# Patient Record
Sex: MALE | Race: WHITE | Employment: OTHER | URBAN - METROPOLITAN AREA
[De-identification: names, ages, dates, MRNs, and addresses within clinical notes are randomized per-mention and may not be internally consistent; named-entity substitution may affect disease eponyms.]

---

## 2017-06-15 PROBLEM — R00.0 TACHYCARDIA: Status: ACTIVE | Noted: 2017-06-15

## 2017-06-15 PROBLEM — K74.60 CIRRHOSIS (HCC): Status: ACTIVE | Noted: 2017-06-15

## 2017-06-15 PROBLEM — I42.9 CARDIOMYOPATHY (HCC): Status: ACTIVE | Noted: 2017-06-15

## 2017-06-15 PROBLEM — E87.1 HYPONATREMIA: Status: ACTIVE | Noted: 2017-06-15

## 2017-06-15 PROBLEM — F10.10 ALCOHOL ABUSE: Status: ACTIVE | Noted: 2017-06-15

## 2017-08-25 PROBLEM — L97.912 NON-PRESSURE CHRONIC ULCER OF RIGHT LOWER LEG WITH FAT LAYER EXPOSED (HCC): Status: ACTIVE | Noted: 2017-08-25

## 2018-02-14 PROBLEM — M17.12 PRIMARY OSTEOARTHRITIS OF LEFT KNEE: Status: ACTIVE | Noted: 2018-02-14

## 2018-04-03 LAB
ALBUMIN SERPL-MCNC: 3.7 G/DL
ALP BLD-CCNC: 57 U/L
ALT SERPL-CCNC: 10 U/L
ANION GAP SERPL CALCULATED.3IONS-SCNC: NORMAL MMOL/L
AST SERPL-CCNC: 14 U/L
AVERAGE GLUCOSE: NORMAL
BASOPHILS ABSOLUTE: 40 /ΜL
BASOPHILS RELATIVE PERCENT: 1 %
BILIRUB SERPL-MCNC: 0.6 MG/DL (ref 0.1–1.4)
BILIRUBIN, URINE: NEGATIVE
BLOOD, URINE: NORMAL
BUN BLDV-MCNC: 13 MG/DL
CALCIUM SERPL-MCNC: 8.9 MG/DL
CHLORIDE BLD-SCNC: 96 MMOL/L
CHOLESTEROL, TOTAL: 197 MG/DL
CHOLESTEROL/HDL RATIO: 5.3
CLARITY: CLEAR
CO2: 32 MMOL/L
COLOR: YELLOW
CREAT SERPL-MCNC: 1.37 MG/DL
EOSINOPHILS ABSOLUTE: 80 /ΜL
EOSINOPHILS RELATIVE PERCENT: 1 %
GFR CALCULATED: NORMAL
GLUCOSE BLD-MCNC: 115 MG/DL
GLUCOSE URINE: NEGATIVE
HBA1C MFR BLD: 5.9 %
HCT VFR BLD CALC: 37.1 % (ref 41–53)
HDLC SERPL-MCNC: 37 MG/DL (ref 35–70)
HEMOGLOBIN: 12.2 G/DL (ref 13.5–17.5)
KETONES, URINE: NEGATIVE
LDL CHOLESTEROL CALCULATED: 134 MG/DL (ref 0–160)
LEUKOCYTE ESTERASE, URINE: NEGATIVE
LYMPHOCYTES ABSOLUTE: 2050 /ΜL
LYMPHOCYTES RELATIVE PERCENT: 27 %
MCH RBC QN AUTO: 31.4 PG
MCHC RBC AUTO-ENTMCNC: 32.8 G/DL
MCV RBC AUTO: 95.8 FL
MONOCYTES ABSOLUTE: 430 /ΜL
MONOCYTES RELATIVE PERCENT: 6 %
NEUTROPHILS ABSOLUTE: 5070 /ΜL
NEUTROPHILS RELATIVE PERCENT: 66 %
NITRITE, URINE: NEGATIVE
PDW BLD-RTO: 14.6 %
PH UA: 7.5 (ref 4.5–8)
PLATELET # BLD: 276 K/ΜL
PMV BLD AUTO: 9.1 FL
POTASSIUM SERPL-SCNC: 4.1 MMOL/L
PROSTATE SPECIFIC ANTIGEN: 3.1 NG/ML
PROTEIN UA: NEGATIVE
RBC # BLD: 3.87 10^6/ΜL
SODIUM BLD-SCNC: 136 MMOL/L
SPECIFIC GRAVITY, URINE: 1.01
TOTAL PROTEIN: 6.2
TRIGL SERPL-MCNC: 134 MG/DL
TSH SERPL DL<=0.05 MIU/L-ACNC: 1.98 UIU/ML
URIC ACID: 8.3
UROBILINOGEN, URINE: NORMAL
VLDLC SERPL CALC-MCNC: NORMAL MG/DL
WBC # BLD: 7.7 10^3/ML

## 2018-04-24 ENCOUNTER — OFFICE VISIT (OUTPATIENT)
Dept: ORTHOPEDIC SURGERY | Age: 78
End: 2018-04-24
Payer: MEDICARE

## 2018-04-24 VITALS
HEART RATE: 64 BPM | BODY MASS INDEX: 34.07 KG/M2 | SYSTOLIC BLOOD PRESSURE: 123 MMHG | WEIGHT: 230 LBS | DIASTOLIC BLOOD PRESSURE: 66 MMHG | TEMPERATURE: 97.3 F | HEIGHT: 69 IN

## 2018-04-24 DIAGNOSIS — M17.12 PRIMARY OSTEOARTHRITIS OF LEFT KNEE: Primary | ICD-10-CM

## 2018-04-24 PROCEDURE — 99213 OFFICE O/P EST LOW 20 MIN: CPT | Performed by: ORTHOPAEDIC SURGERY

## 2018-04-24 RX ORDER — POTASSIUM CHLORIDE 20 MEQ/1
20 TABLET, EXTENDED RELEASE ORAL 2 TIMES DAILY
COMMUNITY
Start: 2018-04-23 | End: 2020-05-12 | Stop reason: SDUPTHER

## 2018-04-27 ENCOUNTER — OFFICE VISIT (OUTPATIENT)
Dept: PRIMARY CARE CLINIC | Age: 78
End: 2018-04-27
Payer: MEDICARE

## 2018-04-27 VITALS
DIASTOLIC BLOOD PRESSURE: 62 MMHG | BODY MASS INDEX: 36.22 KG/M2 | RESPIRATION RATE: 18 BRPM | HEIGHT: 68 IN | SYSTOLIC BLOOD PRESSURE: 118 MMHG | OXYGEN SATURATION: 98 % | HEART RATE: 65 BPM | TEMPERATURE: 98.1 F | WEIGHT: 239 LBS

## 2018-04-27 DIAGNOSIS — I42.0 DILATED CARDIOMYOPATHY (HCC): ICD-10-CM

## 2018-04-27 DIAGNOSIS — I10 ESSENTIAL HYPERTENSION: ICD-10-CM

## 2018-04-27 DIAGNOSIS — R18.8 OTHER ASCITES: ICD-10-CM

## 2018-04-27 DIAGNOSIS — E78.00 PURE HYPERCHOLESTEROLEMIA: ICD-10-CM

## 2018-04-27 DIAGNOSIS — M89.9 DISORDER OF BONE: ICD-10-CM

## 2018-04-27 DIAGNOSIS — K76.9 LIVER DISEASE: Primary | ICD-10-CM

## 2018-04-27 DIAGNOSIS — Z87.898 HISTORY OF ASCITES: ICD-10-CM

## 2018-04-27 DIAGNOSIS — I48.19 PERSISTENT ATRIAL FIBRILLATION (HCC): ICD-10-CM

## 2018-04-27 DIAGNOSIS — R53.83 FATIGUE, UNSPECIFIED TYPE: ICD-10-CM

## 2018-04-27 DIAGNOSIS — R73.09 ELEVATED HEMOGLOBIN A1C: ICD-10-CM

## 2018-04-27 DIAGNOSIS — K70.31 ALCOHOLIC CIRRHOSIS OF LIVER WITH ASCITES (HCC): ICD-10-CM

## 2018-04-27 DIAGNOSIS — M17.12 PRIMARY OSTEOARTHRITIS OF LEFT KNEE: ICD-10-CM

## 2018-04-27 PROCEDURE — 99214 OFFICE O/P EST MOD 30 MIN: CPT | Performed by: INTERNAL MEDICINE

## 2018-04-27 RX ORDER — LEVOTHYROXINE SODIUM 0.2 MG/1
200 TABLET ORAL DAILY
COMMUNITY
End: 2018-08-28 | Stop reason: SDUPTHER

## 2018-04-27 ASSESSMENT — ENCOUNTER SYMPTOMS
STRIDOR: 0
BLOOD IN STOOL: 0
DOUBLE VISION: 0
HEMOPTYSIS: 0
EYE REDNESS: 0
EYE PAIN: 0
BLURRED VISION: 0
DIARRHEA: 0
NAUSEA: 0
SHORTNESS OF BREATH: 0

## 2018-04-27 ASSESSMENT — PATIENT HEALTH QUESTIONNAIRE - PHQ9
SUM OF ALL RESPONSES TO PHQ9 QUESTIONS 1 & 2: 0
2. FEELING DOWN, DEPRESSED OR HOPELESS: 0
SUM OF ALL RESPONSES TO PHQ QUESTIONS 1-9: 0
1. LITTLE INTEREST OR PLEASURE IN DOING THINGS: 0

## 2018-07-22 ENCOUNTER — HOSPITAL ENCOUNTER (EMERGENCY)
Age: 78
Discharge: HOME | End: 2018-07-22
Attending: EMERGENCY MEDICINE
Payer: MEDICARE

## 2018-07-22 ENCOUNTER — APPOINTMENT (OUTPATIENT)
Dept: GENERAL RADIOLOGY | Age: 78
End: 2018-07-22
Payer: MEDICARE

## 2018-07-22 VITALS
OXYGEN SATURATION: 97 % | HEART RATE: 72 BPM | BODY MASS INDEX: 34.07 KG/M2 | HEIGHT: 69 IN | DIASTOLIC BLOOD PRESSURE: 66 MMHG | WEIGHT: 230 LBS | TEMPERATURE: 97.5 F | SYSTOLIC BLOOD PRESSURE: 152 MMHG | RESPIRATION RATE: 16 BRPM

## 2018-07-22 DIAGNOSIS — S63.501A SPRAIN OF RIGHT WRIST, INITIAL ENCOUNTER: Primary | ICD-10-CM

## 2018-07-22 PROCEDURE — 99283 EMERGENCY DEPT VISIT LOW MDM: CPT

## 2018-07-22 PROCEDURE — 73110 X-RAY EXAM OF WRIST: CPT

## 2018-07-22 PROCEDURE — 6360000002 HC RX W HCPCS: Performed by: EMERGENCY MEDICINE

## 2018-07-22 PROCEDURE — 96372 THER/PROPH/DIAG INJ SC/IM: CPT

## 2018-07-22 PROCEDURE — 6370000000 HC RX 637 (ALT 250 FOR IP): Performed by: EMERGENCY MEDICINE

## 2018-07-22 RX ORDER — OXYCODONE HYDROCHLORIDE AND ACETAMINOPHEN 5; 325 MG/1; MG/1
1 TABLET ORAL EVERY 6 HOURS PRN
Qty: 20 TABLET | Refills: 0 | Status: SHIPPED | OUTPATIENT
Start: 2018-07-22 | End: 2018-07-27

## 2018-07-22 RX ORDER — KETOROLAC TROMETHAMINE 30 MG/ML
30 INJECTION, SOLUTION INTRAMUSCULAR; INTRAVENOUS ONCE
Status: COMPLETED | OUTPATIENT
Start: 2018-07-22 | End: 2018-07-22

## 2018-07-22 RX ORDER — ACETAMINOPHEN 500 MG
1000 TABLET ORAL ONCE
Status: COMPLETED | OUTPATIENT
Start: 2018-07-22 | End: 2018-07-22

## 2018-07-22 RX ADMIN — ACETAMINOPHEN 1000 MG: 500 TABLET ORAL at 05:48

## 2018-07-22 RX ADMIN — KETOROLAC TROMETHAMINE 30 MG: 30 INJECTION, SOLUTION INTRAMUSCULAR at 05:49

## 2018-07-22 ASSESSMENT — PAIN DESCRIPTION - PAIN TYPE: TYPE: ACUTE PAIN

## 2018-07-22 ASSESSMENT — PAIN DESCRIPTION - LOCATION: LOCATION: WRIST

## 2018-07-22 ASSESSMENT — PAIN DESCRIPTION - ORIENTATION: ORIENTATION: RIGHT

## 2018-07-22 ASSESSMENT — PAIN SCALES - GENERAL: PAINLEVEL_OUTOF10: 8

## 2018-07-22 NOTE — ED PROVIDER NOTES
reviewed and negative    Physical brief exam: See HPI for other details  Details in electronic record may supersede details below    Vital signs reviewed, please refer to nurses note  Constitutionall: No distress, warm, dry, well nourished, nontoxic  HENT:  NC AT, no deformity, no bleeding of gums  Eyes:  EOMI, nl lids and conjunctiva   Resp:  normal resp rate, no resp distress, no stridor  CVS:  no JVD, no visible heave  GI:  no outward sign peritonitis or splinting, non distended  Musc-Skel:  full range of motion, (+) edema and ttp in right wrist, neurovascularly intact  Skin:  warm and dry, normal turgor, no rashes   Neurologic: awake and alert, cooperative, Cranial Nerves II-XII grossly intact, motor& sensory grossly intact x4   Psychiatric: orientated x3, answers questions appropriately, judgment & affect grossly appropriate  Heme/ Lymph: no visible adenopathy, ecchymosis, pettichiae    Procedures/Radiology:  Wrist xray negative as read by me, pt told that official radiology read is pending and he will be contacted if there is a positive read by radiology    ------------------------- NURSING NOTES AND VITALS REVIEWED ---------------------------  Date / Time Roomed:  7/22/2018  4:58 AM  ED Bed Assignment:  02/02    The nursing notes within the ED encounter and vital signs as below have been reviewed. Patient Vitals for the past 24 hrs:   BP Temp Temp src Pulse Resp SpO2 Height Weight   07/22/18 0512 (!) 152/66 97.5 °F (36.4 °C) Oral 72 16 97 % 5' 9\" (1.753 m) 230 lb (104.3 kg)       Oxygen Saturation Interpretation: Normal      Assessment and Plan: Negative wrist xray, will splint and give pain control, pt to f/u with pcp, given strict return precautions for any new or worsening symptoms      Impression: Right Wrist Sprain      I have reviewed the patient's medications, vital signs, and diagnostic studies available to me in the medical record at the time of the patient encounter.           Danilo Sutherland, MD  07/22/18 5596

## 2018-08-28 RX ORDER — LEVOTHYROXINE SODIUM 0.2 MG/1
200 TABLET ORAL DAILY
Qty: 90 TABLET | Refills: 1 | Status: SHIPPED | OUTPATIENT
Start: 2018-08-28 | End: 2019-02-26 | Stop reason: SDUPTHER

## 2018-09-10 RX ORDER — GABAPENTIN 100 MG/1
100 CAPSULE ORAL 3 TIMES DAILY
Qty: 270 CAPSULE | Refills: 0
Start: 2018-09-10 | End: 2018-09-11 | Stop reason: SDUPTHER

## 2018-09-11 RX ORDER — GABAPENTIN 100 MG/1
CAPSULE ORAL
Qty: 270 CAPSULE | Refills: 1 | Status: SHIPPED | OUTPATIENT
Start: 2018-09-11 | End: 2019-04-01 | Stop reason: SDUPTHER

## 2018-10-04 LAB
ALBUMIN SERPL-MCNC: 3.9 G/DL
ALP BLD-CCNC: 58 U/L
ALT SERPL-CCNC: 11 U/L
ANION GAP SERPL CALCULATED.3IONS-SCNC: 17 MMOL/L
AST SERPL-CCNC: 19 U/L
AVERAGE GLUCOSE: 111
BASOPHILS ABSOLUTE: 0.05 /ΜL
BASOPHILS RELATIVE PERCENT: 0.7 %
BILIRUB SERPL-MCNC: 0.4 MG/DL (ref 0.1–1.4)
BILIRUBIN, URINE: NEGATIVE
BLOOD, URINE: NEGATIVE
BUN BLDV-MCNC: 18 MG/DL
CALCIUM SERPL-MCNC: 9 MG/DL
CHLORIDE BLD-SCNC: 89 MMOL/L
CHOLESTEROL, TOTAL: 181 MG/DL
CHOLESTEROL/HDL RATIO: NORMAL
CLARITY: CLEAR
CO2: 25 MMOL/L
COLOR: YELLOW
CREAT SERPL-MCNC: 1.49 MG/DL
EOSINOPHILS ABSOLUTE: 0.07 /ΜL
EOSINOPHILS RELATIVE PERCENT: 0.9 %
GFR CALCULATED: NORMAL
GLUCOSE BLD-MCNC: 110 MG/DL
GLUCOSE URINE: NEGATIVE
HBA1C MFR BLD: 5.5 %
HCT VFR BLD CALC: 38.2 % (ref 41–53)
HDLC SERPL-MCNC: 37 MG/DL (ref 35–70)
HEMOGLOBIN: 13 G/DL (ref 13.5–17.5)
KETONES, URINE: NEGATIVE
LDL CHOLESTEROL CALCULATED: 112 MG/DL (ref 0–160)
LEUKOCYTE ESTERASE, URINE: NEGATIVE
LYMPHOCYTES ABSOLUTE: 2.84 /ΜL
LYMPHOCYTES RELATIVE PERCENT: 36.9 %
MCH RBC QN AUTO: 31.6 PG
MCHC RBC AUTO-ENTMCNC: 34 G/DL
MCV RBC AUTO: 92.7 FL
MONOCYTES ABSOLUTE: 0.66 /ΜL
MONOCYTES RELATIVE PERCENT: 8.6 %
NEUTROPHILS ABSOLUTE: 4.05 /ΜL
NEUTROPHILS RELATIVE PERCENT: 52.9 %
NITRITE, URINE: NEGATIVE
PDW BLD-RTO: ABNORMAL %
PH UA: 7 (ref 4.5–8)
PLATELET # BLD: 302 K/ΜL
PMV BLD AUTO: 10.6 FL
POTASSIUM SERPL-SCNC: 3.6 MMOL/L
PROTEIN UA: NEGATIVE
RBC # BLD: 4.12 10^6/ΜL
SODIUM BLD-SCNC: 131 MMOL/L
SPECIFIC GRAVITY, URINE: 1.01
TOTAL PROTEIN: 7.4
TRIGL SERPL-MCNC: 158 MG/DL
TSH SERPL DL<=0.05 MIU/L-ACNC: 0.79 UIU/ML
URIC ACID: 13.1
UROBILINOGEN, URINE: NORMAL
VITAMIN D 25-HYDROXY: NORMAL
VITAMIN D2, 25 HYDROXY: NORMAL
VITAMIN D3,25 HYDROXY: NORMAL
VLDLC SERPL CALC-MCNC: 32 MG/DL
WBC # BLD: 7.69 10^3/ML

## 2018-10-11 DIAGNOSIS — R73.09 ELEVATED HEMOGLOBIN A1C: ICD-10-CM

## 2018-10-11 DIAGNOSIS — R53.83 FATIGUE, UNSPECIFIED TYPE: ICD-10-CM

## 2018-10-11 DIAGNOSIS — R18.8 OTHER ASCITES: ICD-10-CM

## 2018-10-11 DIAGNOSIS — M89.9 DISORDER OF BONE: ICD-10-CM

## 2018-10-11 DIAGNOSIS — K76.9 LIVER DISEASE: ICD-10-CM

## 2018-10-11 DIAGNOSIS — I10 ESSENTIAL HYPERTENSION: ICD-10-CM

## 2018-10-11 DIAGNOSIS — I42.0 DILATED CARDIOMYOPATHY (HCC): ICD-10-CM

## 2018-10-11 DIAGNOSIS — E78.00 PURE HYPERCHOLESTEROLEMIA: ICD-10-CM

## 2018-10-25 ENCOUNTER — OFFICE VISIT (OUTPATIENT)
Dept: PRIMARY CARE CLINIC | Age: 78
End: 2018-10-25
Payer: MEDICARE

## 2018-10-25 VITALS
BODY MASS INDEX: 34.96 KG/M2 | DIASTOLIC BLOOD PRESSURE: 62 MMHG | HEIGHT: 69 IN | OXYGEN SATURATION: 98 % | HEART RATE: 64 BPM | TEMPERATURE: 98 F | WEIGHT: 236 LBS | SYSTOLIC BLOOD PRESSURE: 126 MMHG | RESPIRATION RATE: 18 BRPM

## 2018-10-25 DIAGNOSIS — N18.30 CKD (CHRONIC KIDNEY DISEASE) STAGE 3, GFR 30-59 ML/MIN (HCC): ICD-10-CM

## 2018-10-25 DIAGNOSIS — E78.00 PURE HYPERCHOLESTEROLEMIA: ICD-10-CM

## 2018-10-25 DIAGNOSIS — I10 ESSENTIAL HYPERTENSION: ICD-10-CM

## 2018-10-25 DIAGNOSIS — R53.83 FATIGUE, UNSPECIFIED TYPE: ICD-10-CM

## 2018-10-25 DIAGNOSIS — R73.01 ABNORMAL FASTING GLUCOSE: ICD-10-CM

## 2018-10-25 DIAGNOSIS — Z00.00 ROUTINE GENERAL MEDICAL EXAMINATION AT A HEALTH CARE FACILITY: Primary | ICD-10-CM

## 2018-10-25 DIAGNOSIS — R73.09 ELEVATED HEMOGLOBIN A1C: ICD-10-CM

## 2018-10-25 DIAGNOSIS — E55.9 VITAMIN D DEFICIENCY: ICD-10-CM

## 2018-10-25 DIAGNOSIS — Z87.898 HISTORY OF ASCITES: ICD-10-CM

## 2018-10-25 DIAGNOSIS — E79.0 HYPERURICEMIA: ICD-10-CM

## 2018-10-25 DIAGNOSIS — K70.31 ALCOHOLIC CIRRHOSIS OF LIVER WITH ASCITES (HCC): ICD-10-CM

## 2018-10-25 DIAGNOSIS — Z71.85 IMMUNIZATION COUNSELING: ICD-10-CM

## 2018-10-25 DIAGNOSIS — N40.0 PROSTATE ENLARGEMENT: ICD-10-CM

## 2018-10-25 DIAGNOSIS — I42.0 DILATED CARDIOMYOPATHY (HCC): ICD-10-CM

## 2018-10-25 DIAGNOSIS — I89.0 LYMPHEDEMA: ICD-10-CM

## 2018-10-25 DIAGNOSIS — Z12.11 ENCOUNTER FOR SCREENING COLONOSCOPY: ICD-10-CM

## 2018-10-25 DIAGNOSIS — G62.9 NEUROPATHY: ICD-10-CM

## 2018-10-25 DIAGNOSIS — R00.0 TACHYCARDIA: ICD-10-CM

## 2018-10-25 PROCEDURE — G0439 PPPS, SUBSEQ VISIT: HCPCS | Performed by: INTERNAL MEDICINE

## 2018-10-25 RX ORDER — THIAMINE MONONITRATE (VIT B1) 100 MG
100 TABLET ORAL DAILY
COMMUNITY

## 2018-10-25 RX ORDER — BUMETANIDE 2 MG/1
2 TABLET ORAL 3 TIMES DAILY
COMMUNITY
End: 2019-06-06 | Stop reason: ALTCHOICE

## 2018-10-25 ASSESSMENT — LIFESTYLE VARIABLES
HOW OFTEN DURING THE LAST YEAR HAVE YOU NEEDED AN ALCOHOLIC DRINK FIRST THING IN THE MORNING TO GET YOURSELF GOING AFTER A NIGHT OF HEAVY DRINKING: 0
HOW OFTEN DURING THE LAST YEAR HAVE YOU FOUND THAT YOU WERE NOT ABLE TO STOP DRINKING ONCE YOU HAD STARTED: 0
HOW OFTEN DO YOU HAVE A DRINK CONTAINING ALCOHOL: 1
HOW MANY STANDARD DRINKS CONTAINING ALCOHOL DO YOU HAVE ON A TYPICAL DAY: 0
AUDIT TOTAL SCORE: 5
HAVE YOU OR SOMEONE ELSE BEEN INJURED AS A RESULT OF YOUR DRINKING: 0
HOW OFTEN DURING THE LAST YEAR HAVE YOU FAILED TO DO WHAT WAS NORMALLY EXPECTED FROM YOU BECAUSE OF DRINKING: 0
HAS A RELATIVE, FRIEND, DOCTOR, OR ANOTHER HEALTH PROFESSIONAL EXPRESSED CONCERN ABOUT YOUR DRINKING OR SUGGESTED YOU CUT DOWN: 4
HOW OFTEN DURING THE LAST YEAR HAVE YOU HAD A FEELING OF GUILT OR REMORSE AFTER DRINKING: 0
HOW OFTEN DO YOU HAVE SIX OR MORE DRINKS ON ONE OCCASION: 0
AUDIT-C TOTAL SCORE: 1
HOW OFTEN DURING THE LAST YEAR HAVE YOU BEEN UNABLE TO REMEMBER WHAT HAPPENED THE NIGHT BEFORE BECAUSE YOU HAD BEEN DRINKING: 0

## 2018-10-25 NOTE — PROGRESS NOTES
Subsequent annual wellness visit for this 71-year-old gentleman last seen me on 19    We reviewed his past history current medication, and review of systems. Things are improving. He discussed the chronic conditions that he's been treated for which included the followin. 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 2.  Lymphedema, improved secondary to ascites, patient has discontinued drinking and is markedly improved    3. 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 Patient is no longer seeing cardiology    4. As noted above history of hepatic encephalopathy with liver failure and cirrhosis, was followed by Dr. Renaldo Moritz, had paracentesis on occasion. No longer following up with gastroenterology    5. History of chronic kidney disease stage III, continues to see nephrologist Dr. Adeline Ordonez, we'll see him in the coming week. Patient will bring along laboratory work that was performed on 10/4/18. We also reviewed that lab work today    6. Hypercholesterolemia, controlled    7. Essential hypertension, controlled    8. 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 Her graft 9 last colonoscopy on this patient was performed , I advised patient to see Dr. Darien Vargas for a 10 year follow-up, the patient states \"I'll think about it\". ,  Would not allow me to make a referral    9. Discussed immunizations with the patient, he refuses all of them    Plan;  Continue all current medications    Follow-up with nephrology next week Dr. Adeline Ordonez    Consider having colonoscopy, patient will notify us when he is ready to be seen     Dr. Michael Alex, orthopedic surgeon regarding knee pain, patient will make that call himself    Laboratory studies to be performed and see me in 6 months time.   He can combine the laboratory studies

## 2018-10-25 NOTE — PATIENT INSTRUCTIONS
Personalized Preventive Plan for Margaux Thomas - 10/25/2018  Medicare offers a range of preventive health benefits. Some of the tests and screenings are paid in full while other may be subject to a deductible, co-insurance, and/or copay. Some of these benefits include a comprehensive review of your medical history including lifestyle, illnesses that may run in your family, and various assessments and screenings as appropriate. After reviewing your medical record and screening and assessments performed today your provider may have ordered immunizations, labs, imaging, and/or referrals for you. A list of these orders (if applicable) as well as your Preventive Care list are included within your After Visit Summary for your review. Other Preventive Recommendations:    · A preventive eye exam performed by an eye specialist is recommended every 1-2 years to screen for glaucoma; cataracts, macular degeneration, and other eye disorders. · A preventive dental visit is recommended every 6 months. · Try to get at least 150 minutes of exercise per week or 10,000 steps per day on a pedometer . · Order or download the FREE \"Exercise & Physical Activity: Your Everyday Guide\" from [employer]. Call 3-360.602.6610 or search [employer] online. · You need 5000-1687 mg of calcium and 6841-4832 IU of vitamin D per day. It is possible to meet your calcium requirement with diet alone, but a vitamin D supplement is usually necessary to meet this goal.  · When exposed to the sun, use a sunscreen that protects against both UVA and UVB radiation with an SPF of 30 or greater. Reapply every 2 to 3 hours or after sweating, drying off with a towel, or swimming. · Always wear a seat belt when traveling in a car. Always wear a helmet when riding a bicycle or motorcycle.

## 2018-10-30 ENCOUNTER — OFFICE VISIT (OUTPATIENT)
Dept: ORTHOPEDIC SURGERY | Age: 78
End: 2018-10-30
Payer: MEDICARE

## 2018-10-30 VITALS
TEMPERATURE: 98.3 F | SYSTOLIC BLOOD PRESSURE: 122 MMHG | DIASTOLIC BLOOD PRESSURE: 69 MMHG | BODY MASS INDEX: 35.06 KG/M2 | WEIGHT: 234 LBS | HEART RATE: 63 BPM

## 2018-10-30 DIAGNOSIS — M17.12 PRIMARY OSTEOARTHRITIS OF LEFT KNEE: Primary | ICD-10-CM

## 2018-10-30 PROCEDURE — 20610 DRAIN/INJ JOINT/BURSA W/O US: CPT | Performed by: ORTHOPAEDIC SURGERY

## 2018-10-30 PROCEDURE — 99213 OFFICE O/P EST LOW 20 MIN: CPT | Performed by: ORTHOPAEDIC SURGERY

## 2018-10-30 RX ORDER — BUPIVACAINE HYDROCHLORIDE 2.5 MG/ML
3 INJECTION, SOLUTION INFILTRATION; PERINEURAL ONCE
Status: COMPLETED | OUTPATIENT
Start: 2018-10-30 | End: 2018-10-30

## 2018-10-30 RX ORDER — TRIAMCINOLONE ACETONIDE 40 MG/ML
80 INJECTION, SUSPENSION INTRA-ARTICULAR; INTRAMUSCULAR ONCE
Status: COMPLETED | OUTPATIENT
Start: 2018-10-30 | End: 2018-10-30

## 2018-10-30 RX ADMIN — BUPIVACAINE HYDROCHLORIDE 7.5 MG: 2.5 INJECTION, SOLUTION INFILTRATION; PERINEURAL at 14:04

## 2018-10-30 RX ADMIN — TRIAMCINOLONE ACETONIDE 80 MG: 40 INJECTION, SUSPENSION INTRA-ARTICULAR; INTRAMUSCULAR at 14:05

## 2018-10-31 NOTE — PROGRESS NOTES
Chief Complaint:   Chief Complaint   Patient presents with    Knee Pain     Lt knee pain, requesting injection. Last injection given 2/14/2018       Felisha Longoria  presents with recent recurrence of left knee pain, did very well for some time following the injection back in the spring. Denies right knee pain has no other joint complaints. Left knee pain occurs with weightbearing standing transfers and stairs. Stiffness noted but no mechanical locking or giving way. No other joint complaints no fever chills sweats or other constitutional symptoms    Allergies; medications; past medical, surgical, family, and social history; and problem list have been reviewed today and updated as indicated in this encounter seen below. Current Outpatient Prescriptions   Medication Sig Dispense Refill    bumetanide (BUMEX) 2 MG tablet Take 2 mg by mouth 3 times daily      vitamin B-1 (THIAMINE) 100 MG tablet Take 100 mg by mouth daily      gabapentin (NEURONTIN) 100 MG capsule TAKE ONE CAPSULE BY MOUTH THREE TIMES A  capsule 1    levothyroxine (SYNTHROID) 200 MCG tablet Take 1 tablet by mouth Daily 90 tablet 1    potassium chloride (KLOR-CON M) 20 MEQ extended release tablet Take 20 mEq by mouth 2 times daily       allopurinol (ZYLOPRIM) 100 MG tablet TAKE ONE TABLET BY MOUTH TWO TIMES A DAY  1    magnesium oxide (MAG-OX) 400 MG tablet Take 400 mg by mouth daily      metolazone (ZAROXOLYN) 2.5 MG tablet Take 2.5 mg by mouth once a week       HYDROcodone-acetaminophen (NORCO) 5-325 MG per tablet Take 1 tablet by mouth every 6 hours as needed for Pain .  metoprolol tartrate (LOPRESSOR) 50 MG tablet Take 1 tablet by mouth 2 times daily 60 tablet 3    MULTIPLE VITAMIN PO Take  by mouth daily.  Cyanocobalamin (VITAMIN B 12) 100 MCG LOZG Take 200 mcg by mouth. check frequency       No current facility-administered medications for this visit.         Exam: Leg lengths equal hip motion painless, right knee

## 2018-11-24 PROBLEM — Z12.11 ENCOUNTER FOR SCREENING COLONOSCOPY: Status: RESOLVED | Noted: 2018-10-25 | Resolved: 2018-11-24

## 2019-02-26 RX ORDER — LEVOTHYROXINE SODIUM 0.2 MG/1
200 TABLET ORAL DAILY
Qty: 90 TABLET | Refills: 0 | Status: SHIPPED | OUTPATIENT
Start: 2019-02-26 | End: 2019-05-31 | Stop reason: SDUPTHER

## 2019-04-01 RX ORDER — GABAPENTIN 100 MG/1
CAPSULE ORAL
Qty: 270 CAPSULE | Refills: 1 | Status: SHIPPED | OUTPATIENT
Start: 2019-04-01 | End: 2019-11-25 | Stop reason: SDUPTHER

## 2019-05-31 RX ORDER — LEVOTHYROXINE SODIUM 0.2 MG/1
200 TABLET ORAL DAILY
Qty: 90 TABLET | Refills: 1 | Status: SHIPPED | OUTPATIENT
Start: 2019-05-31 | End: 2019-11-25 | Stop reason: SDUPTHER

## 2019-06-06 ENCOUNTER — OFFICE VISIT (OUTPATIENT)
Dept: ORTHOPEDIC SURGERY | Age: 79
End: 2019-06-06
Payer: MEDICARE

## 2019-06-06 VITALS
TEMPERATURE: 97.2 F | BODY MASS INDEX: 35.55 KG/M2 | DIASTOLIC BLOOD PRESSURE: 73 MMHG | WEIGHT: 240 LBS | HEIGHT: 69 IN | HEART RATE: 69 BPM | SYSTOLIC BLOOD PRESSURE: 132 MMHG

## 2019-06-06 DIAGNOSIS — E66.9 OBESITY (BMI 30-39.9): ICD-10-CM

## 2019-06-06 DIAGNOSIS — M17.12 PRIMARY OSTEOARTHRITIS OF LEFT KNEE: Primary | ICD-10-CM

## 2019-06-06 PROCEDURE — 20610 DRAIN/INJ JOINT/BURSA W/O US: CPT | Performed by: ORTHOPAEDIC SURGERY

## 2019-06-06 PROCEDURE — 99213 OFFICE O/P EST LOW 20 MIN: CPT | Performed by: ORTHOPAEDIC SURGERY

## 2019-06-06 RX ORDER — TORSEMIDE 20 MG/1
40 TABLET ORAL 2 TIMES DAILY
COMMUNITY
Start: 2019-06-05

## 2019-06-06 RX ORDER — SPIRONOLACTONE 25 MG/1
25 TABLET ORAL DAILY
Refills: 5 | COMMUNITY
Start: 2019-04-23 | End: 2020-04-20

## 2019-06-06 RX ORDER — LIDOCAINE HYDROCHLORIDE 10 MG/ML
3 INJECTION, SOLUTION INFILTRATION; PERINEURAL ONCE
Status: COMPLETED | OUTPATIENT
Start: 2019-06-06 | End: 2019-06-07

## 2019-06-06 RX ORDER — TRIAMCINOLONE ACETONIDE 40 MG/ML
80 INJECTION, SUSPENSION INTRA-ARTICULAR; INTRAMUSCULAR ONCE
Status: COMPLETED | OUTPATIENT
Start: 2019-06-06 | End: 2019-06-07

## 2019-06-07 RX ADMIN — TRIAMCINOLONE ACETONIDE 80 MG: 40 INJECTION, SUSPENSION INTRA-ARTICULAR; INTRAMUSCULAR at 13:31

## 2019-06-07 RX ADMIN — LIDOCAINE HYDROCHLORIDE 3 ML: 10 INJECTION, SOLUTION INFILTRATION; PERINEURAL at 13:30

## 2019-06-07 NOTE — PROGRESS NOTES
Chief Complaint:   Chief Complaint   Patient presents with    Knee Pain     Lt knee pain. Pain improved for approx 2 months after injection then returned. Pain has since increased. States pain level 7/10       Tate Hubbard  presents with recurrence of anterior to medial left knee pain, temporarily relieved in the past with injections for variable periods of time. Pain is now more constant interfering significantly with transfers sit to stand as well as stairs and ambulation on level surfaces. No injury no history of mechanical problems although he does note stiffness especially after sitting. No fever chills sweats or other systemic symptoms. Patient recognizes that his weight is probably contributing to his discomfort and requests assistance with weight loss prior to considering more aggressive intervention such as surgery in the knee. Allergies; medications; past medical, surgical, family, and social history; and problem list have been reviewed today and updated as indicated in this encounter seen below. Exam: Upper extremities intact leg lengths equal hip motion past. Left knee shows synovitis no active effusion, non-correctable varus deformity with joint line tenderness to palpation, mild crepitus range of motion limited 5-95° of flexion by pain. No mediolateral or AP laxity. Radiographs: Prior x-rays of left knee are reviewed showing advanced degenerative disease of the left knee with varus deformity sclerosis and osteophyte formation. Jona Linn was seen today for knee pain.     Diagnoses and all orders for this visit:    Primary osteoarthritis of left knee  -     MT ARTHROCENTESIS ASPIR&/INJ MAJOR JT/BURSA W/O   -     [facility] - Henry Adame MD, Endocrinology, [place]    Obesity (BMI 30-39.9)  -     Derek Figueroa MD, Endocrinology, [place]    Other orders  -     triamcinolone acetonide (KENALOG-40) injection 80 mg  -     lidocaine 1 % injection 3 mL       Treatment options were reviewed, at this point the patient requests assistance with weight loss so referral was made to Dr. Racquel Marte in this regard, patient may pursue his own over-the-counter remedy such as nutri system in the meantime. At his request today repeated injection of Kenalog and lidocaine in the left knee through an anterior approach no difficulty, case tolerated well. Questions asked and answered follow-up in 4 months or as needed. Return in about 4 months (around 10/6/2019). Current Outpatient Medications   Medication Sig Dispense Refill    spironolactone (ALDACTONE) 25 MG tablet TAKE THREE (3) TABLETS BY MOUTH ONCE DAILY AS DIRECTED  5    torsemide (DEMADEX) 20 MG tablet Take 20 mg by mouth       levothyroxine (SYNTHROID) 200 MCG tablet Take 1 tablet by mouth Daily 90 tablet 1    gabapentin (NEURONTIN) 100 MG capsule TAKE ONE CAPSULE BY MOUTH THREE TIMES A  capsule 1    vitamin B-1 (THIAMINE) 100 MG tablet Take 100 mg by mouth daily      potassium chloride (KLOR-CON M) 20 MEQ extended release tablet Take 20 mEq by mouth 2 times daily       allopurinol (ZYLOPRIM) 100 MG tablet TAKE ONE TABLET BY MOUTH TWO TIMES A DAY  1    magnesium oxide (MAG-OX) 400 MG tablet Take 400 mg by mouth daily      metolazone (ZAROXOLYN) 2.5 MG tablet Take 2.5 mg by mouth once a week       metoprolol tartrate (LOPRESSOR) 50 MG tablet Take 1 tablet by mouth 2 times daily 60 tablet 3    MULTIPLE VITAMIN PO Take  by mouth daily.  HYDROcodone-acetaminophen (NORCO) 5-325 MG per tablet Take 1 tablet by mouth every 6 hours as needed for Pain .        Current Facility-Administered Medications   Medication Dose Route Frequency Provider Last Rate Last Dose    triamcinolone acetonide (KENALOG-40) injection 80 mg  80 mg Intra-articular Once Yanna Hardin MD        lidocaine 1 % injection 3 mL  3 mL Intra-articular Once Yanna Hardin MD           Patient Active Problem List   Diagnosis    Neuropathy ([address]    Ascites  Cirrhosis ([facility]    Tachycardia    Hyponatremia    Cardiomyopathy ([facility]    Alcohol abuse    Delirium due to multiple etiologies    Alcohol use disorder, severe, dependence (HCC)    Persistent atrial fibrillation (HCC)    Non-pressure chronic ulcer of right lower leg with fat layer exposed ([facility]    Primary osteoarthritis of left knee    Osteoarthritis    Lymphedema    Liver disease    Hypertension    Hyperlipidemia    History of ascites    Disorder of bone    Fatigue    Elevated hemoglobin A1c    Immunization counseling    CKD (chronic kidney disease) stage 3, GFR 30-59 ml/min (HCC)    Hyperuricemia    Prostate enlargement    Abnormal fasting glucose    Vitamin D deficiency       Past Medical History:   Diagnosis Date    Ascites     Blood circulation, collateral     CAD (coronary artery disease)     Chronic kidney disease     History of alcohol use     History of ascites     Hyperlipidemia     Hypertension     Hyponatremia     Liver disease     Lymphedema     Neuropathy     both feet, 5/10 in severity    Osteoarthritis     Psychiatric problem     Thyroid disease        Past Surgical History:   Procedure Laterality Date    APPENDECTOMY      COLONOSCOPY      EYE SURGERY      cataracts       Allergies   Allergen Reactions    Sulfa Antibiotics      Hives         Social History     Socioeconomic History    Marital status:      Spouse name: None    Number of children: None    Years of education: None    Highest education level: None   Occupational History    None   Social Needs    Financial resource strain: None    Food insecurity:     Worry: None     Inability: None    Transportation needs:     Medical: None     Non-medical: None   Tobacco Use    Smoking status: Former Smoker     Packs/day: 0.75     Years: 25.00     Pack years: 18.75     Types: Pipe, Cigars     Start date: 10/8/1964     Last attempt to quit: 1986     Years since quittin.2   

## 2019-08-22 ENCOUNTER — TELEPHONE (OUTPATIENT)
Dept: PRIMARY CARE CLINIC | Age: 79
End: 2019-08-22

## 2019-08-22 DIAGNOSIS — E55.9 VITAMIN D DEFICIENCY: ICD-10-CM

## 2019-08-22 DIAGNOSIS — I10 ESSENTIAL HYPERTENSION: ICD-10-CM

## 2019-08-22 DIAGNOSIS — I48.19 PERSISTENT ATRIAL FIBRILLATION (HCC): Primary | ICD-10-CM

## 2019-08-22 DIAGNOSIS — Z12.5 SCREENING FOR PROSTATE CANCER: ICD-10-CM

## 2019-08-22 DIAGNOSIS — E78.00 PURE HYPERCHOLESTEROLEMIA: ICD-10-CM

## 2019-08-22 DIAGNOSIS — R53.83 FATIGUE, UNSPECIFIED TYPE: ICD-10-CM

## 2019-08-22 DIAGNOSIS — N40.0 PROSTATE ENLARGEMENT: ICD-10-CM

## 2019-08-22 DIAGNOSIS — M89.9 DISORDER OF BONE: ICD-10-CM

## 2019-08-22 DIAGNOSIS — R73.09 ELEVATED HEMOGLOBIN A1C: ICD-10-CM

## 2019-09-04 ENCOUNTER — HOSPITAL ENCOUNTER (EMERGENCY)
Age: 79
Discharge: HOME | End: 2019-09-04
Attending: EMERGENCY MEDICINE
Payer: MEDICARE

## 2019-09-04 ENCOUNTER — OFFICE VISIT (OUTPATIENT)
Dept: PRIMARY CARE CLINIC | Age: 79
End: 2019-09-04
Payer: MEDICARE

## 2019-09-04 VITALS
OXYGEN SATURATION: 97 % | RESPIRATION RATE: 18 BRPM | HEIGHT: 68 IN | WEIGHT: 243 LBS | SYSTOLIC BLOOD PRESSURE: 130 MMHG | HEART RATE: 97 BPM | TEMPERATURE: 100.7 F | BODY MASS INDEX: 36.83 KG/M2 | DIASTOLIC BLOOD PRESSURE: 70 MMHG

## 2019-09-04 VITALS
HEART RATE: 97 BPM | WEIGHT: 240 LBS | BODY MASS INDEX: 36.37 KG/M2 | RESPIRATION RATE: 20 BRPM | SYSTOLIC BLOOD PRESSURE: 148 MMHG | HEIGHT: 68 IN | TEMPERATURE: 98.3 F | OXYGEN SATURATION: 97 % | DIASTOLIC BLOOD PRESSURE: 70 MMHG

## 2019-09-04 DIAGNOSIS — J02.9 VIRAL PHARYNGITIS: Primary | ICD-10-CM

## 2019-09-04 DIAGNOSIS — J02.9 PHARYNGITIS, UNSPECIFIED ETIOLOGY: Primary | ICD-10-CM

## 2019-09-04 DIAGNOSIS — R13.14 PHARYNGOESOPHAGEAL DYSPHAGIA: ICD-10-CM

## 2019-09-04 LAB — S PYO AG THROAT QL: NORMAL

## 2019-09-04 PROCEDURE — 96372 THER/PROPH/DIAG INJ SC/IM: CPT

## 2019-09-04 PROCEDURE — 99282 EMERGENCY DEPT VISIT SF MDM: CPT

## 2019-09-04 PROCEDURE — 6360000002 HC RX W HCPCS: Performed by: NURSE PRACTITIONER

## 2019-09-04 PROCEDURE — 6360000002 HC RX W HCPCS: Performed by: STUDENT IN AN ORGANIZED HEALTH CARE EDUCATION/TRAINING PROGRAM

## 2019-09-04 PROCEDURE — 87880 STREP A ASSAY W/OPTIC: CPT | Performed by: FAMILY MEDICINE

## 2019-09-04 PROCEDURE — 99213 OFFICE O/P EST LOW 20 MIN: CPT | Performed by: FAMILY MEDICINE

## 2019-09-04 RX ORDER — KETOROLAC TROMETHAMINE 30 MG/ML
15 INJECTION, SOLUTION INTRAMUSCULAR; INTRAVENOUS ONCE
Status: COMPLETED | OUTPATIENT
Start: 2019-09-04 | End: 2019-09-04

## 2019-09-04 RX ORDER — DEXAMETHASONE SODIUM PHOSPHATE 10 MG/ML
10 INJECTION INTRAMUSCULAR; INTRAVENOUS ONCE
Status: COMPLETED | OUTPATIENT
Start: 2019-09-04 | End: 2019-09-04

## 2019-09-04 RX ORDER — SIMVASTATIN 40 MG
TABLET ORAL
COMMUNITY
End: 2019-09-12 | Stop reason: SDUPTHER

## 2019-09-04 RX ORDER — KETOROLAC TROMETHAMINE 30 MG/ML
60 INJECTION, SOLUTION INTRAMUSCULAR; INTRAVENOUS ONCE
Status: DISCONTINUED | OUTPATIENT
Start: 2019-09-04 | End: 2019-09-04

## 2019-09-04 RX ORDER — DEXAMETHASONE SODIUM PHOSPHATE 10 MG/ML
10 INJECTION INTRAMUSCULAR; INTRAVENOUS ONCE
Status: DISCONTINUED | OUTPATIENT
Start: 2019-09-04 | End: 2019-09-04

## 2019-09-04 RX ADMIN — KETOROLAC TROMETHAMINE 15 MG: 30 INJECTION, SOLUTION INTRAMUSCULAR at 15:09

## 2019-09-04 RX ADMIN — DEXAMETHASONE SODIUM PHOSPHATE 10 MG: 10 INJECTION INTRAMUSCULAR; INTRAVENOUS at 15:05

## 2019-09-04 ASSESSMENT — PAIN DESCRIPTION - DESCRIPTORS: DESCRIPTORS: STABBING;SHARP

## 2019-09-04 ASSESSMENT — ENCOUNTER SYMPTOMS
STRIDOR: 0
CHOKING: 0
NAUSEA: 0
SORE THROAT: 1
BACK PAIN: 0
COUGH: 1
SINUS PRESSURE: 0
VOMITING: 0
DIARRHEA: 0
ABDOMINAL PAIN: 0
EYE PAIN: 0
FACIAL SWELLING: 0
ORTHOPNEA: 0
SHORTNESS OF BREATH: 0
SINUS PAIN: 0
TROUBLE SWALLOWING: 1
WHEEZING: 0
EYE REDNESS: 0
CONSTIPATION: 0
VOICE CHANGE: 0

## 2019-09-04 ASSESSMENT — PATIENT HEALTH QUESTIONNAIRE - PHQ9
1. LITTLE INTEREST OR PLEASURE IN DOING THINGS: 0
SUM OF ALL RESPONSES TO PHQ QUESTIONS 1-9: 0
SUM OF ALL RESPONSES TO PHQ QUESTIONS 1-9: 0
2. FEELING DOWN, DEPRESSED OR HOPELESS: 0
SUM OF ALL RESPONSES TO PHQ9 QUESTIONS 1 & 2: 0

## 2019-09-04 ASSESSMENT — PAIN DESCRIPTION - PAIN TYPE: TYPE: ACUTE PAIN

## 2019-09-04 ASSESSMENT — PAIN SCALES - GENERAL
PAINLEVEL_OUTOF10: 7
PAINLEVEL_OUTOF10: 7

## 2019-09-04 ASSESSMENT — PAIN DESCRIPTION - LOCATION: LOCATION: THROAT

## 2019-09-04 ASSESSMENT — PAIN DESCRIPTION - FREQUENCY: FREQUENCY: CONTINUOUS

## 2019-09-05 ENCOUNTER — HOSPITAL ENCOUNTER (OUTPATIENT)
Age: 79
Discharge: HOME | End: 2019-09-07
Payer: MEDICARE

## 2019-09-05 DIAGNOSIS — I10 ESSENTIAL HYPERTENSION: ICD-10-CM

## 2019-09-05 DIAGNOSIS — I48.19 PERSISTENT ATRIAL FIBRILLATION (HCC): ICD-10-CM

## 2019-09-05 DIAGNOSIS — M89.9 DISORDER OF BONE: ICD-10-CM

## 2019-09-05 DIAGNOSIS — E78.00 PURE HYPERCHOLESTEROLEMIA: ICD-10-CM

## 2019-09-05 DIAGNOSIS — N40.0 PROSTATE ENLARGEMENT: ICD-10-CM

## 2019-09-05 DIAGNOSIS — R53.83 FATIGUE, UNSPECIFIED TYPE: ICD-10-CM

## 2019-09-05 DIAGNOSIS — Z12.5 SCREENING FOR PROSTATE CANCER: ICD-10-CM

## 2019-09-05 DIAGNOSIS — R73.09 ELEVATED HEMOGLOBIN A1C: ICD-10-CM

## 2019-09-05 DIAGNOSIS — E55.9 VITAMIN D DEFICIENCY: ICD-10-CM

## 2019-09-05 LAB
ALBUMIN SERPL-MCNC: 3.7 G/DL (ref 3.5–5.2)
ALP BLD-CCNC: 62 U/L (ref 40–129)
ALT SERPL-CCNC: 15 U/L (ref 0–40)
ANION GAP SERPL CALCULATED.3IONS-SCNC: 18 MMOL/L (ref 7–16)
AST SERPL-CCNC: 18 U/L (ref 0–39)
BASOPHILS ABSOLUTE: 0.01 E9/L (ref 0–0.2)
BASOPHILS RELATIVE PERCENT: 0.1 % (ref 0–2)
BILIRUB SERPL-MCNC: 0.5 MG/DL (ref 0–1.2)
BUN BLDV-MCNC: 27 MG/DL (ref 8–23)
CALCIUM SERPL-MCNC: 8.8 MG/DL (ref 8.6–10.2)
CHLORIDE BLD-SCNC: 91 MMOL/L (ref 98–107)
CHOLESTEROL, TOTAL: 241 MG/DL (ref 0–199)
CO2: 23 MMOL/L (ref 22–29)
CREAT SERPL-MCNC: 1.7 MG/DL (ref 0.7–1.2)
EOSINOPHILS ABSOLUTE: 0 E9/L (ref 0.05–0.5)
EOSINOPHILS RELATIVE PERCENT: 0 % (ref 0–6)
GFR AFRICAN AMERICAN: 48
GFR NON-AFRICAN AMERICAN: 39 ML/MIN/1.73
GLUCOSE BLD-MCNC: 217 MG/DL (ref 74–99)
HBA1C MFR BLD: 5.9 % (ref 4–5.6)
HCT VFR BLD CALC: 40.8 % (ref 37–54)
HDLC SERPL-MCNC: 34 MG/DL
HEMOGLOBIN: 13.6 G/DL (ref 12.5–16.5)
IMMATURE GRANULOCYTES #: 0.1 E9/L
IMMATURE GRANULOCYTES %: 0.8 % (ref 0–5)
LDL CHOLESTEROL CALCULATED: 161 MG/DL (ref 0–99)
LYMPHOCYTES ABSOLUTE: 1.19 E9/L (ref 1.5–4)
LYMPHOCYTES RELATIVE PERCENT: 9.9 % (ref 20–42)
MCH RBC QN AUTO: 32.6 PG (ref 26–35)
MCHC RBC AUTO-ENTMCNC: 33.3 % (ref 32–34.5)
MCV RBC AUTO: 97.8 FL (ref 80–99.9)
MONOCYTES ABSOLUTE: 0.36 E9/L (ref 0.1–0.95)
MONOCYTES RELATIVE PERCENT: 3 % (ref 2–12)
NEUTROPHILS ABSOLUTE: 10.31 E9/L (ref 1.8–7.3)
NEUTROPHILS RELATIVE PERCENT: 86.2 % (ref 43–80)
PDW BLD-RTO: 13.4 FL (ref 11.5–15)
PLATELET # BLD: 313 E9/L (ref 130–450)
PMV BLD AUTO: 10.6 FL (ref 7–12)
POTASSIUM SERPL-SCNC: 3.7 MMOL/L (ref 3.5–5)
PROSTATE SPECIFIC ANTIGEN: 3.09 NG/ML (ref 0–4)
RBC # BLD: 4.17 E12/L (ref 3.8–5.8)
SODIUM BLD-SCNC: 132 MMOL/L (ref 132–146)
TOTAL PROTEIN: 7.7 G/DL (ref 6.4–8.3)
TRIGL SERPL-MCNC: 228 MG/DL (ref 0–149)
TSH SERPL DL<=0.05 MIU/L-ACNC: 1.21 UIU/ML (ref 0.27–4.2)
URIC ACID, SERUM: 13.7 MG/DL (ref 3.4–7)
VITAMIN D 25-HYDROXY: 54 NG/ML (ref 30–100)
VLDLC SERPL CALC-MCNC: 46 MG/DL
WBC # BLD: 12 E9/L (ref 4.5–11.5)

## 2019-09-05 PROCEDURE — 80061 LIPID PANEL: CPT

## 2019-09-05 PROCEDURE — 83036 HEMOGLOBIN GLYCOSYLATED A1C: CPT

## 2019-09-05 PROCEDURE — 85025 COMPLETE CBC W/AUTO DIFF WBC: CPT

## 2019-09-05 PROCEDURE — G0103 PSA SCREENING: HCPCS

## 2019-09-05 PROCEDURE — 82306 VITAMIN D 25 HYDROXY: CPT

## 2019-09-05 PROCEDURE — 84443 ASSAY THYROID STIM HORMONE: CPT

## 2019-09-05 PROCEDURE — 84550 ASSAY OF BLOOD/URIC ACID: CPT

## 2019-09-05 PROCEDURE — 80053 COMPREHEN METABOLIC PANEL: CPT

## 2019-09-07 ENCOUNTER — TELEPHONE (OUTPATIENT)
Dept: FAMILY MEDICINE CLINIC | Age: 79
End: 2019-09-07

## 2019-09-12 ENCOUNTER — OFFICE VISIT (OUTPATIENT)
Dept: PRIMARY CARE CLINIC | Age: 79
End: 2019-09-12
Payer: MEDICARE

## 2019-09-12 VITALS
TEMPERATURE: 98.5 F | HEIGHT: 67 IN | DIASTOLIC BLOOD PRESSURE: 68 MMHG | HEART RATE: 69 BPM | SYSTOLIC BLOOD PRESSURE: 118 MMHG | OXYGEN SATURATION: 98 % | RESPIRATION RATE: 16 BRPM | BODY MASS INDEX: 37.51 KG/M2 | WEIGHT: 239 LBS

## 2019-09-12 DIAGNOSIS — Z87.898 HISTORY OF ASCITES: ICD-10-CM

## 2019-09-12 DIAGNOSIS — E79.0 HYPERURICEMIA: ICD-10-CM

## 2019-09-12 DIAGNOSIS — F10.10 ALCOHOL ABUSE: ICD-10-CM

## 2019-09-12 DIAGNOSIS — J02.9 ACUTE PHARYNGITIS, UNSPECIFIED ETIOLOGY: ICD-10-CM

## 2019-09-12 DIAGNOSIS — K70.31 ALCOHOLIC CIRRHOSIS OF LIVER WITH ASCITES (HCC): Primary | ICD-10-CM

## 2019-09-12 DIAGNOSIS — E87.1 HYPONATREMIA: ICD-10-CM

## 2019-09-12 DIAGNOSIS — N18.30 CKD (CHRONIC KIDNEY DISEASE) STAGE 3, GFR 30-59 ML/MIN (HCC): ICD-10-CM

## 2019-09-12 DIAGNOSIS — G62.9 NEUROPATHY: ICD-10-CM

## 2019-09-12 DIAGNOSIS — R73.01 IMPAIRED FASTING GLUCOSE: ICD-10-CM

## 2019-09-12 DIAGNOSIS — I42.0 DILATED CARDIOMYOPATHY (HCC): ICD-10-CM

## 2019-09-12 DIAGNOSIS — M15.9 PRIMARY OSTEOARTHRITIS INVOLVING MULTIPLE JOINTS: ICD-10-CM

## 2019-09-12 DIAGNOSIS — I10 ESSENTIAL HYPERTENSION: ICD-10-CM

## 2019-09-12 DIAGNOSIS — E78.00 PURE HYPERCHOLESTEROLEMIA: ICD-10-CM

## 2019-09-12 DIAGNOSIS — E03.8 OTHER SPECIFIED HYPOTHYROIDISM: ICD-10-CM

## 2019-09-12 PROBLEM — R73.09 ELEVATED HEMOGLOBIN A1C: Status: RESOLVED | Noted: 2018-04-27 | Resolved: 2019-09-12

## 2019-09-12 PROBLEM — M89.9 DISORDER OF BONE: Status: RESOLVED | Noted: 2018-04-27 | Resolved: 2019-09-12

## 2019-09-12 PROBLEM — L97.912 NON-PRESSURE CHRONIC ULCER OF RIGHT LOWER LEG WITH FAT LAYER EXPOSED (HCC): Status: RESOLVED | Noted: 2017-08-25 | Resolved: 2019-09-12

## 2019-09-12 PROCEDURE — 99214 OFFICE O/P EST MOD 30 MIN: CPT | Performed by: INTERNAL MEDICINE

## 2019-09-12 RX ORDER — ALLOPURINOL 100 MG/1
100 TABLET ORAL DAILY
Qty: 30 TABLET | Refills: 5 | Status: SHIPPED | OUTPATIENT
Start: 2019-09-12 | End: 2019-12-26

## 2019-09-12 RX ORDER — AMOXICILLIN 500 MG/1
500 CAPSULE ORAL 2 TIMES DAILY
Qty: 14 CAPSULE | Refills: 0 | Status: SHIPPED | OUTPATIENT
Start: 2019-09-12 | End: 2019-09-19

## 2019-09-12 RX ORDER — SIMVASTATIN 40 MG
40 TABLET ORAL NIGHTLY
Qty: 30 TABLET | Refills: 5 | Status: SHIPPED
Start: 2019-09-12 | End: 2020-04-06 | Stop reason: SDUPTHER

## 2019-09-12 ASSESSMENT — ENCOUNTER SYMPTOMS
CONSTIPATION: 0
CHEST TIGHTNESS: 0
EYE PAIN: 0
VOMITING: 0
RHINORRHEA: 0
NAUSEA: 0
SHORTNESS OF BREATH: 0
BLOOD IN STOOL: 0
SORE THROAT: 1
COUGH: 0
ABDOMINAL PAIN: 0
DIARRHEA: 0

## 2019-10-01 ENCOUNTER — TELEPHONE (OUTPATIENT)
Dept: PRIMARY CARE CLINIC | Age: 79
End: 2019-10-01

## 2019-11-25 RX ORDER — GABAPENTIN 100 MG/1
CAPSULE ORAL
Qty: 270 CAPSULE | Refills: 0 | Status: SHIPPED
Start: 2019-11-25 | End: 2020-03-02 | Stop reason: SDUPTHER

## 2019-11-25 RX ORDER — LEVOTHYROXINE SODIUM 0.2 MG/1
200 TABLET ORAL DAILY
Qty: 90 TABLET | Refills: 0 | Status: SHIPPED
Start: 2019-11-25 | End: 2020-02-24 | Stop reason: SDUPTHER

## 2019-12-26 ENCOUNTER — OFFICE VISIT (OUTPATIENT)
Dept: PRIMARY CARE CLINIC | Age: 79
End: 2019-12-26
Payer: MEDICARE

## 2019-12-26 VITALS
OXYGEN SATURATION: 97 % | WEIGHT: 240 LBS | DIASTOLIC BLOOD PRESSURE: 58 MMHG | BODY MASS INDEX: 37.67 KG/M2 | HEIGHT: 67 IN | TEMPERATURE: 98 F | SYSTOLIC BLOOD PRESSURE: 98 MMHG | HEART RATE: 81 BPM | RESPIRATION RATE: 18 BRPM

## 2019-12-26 DIAGNOSIS — G62.9 NEUROPATHY: ICD-10-CM

## 2019-12-26 DIAGNOSIS — R73.01 IMPAIRED FASTING GLUCOSE: ICD-10-CM

## 2019-12-26 DIAGNOSIS — N18.30 CKD (CHRONIC KIDNEY DISEASE) STAGE 3, GFR 30-59 ML/MIN (HCC): ICD-10-CM

## 2019-12-26 DIAGNOSIS — E55.9 VITAMIN D DEFICIENCY: Primary | ICD-10-CM

## 2019-12-26 DIAGNOSIS — E78.00 PURE HYPERCHOLESTEROLEMIA: ICD-10-CM

## 2019-12-26 DIAGNOSIS — I89.0 LYMPHEDEMA: ICD-10-CM

## 2019-12-26 DIAGNOSIS — R53.83 FATIGUE, UNSPECIFIED TYPE: ICD-10-CM

## 2019-12-26 DIAGNOSIS — Z87.898 HISTORY OF ASCITES: ICD-10-CM

## 2019-12-26 DIAGNOSIS — K70.31 ALCOHOLIC CIRRHOSIS OF LIVER WITH ASCITES (HCC): ICD-10-CM

## 2019-12-26 DIAGNOSIS — M15.9 PRIMARY OSTEOARTHRITIS INVOLVING MULTIPLE JOINTS: ICD-10-CM

## 2019-12-26 DIAGNOSIS — I10 ESSENTIAL HYPERTENSION: ICD-10-CM

## 2019-12-26 DIAGNOSIS — F10.10 ALCOHOL ABUSE: ICD-10-CM

## 2019-12-26 DIAGNOSIS — E87.1 HYPONATREMIA: ICD-10-CM

## 2019-12-26 DIAGNOSIS — E79.0 HYPERURICEMIA: ICD-10-CM

## 2019-12-26 DIAGNOSIS — I42.0 DILATED CARDIOMYOPATHY (HCC): ICD-10-CM

## 2019-12-26 DIAGNOSIS — E03.8 OTHER SPECIFIED HYPOTHYROIDISM: ICD-10-CM

## 2019-12-26 PROBLEM — J02.9 ACUTE PHARYNGITIS: Status: RESOLVED | Noted: 2019-09-12 | Resolved: 2019-12-26

## 2019-12-26 PROCEDURE — 99214 OFFICE O/P EST MOD 30 MIN: CPT | Performed by: INTERNAL MEDICINE

## 2019-12-26 SDOH — ECONOMIC STABILITY: TRANSPORTATION INSECURITY
IN THE PAST 12 MONTHS, HAS LACK OF TRANSPORTATION KEPT YOU FROM MEETINGS, WORK, OR FROM GETTING THINGS NEEDED FOR DAILY LIVING?: PATIENT DECLINED

## 2019-12-26 SDOH — ECONOMIC STABILITY: TRANSPORTATION INSECURITY
IN THE PAST 12 MONTHS, HAS THE LACK OF TRANSPORTATION KEPT YOU FROM MEDICAL APPOINTMENTS OR FROM GETTING MEDICATIONS?: PATIENT DECLINED

## 2019-12-26 SDOH — ECONOMIC STABILITY: FOOD INSECURITY: WITHIN THE PAST 12 MONTHS, THE FOOD YOU BOUGHT JUST DIDN'T LAST AND YOU DIDN'T HAVE MONEY TO GET MORE.: PATIENT DECLINED

## 2019-12-26 SDOH — ECONOMIC STABILITY: FOOD INSECURITY: WITHIN THE PAST 12 MONTHS, YOU WORRIED THAT YOUR FOOD WOULD RUN OUT BEFORE YOU GOT MONEY TO BUY MORE.: PATIENT DECLINED

## 2019-12-26 SDOH — ECONOMIC STABILITY: INCOME INSECURITY: HOW HARD IS IT FOR YOU TO PAY FOR THE VERY BASICS LIKE FOOD, HOUSING, MEDICAL CARE, AND HEATING?: PATIENT DECLINED

## 2019-12-26 ASSESSMENT — ENCOUNTER SYMPTOMS
DIARRHEA: 0
CONSTIPATION: 0
COUGH: 0
VOMITING: 0
BLOOD IN STOOL: 0
SORE THROAT: 1
RHINORRHEA: 0
ABDOMINAL PAIN: 0
SHORTNESS OF BREATH: 0
CHEST TIGHTNESS: 0
EYE PAIN: 0
NAUSEA: 0

## 2020-01-22 ENCOUNTER — TELEPHONE (OUTPATIENT)
Dept: PRIMARY CARE CLINIC | Age: 80
End: 2020-01-22

## 2020-01-22 NOTE — TELEPHONE ENCOUNTER
Received fax from [facility] requesting medical records. Faxed this information to [facility] records.

## 2020-02-18 ENCOUNTER — OFFICE VISIT (OUTPATIENT)
Dept: ORTHOPEDIC SURGERY | Age: 80
End: 2020-02-18
Payer: MEDICARE

## 2020-02-18 VITALS
WEIGHT: 240 LBS | HEIGHT: 68 IN | DIASTOLIC BLOOD PRESSURE: 62 MMHG | SYSTOLIC BLOOD PRESSURE: 105 MMHG | TEMPERATURE: 97 F | HEART RATE: 77 BPM | BODY MASS INDEX: 36.37 KG/M2

## 2020-02-18 PROCEDURE — 99213 OFFICE O/P EST LOW 20 MIN: CPT | Performed by: ORTHOPAEDIC SURGERY

## 2020-02-18 NOTE — PROGRESS NOTES
weekends, and other underlying medical comorbidities as enumerated. He has not been exercising but does have a membership at the [facility], I encouraged him to do some pre-habilitation there while he considers knee arthroplasty, also discussed this with his primary care physician in terms of his medical risks for this elective procedure. Questions asked and answered follow-up as needed. - Counseling More Than 50% of the Appointment Time: 15 minutes    Return if symptoms worsen or fail to improve. Current Outpatient Medications   Medication Sig Dispense Refill    magnesium oxide (MAG-OX) 400 (241.3 Mg) MG TABS tablet TAKE ONE TABLET BY MOUTH DAILY      levothyroxine (SYNTHROID) 200 MCG tablet Take 1 tablet by mouth Daily 90 tablet 0    gabapentin (NEURONTIN) 100 MG capsule TAKE ONE CAPSULE BY MOUTH THREE TIMES A  capsule 0    simvastatin (ZOCOR) 40 MG tablet Take 1 tablet by mouth nightly 30 tablet 5    spironolactone (ALDACTONE) 25 MG tablet Take 25 mg by mouth daily   5    torsemide (DEMADEX) 20 MG tablet Take 2 tablets TID      vitamin B-1 (THIAMINE) 100 MG tablet Take 100 mg by mouth daily      potassium chloride (KLOR-CON M) 20 MEQ extended release tablet Take 20 mEq by mouth 2 times daily       metolazone (ZAROXOLYN) 2.5 MG tablet Take 2.5 mg by mouth once a week       metoprolol tartrate (LOPRESSOR) 50 MG tablet Take 1 tablet by mouth 2 times daily 60 tablet 3    MULTIPLE VITAMIN PO Take  by mouth daily.  HYDROcodone-acetaminophen (NORCO) 5-325 MG per tablet Take 1 tablet by mouth every 6 hours as needed for Pain . No current facility-administered medications for this visit.         Patient Active Problem List   Diagnosis    [facility]    Cirrhosis ([facility]    Tachycardia    Hyponatremia    Cardiomyopathy ([facility]    Alcohol abuse    Delirium due to multiple etiologies    Primary osteoarthritis of left knee    Osteoarthritis    Lymphedema    Hypertension   

## 2020-02-24 RX ORDER — LEVOTHYROXINE SODIUM 0.2 MG/1
200 TABLET ORAL DAILY
Qty: 90 TABLET | Refills: 0 | Status: SHIPPED
Start: 2020-02-24 | End: 2020-05-26 | Stop reason: SDUPTHER

## 2020-03-02 RX ORDER — GABAPENTIN 100 MG/1
CAPSULE ORAL
Qty: 270 CAPSULE | Refills: 0 | Status: SHIPPED
Start: 2020-03-02 | End: 2020-05-15 | Stop reason: SDUPTHER

## 2020-03-03 ENCOUNTER — TELEPHONE (OUTPATIENT)
Dept: ORTHOPEDIC SURGERY | Age: 80
End: 2020-03-03

## 2020-03-03 NOTE — TELEPHONE ENCOUNTER
Patient called stating he would like to schedule Lt TKA. First available date. Do you want pre-op visit for further discussion?

## 2020-03-04 ENCOUNTER — TELEPHONE (OUTPATIENT)
Dept: ORTHOPEDIC SURGERY | Age: 80
End: 2020-03-04

## 2020-03-04 ENCOUNTER — TELEPHONE (OUTPATIENT)
Dept: PRIMARY CARE CLINIC | Age: 80
End: 2020-03-04

## 2020-03-04 NOTE — TELEPHONE ENCOUNTER
3/04/2020 11:25 am Fax message to Dr. Glass Or, Nephrologist [phone]: [address] notification of plans for surgery.

## 2020-03-04 NOTE — TELEPHONE ENCOUNTER
Patient to have total left knee joint arthroplasty done 3/30/2020. Office is requesting letter of medical clearance. Patient was last seen in December. You do have a 15 minute slot tomorrow if patient needs seen. Please advise so I can contact patient if necessary. I could probably schedule him with one of other doctors if needed.

## 2020-03-05 ENCOUNTER — TELEPHONE (OUTPATIENT)
Dept: ORTHOPEDIC SURGERY | Age: 80
End: 2020-03-05

## 2020-03-05 ENCOUNTER — OFFICE VISIT (OUTPATIENT)
Dept: PRIMARY CARE CLINIC | Age: 80
End: 2020-03-05
Payer: MEDICARE

## 2020-03-05 VITALS
BODY MASS INDEX: 35.46 KG/M2 | RESPIRATION RATE: 18 BRPM | SYSTOLIC BLOOD PRESSURE: 112 MMHG | HEIGHT: 68 IN | OXYGEN SATURATION: 98 % | DIASTOLIC BLOOD PRESSURE: 64 MMHG | HEART RATE: 74 BPM | TEMPERATURE: 98.7 F | WEIGHT: 234 LBS

## 2020-03-05 PROBLEM — Z01.818 PREOP EXAM FOR INTERNAL MEDICINE: Status: ACTIVE | Noted: 2018-10-25

## 2020-03-05 PROBLEM — I48.92 ATRIAL FLUTTER (HCC): Status: ACTIVE | Noted: 2020-03-05

## 2020-03-05 PROCEDURE — 93000 ELECTROCARDIOGRAM COMPLETE: CPT | Performed by: INTERNAL MEDICINE

## 2020-03-05 PROCEDURE — 99214 OFFICE O/P EST MOD 30 MIN: CPT | Performed by: INTERNAL MEDICINE

## 2020-03-05 ASSESSMENT — ENCOUNTER SYMPTOMS
RHINORRHEA: 0
CHEST TIGHTNESS: 0
NAUSEA: 0
SORE THROAT: 1
DIARRHEA: 0
SHORTNESS OF BREATH: 0
BLOOD IN STOOL: 0
COUGH: 0
VOMITING: 0
CONSTIPATION: 0
ABDOMINAL PAIN: 0
EYE PAIN: 0

## 2020-03-05 NOTE — TELEPHONE ENCOUNTER
surgery. Due to patient's pre-existing medical problems he is considering a short stay in rehab    DME needs: Patient has a cane.  Patient will need a [address] and 3:1 commode    Post Op Appointment: Thursday, April 9 th at 2:15 pm

## 2020-03-05 NOTE — PROGRESS NOTES
[facility]) Physicians   Internal Medicine     3/5/2020  Susana Organ : 1940 Sex: male Age:79 y.o. Chief Complaint   Patient presents with    Pre-op Exam     Medical Clearance check-up        HPI:   Patient presents to office for review and management of chronic medical conditions. States to have knee replacement. Needs preop evaluation     States has hypothyroid. 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 Still drinks on weekends. Discussed cessation. On sprinolactone and metolazone. On torsemide - was taking 40mg three a day, was suggested to decrease torsemide to 40 mg twice a day, decrease metolazone to 1 every 10 days. Patient states was gaining fluid and increased torsemide back to 40mg three times a day. States has chronic kidney disease. Follows with nephrology     States has elevated uric acid. States was on allopurinol in the past, stopped due to thought was causing hives. States has hypertension, not checking blood pressure at home     States has high cholesterol. On simvastatin     States has cardiomyopathy. States due to alcohol. Did have brief atrial fibr. Declines to follow with cardiology. States has neuropathy. Possible due to alcohol. On gabapentin. Helping     Last lab shows elevated sugar. A1c borderline at 5.9     Has electrolyte abnormalities     States has osteoarthritis. States multiple joints. Follows with ortho for knee pain. 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 States will have another injection. Health Maintenance   - immunizations:   Influenza Vaccination - declines  Pneumonia Vaccination - declines   Zoster/Shingles Vaccine  Tetanus Vaccination    - Screenings:   Colonoscopy   Prostate     Couseled on Home Safety     ROS:  Review of Systems   Constitutional: Negative for appetite change, chills, fever and unexpected weight change. HENT: Positive for sore throat.  Negative for congestion and rhinorrhea. Eyes: Negative for pain and visual disturbance. Respiratory: Negative for cough, chest tightness and shortness of breath. Cardiovascular: Negative for chest pain and palpitations. Gastrointestinal: Negative for abdominal pain, blood in stool, constipation, diarrhea, nausea and vomiting. Genitourinary: Negative for difficulty urinating, dysuria, hematuria, scrotal swelling, testicular pain and urgency. Musculoskeletal: Negative for arthralgias and gait problem. Skin: Negative for rash. Neurological: Negative for dizziness, syncope, weakness, light-headedness and headaches. Hematological: Negative for adenopathy. Does not bruise/bleed easily. Psychiatric/Behavioral: Negative for suicidal ideas. The patient is not nervous/anxious. Current Outpatient Medications:     gabapentin (NEURONTIN) 100 MG capsule, TAKE ONE CAPSULE BY MOUTH THREE TIMES A DAY, Disp: 270 capsule, Rfl: 0    levothyroxine (SYNTHROID) 200 MCG tablet, Take 1 tablet by mouth Daily, Disp: 90 tablet, Rfl: 0    magnesium oxide (MAG-OX) 400 (241.3 Mg) MG TABS tablet, TAKE ONE TABLET BY MOUTH DAILY, Disp: , Rfl:     simvastatin (ZOCOR) 40 MG tablet, Take 1 tablet by mouth nightly, Disp: 30 tablet, Rfl: 5    spironolactone (ALDACTONE) 25 MG tablet, Take 25 mg by mouth daily , Disp: , Rfl: 5    torsemide (DEMADEX) 20 MG tablet, Take 2 tablets TID, Disp: , Rfl:     vitamin B-1 (THIAMINE) 100 MG tablet, Take 100 mg by mouth daily, Disp: , Rfl:     potassium chloride (KLOR-CON M) 20 MEQ extended release tablet, Take 20 mEq by mouth 2 times daily , Disp: , Rfl:     metolazone (ZAROXOLYN) 2.5 MG tablet, Take 2.5 mg by mouth once a week , Disp: , Rfl:     metoprolol tartrate (LOPRESSOR) 50 MG tablet, Take 1 tablet by mouth 2 times daily, Disp: 60 tablet, Rfl: 3    MULTIPLE VITAMIN PO, Take  by mouth daily. , Disp: , Rfl:     HYDROcodone-acetaminophen (NORCO) 5-325 MG per tablet, Take 1 tablet by mouth every 6 of clubs or organizations: None     Relationship status: None    Intimate partner violence:     Fear of current or ex partner: None     Emotionally abused: None     Physically abused: None     Forced sexual activity: None   Other Topics Concern    None   Social History Narrative    None        Vitals:    03/05/20 1543   BP: 112/64   Site: Left Upper Arm   Position: Sitting   Cuff Size: Large Adult   Pulse: 74   Resp: 18   Temp: 98.7 °F (37.1 °C)   TempSrc: Tympanic   SpO2: 98%   Weight: 234 lb (106.1 kg)   Height: 5' 7.5\" (1.715 m)       Exam:  Physical Exam  Constitutional:       Appearance: He is well-developed. HENT:      Head: Normocephalic and atraumatic. Right Ear: External ear normal.      Left Ear: External ear normal.      Nose: Rhinorrhea present. Mouth/Throat:      Pharynx: Posterior oropharyngeal erythema present. Eyes:      Pupils: Pupils are equal, round, and reactive to light. Neck:      Musculoskeletal: Normal range of motion and neck supple. Thyroid: No thyromegaly. Cardiovascular:      Rate and Rhythm: Normal rate. Rhythm irregular. Heart sounds: Murmur present. Systolic murmur present with a grade of 2/6. Pulmonary:      Effort: Pulmonary effort is normal.      Breath sounds: Normal breath sounds. No wheezing or rales. Abdominal:      General: Bowel sounds are normal.      Palpations: Abdomen is soft. Tenderness: There is no abdominal tenderness. There is no guarding or rebound. Musculoskeletal: Normal range of motion. Right lower leg: Edema present. Left lower leg: Edema present. Lymphadenopathy:      Cervical: No cervical adenopathy. Skin:     General: Skin is warm and dry. Neurological:      Mental Status: He is alert and oriented to person, place, and time. Cranial Nerves: No cranial nerve deficit.    Psychiatric:         Judgment: Judgment normal.         Assessment and Plan:    [address] [place] [address] was seen today for 6 month follow-up, hyperlipidemia, hypertension, hypothyroidism and health maintenance. Diagnoses and all orders for this visit:    - Procedure: total knee replacement     Preop exam for internal medicine  ACC/AHA  - Moderate MET, multiple risk factors   - EKG: atrial flutter and fib  - Revised cardiac index - 0 to 1- risk factors giving 1.0 to 1.3% risk   - h/o cardiomyopathy and atrial flutter and murmur - untreated   - intermediate risk from cardiovascular standpoint given above  - recommend referral to cardiology for assessment, echo poss stress     Pulmonary:  ARISCAT pulmonary risk 8.7% risk of complications - low risk   -  sleep apnea risk - elevated risk based on STOP bang - not tested for ANTOLIN.      American college of surgeons risk Calculator   - 3.3 to 5% % risk for any or serious complication   - overall average to slight above average risk risk     Medications:   recommend meds up to day of surgery   Hold aspirin and NSAIDs 1 week before procedure    Ok to proceed with procedure after cardiology assessment - intermediate risk    Atrial flutter ([facility]  - uncertain as to onset  - ekg from 2017 showed flutter   - not on anticoagulation   - has not seen cardiology   - recommend referral     Alcoholic cirrhosis of liver with ascites ([facility]  - Continue present treatment   - discussed and advised stopping alcohol altogether   - declines referral to GI   - on spironolactone   - on torsemide   - on metolazone     Hyponatremia  - Possible multifactorial (cirrhois and meds)   - last lab stable at 132 (12/2019)     Dilated cardiomyopathy ([facility]  - possible due to alcohol   - discussed and advised stopping alcohol altogether     CKD (chronic kidney disease) stage 3, GFR 30-59 ml/min ([facility])  - Continue present treatment   - following with nephrology   - on spirinolactone 25mg daily   - on metalozaone 2.5mg every 10 days   - on torsemide - taking was 40mg 3x per day - advised by renal to take 40mg 2x per day   - on potassium supplement   - follow labs - last (9/2019) - show slight worsening     Impaired fasting glucose  - Continue present treatment   - watch diet   - follow A1c - last was 5.9     Alcohol abuse  - discussed and advised stopping alcohol altogether     Essential hypertension  - Continue present treatment   - watch diet   - monitor blood pressure at home   - on spironolactone   - stable     Pure hypercholesterolemia  - Continue present treatment   - watch diet   - uncertain if taking simvastatin   - follow labs   - uncontrolled     History of ascites    Neuropathy ([address]  - Continue present treatment   - due to alcohol   - on gabapentin   - stable     Other specified hypothyroidism  - Continue present treatment   - on levothyroxine   - follow labs     Primary osteoarthritis involving multiple joints    Hyperuricemia  - Continue present treatment   - watch diet   - stopped allopurinol - thought possibly causing hives   - did not restart      Return for check up and review, as scheduled.     Orders Placed This Encounter   Procedures   Jenny Case MD, Cardiology, Ant     Referral Priority:   Urgent     Referral Type:   Eval and Treat     Referral Reason:   Specialty Services Required     Referred to Provider:   Honey Khalil MD     Requested Specialty:   Cardiology     Number of Visits Requested:   1    EKG 12 lead     Standing Status:   Future     Number of Occurrences:   1     Standing Expiration Date:   5/4/2020     Order Specific Question:   Reason for Exam?     Answer:   Hypertension       Requested Prescriptions      No prescriptions requested or ordered in this encounter        Amita Chris MD  3/5/2020  5:30 PM

## 2020-03-06 ENCOUNTER — TELEPHONE (OUTPATIENT)
Dept: ADMINISTRATIVE | Age: 80
End: 2020-03-06

## 2020-03-06 ENCOUNTER — TELEPHONE (OUTPATIENT)
Dept: PRIMARY CARE CLINIC | Age: 80
End: 2020-03-06

## 2020-03-06 NOTE — TELEPHONE ENCOUNTER
Scheduled patient to see Andreas Guillaume 3/12/2020 9AM.  He is aware of appointment. I have faxed referral office note and EKG reports to office.

## 2020-03-06 NOTE — TELEPHONE ENCOUNTER
Pt was referred by Dr. Fawn Gutierrez for cardiac clearance prior to surgery for total L knee replacement schedule on March 30. Pt was scheduled with Dr. Jocelyn Najera on 3/12/20 @ 9:00. Past cardiac hx form scanned.

## 2020-03-12 ENCOUNTER — TELEPHONE (OUTPATIENT)
Dept: CARDIOLOGY CLINIC | Age: 80
End: 2020-03-12

## 2020-03-12 ENCOUNTER — OFFICE VISIT (OUTPATIENT)
Dept: CARDIOLOGY CLINIC | Age: 80
End: 2020-03-12
Payer: MEDICARE

## 2020-03-12 VITALS
WEIGHT: 242.5 LBS | RESPIRATION RATE: 18 BRPM | BODY MASS INDEX: 38.06 KG/M2 | HEIGHT: 67 IN | HEART RATE: 71 BPM | SYSTOLIC BLOOD PRESSURE: 110 MMHG | DIASTOLIC BLOOD PRESSURE: 70 MMHG

## 2020-03-12 PROCEDURE — 99214 OFFICE O/P EST MOD 30 MIN: CPT | Performed by: INTERNAL MEDICINE

## 2020-03-12 PROCEDURE — 93000 ELECTROCARDIOGRAM COMPLETE: CPT | Performed by: INTERNAL MEDICINE

## 2020-03-12 NOTE — TELEPHONE ENCOUNTER
----- Message from Teresa Bridges MD sent at 3/12/2020  4:14 PM EDT -----  Please let pt know I reviewed his blood work and based on his kidney function I ordered him the lower dose of Xarelto which was sent to his pharmacy.

## 2020-03-12 NOTE — PROGRESS NOTES
OUTPATIENT CARDIOLOGY CONSULT    Name: Shannan Alarcon    Age: 70 y.o. Date of Service: 3/12/2020    Reason for Consultation: Cardiomyopathy, atrial fibrillation    Referring Physician: Naomy Saucedo MD    History of Present Illness:  Shannan Alarocn is a 70 y.o. male who presents today for further evaluation of prior history of cardiomyopathy and current atrial fibrillation. He has history of alcoholic cirrhosis, presumed alcoholic cardiomyopathy diagnosed in 2017 with an EF of 37% when seen by my partner Dr. [address] in the hospital.  He was also diagnosed with atrial fibrillation at the time, anticoagulation was recommended but the patient declined. He has not had cardiac follow-up since that time. He also has hypertension, CKD (follows with [facility]), and neuropathy. He is scheduled to have knee replacement surgery and had seen Dr. Edwin Edmondson recently, found to still be in A. fib and was referred here for further evaluation. When discussing with the patient today, he has no recollection of being told about atrial fibrillation or any weakened heart muscle, nor does he recall any discussion of anticoagulation. He presently reports some dyspnea on exertion with exercise, he is trying to go to the  to do some exercise before her scheduled knee surgery at the end of the month. He denies any chest pain. He has chronic edema of the lower extremities, for which he takes torsemide 40 mg 3 times daily along with daily metolazone, as prescribed by Dr. Sohail Hazel. He denies dizziness, lightheadedness, palpitations, syncope. When asked how much he is drinking says he does not keep track. States he only drinks on the weekends. Believes he was drinking more heavily back in 2017. Review of Systems:  Complete review of systems otherwise negative except as described above.     Past Medical History:  Past Medical History:   Diagnosis Date    Ascites     Blood circulation, collateral     CAD (coronary artery INR 1.2 06/19/2017    INR 1.2 06/16/2017    INR 1.2 06/15/2017    PROTIME 12.9 (H) 06/19/2017    PROTIME 13.7 (H) 06/16/2017    PROTIME 13.0 (H) 06/15/2017     Lab Results   Component Value Date    TSH 1.210 09/05/2019     Lab Results   Component Value Date    LABA1C 5.9 (H) 09/05/2019     No results found for: EAG  Lab Results   Component Value Date    CHOL 241 (H) 09/05/2019    CHOL 181 10/04/2018    CHOL 197 04/03/2018     Lab Results   Component Value Date    TRIG 228 (H) 09/05/2019    TRIG 158 10/04/2018    TRIG 134 04/03/2018     Lab Results   Component Value Date    HDL 34 09/05/2019    HDL 37 10/04/2018    HDL 37 04/03/2018     Lab Results   Component Value Date    LDLCALC 161 (H) 09/05/2019    LDLCALC 112 10/04/2018    LDLCALC 134 04/03/2018     Lab Results   Component Value Date    LABVLDL 46 09/05/2019    LABVLDL 17 05/13/2017    VLDL 32 10/04/2018     Lab Results   Component Value Date    CHOLHDLRATIO 5.3 04/03/2018    CHOLHDLRATIO 2.8 08/14/2017     No results for input(s): PROBNP in the last 72 hours. Cardiac Tests:  ECG: Atrial fibrillation 71 bpm.  Normal axis normal intervals. Anterior Q waves suggestive of prior MI. Echocardiogram: TTE 6/2017   Summary   Normal diastolic function for age   Ejection fraction biplane =37% (contrast)   LV mild diffuse hypokinesis. The left atrium is severely dilated. Possible atrial fibrillation   Structurally normal mitral valve. The aortic valve is trileaflet. The aortic valve appears mildly sclerotic. No pulmonary hypertension   No pericardial effusion. Stress test: None    Cardiac catheterization: None    Orders Placed This Encounter   Procedures    Stress test (Lexiscan)    EKG 12 lead    EKG 12 Lead    ECHO COMPLETE        Requested Prescriptions      No prescriptions requested or ordered in this encounter        Impression:  1. History of presumed alcoholic cardiomyopathy, EF 37% by echo 2017  2.  Longstanding persistent atrial

## 2020-03-13 ENCOUNTER — TELEPHONE (OUTPATIENT)
Dept: CARDIOLOGY | Age: 80
End: 2020-03-13

## 2020-03-13 NOTE — TELEPHONE ENCOUNTER
CALLED PATIENT AND LEFT MESSAGE TO SCHEDULE ECHO & MADISYN STRESS TEST.   Electronically signed by Joelle Bond on 3/13/2020 at 8:44 AM

## 2020-03-16 ENCOUNTER — TELEPHONE (OUTPATIENT)
Dept: ORTHOPEDIC SURGERY | Age: 80
End: 2020-03-16

## 2020-03-16 NOTE — TELEPHONE ENCOUNTER
3/16/2020 Felix Macdonald MA phoned Interventional Radiology [phone] / Willie Muñoz with and informed Nataliia Trujillo: Adiel Martinez has been cancelled (reason: coronavirus). Will need to reschedule CT knee when surgery has been rescheduled.

## 2020-03-16 NOTE — TELEPHONE ENCOUNTER
3/16/2020 12:20 pm Phoned patient [phone] / Vanessa Jeong with and informed patient [address] scheduled for March 30 th has been cancelled (reason: coronavirus). Patient informed: Surgery will be rescheduled when ban has been lifted. Patient expresses much disappointment but is in agreement with the plan.

## 2020-03-17 ENCOUNTER — TELEPHONE (OUTPATIENT)
Dept: CARDIOLOGY CLINIC | Age: 80
End: 2020-03-17

## 2020-03-18 RX ORDER — WARFARIN SODIUM 5 MG/1
5 TABLET ORAL DAILY
Qty: 30 TABLET | Refills: 5 | Status: SHIPPED
Start: 2020-03-18 | End: 2020-09-22 | Stop reason: SDUPTHER

## 2020-03-18 NOTE — TELEPHONE ENCOUNTER
MD Em Gilliland [facility]   Caller: Unspecified (Yesterday,  1:12 PM)             Can start warfarin 5 mg daily but he will have to follow up with anticoagulation clinic for dose adjustment and INR checks      Left message for patient to contact office.

## 2020-03-18 NOTE — TELEPHONE ENCOUNTER
Patient returned our call and was given instructions regarding Coumadin. He wishes to have Dr. Nrois Cade monitor this for him and will contact his office.

## 2020-03-25 ENCOUNTER — TELEPHONE (OUTPATIENT)
Dept: ORTHOPEDIC SURGERY | Age: 80
End: 2020-03-25

## 2020-03-25 NOTE — TELEPHONE ENCOUNTER
3/25/2020 9:44 am Fax message sent to Dr. Wojciech Owen, cardiologist [phone]: Notification of plans for surgery (new surgery date). Approval to hold Coumadin 5 days pre op (last dose June 16 th) will resume post op. Any recommendations?

## 2020-03-25 NOTE — TELEPHONE ENCOUNTER
Patient informed he will be getting a call from [facility] to schedule CT Scan Left Knee prior to surgery date of 6/22/2020 for Lt TKA (IVAN). Order pended in [facility] [address]

## 2020-03-26 ENCOUNTER — TELEPHONE (OUTPATIENT)
Dept: ORTHOPEDIC SURGERY | Age: 80
End: 2020-03-26

## 2020-03-26 NOTE — TELEPHONE ENCOUNTER
3/26/2020 1:36 pm Pre-Optimization Checklist faxed to [facility], nurse navigator, Kacey TILLMAN, [phone]. Informed of surgery date change.

## 2020-03-27 ENCOUNTER — TELEPHONE (OUTPATIENT)
Dept: CARDIOLOGY | Age: 80
End: 2020-03-27

## 2020-04-01 ENCOUNTER — TELEPHONE (OUTPATIENT)
Dept: CARDIOLOGY | Age: 80
End: 2020-04-01

## 2020-04-02 ENCOUNTER — HOSPITAL ENCOUNTER (OUTPATIENT)
Dept: CARDIOLOGY | Age: 80
Discharge: HOME | End: 2020-04-02
Payer: MEDICARE

## 2020-04-02 ENCOUNTER — APPOINTMENT (OUTPATIENT)
Dept: CARDIOLOGY | Age: 80
End: 2020-04-02
Payer: MEDICARE

## 2020-04-02 VITALS
TEMPERATURE: 97.6 F | WEIGHT: 240 LBS | BODY MASS INDEX: 37.67 KG/M2 | OXYGEN SATURATION: 96 % | SYSTOLIC BLOOD PRESSURE: 102 MMHG | DIASTOLIC BLOOD PRESSURE: 60 MMHG | HEART RATE: 76 BPM | HEIGHT: 67 IN

## 2020-04-02 PROCEDURE — 2580000003 HC RX 258: Performed by: INTERNAL MEDICINE

## 2020-04-02 PROCEDURE — 78452 HT MUSCLE IMAGE SPECT MULT: CPT

## 2020-04-02 PROCEDURE — 93017 CV STRESS TEST TRACING ONLY: CPT

## 2020-04-02 PROCEDURE — 3430000000 HC RX DIAGNOSTIC RADIOPHARMACEUTICAL: Performed by: INTERNAL MEDICINE

## 2020-04-02 PROCEDURE — A9502 TC99M TETROFOSMIN: HCPCS | Performed by: INTERNAL MEDICINE

## 2020-04-02 PROCEDURE — 6360000002 HC RX W HCPCS: Performed by: INTERNAL MEDICINE

## 2020-04-02 RX ORDER — SODIUM CHLORIDE 0.9 % (FLUSH) 0.9 %
10 SYRINGE (ML) INJECTION PRN
Status: DISCONTINUED | OUTPATIENT
Start: 2020-04-02 | End: 2020-04-03 | Stop reason: HOSPADM

## 2020-04-02 RX ADMIN — REGADENOSON 0.4 MG: 0.08 INJECTION, SOLUTION INTRAVENOUS at 11:08

## 2020-04-02 RX ADMIN — TETROFOSMIN 8.4 MILLICURIE: 0.23 INJECTION, POWDER, LYOPHILIZED, FOR SOLUTION INTRAVENOUS at 09:56

## 2020-04-02 RX ADMIN — Medication 10 ML: at 11:08

## 2020-04-02 RX ADMIN — TETROFOSMIN 31.3 MILLICURIE: 0.23 INJECTION, POWDER, LYOPHILIZED, FOR SOLUTION INTRAVENOUS at 11:09

## 2020-04-02 RX ADMIN — Medication 10 ML: at 09:56

## 2020-04-02 RX ADMIN — Medication 10 ML: at 11:05

## 2020-04-02 NOTE — PROCEDURES
motion. The gated SPECT stress imaging in the short, vertical long, and horizontal long axis demonstrated the following:    A moderate defect was present in the apex that was small sized by quantification. There also was a moderate defect present in the basal inferior, mid inferior and apical inferior wall(s) that was moderate sized by quantification:     The resting images reveal complete reversibility. Gated SPECT left ventricular ejection fraction was calculated to be 73%, with normal myocardial thickening and wall motion. Impression:    1. ECG during the infusion did not change. 2. The myocardial perfusion imaging was abnormal.  The abnormality was a moderate sized reversible defect in the entire inferior wall and apex suggestive of ischemia. 3. Overall left ventricular systolic function was normal without regional wall motion abnormalities. 4. Intermediate risk pre-operative pharmacologic stress test.    Thank you for sending your patient to this [employer].      Electronically signed by Neris Cornelius MD on 4/3/20 at 5:17 PM EDT

## 2020-04-04 PROBLEM — Z01.818 PREOP EXAM FOR INTERNAL MEDICINE: Status: RESOLVED | Noted: 2018-10-25 | Resolved: 2020-04-04

## 2020-04-06 ENCOUNTER — TELEPHONE (OUTPATIENT)
Dept: PRIMARY CARE CLINIC | Age: 80
End: 2020-04-06

## 2020-04-07 RX ORDER — SIMVASTATIN 40 MG
40 TABLET ORAL NIGHTLY
Qty: 30 TABLET | Refills: 1 | Status: SHIPPED
Start: 2020-04-07 | End: 2020-05-12 | Stop reason: HOSPADM

## 2020-04-08 ENCOUNTER — TELEPHONE (OUTPATIENT)
Dept: CARDIOLOGY CLINIC | Age: 80
End: 2020-04-08

## 2020-04-08 NOTE — TELEPHONE ENCOUNTER
Margarita Hernandez called back and was advised to follow up with his cardiologist. He agreed to do so.

## 2020-04-16 ENCOUNTER — VIRTUAL VISIT (OUTPATIENT)
Dept: CARDIOLOGY CLINIC | Age: 80
End: 2020-04-16
Payer: MEDICARE

## 2020-04-16 VITALS — WEIGHT: 232.5 LBS | BODY MASS INDEX: 35.24 KG/M2 | HEIGHT: 68 IN

## 2020-04-16 PROCEDURE — 99442 PR PHYS/QHP TELEPHONE EVALUATION 11-20 MIN: CPT | Performed by: INTERNAL MEDICINE

## 2020-04-16 NOTE — PROGRESS NOTES
controlled. Diagnosed 6/2017. [id number] Score at least 4. Started on warfarin 3/2020. 4. Probable CAD, with anterior Q waves suggesting prior MI, abnormal stress as above  5. Alcoholic cirrhosis with ongoing intermittent alcohol use  6. Hypertension  7. CKD stage II. Most recent creatinine at the [place] 1.9. Previously 1.49 at nephrology office 12/2019  8. Comorbid disease: Borderline diabetes A1c 5.9%, peripheral neuropathy, hyperlipidemia, osteoarthritis    Recommendations:  · Recommend left heart catheterization given abnormal stress test and upcoming surgery  · I would still like to have the echocardiogram done  · Recommend discontinuing spironolactone given his kidney function and postural dizziness  · Will touch base with Dr. Zamzam Corrales regarding plans for left heart cath and DC of spironolactone, will plan for early admission for several hours of IV hydration before his heart cath if Dr. Zamzam Corrales in agreement  · Recommend obtaining home blood pressure monitor  · Continue current cardiac medications otherwise  · Will consider DC cardioversion after his knee surgery since he will require several interruptions of anticoagulation  · Aggressive risk factor modification    Risks and benefits of heart catheterization explained, including risk of MI, stroke, bleeding, death, requiring emergency surgery, kidney failure. Possible outcomes include need for medical management, PCI, bypass explained to patient. He understands and agrees to proceed. AUC criteria:   Indication: 16; score 7        I affirm this is a Patient Initiated Episode with an Established Patient who has not had a related appointment within my department in the past 7 days or scheduled within the next 24 hours.     Total Time: minutes: 11-20 minutes    Note: not billable if this call serves to triage the patient into an appointment for the relevant concern      Justin Martinez

## 2020-04-17 ENCOUNTER — TELEPHONE (OUTPATIENT)
Dept: CARDIOLOGY CLINIC | Age: 80
End: 2020-04-17

## 2020-04-17 LAB — TSH SERPL DL<=0.05 MIU/L-ACNC: 0.39 UIU/ML

## 2020-04-17 NOTE — TELEPHONE ENCOUNTER
Patient states since he has been on coumadin, he is getting dizzy spells when he stands. He has stoppped taking the medication. Please advise.

## 2020-04-20 ENCOUNTER — TELEPHONE (OUTPATIENT)
Dept: CARDIOLOGY CLINIC | Age: 80
End: 2020-04-20

## 2020-04-20 ENCOUNTER — NURSE ONLY (OUTPATIENT)
Dept: CARDIOLOGY CLINIC | Age: 80
End: 2020-04-20

## 2020-04-20 VITALS
SYSTOLIC BLOOD PRESSURE: 102 MMHG | BODY MASS INDEX: 34.86 KG/M2 | DIASTOLIC BLOOD PRESSURE: 60 MMHG | WEIGHT: 230 LBS | HEIGHT: 68 IN | HEART RATE: 64 BPM

## 2020-04-20 NOTE — TELEPHONE ENCOUNTER
MD Georgina Osheah [facility]AN[facility]   Caller: Unspecified (Today,  1:41 PM)             Can reduced metoprolol tartrate to 25 bid and stop spironolactone   Dr Nikolai Zhong to follow up with pt regarding torsemide and metolazone   Needs bloodwork      Left message for patient to contact office.

## 2020-04-22 ENCOUNTER — TELEPHONE (OUTPATIENT)
Dept: PRIMARY CARE CLINIC | Age: 80
End: 2020-04-22

## 2020-04-23 ENCOUNTER — TELEPHONE (OUTPATIENT)
Dept: ORTHOPEDIC SURGERY | Age: 80
End: 2020-04-23

## 2020-05-01 ENCOUNTER — TELEPHONE (OUTPATIENT)
Dept: CARDIOLOGY CLINIC | Age: 80
End: 2020-05-01

## 2020-05-06 NOTE — TELEPHONE ENCOUNTER
MD Em Copeland [facility]   Caller: Unspecified (5 days ago,  3:46 PM)             Can stop coumadin after todays dose      Patient contacted with instructions to stop Coumadin after taking 5/6/2020 dose. Patient acknowledged understanding.

## 2020-05-07 ENCOUNTER — HOSPITAL ENCOUNTER (OUTPATIENT)
Age: 80
Discharge: HOME | End: 2020-05-09
Payer: MEDICARE

## 2020-05-07 PROCEDURE — U0003 INFECTIOUS AGENT DETECTION BY NUCLEIC ACID (DNA OR RNA); SEVERE ACUTE RESPIRATORY SYNDROME CORONAVIRUS 2 (SARS-COV-2) (CORONAVIRUS DISEASE [COVID-19]), AMPLIFIED PROBE TECHNIQUE, MAKING USE OF HIGH THROUGHPUT TECHNOLOGIES AS DESCRIBED BY CMS-2020-01-R: HCPCS

## 2020-05-10 LAB
SARS-COV-2: NOT DETECTED
SOURCE: NORMAL

## 2020-05-12 ENCOUNTER — HOSPITAL ENCOUNTER (OUTPATIENT)
Dept: CARDIAC CATH/INVASIVE PROCEDURES | Age: 80
Discharge: HOME | End: 2020-05-12
Payer: MEDICARE

## 2020-05-12 ENCOUNTER — HOSPITAL ENCOUNTER (OUTPATIENT)
Age: 80
Discharge: HOME | End: 2020-05-12
Payer: MEDICARE

## 2020-05-12 LAB
ABO/RH: NORMAL
ANION GAP SERPL CALCULATED.3IONS-SCNC: 13 MMOL/L (ref 7–16)
ANION GAP SERPL CALCULATED.3IONS-SCNC: 15 MMOL/L (ref 7–16)
ANTIBODY SCREEN: NORMAL
BASOPHILS ABSOLUTE: 0.02 E9/L (ref 0–0.2)
BASOPHILS RELATIVE PERCENT: 0.3 % (ref 0–2)
BUN BLDV-MCNC: 19 MG/DL (ref 8–23)
BUN BLDV-MCNC: 20 MG/DL (ref 8–23)
CALCIUM SERPL-MCNC: 8.5 MG/DL (ref 8.6–10.2)
CALCIUM SERPL-MCNC: 9.1 MG/DL (ref 8.6–10.2)
CHLORIDE BLD-SCNC: 93 MMOL/L (ref 98–107)
CHLORIDE BLD-SCNC: 94 MMOL/L (ref 98–107)
CO2: 25 MMOL/L (ref 22–29)
CO2: 28 MMOL/L (ref 22–29)
CREAT SERPL-MCNC: 1.2 MG/DL (ref 0.7–1.2)
CREAT SERPL-MCNC: 1.5 MG/DL (ref 0.7–1.2)
EOSINOPHILS ABSOLUTE: 0.09 E9/L (ref 0.05–0.5)
EOSINOPHILS RELATIVE PERCENT: 1.2 % (ref 0–6)
GFR AFRICAN AMERICAN: 55
GFR AFRICAN AMERICAN: >60
GFR NON-AFRICAN AMERICAN: 45 ML/MIN/1.73
GFR NON-AFRICAN AMERICAN: 59 ML/MIN/1.73
GLUCOSE BLD-MCNC: 110 MG/DL (ref 74–99)
GLUCOSE BLD-MCNC: 121 MG/DL (ref 74–99)
HCT VFR BLD CALC: 34.7 % (ref 37–54)
HEMOGLOBIN: 11.7 G/DL (ref 12.5–16.5)
IMMATURE GRANULOCYTES #: 0.03 E9/L
IMMATURE GRANULOCYTES %: 0.4 % (ref 0–5)
INR BLD: 1.3
LYMPHOCYTES ABSOLUTE: 2.41 E9/L (ref 1.5–4)
LYMPHOCYTES RELATIVE PERCENT: 31.1 % (ref 20–42)
MCH RBC QN AUTO: 31.1 PG (ref 26–35)
MCHC RBC AUTO-ENTMCNC: 33.7 % (ref 32–34.5)
MCV RBC AUTO: 92.3 FL (ref 80–99.9)
MONOCYTES ABSOLUTE: 0.62 E9/L (ref 0.1–0.95)
MONOCYTES RELATIVE PERCENT: 8 % (ref 2–12)
NEUTROPHILS ABSOLUTE: 4.59 E9/L (ref 1.8–7.3)
NEUTROPHILS RELATIVE PERCENT: 59 % (ref 43–80)
PDW BLD-RTO: 13.7 FL (ref 11.5–15)
PLATELET # BLD: 268 E9/L (ref 130–450)
PMV BLD AUTO: 9.7 FL (ref 7–12)
POTASSIUM SERPL-SCNC: 2.7 MMOL/L (ref 3.5–5)
POTASSIUM SERPL-SCNC: 2.9 MMOL/L (ref 3.5–5)
PROTHROMBIN TIME: 14.2 SEC (ref 9.3–12.4)
RBC # BLD: 3.76 E12/L (ref 3.8–5.8)
SODIUM BLD-SCNC: 132 MMOL/L (ref 132–146)
SODIUM BLD-SCNC: 136 MMOL/L (ref 132–146)
WBC # BLD: 7.8 E9/L (ref 4.5–11.5)

## 2020-05-12 PROCEDURE — 93454 CORONARY ARTERY ANGIO S&I: CPT | Performed by: INTERNAL MEDICINE

## 2020-05-12 PROCEDURE — 2709999900 HC NON-CHARGEABLE SUPPLY

## 2020-05-12 PROCEDURE — 86900 BLOOD TYPING SEROLOGIC ABO: CPT

## 2020-05-12 PROCEDURE — 80048 BASIC METABOLIC PNL TOTAL CA: CPT

## 2020-05-12 PROCEDURE — 85610 PROTHROMBIN TIME: CPT

## 2020-05-12 PROCEDURE — 2500000003 HC RX 250 WO HCPCS

## 2020-05-12 PROCEDURE — 36415 COLL VENOUS BLD VENIPUNCTURE: CPT

## 2020-05-12 PROCEDURE — C1725 CATH, TRANSLUMIN NON-LASER: HCPCS

## 2020-05-12 PROCEDURE — C1769 GUIDE WIRE: HCPCS

## 2020-05-12 PROCEDURE — 86901 BLOOD TYPING SEROLOGIC RH(D): CPT

## 2020-05-12 PROCEDURE — 6370000000 HC RX 637 (ALT 250 FOR IP): Performed by: INTERNAL MEDICINE

## 2020-05-12 PROCEDURE — C1894 INTRO/SHEATH, NON-LASER: HCPCS

## 2020-05-12 PROCEDURE — 6360000002 HC RX W HCPCS

## 2020-05-12 PROCEDURE — 86850 RBC ANTIBODY SCREEN: CPT

## 2020-05-12 PROCEDURE — 85025 COMPLETE CBC W/AUTO DIFF WBC: CPT

## 2020-05-12 RX ORDER — METOLAZONE 2.5 MG/1
2.5 TABLET ORAL WEEKLY
Qty: 90 TABLET | Refills: 1 | Status: SHIPPED
Start: 2020-05-15 | End: 2021-11-16 | Stop reason: SDUPTHER

## 2020-05-12 RX ORDER — ATORVASTATIN CALCIUM 10 MG/1
10 TABLET, FILM COATED ORAL DAILY
Qty: 30 TABLET | Refills: 3 | Status: SHIPPED | OUTPATIENT
Start: 2020-05-12 | End: 2020-05-14 | Stop reason: SDUPTHER

## 2020-05-12 RX ORDER — ACETAMINOPHEN 325 MG/1
650 TABLET ORAL EVERY 4 HOURS PRN
Status: CANCELLED | OUTPATIENT
Start: 2020-05-12

## 2020-05-12 RX ORDER — SODIUM CHLORIDE 0.9 % (FLUSH) 0.9 %
10 SYRINGE (ML) INJECTION EVERY 12 HOURS SCHEDULED
Status: CANCELLED | OUTPATIENT
Start: 2020-05-12

## 2020-05-12 RX ORDER — ASPIRIN 81 MG/1
81 TABLET ORAL DAILY
Qty: 30 TABLET | Refills: 3 | Status: SHIPPED | OUTPATIENT
Start: 2020-05-12 | End: 2020-09-08 | Stop reason: SDUPTHER

## 2020-05-12 RX ORDER — SODIUM CHLORIDE 0.9 % (FLUSH) 0.9 %
10 SYRINGE (ML) INJECTION PRN
Status: CANCELLED | OUTPATIENT
Start: 2020-05-12

## 2020-05-12 RX ORDER — POTASSIUM CHLORIDE 20 MEQ/1
20 TABLET, EXTENDED RELEASE ORAL 3 TIMES DAILY
Qty: 270 TABLET | Refills: 2 | Status: SHIPPED | OUTPATIENT
Start: 2020-05-12 | End: 2020-05-15 | Stop reason: DRUGHIGH

## 2020-05-12 RX ADMIN — POTASSIUM BICARBONATE 40 MEQ: 782 TABLET, EFFERVESCENT ORAL at 14:09

## 2020-05-12 RX ADMIN — POTASSIUM BICARBONATE 20 MEQ: 782 TABLET, EFFERVESCENT ORAL at 14:56

## 2020-05-12 RX ADMIN — POTASSIUM BICARBONATE 20 MEQ: 782 TABLET, EFFERVESCENT ORAL at 14:50

## 2020-05-12 NOTE — PROGRESS NOTES
Repeated potassium after replacement was 2.7, patient declined hospital admission to correct his potassium, risks explained, will give 40 mEq of potassium now then another 40 when he leaves, will check basic metabolic panel in a couple of days, will hold metolazone for the next couple of days, patient stated that he had an appointment with Dr. Nitza Vogt on Friday   will add aspirin, will change simvastatin to Lipitor given his CAD

## 2020-05-13 ENCOUNTER — TELEPHONE (OUTPATIENT)
Dept: CARDIOLOGY CLINIC | Age: 80
End: 2020-05-13

## 2020-05-14 RX ORDER — ATORVASTATIN CALCIUM 40 MG/1
40 TABLET, FILM COATED ORAL DAILY
Qty: 90 TABLET | Refills: 3 | Status: SHIPPED
Start: 2020-05-14 | End: 2021-05-10 | Stop reason: SDUPTHER

## 2020-05-14 NOTE — TELEPHONE ENCOUNTER
MD Em Trujillo [facility]   Caller: Unspecified (Yesterday,  1:23 PM)             Can resume coumadin right away. He may proceed with knee surgery understanding that he is at somewhat elevated risk because of the blockages that were seen on the heart cath.  I reviewed with films with 2 interventionalists, and agreed that medical therapy (in other words no stent or bypass surgery) is indicated for now since he is not having chest pain or exertional shortness of breath.  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  So the medical treatment is aspirin and cholesterol medication - we should increase his atorvastatin to 40. May proceed with surgery without further cardiac testing. If develops chest pain we can revisit the blockages to see if he may need stent but right now just medicine      Left message for patient to contact office.

## 2020-05-15 ENCOUNTER — OFFICE VISIT (OUTPATIENT)
Dept: FAMILY MEDICINE CLINIC | Age: 80
End: 2020-05-15
Payer: MEDICARE

## 2020-05-15 VITALS
TEMPERATURE: 97.8 F | BODY MASS INDEX: 36.98 KG/M2 | SYSTOLIC BLOOD PRESSURE: 106 MMHG | DIASTOLIC BLOOD PRESSURE: 60 MMHG | OXYGEN SATURATION: 98 % | HEIGHT: 68 IN | WEIGHT: 244 LBS | HEART RATE: 64 BPM

## 2020-05-15 PROCEDURE — 99214 OFFICE O/P EST MOD 30 MIN: CPT | Performed by: INTERNAL MEDICINE

## 2020-05-15 RX ORDER — POTASSIUM CHLORIDE 20 MEQ/1
20 TABLET, EXTENDED RELEASE ORAL 2 TIMES DAILY
Qty: 270 TABLET | Refills: 2 | Status: SHIPPED
Start: 2020-05-15 | End: 2021-08-06 | Stop reason: SDUPTHER

## 2020-05-15 RX ORDER — GABAPENTIN 100 MG/1
CAPSULE ORAL
Qty: 450 CAPSULE | Refills: 0 | Status: SHIPPED | OUTPATIENT
Start: 2020-05-15 | End: 2020-09-08 | Stop reason: SDUPTHER

## 2020-05-15 RX ORDER — SPIRONOLACTONE 25 MG/1
25 TABLET ORAL DAILY
Qty: 30 TABLET | Refills: 5
Start: 2020-05-15 | End: 2020-06-10 | Stop reason: SINTOL

## 2020-05-15 ASSESSMENT — ENCOUNTER SYMPTOMS
VOMITING: 0
SHORTNESS OF BREATH: 0
BLOOD IN STOOL: 0
CHEST TIGHTNESS: 0
CONSTIPATION: 0
EYE PAIN: 0
ABDOMINAL PAIN: 0
COUGH: 0
DIARRHEA: 0
RHINORRHEA: 0
NAUSEA: 0
SORE THROAT: 1

## 2020-05-15 ASSESSMENT — PATIENT HEALTH QUESTIONNAIRE - PHQ9
SUM OF ALL RESPONSES TO PHQ QUESTIONS 1-9: 0
2. FEELING DOWN, DEPRESSED OR HOPELESS: 0
1. LITTLE INTEREST OR PLEASURE IN DOING THINGS: 0
SUM OF ALL RESPONSES TO PHQ9 QUESTIONS 1 & 2: 0
SUM OF ALL RESPONSES TO PHQ QUESTIONS 1-9: 0

## 2020-05-15 NOTE — PROGRESS NOTES
[facility]   Internal Medicine     5/15/2020  Inocente Sapp : 1940 Sex: male Age:79 y.o. Chief Complaint   Patient presents with    3 Month Follow-Up     Heart catyh done 20    Hypertension        HPI:   Patient presents to office for review and management of chronic medical conditions.    - States has CAD. States had stress test (2020) The myocardial perfusion imaging was abnormal, moderate sized reversible defect , cardiac cath (2020) - 40% proximal LAD lesion, 50% mid LAD lesion at the takeoff of a large diagonal branch, 50% proximal left circumflex artery disease. Totally occluded right coronary artery with grade 3 left-to-right collaterals.  needs aggressive risk factor management. Now on metoprolol    -  has been getting dizzy after walking. States has hypothyroid. 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 Still drinks on weekends. Discussed cessation. On sprinolactone and metolazone. On torsemide 40 mg twice a day, decrease metolazone to 2.5mg  every 10 days. Patient states was gaining fluid and increased torsemide back to 40mg three times a day. States has chronic kidney disease. Follows with nephrology     States has elevated uric acid. States was on allopurinol in the past, stopped due to thought was causing hives.  has hypertension, not checking blood pressure at home     States has high cholesterol. Was on simvastatin - changed to atorvastatin. States has cardiomyopathy. States due to alcohol. Did have brief atrial fibr. Declines to follow with cardiology. States has neuropathy. Possible due to alcohol. On gabapentin. Helping asking for dose increase. Last lab shows elevated sugar. A1c borderline at 5.9     Has electrolyte abnormalities     States has osteoarthritis. States multiple joints. Follows with ortho for knee pain. 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   States will have another injection. Health Maintenance   - immunizations:   Influenza Vaccination - declines  Pneumonia Vaccination - declines   Zoster/Shingles Vaccine  Tetanus Vaccination    - Screenings:   Colonoscopy   Prostate     Stress test (4/2020) - The myocardial perfusion imaging was abnormal, moderate sized reversible defect in the entire inferior wall and apex suggestive of ischemia    echo (4/20) - ef 55-60%, mod l &r atiral dilation, mild to mod MR, indeterm diastolic dysfun    Couseled on Home Safety     ROS:  Review of Systems   Constitutional: Negative for appetite change, chills, fever and unexpected weight change. HENT: Positive for sore throat. Negative for congestion and rhinorrhea. Eyes: Negative for pain and visual disturbance. Respiratory: Negative for cough, chest tightness and shortness of breath. Cardiovascular: Negative for chest pain and palpitations. Gastrointestinal: Negative for abdominal pain, blood in stool, constipation, diarrhea, nausea and vomiting. Genitourinary: Negative for difficulty urinating, dysuria, hematuria, scrotal swelling, testicular pain and urgency. Musculoskeletal: Negative for arthralgias and gait problem. Skin: Negative for rash. Neurological: Negative for dizziness, syncope, weakness, light-headedness and headaches. Hematological: Negative for adenopathy. Does not bruise/bleed easily. Psychiatric/Behavioral: Negative for suicidal ideas. The patient is not nervous/anxious.           Current Outpatient Medications:     potassium chloride (KLOR-CON M) 20 MEQ extended release tablet, Take 1 tablet by mouth 2 times daily, Disp: 270 tablet, Rfl: 2    spironolactone (ALDACTONE) 25 MG tablet, Take 1 tablet by mouth daily, Disp: 30 tablet, Rfl: 5    gabapentin (NEURONTIN) 100 MG capsule, 200mg in am, 100mg at noon and 200mg in pm, Disp: 450 capsule, Rfl: 0    atorvastatin (LIPITOR) 40 MG tablet, Take 1 tablet by mouth daily, Disp: 90 tablet, Raisa Rutledge MD  5/15/2020  4:21 PM

## 2020-05-21 ENCOUNTER — TELEPHONE (OUTPATIENT)
Dept: CARDIOLOGY CLINIC | Age: 80
End: 2020-05-21

## 2020-05-22 ENCOUNTER — ANTI-COAG VISIT (OUTPATIENT)
Dept: FAMILY MEDICINE CLINIC | Age: 80
End: 2020-05-22
Payer: MEDICARE

## 2020-05-22 ENCOUNTER — NURSE ONLY (OUTPATIENT)
Dept: FAMILY MEDICINE CLINIC | Age: 80
End: 2020-05-22
Payer: MEDICARE

## 2020-05-22 ENCOUNTER — HOSPITAL ENCOUNTER (OUTPATIENT)
Age: 80
Discharge: HOME | End: 2020-05-24
Payer: MEDICARE

## 2020-05-22 LAB
ANION GAP SERPL CALCULATED.3IONS-SCNC: 10 MMOL/L (ref 7–16)
BUN BLDV-MCNC: 17 MG/DL (ref 8–23)
CALCIUM SERPL-MCNC: 8.8 MG/DL (ref 8.6–10.2)
CHLORIDE BLD-SCNC: 97 MMOL/L (ref 98–107)
CO2: 31 MMOL/L (ref 22–29)
CREAT SERPL-MCNC: 1.5 MG/DL (ref 0.7–1.2)
GFR AFRICAN AMERICAN: 55
GFR NON-AFRICAN AMERICAN: 45 ML/MIN/1.73
GLUCOSE BLD-MCNC: 159 MG/DL (ref 74–99)
INTERNATIONAL NORMALIZATION RATIO, POC: 1.8
POTASSIUM SERPL-SCNC: 3.9 MMOL/L (ref 3.5–5)
PROTHROMBIN TIME, POC: 21.6
SODIUM BLD-SCNC: 138 MMOL/L (ref 132–146)

## 2020-05-22 PROCEDURE — 80048 BASIC METABOLIC PNL TOTAL CA: CPT

## 2020-05-22 PROCEDURE — 36415 COLL VENOUS BLD VENIPUNCTURE: CPT

## 2020-05-22 PROCEDURE — 93793 ANTICOAG MGMT PT WARFARIN: CPT | Performed by: INTERNAL MEDICINE

## 2020-05-22 PROCEDURE — 85610 PROTHROMBIN TIME: CPT | Performed by: INTERNAL MEDICINE

## 2020-05-26 RX ORDER — LEVOTHYROXINE SODIUM 0.2 MG/1
200 TABLET ORAL DAILY
Qty: 90 TABLET | Refills: 0 | Status: SHIPPED
Start: 2020-05-26 | End: 2020-08-24 | Stop reason: SDUPTHER

## 2020-05-26 NOTE — TELEPHONE ENCOUNTER
Last Appointment:  5/15/2020  Future Appointments   Date Time Provider Rafi Alvarenga   5/29/2020  1:00 PM SCHEDULE, YX HA STEWARD [facility]   6/8/2020  1:00 PM SEB CT 1-BD SEBZ CT [facility] Radiolog   6/9/2020 11:00 AM MD HA Diaz [facility]   6/16/2020 11:30 AM Matilda Anaya MD [facility]   7/2/2020  1:45 PM Marilyn Cabrera MD [facility]      Patient needing levothyroxine to GE does not have enough til appt

## 2020-05-29 ENCOUNTER — NURSE ONLY (OUTPATIENT)
Dept: FAMILY MEDICINE CLINIC | Age: 80
End: 2020-05-29
Payer: MEDICARE

## 2020-05-29 ENCOUNTER — ANTI-COAG VISIT (OUTPATIENT)
Dept: FAMILY MEDICINE CLINIC | Age: 80
End: 2020-05-29
Payer: MEDICARE

## 2020-05-29 LAB
INTERNATIONAL NORMALIZATION RATIO, POC: 2.2
PROTHROMBIN TIME, POC: NORMAL

## 2020-05-29 PROCEDURE — 85610 PROTHROMBIN TIME: CPT | Performed by: INTERNAL MEDICINE

## 2020-05-29 PROCEDURE — 93793 ANTICOAG MGMT PT WARFARIN: CPT | Performed by: INTERNAL MEDICINE

## 2020-05-29 NOTE — PROGRESS NOTES
Previous INR: 1.8     Previous dose: 5mg daily     Current INR: 2.2    Recommendation: continue 5mg daily     Next INR: 2 week     Tommy Sutherland MD  5/29/2020  4:52 PM

## 2020-06-04 ENCOUNTER — TELEPHONE (OUTPATIENT)
Dept: ORTHOPEDIC SURGERY | Age: 80
End: 2020-06-04

## 2020-06-04 NOTE — TELEPHONE ENCOUNTER
6/04/2020 2:33 pm  Fax message successfully sent to [phone] and [phone] Dr. Jones Pod: Courtesy notification of new date - surgery. Requesting approval hold Coumadin 5 days and aspirin 7 days pre op.

## 2020-06-04 NOTE — TELEPHONE ENCOUNTER
Spoke with Vilma Doherty at [facility]. Original request for Inpatient status for Lt TKA 6/22/2020 was denied. New pre-cert obtained for OPT/OBS status. Auth# [id number]. Valid 6/22/2020 - 8/20/2020. Changed Status to OPT with surgery scheduling.

## 2020-06-05 ENCOUNTER — TELEPHONE (OUTPATIENT)
Dept: ORTHOPEDIC SURGERY | Age: 80
End: 2020-06-05

## 2020-06-08 ENCOUNTER — HOSPITAL ENCOUNTER (OUTPATIENT)
Dept: CT IMAGING | Age: 80
Discharge: HOME | End: 2020-06-10
Payer: MEDICARE

## 2020-06-08 PROCEDURE — 73700 CT LOWER EXTREMITY W/O DYE: CPT

## 2020-06-09 ENCOUNTER — HOSPITAL ENCOUNTER (OUTPATIENT)
Age: 80
Discharge: HOME | End: 2020-06-11
Payer: MEDICARE

## 2020-06-09 ENCOUNTER — OFFICE VISIT (OUTPATIENT)
Dept: FAMILY MEDICINE CLINIC | Age: 80
End: 2020-06-09
Payer: MEDICARE

## 2020-06-09 VITALS
HEIGHT: 68 IN | HEART RATE: 69 BPM | SYSTOLIC BLOOD PRESSURE: 90 MMHG | TEMPERATURE: 98.1 F | DIASTOLIC BLOOD PRESSURE: 58 MMHG | BODY MASS INDEX: 37.13 KG/M2 | OXYGEN SATURATION: 98 % | WEIGHT: 245 LBS

## 2020-06-09 LAB
BASOPHILS ABSOLUTE: 0.05 E9/L (ref 0–0.2)
BASOPHILS RELATIVE PERCENT: 0.7 % (ref 0–2)
EOSINOPHILS ABSOLUTE: 0.08 E9/L (ref 0.05–0.5)
EOSINOPHILS RELATIVE PERCENT: 1.1 % (ref 0–6)
HCT VFR BLD CALC: 38.2 % (ref 37–54)
HEMOGLOBIN: 12.1 G/DL (ref 12.5–16.5)
IMMATURE GRANULOCYTES #: 0.02 E9/L
IMMATURE GRANULOCYTES %: 0.3 % (ref 0–5)
INTERNATIONAL NORMALIZATION RATIO, POC: 2.3
LYMPHOCYTES ABSOLUTE: 2.29 E9/L (ref 1.5–4)
LYMPHOCYTES RELATIVE PERCENT: 31.2 % (ref 20–42)
MCH RBC QN AUTO: 30.9 PG (ref 26–35)
MCHC RBC AUTO-ENTMCNC: 31.7 % (ref 32–34.5)
MCV RBC AUTO: 97.7 FL (ref 80–99.9)
MONOCYTES ABSOLUTE: 0.61 E9/L (ref 0.1–0.95)
MONOCYTES RELATIVE PERCENT: 8.3 % (ref 2–12)
NEUTROPHILS ABSOLUTE: 4.28 E9/L (ref 1.8–7.3)
NEUTROPHILS RELATIVE PERCENT: 58.4 % (ref 43–80)
PDW BLD-RTO: 14.5 FL (ref 11.5–15)
PLATELET # BLD: 270 E9/L (ref 130–450)
PMV BLD AUTO: 10.5 FL (ref 7–12)
PROTHROMBIN TIME, POC: 27.6
RBC # BLD: 3.91 E12/L (ref 3.8–5.8)
WBC # BLD: 7.3 E9/L (ref 4.5–11.5)

## 2020-06-09 PROCEDURE — 93793 ANTICOAG MGMT PT WARFARIN: CPT | Performed by: INTERNAL MEDICINE

## 2020-06-09 PROCEDURE — 80053 COMPREHEN METABOLIC PANEL: CPT

## 2020-06-09 PROCEDURE — 36415 COLL VENOUS BLD VENIPUNCTURE: CPT

## 2020-06-09 PROCEDURE — 83735 ASSAY OF MAGNESIUM: CPT

## 2020-06-09 PROCEDURE — 99214 OFFICE O/P EST MOD 30 MIN: CPT | Performed by: INTERNAL MEDICINE

## 2020-06-09 PROCEDURE — 85610 PROTHROMBIN TIME: CPT | Performed by: INTERNAL MEDICINE

## 2020-06-09 PROCEDURE — 85025 COMPLETE CBC W/AUTO DIFF WBC: CPT

## 2020-06-09 ASSESSMENT — ENCOUNTER SYMPTOMS
RHINORRHEA: 0
BLOOD IN STOOL: 0
CONSTIPATION: 0
COUGH: 0
CHEST TIGHTNESS: 0
SORE THROAT: 1
NAUSEA: 0
EYE PAIN: 0
DIARRHEA: 0
VOMITING: 0
SHORTNESS OF BREATH: 0
ABDOMINAL PAIN: 0

## 2020-06-09 NOTE — PROGRESS NOTES
[facility]   Internal Medicine     2020  Anna Bailey : 1940 Sex: male Age:79 y.o. Chief Complaint   Patient presents with    Pre-op Exam     Left total knee     Anticoagulation        HPI:   Patient presents to office for review and management of chronic medical conditions.    - States has CAD. States had stress test (2020) The myocardial perfusion imaging was abnormal, moderate sized reversible defect , cardiac cath (2020) - 40% proximal LAD lesion, 50% mid LAD lesion at the takeoff of a large diagonal branch, 50% proximal left circumflex artery disease. Totally occluded right coronary artery with grade 3 left-to-right collaterals. States needs aggressive risk factor management. Now on metoprolol    - States has been getting dizzy after walking. States has hypothyroid. 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 Still drinks on weekends. Discussed cessation. On sprinolactone and metolazone. On torsemide 40 mg twice a day, decrease metolazone to 2.5mg  every 10 days. Patient states was gaining fluid and increased torsemide back to 40mg three times a day. States has chronic kidney disease. Follows with nephrology     States has elevated uric acid. States was on allopurinol in the past, stopped due to thought was causing hives. States has hypertension, not checking blood pressure at home     States has high cholesterol. Was on simvastatin - changed to atorvastatin. States has cardiomyopathy. States due to alcohol. Did have brief atrial fibr. Declines to follow with cardiology. States has neuropathy. Possible due to alcohol. On gabapentin. Helping asking for dose increase. Last lab shows elevated sugar. A1c borderline at 5.9     Has electrolyte abnormalities     States has osteoarthritis. States multiple joints. Follows with ortho for knee pain. 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   States will have Disp: 30 tablet, Rfl: 5    gabapentin (NEURONTIN) 100 MG capsule, 200mg in am, 100mg at noon and 200mg in pm, Disp: 450 capsule, Rfl: 0    atorvastatin (LIPITOR) 40 MG tablet, Take 1 tablet by mouth daily, Disp: 90 tablet, Rfl: 3    metOLazone (ZAROXOLYN) 2.5 MG tablet, Take 1 tablet by mouth once a week, Disp: 90 tablet, Rfl: 1    aspirin (ECOTRIN LOW STRENGTH) 81 MG EC tablet, Take 1 tablet by mouth daily, Disp: 30 tablet, Rfl: 3    warfarin (COUMADIN) 5 MG tablet, Take 1 tablet by mouth daily, Disp: 30 tablet, Rfl: 5    magnesium oxide (MAG-OX) 400 (241.3 Mg) MG TABS tablet, TAKE ONE TABLET BY MOUTH DAILY, Disp: , Rfl:     torsemide (DEMADEX) 20 MG tablet, Take 40 mg by mouth 2 times daily Take 2 tablets TID, Disp: , Rfl:     vitamin B-1 (THIAMINE) 100 MG tablet, Take 100 mg by mouth daily, Disp: , Rfl:     HYDROcodone-acetaminophen (NORCO) 5-325 MG per tablet, Take 1 tablet by mouth every 6 hours as needed for Pain ., Disp: , Rfl:     metoprolol tartrate (LOPRESSOR) 50 MG tablet, Take 1 tablet by mouth 2 times daily, Disp: 60 tablet, Rfl: 3    MULTIPLE VITAMIN PO, Take  by mouth daily. , Disp: , Rfl:     Allergies   Allergen Reactions    Sulfa Antibiotics      Hives         Past Medical History:   Diagnosis Date    Ascites     Blood circulation, collateral     CAD (coronary artery disease)     Chronic kidney disease     History of alcohol use     History of ascites     Hyperlipidemia     Hypertension     Hyponatremia     Liver disease     Lymphedema     Neuropathy     both feet, 5/10 in severity    Osteoarthritis     Psychiatric problem     Thyroid disease        Past Surgical History:   Procedure Laterality Date    APPENDECTOMY      COLONOSCOPY      EYE SURGERY      cataracts       Family History   Problem Relation Age of Onset    Other Mother         old age        Social History     Socioeconomic History    Marital status:      Spouse name: None    Number of children: 1  Years of education: 16    Highest education level: Master's degree (e.g., MA, MS, Jordan, MEd, MSW, LUCERO)   Occupational History    None   Social Needs    Financial resource strain: Patient refused    Food insecurity     Worry: Patient refused     Inability: Patient refused    Transportation needs     Medical: Patient refused     Non-medical: Patient refused   Tobacco Use    Smoking status: Former Smoker     Packs/day: 0.75     Years: 25.00     Pack years: 18.75     Types: Pipe, Cigars     Start date: 10/8/1964     Last attempt to quit: 1986     Years since quittin.2    Smokeless tobacco: Never Used    Tobacco comment: Cigar and Pipe smoking history only   Substance and Sexual Activity    Alcohol use: No     Alcohol/week: 0.0 standard drinks     Comment: quit 2017    Drug use: No    Sexual activity: None   Lifestyle    Physical activity     Days per week: None     Minutes per session: None    Stress: None   Relationships    Social connections     Talks on phone: None     Gets together: None     Attends Baptism service: None     Active member of club or organization: None     Attends meetings of clubs or organizations: None     Relationship status: None    Intimate partner violence     Fear of current or ex partner: None     Emotionally abused: None     Physically abused: None     Forced sexual activity: None   Other Topics Concern    None   Social History Narrative    None        Vitals:    20 1113 20 1154   BP: (!) 90/58 (!) 90/58   Site: Left Upper Arm    Position: Sitting    Cuff Size: Large Adult    Pulse: 69    Temp: 98.1 °F (36.7 °C)    SpO2: 98%    Weight: 245 lb (111.1 kg)    Height: 5' 8\" (1.727 m)        Exam:  Physical Exam  Constitutional:       Appearance: He is well-developed. HENT:      Head: Normocephalic and atraumatic. Right Ear: External ear normal.      Left Ear: External ear normal.      Nose: Rhinorrhea present.       Mouth/Throat: -  sleep apnea risk - elevated risk based on STOP bang - not tested for ANTOLIN.      American college of surgeons risk Calculator   - 3.3 to 5% % risk for any or serious complication   - overall average to slight above average risk risk      Medications:   recommend meds up to day of surgery   Ok to hold coumadin before surgery   Hold aspirin and NSAIDs 1 week before procedure    Recheck labs      Ok to proceed with procedure after cardiology assessment - intermediate risk      At[facility] fl[facility] ([facility]  - uncertain as to onset  - ekg from 2017 showed flutter   - now on anticoagulation   - follow with cardiolgy   - on coumadin 5mg daily   - inr before surgery     Coronary artery disease involving native coronary artery of native heart without angina pectoris  - s/p stress and cath (2020)   - medical therapy   - now on atorvastatin   - on aspirin 81mg   - on metorolol tart 50mg twice a day   - follow with cardiology     Alcoholic cirrhosis of liver with ascites ([facility]  - Continue present treatment   - discussed and advised stopping alcohol altogether   - declines referral to GI   - on spironolactone -  advised to hold by cardiology     - on torsemide   - on metolazone     Hyponatremia  - Possible multifactorial (cirrhois and meds)   - last lab stable at 132 (12/2019)     Dilated cardiomyopathy ([facility]  - possible due to alcohol   - discussed and advised stopping alcohol altogether     CKD (chronic kidney disease) stage 3, GFR 30-59 ml/min ([facility]  - Continue present treatment   - following with nephrology   - will advise to stop spirinolactone 25mg daily - given advised to hold by cardiology   - on metalozaone 2.5mg every 7 days   - on torsemide - advised by renal to take 40mg 2x per day   - on potassium supplement   - follow labs - last (9/2019) - show slight worsening     Impaired fasting glucose  - Continue present treatment   - watch diet   - follow A1c - last was 5.9     Alcohol abuse  - discussed and advised stopping alcohol altogether     Essential hypertension  - Continue present treatment   - watch diet   - monitor blood pressure at home   - on spironolactone   - now on metoprolol tart   - stable     Pure hypercholesterolemia  - Continue present treatment   - watch diet   - simvastatin switched to atorvastatin   - follow labs   - uncontrolled     History of ascites    Neuropathy (HCC)  - Continue present treatment   - due to alcohol   - on gabapentin   - increase to 200mg in am, 200mg at noon and 100mg in pm   - stable     Other specified hypothyroidism  - Continue present treatment   - on levothyroxine   - follow labs     Primary osteoarthritis involving multiple joints    Hyperuricemia  - Continue present treatment   - watch diet   - stopped allopurinol - thought possibly causing hives   - did not restart      Return in about 1 month (around 7/9/2020) for check up and review.     Orders Placed This Encounter   Procedures    Comprehensive Metabolic Panel     Standing Status:   Future     Number of Occurrences:   1     Standing Expiration Date:   6/9/2021    Magnesium     Standing Status:   Future     Number of Occurrences:   1     Standing Expiration Date:   6/9/2021    CBC Auto Differential     Standing Status:   Future     Number of Occurrences:   1     Standing Expiration Date:   6/9/2021     Requested Prescriptions      No prescriptions requested or ordered in this encounter        Abdon Hoff MD  6/9/2020  12:53 PM

## 2020-06-10 ENCOUNTER — TELEPHONE (OUTPATIENT)
Dept: FAMILY MEDICINE CLINIC | Age: 80
End: 2020-06-10

## 2020-06-10 LAB
ALBUMIN SERPL-MCNC: 4.1 G/DL (ref 3.5–5.2)
ALP BLD-CCNC: 64 U/L (ref 40–129)
ALT SERPL-CCNC: 11 U/L (ref 0–40)
ANION GAP SERPL CALCULATED.3IONS-SCNC: 14 MMOL/L (ref 7–16)
AST SERPL-CCNC: 17 U/L (ref 0–39)
BILIRUB SERPL-MCNC: 0.7 MG/DL (ref 0–1.2)
BUN BLDV-MCNC: 23 MG/DL (ref 8–23)
CALCIUM SERPL-MCNC: 9.1 MG/DL (ref 8.6–10.2)
CHLORIDE BLD-SCNC: 95 MMOL/L (ref 98–107)
CO2: 27 MMOL/L (ref 22–29)
CREAT SERPL-MCNC: 1.7 MG/DL (ref 0.7–1.2)
GFR AFRICAN AMERICAN: 47
GFR NON-AFRICAN AMERICAN: 39 ML/MIN/1.73
GLUCOSE BLD-MCNC: 117 MG/DL (ref 74–99)
MAGNESIUM: 2.3 MG/DL (ref 1.6–2.6)
POTASSIUM SERPL-SCNC: 4 MMOL/L (ref 3.5–5)
SODIUM BLD-SCNC: 136 MMOL/L (ref 132–146)
TOTAL PROTEIN: 7.7 G/DL (ref 6.4–8.3)

## 2020-06-10 NOTE — TELEPHONE ENCOUNTER
Please let the patient know that blood work results showed    Sugar was slightly elevated    Labs still show kidney dysfunction, slight change when compared to previous lab results. Would still recommend holding spironolactone at present time.      Potassium level was normal.  Would continue other medications    Liver function tests were normal    Magnesium level was normal    Blood count showed a slight anemia but improved and fair when compared to previous lab results    Other blood counts were normal    Please forward a copy of the blood work results to Dr. Irene Lagunas    Please send a copy of the blood work results to the patient    Thanks

## 2020-06-11 ENCOUNTER — PREP FOR PROCEDURE (OUTPATIENT)
Dept: ORTHOPEDIC SURGERY | Age: 80
End: 2020-06-11

## 2020-06-11 ENCOUNTER — TELEPHONE (OUTPATIENT)
Dept: ORTHOPEDIC SURGERY | Age: 80
End: 2020-06-11

## 2020-06-11 DIAGNOSIS — M17.12 PRIMARY OSTEOARTHRITIS OF LEFT KNEE: Primary | ICD-10-CM

## 2020-06-11 RX ORDER — ACETAMINOPHEN 325 MG/1
1000 TABLET ORAL ONCE
Status: CANCELLED | OUTPATIENT
Start: 2020-06-11 | End: 2020-06-11

## 2020-06-11 RX ORDER — SODIUM CHLORIDE 0.9 % (FLUSH) 0.9 %
10 SYRINGE (ML) INJECTION EVERY 12 HOURS SCHEDULED
Status: CANCELLED | OUTPATIENT
Start: 2020-06-11

## 2020-06-11 RX ORDER — IBUPROFEN 400 MG/1
400 TABLET ORAL ONCE
Status: CANCELLED | OUTPATIENT
Start: 2020-06-11 | End: 2020-06-11

## 2020-06-11 RX ORDER — SODIUM CHLORIDE 9 MG/ML
INJECTION, SOLUTION INTRAVENOUS CONTINUOUS
Status: CANCELLED | OUTPATIENT
Start: 2020-06-11

## 2020-06-11 RX ORDER — GABAPENTIN 100 MG/1
300 CAPSULE ORAL ONCE
Status: CANCELLED | OUTPATIENT
Start: 2020-06-11 | End: 2020-06-11

## 2020-06-11 RX ORDER — SODIUM CHLORIDE 0.9 % (FLUSH) 0.9 %
10 SYRINGE (ML) INJECTION PRN
Status: CANCELLED | OUTPATIENT
Start: 2020-06-11

## 2020-06-11 NOTE — TELEPHONE ENCOUNTER
6/10/2020 8:41 am Received a fax from Dr. Glenis Dallas [phone]: A copy of office notes dated 6/09/2020, Pre Op Exam for TLK. Patient has been getting dizzy after walking. BP 90/58. Office notes are in epic. After reviewing office notes, it is noted that Dr. Nola Covington documented patient had stem cell injection left knee and states will have another. 6/10/2020 3:30 pm Phoned patient numerous times today [phone] / phone rings busy / unable to leave a message. 6/11/2020 9:33 am Phoned patient / message left on voice mail: Question: When did you receive a stem cell injection into the left knee? Do you plan on having another? When?    6/11/2020 9:55 am Patient returned call and reports: Cannot recall if I told doctor about the stem cell shot. Had the injection in November of last year. It did not work and I do not plan on having another. Informed patient: Should not receive any injection into the left knee before surgery. Will inform [facility] of above.

## 2020-06-16 ENCOUNTER — HOSPITAL ENCOUNTER (OUTPATIENT)
Dept: PREADMISSION TESTING | Age: 80
Discharge: HOME | End: 2020-06-16
Payer: MEDICARE

## 2020-06-16 ENCOUNTER — ANESTHESIA EVENT (OUTPATIENT)
Dept: OPERATING ROOM | Age: 80
End: 2020-06-16
Payer: MEDICARE

## 2020-06-16 VITALS
SYSTOLIC BLOOD PRESSURE: 116 MMHG | HEIGHT: 68 IN | OXYGEN SATURATION: 100 % | TEMPERATURE: 96.6 F | BODY MASS INDEX: 36.37 KG/M2 | RESPIRATION RATE: 18 BRPM | DIASTOLIC BLOOD PRESSURE: 58 MMHG | WEIGHT: 240 LBS | HEART RATE: 60 BPM

## 2020-06-16 LAB — PREALBUMIN: 19 MG/DL (ref 20–40)

## 2020-06-16 PROCEDURE — 36415 COLL VENOUS BLD VENIPUNCTURE: CPT

## 2020-06-16 PROCEDURE — 84134 ASSAY OF PREALBUMIN: CPT

## 2020-06-16 PROCEDURE — 87081 CULTURE SCREEN ONLY: CPT

## 2020-06-16 ASSESSMENT — PAIN SCALES - GENERAL: PAINLEVEL_OUTOF10: 0

## 2020-06-16 ASSESSMENT — PAIN DESCRIPTION - ONSET: ONSET: SUDDEN

## 2020-06-16 ASSESSMENT — PAIN - FUNCTIONAL ASSESSMENT: PAIN_FUNCTIONAL_ASSESSMENT: PREVENTS OR INTERFERES WITH ALL ACTIVE AND SOME PASSIVE ACTIVITIES

## 2020-06-16 ASSESSMENT — PAIN DESCRIPTION - PAIN TYPE: TYPE: CHRONIC PAIN

## 2020-06-16 ASSESSMENT — KOOS JR
GOING UP OR DOWN STAIRS: 1
STANDING UPRIGHT: 2
BENDING TO THE FLOOR TO PICK UP OBJECT: 1
HOW SEVERE IS YOUR KNEE STIFFNESS AFTER FIRST WAKING IN MORNING: 3
STRAIGHTENING KNEE FULLY: 1
TWISING OR PIVOTING ON KNEE: 3
RISING FROM SITTING: 2

## 2020-06-16 ASSESSMENT — PAIN DESCRIPTION - FREQUENCY: FREQUENCY: INTERMITTENT

## 2020-06-16 ASSESSMENT — PAIN DESCRIPTION - PROGRESSION: CLINICAL_PROGRESSION: GRADUALLY WORSENING

## 2020-06-16 ASSESSMENT — PAIN DESCRIPTION - LOCATION: LOCATION: KNEE

## 2020-06-16 ASSESSMENT — PAIN DESCRIPTION - ORIENTATION: ORIENTATION: LEFT

## 2020-06-16 ASSESSMENT — PAIN DESCRIPTION - DESCRIPTORS: DESCRIPTORS: PRESSURE

## 2020-06-16 NOTE — PROGRESS NOTES
Have you been tested for COVID  No    Scheduled 06/17/2020 preop test         Have you been told you were positive for COVID No  Have you had any known exposure to someone that is positive for COVID No  Do you have a cough                   No              Do you have shortness of breath No                 Do you have a sore throat            No                Are you having chills                    No                Are you having muscle aches. No                    Please come to the hospital wearing a mask and have your significant other wear a mask as well. Both of you should check your temperature before leaving to come here,  if it is 100 or higher please call [phone] for instruction.

## 2020-06-16 NOTE — ANESTHESIA PRE PROCEDURE
Department of Anesthesiology  Preprocedure Note       Name:  Fariba Nathan   Age:  70 y.o.  :  1940                                          MRN:  [id number]         Date:  2020      Surgeon: Isa Schmitz):  Comfort Schultz MD    Procedure: Procedure(s):  LEFT KNEE IVAN ROBOTIC TOTAL ARTHROPLASTY    ++NASIR ADVANCED++   ++PNB++    Medications prior to admission:   Prior to Admission medications    Medication Sig Start Date End Date Taking? Authorizing Provider   levothyroxine (SYNTHROID) 200 MCG tablet Take 1 tablet by mouth Daily 20   Elzbieta Rudolph MD   potassium chloride (KLOR-CON M) 20 MEQ extended release tablet Take 1 tablet by mouth 2 times daily  Patient taking differently: Take 20 mEq by mouth 3 times daily  5/15/20   Elzbieta Rudolph MD   gabapentin (NEURONTIN) 100 MG capsule 200mg in am, 100mg at noon and 200mg in pm 5/15/20 10/26/20  Elzbieta Rudolph MD   atorvastatin (LIPITOR) 40 MG tablet Take 1 tablet by mouth daily 20   Yelena Denny MD   metOLazone (ZAROXOLYN) 2.5 MG tablet Take 1 tablet by mouth once a week 5/15/20   Mary Maradiaga MD   aspirin (ECOTRIN LOW STRENGTH) 81 MG EC tablet Take 1 tablet by mouth daily 20   Mary Maradiaga MD   warfarin (COUMADIN) 5 MG tablet Take 1 tablet by mouth daily 3/18/20   Yelena Denny MD   magnesium oxide (MAG-OX) 400 (241.3 Mg) MG TABS tablet TAKE ONE TABLET BY MOUTH DAILY 2/10/20   Historical Provider, MD   torsemide (DEMADEX) 20 MG tablet Take 40 mg by mouth 2 times daily Take 2 tablets TID 19   Historical Provider, MD   vitamin B-1 (THIAMINE) 100 MG tablet Take 100 mg by mouth daily    Historical Provider, MD   HYDROcodone-acetaminophen (NORCO) 5-325 MG per tablet Take 1 tablet by mouth every 6 hours as needed for Pain . Historical Provider, MD   metoprolol tartrate (LOPRESSOR) 50 MG tablet Take 1 tablet by mouth 2 times daily 17   Jodene Dakin, MD   MULTIPLE VITAMIN PO Take  by mouth daily.     Historical CKD (chronic kidney disease) stage 3, GFR 30-59 ml/min (HCC) N18.3    Hyperuricemia E79.0    Prostate enlargement N40.0    Impaired fasting glucose R73.01    Vitamin D deficiency E55.9    Other specified hypothyroidism E03.8    Atrial flutter (HCC) I48.92    Coronary artery disease involving native coronary artery of native heart without angina pectoris I25.10       Past Medical History:        Diagnosis Date    Ascites     Blood circulation, collateral     CAD (coronary artery disease)     Chronic kidney disease     History of alcohol use     History of ascites     Hyperlipidemia     Hypertension     Hyponatremia     Liver disease     Lymphedema     Neuropathy     both feet, 5/10 in severity    Osteoarthritis     Psychiatric problem     Thyroid disease        Past Surgical History:        Procedure Laterality Date    APPENDECTOMY      COLONOSCOPY      EYE SURGERY      cataracts       Social History:    Social History     Tobacco Use    Smoking status: Former Smoker     Packs/day: 0.75     Years: 25.00     Pack years: 18.75     Types: Pipe, Cigars     Start date: 10/8/1964     Last attempt to quit: 1986     Years since quittin.2    Smokeless tobacco: Never Used    Tobacco comment: Cigar and Pipe smoking history only   Substance Use Topics    Alcohol use: No     Alcohol/week: 0.0 standard drinks     Comment: quit 2017                                Counseling given: Not Answered  Comment: Cigar and Pipe smoking history only      Vital Signs (Current): There were no vitals filed for this visit.                                            BP Readings from Last 3 Encounters:   20 (!) 90/58   05/15/20 106/60   20 102/60       NPO Status:                                                                                 BMI:   Wt Readings from Last 3 Encounters:   20 245 lb (111.1 kg)   05/15/20 244 lb (110.7 kg)   20 230 lb (104.3 kg)     There is no height or weight on file to calculate BMI.    CBC:   Lab Results   Component Value Date    WBC 7.3 06/09/2020    RBC 3.91 06/09/2020    HGB 12.1 06/09/2020    HCT 38.2 06/09/2020    MCV 97.7 06/09/2020    RDW 14.5 06/09/2020     06/09/2020       CMP:   Lab Results   Component Value Date     06/09/2020    K 4.0 06/09/2020    CL 95 06/09/2020    CO2 27 06/09/2020    BUN 23 06/09/2020    CREATININE 1.7 06/09/2020    GFRAA 47 06/09/2020    LABGLOM 39 06/09/2020    GLUCOSE 117 06/09/2020    PROT 7.7 06/09/2020    CALCIUM 9.1 06/09/2020    BILITOT 0.7 06/09/2020    ALKPHOS 64 06/09/2020    AST 17 06/09/2020    ALT 11 06/09/2020       POC Tests: No results for input(s): POCGLU, POCNA, POCK, POCCL, POCBUN, POCHEMO, POCHCT in the last 72 hours.     Coags:   Lab Results   Component Value Date    PROTIME 27.6 06/09/2020    INR 2.3 06/09/2020    INR 1.3 05/12/2020       HCG (If Applicable): No results found for: PREGTESTUR, PREGSERUM, HCG, HCGQUANT     ABGs: No results found for: PHART, PO2ART, GWN6ORI, IVF0PEV, BEART, F0RCZCKE     Type & Screen (If Applicable):  No results found for: LABABO, LABRH    Drug/Infectious Status (If Applicable):  No results found for: HIV, HEPCAB    COVID-19 Screening (If Applicable):   Lab Results   Component Value Date    COVID19 Not Detected 05/07/2020         Anesthesia Evaluation  Patient summary reviewed no history of anesthetic complications:   Airway: Mallampati: III  TM distance: <3 FB   Neck ROM: full  Mouth opening: < 3 FB Dental: normal exam     Comment: Denies loose or chipped teeth    Pulmonary:Negative Pulmonary ROS breath sounds clear to auscultation                            ROS comment: Former smoker   Cardiovascular:    (+) hypertension:, CAD:, hyperlipidemia        Rhythm: regular             Beta Blocker:  Not on Beta Blocker         Neuro/Psych:   (+) psychiatric history (alcohol abuse):            GI/Hepatic/Renal:   (+) liver disease (cirrhosis ):,           Endo/Other:

## 2020-06-16 NOTE — PROGRESS NOTES
[facility] PRE-ADMISSION TESTING INSTRUCTIONS    Please come to the hospital wearing a mask and have your significant other wear a mask as well. Both of you should check your temperature before leaving to come here,  if it is 100 or higher please call [phone] for instruction. The Preadmission Testing patient is instructed accordingly using the following criteria (check applicable):    ARRIVAL INSTRUCTIONS:  [x] Parking the day of Surgery is located in the Main Entrance lot. Upon entering the door, make an immediate right to the surgery reception desk    [] Bring photo ID and insurance card    [] Bring in a copy of Living will or Durable Power of  papers. [x] Please be sure to arrange for responsible adult to provide transportation to and from the hospital    [] Please arrange for responsible adult to be with you for the 24 hour period post procedure due to having anesthesia      GENERAL INSTRUCTIONS:    [x] Nothing by mouth after midnight, including gum, candy, mints or water    [x] You may brush your teeth, but do not swallow any water    [x] Take medications as instructed with 1-2 oz of water    [x] Stop herbal supplements and vitamins 5 days prior to procedure    [x] Follow preop dosing of blood thinners per physician instructions    [] Take 1/2 dose of evening insulin, but no insulin after midnight    [] No oral diabetic medications after midnight    [] If diabetic and have low blood sugar or feel symptomatic, take 1-2oz apple juice only    [] Bring inhalers day of surgery    [] Bring C-PAP/ Bi-Pap day of surgery    [] Bring urine specimen day of surgery    [] Shower or bath with soap, lather and rinse well, AM of Surgery, no lotion, powders or creams to surgical site    [] Follow bowel prep as instructed per surgeon    [x] No tobacco products within 24 hours of surgery     [x] No alcohol or illegal drug use within 24 hours of surgery.     [x] Jewelry, body piercing's, eyeglasses, contact lenses and dentures are not permitted into surgery (bring cases)      [] Please do not wear any nail polish, make up or hair products on the day of surgery    [] If not already done, you can expect a call from registration    [x] You can expect a call the business day prior to procedure to notify you if your arrival time changes    [x] If you receive a survey after surgery we would greatly appreciate your comments    [] Parent/guardian of a minor must accompany their child and remain on the premises  the entire time they are under our care     [] Pediatric patients may bring favorite toy, blanket or comfort item with them    [] A caregiver or family member must remain with the patient during their stay if they are mentally handicapped, have dementia, disoriented or unable to use a call light or would be a safety concern if left unattended    [x] Please notify surgeon if you develop any illness between now and time of surgery (cold, cough, sore throat, fever, nausea, vomiting) or any signs of infections  including skin, wounds, and dental.    [x]  The Outpatient Pharmacy is available to fill your prescription here on your day of surgery, ask your preop nurse for details    [] Other instructions  EDUCATIONAL MATERIALS PROVIDED:    [x] PAT Preoperative Education Packet/Booklet     [x] Medication List    [] Fluoroscopy Information Pamphlet    [] Transfusion bracelet applied with instructions    [] Joint replacement video reviewed    [x] Shower with soap, lather and rinse well, and use CHG wipes provided the evening before surgery as instructed

## 2020-06-17 ENCOUNTER — HOSPITAL ENCOUNTER (OUTPATIENT)
Age: 80
Discharge: HOME | End: 2020-06-19
Payer: MEDICARE

## 2020-06-17 PROCEDURE — U0003 INFECTIOUS AGENT DETECTION BY NUCLEIC ACID (DNA OR RNA); SEVERE ACUTE RESPIRATORY SYNDROME CORONAVIRUS 2 (SARS-COV-2) (CORONAVIRUS DISEASE [COVID-19]), AMPLIFIED PROBE TECHNIQUE, MAKING USE OF HIGH THROUGHPUT TECHNOLOGIES AS DESCRIBED BY CMS-2020-01-R: HCPCS

## 2020-06-18 LAB — MRSA CULTURE ONLY: NORMAL

## 2020-06-19 LAB
SARS-COV-2: NOT DETECTED
SOURCE: NORMAL

## 2020-06-22 ENCOUNTER — ANESTHESIA (OUTPATIENT)
Dept: OPERATING ROOM | Age: 80
End: 2020-06-22
Payer: MEDICARE

## 2020-06-22 ENCOUNTER — HOSPITAL ENCOUNTER (OUTPATIENT)
Age: 80
Setting detail: OBSERVATION
Discharge: HOME HEALTH | End: 2020-06-23
Attending: ORTHOPAEDIC SURGERY | Admitting: ORTHOPAEDIC SURGERY
Payer: MEDICARE

## 2020-06-22 VITALS
DIASTOLIC BLOOD PRESSURE: 53 MMHG | OXYGEN SATURATION: 100 % | SYSTOLIC BLOOD PRESSURE: 91 MMHG | TEMPERATURE: 97.2 F | RESPIRATION RATE: 2 BRPM

## 2020-06-22 PROBLEM — Z98.890 HISTORY OF SURGERY: Status: ACTIVE | Noted: 2020-06-22

## 2020-06-22 LAB
APTT: 27.1 SEC (ref 24.5–35.1)
INR BLD: 1.1
INR BLD: 1.2
PROTHROMBIN TIME: 13.1 SEC (ref 9.3–12.4)
PROTHROMBIN TIME: 13.8 SEC (ref 9.3–12.4)

## 2020-06-22 PROCEDURE — 27447 TOTAL KNEE ARTHROPLASTY: CPT | Performed by: ORTHOPAEDIC SURGERY

## 2020-06-22 PROCEDURE — 64447 NJX AA&/STRD FEMORAL NRV IMG: CPT | Performed by: ANESTHESIOLOGY

## 2020-06-22 PROCEDURE — 2500000003 HC RX 250 WO HCPCS: Performed by: ORTHOPAEDIC SURGERY

## 2020-06-22 PROCEDURE — 3700000001 HC ADD 15 MINUTES (ANESTHESIA): Performed by: ORTHOPAEDIC SURGERY

## 2020-06-22 PROCEDURE — 85610 PROTHROMBIN TIME: CPT

## 2020-06-22 PROCEDURE — C1776 JOINT DEVICE (IMPLANTABLE): HCPCS | Performed by: ORTHOPAEDIC SURGERY

## 2020-06-22 PROCEDURE — 6370000000 HC RX 637 (ALT 250 FOR IP): Performed by: STUDENT IN AN ORGANIZED HEALTH CARE EDUCATION/TRAINING PROGRAM

## 2020-06-22 PROCEDURE — 2580000003 HC RX 258: Performed by: ORTHOPAEDIC SURGERY

## 2020-06-22 PROCEDURE — 99214 OFFICE O/P EST MOD 30 MIN: CPT | Performed by: INTERNAL MEDICINE

## 2020-06-22 PROCEDURE — C1713 ANCHOR/SCREW BN/BN,TIS/BN: HCPCS | Performed by: ORTHOPAEDIC SURGERY

## 2020-06-22 PROCEDURE — 6360000002 HC RX W HCPCS: Performed by: ORTHOPAEDIC SURGERY

## 2020-06-22 PROCEDURE — 2580000003 HC RX 258: Performed by: NURSE ANESTHETIST, CERTIFIED REGISTERED

## 2020-06-22 PROCEDURE — 51798 US URINE CAPACITY MEASURE: CPT

## 2020-06-22 PROCEDURE — 97530 THERAPEUTIC ACTIVITIES: CPT

## 2020-06-22 PROCEDURE — 20985 CPTR-ASST DIR MS PX: CPT | Performed by: ORTHOPAEDIC SURGERY

## 2020-06-22 PROCEDURE — 6370000000 HC RX 637 (ALT 250 FOR IP): Performed by: INTERNAL MEDICINE

## 2020-06-22 PROCEDURE — 3600000005 HC SURGERY LEVEL 5 BASE: Performed by: ORTHOPAEDIC SURGERY

## 2020-06-22 PROCEDURE — 85730 THROMBOPLASTIN TIME PARTIAL: CPT

## 2020-06-22 PROCEDURE — 51701 INSERT BLADDER CATHETER: CPT

## 2020-06-22 PROCEDURE — 6360000002 HC RX W HCPCS: Performed by: INTERNAL MEDICINE

## 2020-06-22 PROCEDURE — G0378 HOSPITAL OBSERVATION PER HR: HCPCS

## 2020-06-22 PROCEDURE — 6360000002 HC RX W HCPCS: Performed by: ANESTHESIOLOGY

## 2020-06-22 PROCEDURE — 6360000002 HC RX W HCPCS: Performed by: NURSE ANESTHETIST, CERTIFIED REGISTERED

## 2020-06-22 PROCEDURE — 6370000000 HC RX 637 (ALT 250 FOR IP): Performed by: ORTHOPAEDIC SURGERY

## 2020-06-22 PROCEDURE — 7100000001 HC PACU RECOVERY - ADDTL 15 MIN: Performed by: ORTHOPAEDIC SURGERY

## 2020-06-22 PROCEDURE — 2720000010 HC SURG SUPPLY STERILE: Performed by: ORTHOPAEDIC SURGERY

## 2020-06-22 PROCEDURE — 36415 COLL VENOUS BLD VENIPUNCTURE: CPT

## 2020-06-22 PROCEDURE — 97165 OT EVAL LOW COMPLEX 30 MIN: CPT

## 2020-06-22 PROCEDURE — 7100000000 HC PACU RECOVERY - FIRST 15 MIN: Performed by: ORTHOPAEDIC SURGERY

## 2020-06-22 PROCEDURE — 2500000003 HC RX 250 WO HCPCS: Performed by: NURSE ANESTHETIST, CERTIFIED REGISTERED

## 2020-06-22 PROCEDURE — 3600000015 HC SURGERY LEVEL 5 ADDTL 15MIN: Performed by: ORTHOPAEDIC SURGERY

## 2020-06-22 PROCEDURE — 2709999900 HC NON-CHARGEABLE SUPPLY: Performed by: ORTHOPAEDIC SURGERY

## 2020-06-22 PROCEDURE — 6360000002 HC RX W HCPCS: Performed by: STUDENT IN AN ORGANIZED HEALTH CARE EDUCATION/TRAINING PROGRAM

## 2020-06-22 PROCEDURE — 3700000000 HC ANESTHESIA ATTENDED CARE: Performed by: ORTHOPAEDIC SURGERY

## 2020-06-22 DEVICE — CEMENT BNE 40 GM RADIOPAQUE BA SIMPLEX P: Type: IMPLANTABLE DEVICE | Site: KNEE | Status: FUNCTIONAL

## 2020-06-22 DEVICE — BASEPLATE TIB SZ 6 KNEE TRITANIUM CEM PRI LO PROF TRIATHLON: Type: IMPLANTABLE DEVICE | Site: KNEE | Status: FUNCTIONAL

## 2020-06-22 DEVICE — IMPLANTABLE DEVICE: Type: IMPLANTABLE DEVICE | Site: KNEE | Status: FUNCTIONAL

## 2020-06-22 RX ORDER — SODIUM CHLORIDE 0.9 % (FLUSH) 0.9 %
10 SYRINGE (ML) INJECTION PRN
Status: DISCONTINUED | OUTPATIENT
Start: 2020-06-22 | End: 2020-06-23 | Stop reason: HOSPADM

## 2020-06-22 RX ORDER — METOPROLOL TARTRATE 50 MG/1
50 TABLET, FILM COATED ORAL 2 TIMES DAILY
Status: DISCONTINUED | OUTPATIENT
Start: 2020-06-22 | End: 2020-06-23 | Stop reason: HOSPADM

## 2020-06-22 RX ORDER — MIDAZOLAM HYDROCHLORIDE 1 MG/ML
INJECTION INTRAMUSCULAR; INTRAVENOUS PRN
Status: DISCONTINUED | OUTPATIENT
Start: 2020-06-22 | End: 2020-06-22 | Stop reason: SDUPTHER

## 2020-06-22 RX ORDER — FENTANYL CITRATE 50 UG/ML
INJECTION, SOLUTION INTRAMUSCULAR; INTRAVENOUS PRN
Status: DISCONTINUED | OUTPATIENT
Start: 2020-06-22 | End: 2020-06-22 | Stop reason: SDUPTHER

## 2020-06-22 RX ORDER — THIAMINE MONONITRATE (VIT B1) 100 MG
100 TABLET ORAL DAILY
Status: DISCONTINUED | OUTPATIENT
Start: 2020-06-22 | End: 2020-06-23 | Stop reason: HOSPADM

## 2020-06-22 RX ORDER — WARFARIN SODIUM 5 MG/1
5 TABLET ORAL DAILY
Status: DISCONTINUED | OUTPATIENT
Start: 2020-06-22 | End: 2020-06-23 | Stop reason: HOSPADM

## 2020-06-22 RX ORDER — SODIUM CHLORIDE 0.9 % (FLUSH) 0.9 %
10 SYRINGE (ML) INJECTION EVERY 12 HOURS SCHEDULED
Status: DISCONTINUED | OUTPATIENT
Start: 2020-06-22 | End: 2020-06-22 | Stop reason: HOSPADM

## 2020-06-22 RX ORDER — ATORVASTATIN CALCIUM 40 MG/1
40 TABLET, FILM COATED ORAL NIGHTLY
Status: DISCONTINUED | OUTPATIENT
Start: 2020-06-22 | End: 2020-06-23 | Stop reason: HOSPADM

## 2020-06-22 RX ORDER — FENTANYL CITRATE 50 UG/ML
25 INJECTION, SOLUTION INTRAMUSCULAR; INTRAVENOUS PRN
Status: DISCONTINUED | OUTPATIENT
Start: 2020-06-22 | End: 2020-06-22 | Stop reason: HOSPADM

## 2020-06-22 RX ORDER — SODIUM CHLORIDE 0.9 % (FLUSH) 0.9 %
10 SYRINGE (ML) INJECTION EVERY 12 HOURS SCHEDULED
Status: DISCONTINUED | OUTPATIENT
Start: 2020-06-22 | End: 2020-06-23 | Stop reason: HOSPADM

## 2020-06-22 RX ORDER — OXYCODONE HYDROCHLORIDE AND ACETAMINOPHEN 5; 325 MG/1; MG/1
1 TABLET ORAL EVERY 6 HOURS PRN
Qty: 28 TABLET | Refills: 0 | Status: SHIPPED | OUTPATIENT
Start: 2020-06-22 | End: 2020-06-29

## 2020-06-22 RX ORDER — DEXAMETHASONE SODIUM PHOSPHATE 10 MG/ML
8 INJECTION, SOLUTION INTRAMUSCULAR; INTRAVENOUS ONCE
Status: DISCONTINUED | OUTPATIENT
Start: 2020-06-22 | End: 2020-06-22 | Stop reason: HOSPADM

## 2020-06-22 RX ORDER — WARFARIN SODIUM 5 MG/1
5 TABLET ORAL DAILY
Status: CANCELLED | OUTPATIENT
Start: 2020-06-22

## 2020-06-22 RX ORDER — ROCURONIUM BROMIDE 10 MG/ML
INJECTION, SOLUTION INTRAVENOUS PRN
Status: DISCONTINUED | OUTPATIENT
Start: 2020-06-22 | End: 2020-06-22 | Stop reason: SDUPTHER

## 2020-06-22 RX ORDER — CALCIUM CHLORIDE 100 MG/ML
INJECTION INTRAVENOUS; INTRAVENTRICULAR PRN
Status: DISCONTINUED | OUTPATIENT
Start: 2020-06-22 | End: 2020-06-22 | Stop reason: ALTCHOICE

## 2020-06-22 RX ORDER — PROPOFOL 10 MG/ML
INJECTION, EMULSION INTRAVENOUS PRN
Status: DISCONTINUED | OUTPATIENT
Start: 2020-06-22 | End: 2020-06-22 | Stop reason: SDUPTHER

## 2020-06-22 RX ORDER — LEVOTHYROXINE SODIUM 0.1 MG/1
200 TABLET ORAL DAILY
Status: DISCONTINUED | OUTPATIENT
Start: 2020-06-22 | End: 2020-06-23 | Stop reason: HOSPADM

## 2020-06-22 RX ORDER — MORPHINE SULFATE 4 MG/ML
4 INJECTION, SOLUTION INTRAMUSCULAR; INTRAVENOUS
Status: DISCONTINUED | OUTPATIENT
Start: 2020-06-22 | End: 2020-06-23 | Stop reason: HOSPADM

## 2020-06-22 RX ORDER — POLYETHYLENE GLYCOL 3350 17 G/17G
17 POWDER, FOR SOLUTION ORAL DAILY PRN
Status: DISCONTINUED | OUTPATIENT
Start: 2020-06-22 | End: 2020-06-23 | Stop reason: HOSPADM

## 2020-06-22 RX ORDER — MIDAZOLAM HYDROCHLORIDE 1 MG/ML
1 INJECTION INTRAMUSCULAR; INTRAVENOUS EVERY 5 MIN PRN
Status: DISCONTINUED | OUTPATIENT
Start: 2020-06-22 | End: 2020-06-22 | Stop reason: HOSPADM

## 2020-06-22 RX ORDER — ROPIVACAINE HYDROCHLORIDE 5 MG/ML
30 INJECTION, SOLUTION EPIDURAL; INFILTRATION; PERINEURAL ONCE
Status: COMPLETED | OUTPATIENT
Start: 2020-06-22 | End: 2020-06-22

## 2020-06-22 RX ORDER — PROMETHAZINE HYDROCHLORIDE 25 MG/1
12.5 TABLET ORAL EVERY 6 HOURS PRN
Status: DISCONTINUED | OUTPATIENT
Start: 2020-06-22 | End: 2020-06-23 | Stop reason: HOSPADM

## 2020-06-22 RX ORDER — IBUPROFEN 800 MG/1
400 TABLET ORAL ONCE
Status: COMPLETED | OUTPATIENT
Start: 2020-06-22 | End: 2020-06-22

## 2020-06-22 RX ORDER — GABAPENTIN 100 MG/1
100 CAPSULE ORAL 3 TIMES DAILY
Status: DISCONTINUED | OUTPATIENT
Start: 2020-06-22 | End: 2020-06-23 | Stop reason: HOSPADM

## 2020-06-22 RX ORDER — SODIUM CHLORIDE 0.9 % (FLUSH) 0.9 %
10 SYRINGE (ML) INJECTION PRN
Status: DISCONTINUED | OUTPATIENT
Start: 2020-06-22 | End: 2020-06-22 | Stop reason: HOSPADM

## 2020-06-22 RX ORDER — ACETAMINOPHEN 500 MG
1000 TABLET ORAL ONCE
Status: COMPLETED | OUTPATIENT
Start: 2020-06-22 | End: 2020-06-22

## 2020-06-22 RX ORDER — SODIUM CHLORIDE 9 MG/ML
INJECTION, SOLUTION INTRAVENOUS CONTINUOUS
Status: DISCONTINUED | OUTPATIENT
Start: 2020-06-22 | End: 2020-06-22

## 2020-06-22 RX ORDER — MEPERIDINE HYDROCHLORIDE 25 MG/ML
12.5 INJECTION INTRAMUSCULAR; INTRAVENOUS; SUBCUTANEOUS EVERY 5 MIN PRN
Status: DISCONTINUED | OUTPATIENT
Start: 2020-06-22 | End: 2020-06-22 | Stop reason: HOSPADM

## 2020-06-22 RX ORDER — DEXAMETHASONE SODIUM PHOSPHATE 4 MG/ML
INJECTION, SOLUTION INTRA-ARTICULAR; INTRALESIONAL; INTRAMUSCULAR; INTRAVENOUS; SOFT TISSUE PRN
Status: DISCONTINUED | OUTPATIENT
Start: 2020-06-22 | End: 2020-06-22 | Stop reason: SDUPTHER

## 2020-06-22 RX ORDER — DEXAMETHASONE SODIUM PHOSPHATE 10 MG/ML
4 INJECTION, SOLUTION INTRAMUSCULAR; INTRAVENOUS ONCE
Status: DISCONTINUED | OUTPATIENT
Start: 2020-06-22 | End: 2020-06-22 | Stop reason: HOSPADM

## 2020-06-22 RX ORDER — ONDANSETRON 2 MG/ML
INJECTION INTRAMUSCULAR; INTRAVENOUS PRN
Status: DISCONTINUED | OUTPATIENT
Start: 2020-06-22 | End: 2020-06-22 | Stop reason: SDUPTHER

## 2020-06-22 RX ORDER — GABAPENTIN 300 MG/1
300 CAPSULE ORAL ONCE
Status: DISCONTINUED | OUTPATIENT
Start: 2020-06-22 | End: 2020-06-22 | Stop reason: HOSPADM

## 2020-06-22 RX ORDER — PHENYLEPHRINE HYDROCHLORIDE 10 MG/ML
INJECTION INTRAVENOUS PRN
Status: DISCONTINUED | OUTPATIENT
Start: 2020-06-22 | End: 2020-06-22 | Stop reason: SDUPTHER

## 2020-06-22 RX ORDER — ONDANSETRON 2 MG/ML
4 INJECTION INTRAMUSCULAR; INTRAVENOUS EVERY 6 HOURS PRN
Status: DISCONTINUED | OUTPATIENT
Start: 2020-06-22 | End: 2020-06-23 | Stop reason: HOSPADM

## 2020-06-22 RX ORDER — MORPHINE SULFATE 2 MG/ML
2 INJECTION, SOLUTION INTRAMUSCULAR; INTRAVENOUS
Status: DISCONTINUED | OUTPATIENT
Start: 2020-06-22 | End: 2020-06-23 | Stop reason: HOSPADM

## 2020-06-22 RX ORDER — NEOSTIGMINE METHYLSULFATE 1 MG/ML
INJECTION, SOLUTION INTRAVENOUS PRN
Status: DISCONTINUED | OUTPATIENT
Start: 2020-06-22 | End: 2020-06-22 | Stop reason: SDUPTHER

## 2020-06-22 RX ORDER — GLYCOPYRROLATE 1 MG/5 ML
SYRINGE (ML) INTRAVENOUS PRN
Status: DISCONTINUED | OUTPATIENT
Start: 2020-06-22 | End: 2020-06-22 | Stop reason: SDUPTHER

## 2020-06-22 RX ORDER — ACETAMINOPHEN 325 MG/1
650 TABLET ORAL EVERY 4 HOURS PRN
Status: DISCONTINUED | OUTPATIENT
Start: 2020-06-22 | End: 2020-06-23 | Stop reason: HOSPADM

## 2020-06-22 RX ORDER — TORSEMIDE 20 MG/1
40 TABLET ORAL 2 TIMES DAILY
Status: DISCONTINUED | OUTPATIENT
Start: 2020-06-22 | End: 2020-06-23 | Stop reason: HOSPADM

## 2020-06-22 RX ORDER — POTASSIUM CHLORIDE 20 MEQ/1
20 TABLET, EXTENDED RELEASE ORAL 2 TIMES DAILY WITH MEALS
Status: DISCONTINUED | OUTPATIENT
Start: 2020-06-22 | End: 2020-06-23 | Stop reason: HOSPADM

## 2020-06-22 RX ORDER — OXYCODONE HYDROCHLORIDE 5 MG/1
10 TABLET ORAL EVERY 4 HOURS PRN
Status: DISCONTINUED | OUTPATIENT
Start: 2020-06-22 | End: 2020-06-23 | Stop reason: HOSPADM

## 2020-06-22 RX ORDER — OXYCODONE HYDROCHLORIDE 5 MG/1
5 TABLET ORAL EVERY 4 HOURS PRN
Status: DISCONTINUED | OUTPATIENT
Start: 2020-06-22 | End: 2020-06-23 | Stop reason: HOSPADM

## 2020-06-22 RX ORDER — ONDANSETRON 2 MG/ML
4 INJECTION INTRAMUSCULAR; INTRAVENOUS
Status: DISCONTINUED | OUTPATIENT
Start: 2020-06-22 | End: 2020-06-22 | Stop reason: HOSPADM

## 2020-06-22 RX ORDER — SODIUM CHLORIDE, SODIUM LACTATE, POTASSIUM CHLORIDE, CALCIUM CHLORIDE 600; 310; 30; 20 MG/100ML; MG/100ML; MG/100ML; MG/100ML
INJECTION, SOLUTION INTRAVENOUS CONTINUOUS PRN
Status: DISCONTINUED | OUTPATIENT
Start: 2020-06-22 | End: 2020-06-22 | Stop reason: SDUPTHER

## 2020-06-22 RX ADMIN — TORSEMIDE 40 MG: 20 TABLET ORAL at 17:38

## 2020-06-22 RX ADMIN — ONDANSETRON HYDROCHLORIDE 4 MG: 2 INJECTION, SOLUTION INTRAMUSCULAR; INTRAVENOUS at 10:09

## 2020-06-22 RX ADMIN — SODIUM CHLORIDE: 9 INJECTION, SOLUTION INTRAVENOUS at 08:13

## 2020-06-22 RX ADMIN — GABAPENTIN 100 MG: 100 CAPSULE ORAL at 19:43

## 2020-06-22 RX ADMIN — Medication 2 G: at 10:00

## 2020-06-22 RX ADMIN — MIDAZOLAM 1 MG: 1 INJECTION INTRAMUSCULAR; INTRAVENOUS at 09:45

## 2020-06-22 RX ADMIN — ACETAMINOPHEN 1000 MG: 500 TABLET ORAL at 07:36

## 2020-06-22 RX ADMIN — Medication 3 MG: at 11:50

## 2020-06-22 RX ADMIN — Medication 2 G: at 18:13

## 2020-06-22 RX ADMIN — FENTANYL CITRATE 100 MCG: 50 INJECTION, SOLUTION INTRAMUSCULAR; INTRAVENOUS at 09:50

## 2020-06-22 RX ADMIN — POTASSIUM CHLORIDE 20 MEQ: 20 TABLET, EXTENDED RELEASE ORAL at 17:38

## 2020-06-22 RX ADMIN — IBUPROFEN 400 MG: 800 TABLET, FILM COATED ORAL at 07:36

## 2020-06-22 RX ADMIN — MAGNESIUM OXIDE TAB 400 MG (241.3 MG ELEMENTAL MG) 400 MG: 400 (241.3 MG) TAB at 17:38

## 2020-06-22 RX ADMIN — METOPROLOL TARTRATE 50 MG: 50 TABLET, FILM COATED ORAL at 19:43

## 2020-06-22 RX ADMIN — TRANEXAMIC ACID 1000 MG: 1 INJECTION, SOLUTION INTRAVENOUS at 12:58

## 2020-06-22 RX ADMIN — FENTANYL CITRATE 50 MCG: 50 INJECTION INTRAMUSCULAR; INTRAVENOUS at 07:57

## 2020-06-22 RX ADMIN — PROPOFOL 70 MG: 10 INJECTION, EMULSION INTRAVENOUS at 09:54

## 2020-06-22 RX ADMIN — SODIUM CHLORIDE, POTASSIUM CHLORIDE, SODIUM LACTATE AND CALCIUM CHLORIDE: 600; 310; 30; 20 INJECTION, SOLUTION INTRAVENOUS at 09:45

## 2020-06-22 RX ADMIN — PHENYLEPHRINE HYDROCHLORIDE 150 MCG: 10 INJECTION INTRAVENOUS at 10:04

## 2020-06-22 RX ADMIN — ROCURONIUM BROMIDE 50 MG: 10 INJECTION, SOLUTION INTRAVENOUS at 09:54

## 2020-06-22 RX ADMIN — PHENYLEPHRINE HYDROCHLORIDE 100 MCG: 10 INJECTION INTRAVENOUS at 10:18

## 2020-06-22 RX ADMIN — TRANEXAMIC ACID 1000 MG: 1 INJECTION, SOLUTION INTRAVENOUS at 10:00

## 2020-06-22 RX ADMIN — MIDAZOLAM HYDROCHLORIDE 1 MG: 1 INJECTION, SOLUTION INTRAMUSCULAR; INTRAVENOUS at 07:57

## 2020-06-22 RX ADMIN — ENOXAPARIN SODIUM 105 MG: 120 INJECTION SUBCUTANEOUS at 17:38

## 2020-06-22 RX ADMIN — Medication 0.6 MG: at 11:50

## 2020-06-22 RX ADMIN — DEXAMETHASONE SODIUM PHOSPHATE 10 MG: 4 INJECTION, SOLUTION INTRAMUSCULAR; INTRAVENOUS at 10:09

## 2020-06-22 RX ADMIN — WARFARIN SODIUM 5 MG: 5 TABLET ORAL at 17:38

## 2020-06-22 RX ADMIN — ROPIVACAINE HYDROCHLORIDE 40 ML: 5 INJECTION, SOLUTION EPIDURAL; INFILTRATION; PERINEURAL at 07:58

## 2020-06-22 RX ADMIN — ATORVASTATIN CALCIUM 40 MG: 40 TABLET, FILM COATED ORAL at 19:44

## 2020-06-22 RX ADMIN — Medication 100 MG: at 17:38

## 2020-06-22 ASSESSMENT — PULMONARY FUNCTION TESTS
PIF_VALUE: 22
PIF_VALUE: 23
PIF_VALUE: 22
PIF_VALUE: 22
PIF_VALUE: 23
PIF_VALUE: 22
PIF_VALUE: 22
PIF_VALUE: 19
PIF_VALUE: 22
PIF_VALUE: 21
PIF_VALUE: 22
PIF_VALUE: 1
PIF_VALUE: 22
PIF_VALUE: 25
PIF_VALUE: 23
PIF_VALUE: 22
PIF_VALUE: 27
PIF_VALUE: 22
PIF_VALUE: 22
PIF_VALUE: 23
PIF_VALUE: 22
PIF_VALUE: 23
PIF_VALUE: 20
PIF_VALUE: 22
PIF_VALUE: 23
PIF_VALUE: 1
PIF_VALUE: 22
PIF_VALUE: 22
PIF_VALUE: 23
PIF_VALUE: 23
PIF_VALUE: 1
PIF_VALUE: 0
PIF_VALUE: 23
PIF_VALUE: 22
PIF_VALUE: 23
PIF_VALUE: 22
PIF_VALUE: 21
PIF_VALUE: 22
PIF_VALUE: 22
PIF_VALUE: 23
PIF_VALUE: 22
PIF_VALUE: 23
PIF_VALUE: 20
PIF_VALUE: 22
PIF_VALUE: 22
PIF_VALUE: 0
PIF_VALUE: 22
PIF_VALUE: 23
PIF_VALUE: 22
PIF_VALUE: 23
PIF_VALUE: 22
PIF_VALUE: 22
PIF_VALUE: 1
PIF_VALUE: 22
PIF_VALUE: 1
PIF_VALUE: 22
PIF_VALUE: 1
PIF_VALUE: 22
PIF_VALUE: 1
PIF_VALUE: 22
PIF_VALUE: 20
PIF_VALUE: 22
PIF_VALUE: 21
PIF_VALUE: 22
PIF_VALUE: 21
PIF_VALUE: 32
PIF_VALUE: 23
PIF_VALUE: 22
PIF_VALUE: 0
PIF_VALUE: 21
PIF_VALUE: 22
PIF_VALUE: 22
PIF_VALUE: 23
PIF_VALUE: 22
PIF_VALUE: 24
PIF_VALUE: 22
PIF_VALUE: 26
PIF_VALUE: 22
PIF_VALUE: 23
PIF_VALUE: 22
PIF_VALUE: 22
PIF_VALUE: 1
PIF_VALUE: 22
PIF_VALUE: 21
PIF_VALUE: 24
PIF_VALUE: 22
PIF_VALUE: 1
PIF_VALUE: 23
PIF_VALUE: 22
PIF_VALUE: 21
PIF_VALUE: 22
PIF_VALUE: 24
PIF_VALUE: 22
PIF_VALUE: 23
PIF_VALUE: 22
PIF_VALUE: 21
PIF_VALUE: 22
PIF_VALUE: 21

## 2020-06-22 ASSESSMENT — PAIN DESCRIPTION - ORIENTATION: ORIENTATION: LEFT

## 2020-06-22 ASSESSMENT — PAIN SCALES - GENERAL
PAINLEVEL_OUTOF10: 0

## 2020-06-22 ASSESSMENT — PAIN DESCRIPTION - LOCATION: LOCATION: KNEE

## 2020-06-22 ASSESSMENT — PAIN DESCRIPTION - PAIN TYPE: TYPE: SURGICAL PAIN

## 2020-06-22 ASSESSMENT — ENCOUNTER SYMPTOMS: SHORTNESS OF BREATH: 0

## 2020-06-22 NOTE — ANESTHESIA PRE PROCEDURE
mg by mouth daily Last dose 06/16    Historical Provider, MD   HYDROcodone-acetaminophen (NORCO) 5-325 MG per tablet Take 1 tablet by mouth every 6 hours as needed for Pain. Take if needed am day of surgery 06/22. Historical Provider, MD   metoprolol tartrate (LOPRESSOR) 50 MG tablet Take 1 tablet by mouth 2 times daily  Patient taking differently: Take 50 mg by mouth 2 times daily Take am day of surgery 06/22/2020 6/20/17   Darrius Alcazar MD   MULTIPLE VITAMIN PO Take 1 tablet by mouth daily Last taken 06/16/2020    Historical Provider, MD       Current medications:    No current facility-administered medications for this visit. No current outpatient medications on file.      Facility-Administered Medications Ordered in Other Visits   Medication Dose Route Frequency Provider Last Rate Last Dose    ropivacaine (NAROPIN) 0.5% injection 30 mL  30 mL Infiltration Once Creig Lash, DO        midazolam (VERSED) injection 1 mg  1 mg Intravenous Q5 Min PRN Creig Lash, DO        fentaNYL (SUBLIMAZE) injection 25 mcg  25 mcg Intravenous PRN Creig Bobh, DO        dexamethasone (PF) (DECADRON) injection 4 mg  4 mg Intravenous Once Creteresa Vincent, DO        0.9 % sodium chloride infusion   Intravenous Continuous Jimmie Orellana MD        acetaminophen (TYLENOL) tablet 1,000 mg  1,000 mg Oral Once Jimmie Orellana MD        ceFAZolin (ANCEF) 2 g in sterile water 20 mL IV syringe  2 g Intravenous On Call to Hal Rankin MD        dexamethasone (PF) (DECADRON) injection 8 mg  8 mg Intravenous Once Jimmie Orellana MD        gabapentin (NEURONTIN) capsule 300 mg  300 mg Oral Once Jimmie Orellana MD        ibuprofen (ADVIL;MOTRIN) tablet 400 mg  400 mg Oral Once Jimmie Orellana MD        sodium chloride flush 0.9 % injection 10 mL  10 mL Intravenous 2 times per day Jimmie Orellana MD        sodium chloride flush 0.9 % injection 10 mL  10 mL Intravenous PRN PREGSERUM, HCG, HCGQUANT     ABGs: No results found for: PHART, PO2ART, MJV3QMR, ZAB2JDN, BEART, I5HJUYUR     Type & Screen (If Applicable):  No results found for: LABABO, LABRH    Drug/Infectious Status (If Applicable):  No results found for: HIV, HEPCAB    COVID-19 Screening (If Applicable):   Lab Results   Component Value Date    COVID19 Not Detected 06/17/2020         Anesthesia Evaluation  Patient summary reviewed no history of anesthetic complications:   Airway: Mallampati: III  TM distance: >3 FB   Neck ROM: full  Mouth opening: > = 3 FB Dental: normal exam     Comment: Denies loose or chipped teeth    Pulmonary: breath sounds clear to auscultation      (-) COPD and shortness of breath                          ROS comment: Former smoker   Cardiovascular:  Exercise tolerance: good (>4 METS),   (+) hypertension: moderate, CAD:, dysrhythmias: atrial fibrillation, CHF: systolic, hyperlipidemia      ECG reviewed  Rhythm: irregular  Rate: normal  Echocardiogram reviewed         Beta Blocker:  Dose within 24 Hrs         Neuro/Psych:   (+) psychiatric history (alcohol abuse):depression/anxiety             GI/Hepatic/Renal:   (+) liver disease (cirrhosis ):, renal disease: CRI,           Endo/Other:    (+) DiabetesType II DM, , hypothyroidism, blood dyscrasia: anticoagulation therapy, arthritis: OA., .                  ROS comment: History of ETOH abuse Abdominal:   (+) obese,         Vascular: negative vascular ROS. Anesthesia Plan      regional and general     ASA 4     (Peripheral nerve block. Aware of general anesthesia due to anticoagulation with coumadin)  Induction: intravenous. MIPS: Postoperative opioids intended and Prophylactic antiemetics administered. Anesthetic plan and risks discussed with patient. Use of blood products discussed with patient whom consented to blood products. Plan discussed with CRNA.             Tiffanie Card MD

## 2020-06-22 NOTE — PROGRESS NOTES
Please consider changing the Morphine PRN q2h order to q3h to comply with the [address]. Thank Earl Harry. D 6/22/2020 2:05 PM

## 2020-06-22 NOTE — PROGRESS NOTES
Educated patient on the importance of Incentive Spirometer, Patient returned demonstration of 2000, tolerated well.  Encouraged use every hour

## 2020-06-22 NOTE — PROGRESS NOTES
Patient unable to void post surgery. Bladder scanned for 351. Straight cath x1 for 250. Patient now DTV between 0100 and 0300.  Will monitor

## 2020-06-22 NOTE — CONSULTS
[facility]      Reason for Consult:  Medical management    History of Present Illness:  70 y.o. male with a history of hypothyroidism, hyperlipidemia, hypertension, CKD, on anticoagulation with Coumadin for possible A. fib presents following left knee total arthroplasty. Informant(s) for H&P: Patient    REVIEW OF SYSTEMS:  Complete ROS performed with patient, pertinent positives and negatives are listed in the HPI. PMH:  Past Medical History:   Diagnosis Date    Blood circulation, collateral     CAD (coronary artery disease)     Chronic kidney disease     History of alcohol use     History of ascites     history of, approx 5 years ago    Hyperlipidemia     Hypertension     Hyponatremia     Left knee pain 06/2020    Liver disease     Lymphedema     lower extrem    Neuropathy     both feet    Osteoarthritis     Psychiatric problem     Thyroid disease     Use of cane as ambulatory aid     Uses wheelchair     for distances    Wears glasses     for reading    Wears hearing aid        Surgical History:  Past Surgical History:   Procedure Laterality Date    APPENDECTOMY      CARDIAC CATHETERIZATION  05/2020    CATARACT REMOVAL WITH IMPLANT Bilateral     COLONOSCOPY      EYE SURGERY      cataracts       Medications Prior to Admission:    Prior to Admission medications    Medication Sig Start Date End Date Taking? Authorizing Provider   oxyCODONE-acetaminophen (PERCOCET) 5-325 MG per tablet Take 1 tablet by mouth every 6 hours as needed for Pain for up to 7 days. Intended supply: 7 days.  Take lowest dose possible to manage pain 6/22/20 6/29/20 Yes Zahira Yo,    levothyroxine (SYNTHROID) 200 MCG tablet Take 1 tablet by mouth Daily 5/26/20  Yes Bette Rajput MD   potassium chloride (KLOR-CON M) 20 MEQ extended release tablet Take 1 tablet by mouth 2 times daily  Patient taking differently: Take 20 mEq by mouth 3 drugs. Family History:       Problem Relation Age of Onset   [address] [facility] Other Mother         old age          PHYSICAL EXAM:  Vitals:  BP (!) 96/58   Pulse 58   Temp 98 °F (36.7 °C) (Oral)   Resp 16   Ht 5' 8\" (1.727 m)   Wt 240 lb (108.9 kg)   SpO2 98%   BMI 36.49 kg/m²   General Appearance: alert and oriented to person, place and time, well-developed and well-nourished, in no acute distress  Skin: warm and dry, no rash or erythema  Head: normocephalic and atraumatic  Eyes: pupils equal, round, and reactive to light, extraocular eye movements intact, conjunctivae normal  ENT: tympanic membrane, external ear and ear canal normal bilaterally, oropharynx clear and moist with normal mucous membranes  Neck: neck supple and non tender without mass, no thyromegaly or thyroid nodules, no cervical lymphadenopathy   Pulmonary/Chest: clear to auscultation bilaterally- no wheezes, rales or rhonchi, normal air movement, no respiratory distress  Cardiovascular: normal rate, normal S1 and S2, no gallops, intact distal pulses and no carotid bruits  Abdomen: soft, non-tender, non-distended, normal bowel sounds, no masses or organomegaly      LABS:  No results for input(s): NA, K, CL, CO2, BUN, CREATININE, GLUCOSE, CALCIUM in the last 72 hours. No results for input(s): WBC, RBC, HGB, HCT, MCV, MCH, MCHC, RDW, PLT, MPV in the last 72 hours. No results for input(s): POCGLU in the last 72 hours. Radiology:   No orders to display       EKG:       ASSESSMENT:      Active Problems:    History of surgery    Preop testing  Resolved Problems:    * No resolved hospital problems. *      PLAN:    1. Left knee arthroplasty, management as per the primary service. 2. Hypothyroidism, patient's levothyroxine is already started. 3. Hyperlipidemia, patient remains on his statins. 4. Hypertension, patient is on metoprolol which is started, BP is controlled.   5. On anticoagulation with Coumadin, last INR 1.2, will need bridging with

## 2020-06-22 NOTE — ANESTHESIA PROCEDURE NOTES
Arterial Line:    An arterial line was placed using surface landmarks, in the pre-op for the following indication(s): continuous blood pressure monitoring. A (size), (length), Arrow (type) catheter was placed, Seldinger technique used, into the left radial artery and 1ml 1% Lidocaine, secured by Tegaderm. Anesthesia type: Local  Local infiltration: Injection    Events:  patient tolerated procedure well with no complications.   6/22/2020 8:00 AM6/22/2020 8:15 AM  Anesthesiologist: Jelly Plasencia MD  Resident/CRNA: MONTSE Izquierdo CRNA  Performed: Resident/CRNA   Preanesthetic Checklist  Completed: patient identified, site marked, surgical consent, pre-op evaluation, timeout performed, IV checked, risks and benefits discussed, monitors and equipment checked, anesthesia consent given, oxygen available and patient being monitored

## 2020-06-22 NOTE — CARE COORDINATION
Met with patient about diagnosis and discharge plan of care. Pt lives alone ranch home. Has quad cane, needs WW-ordered through [facility]. Pt feels his toilet is high enough. Has son who can help, if needed. Will d/c home with [facility] home care-referral called.  Will follow-GLYNN

## 2020-06-22 NOTE — OP NOTE
injected into the joint O and 2-O interrupted vicryl suture was used to close the subcutanseous tissue. Finally, a 4-O running subcuticular monocryl suture was used to closed skin. Steri-Strips were then placed over the incision. And Aquasol dressing was placed, followed by web roll and Ace bandage. Tourniquet was then let down. The patient was then transferred to their hospital bed, awoken from anesthesia, and transported to the PACU in stable condition. POST OPERATIVE PLAN: The patient will be transferred to the orthopaedic floor from PACU once stable. Post operative antibiotics will be continued for 24 hours. Physical therapy will begin immediately, with weight bearing as tolerated. DVT prophylaxis will be with SCD's starting today, and  mg BID starting tomorrow.   It should be noted that Dr. Maribel Espinoza was present for the entirety of the procedure      Electronically signed by Oscar Oro DO on 6/22/2020 at 3:52 PM

## 2020-06-22 NOTE — PLAN OF CARE
Problem: Falls - Risk of:  Goal: Will remain free from falls  Description: Will remain free from falls  Outcome: Met This Shift     Problem: Pain - Acute:  Goal: Pain level will decrease  Description: Pain level will decrease  Outcome: Met This Shift     Problem: Cardiac:  Goal: Complications related to the disease process, condition or treatment will be avoided or minimized  Description: Complications related to the disease process, condition or treatment will be avoided or minimized  Outcome: Met This Shift

## 2020-06-23 VITALS
RESPIRATION RATE: 16 BRPM | WEIGHT: 240 LBS | DIASTOLIC BLOOD PRESSURE: 58 MMHG | BODY MASS INDEX: 36.37 KG/M2 | HEIGHT: 68 IN | OXYGEN SATURATION: 98 % | HEART RATE: 67 BPM | SYSTOLIC BLOOD PRESSURE: 107 MMHG | TEMPERATURE: 98 F

## 2020-06-23 PROBLEM — Z96.652 STATUS POST TOTAL LEFT KNEE REPLACEMENT: Status: ACTIVE | Noted: 2020-06-23

## 2020-06-23 LAB
ALBUMIN SERPL-MCNC: 3.6 G/DL (ref 3.5–5.2)
ALP BLD-CCNC: 64 U/L (ref 40–129)
ALT SERPL-CCNC: 11 U/L (ref 0–40)
ANION GAP SERPL CALCULATED.3IONS-SCNC: 13 MMOL/L (ref 7–16)
AST SERPL-CCNC: 25 U/L (ref 0–39)
BASOPHILS ABSOLUTE: 0.01 E9/L (ref 0–0.2)
BASOPHILS RELATIVE PERCENT: 0.1 % (ref 0–2)
BILIRUB SERPL-MCNC: 0.5 MG/DL (ref 0–1.2)
BUN BLDV-MCNC: 26 MG/DL (ref 8–23)
CALCIUM SERPL-MCNC: 8 MG/DL (ref 8.6–10.2)
CHLORIDE BLD-SCNC: 95 MMOL/L (ref 98–107)
CO2: 24 MMOL/L (ref 22–29)
CREAT SERPL-MCNC: 1.8 MG/DL (ref 0.7–1.2)
EOSINOPHILS ABSOLUTE: 0 E9/L (ref 0.05–0.5)
EOSINOPHILS RELATIVE PERCENT: 0 % (ref 0–6)
GFR AFRICAN AMERICAN: 44
GFR NON-AFRICAN AMERICAN: 37 ML/MIN/1.73
GLUCOSE BLD-MCNC: 163 MG/DL (ref 74–99)
HCT VFR BLD CALC: 34.3 % (ref 37–54)
HEMOGLOBIN: 11 G/DL (ref 12.5–16.5)
IMMATURE GRANULOCYTES #: 0.06 E9/L
IMMATURE GRANULOCYTES %: 0.6 % (ref 0–5)
INR BLD: 1.2
LYMPHOCYTES ABSOLUTE: 0.77 E9/L (ref 1.5–4)
LYMPHOCYTES RELATIVE PERCENT: 7.1 % (ref 20–42)
MCH RBC QN AUTO: 30.9 PG (ref 26–35)
MCHC RBC AUTO-ENTMCNC: 32.1 % (ref 32–34.5)
MCV RBC AUTO: 96.3 FL (ref 80–99.9)
MONOCYTES ABSOLUTE: 0.48 E9/L (ref 0.1–0.95)
MONOCYTES RELATIVE PERCENT: 4.4 % (ref 2–12)
NEUTROPHILS ABSOLUTE: 9.5 E9/L (ref 1.8–7.3)
NEUTROPHILS RELATIVE PERCENT: 87.8 % (ref 43–80)
PDW BLD-RTO: 14 FL (ref 11.5–15)
PLATELET # BLD: 229 E9/L (ref 130–450)
PMV BLD AUTO: 10.8 FL (ref 7–12)
POTASSIUM REFLEX MAGNESIUM: 4 MMOL/L (ref 3.5–5)
PROTHROMBIN TIME: 13.9 SEC (ref 9.3–12.4)
RBC # BLD: 3.56 E12/L (ref 3.8–5.8)
SODIUM BLD-SCNC: 132 MMOL/L (ref 132–146)
TOTAL PROTEIN: 7 G/DL (ref 6.4–8.3)
WBC # BLD: 10.8 E9/L (ref 4.5–11.5)

## 2020-06-23 PROCEDURE — 2580000003 HC RX 258: Performed by: STUDENT IN AN ORGANIZED HEALTH CARE EDUCATION/TRAINING PROGRAM

## 2020-06-23 PROCEDURE — G0378 HOSPITAL OBSERVATION PER HR: HCPCS

## 2020-06-23 PROCEDURE — 97161 PT EVAL LOW COMPLEX 20 MIN: CPT

## 2020-06-23 PROCEDURE — 97530 THERAPEUTIC ACTIVITIES: CPT

## 2020-06-23 PROCEDURE — 6370000000 HC RX 637 (ALT 250 FOR IP): Performed by: STUDENT IN AN ORGANIZED HEALTH CARE EDUCATION/TRAINING PROGRAM

## 2020-06-23 PROCEDURE — 36415 COLL VENOUS BLD VENIPUNCTURE: CPT

## 2020-06-23 PROCEDURE — 6370000000 HC RX 637 (ALT 250 FOR IP): Performed by: ORTHOPAEDIC SURGERY

## 2020-06-23 PROCEDURE — 97110 THERAPEUTIC EXERCISES: CPT

## 2020-06-23 PROCEDURE — 80053 COMPREHEN METABOLIC PANEL: CPT

## 2020-06-23 PROCEDURE — 85025 COMPLETE CBC W/AUTO DIFF WBC: CPT

## 2020-06-23 PROCEDURE — 6360000002 HC RX W HCPCS: Performed by: STUDENT IN AN ORGANIZED HEALTH CARE EDUCATION/TRAINING PROGRAM

## 2020-06-23 PROCEDURE — 97535 SELF CARE MNGMENT TRAINING: CPT

## 2020-06-23 PROCEDURE — 85610 PROTHROMBIN TIME: CPT

## 2020-06-23 PROCEDURE — 99214 OFFICE O/P EST MOD 30 MIN: CPT | Performed by: INTERNAL MEDICINE

## 2020-06-23 PROCEDURE — 99024 POSTOP FOLLOW-UP VISIT: CPT | Performed by: ORTHOPAEDIC SURGERY

## 2020-06-23 RX ORDER — ASPIRIN 81 MG/1
81 TABLET, CHEWABLE ORAL DAILY
Status: DISCONTINUED | OUTPATIENT
Start: 2020-06-23 | End: 2020-06-23 | Stop reason: HOSPADM

## 2020-06-23 RX ADMIN — METOPROLOL TARTRATE 50 MG: 50 TABLET, FILM COATED ORAL at 09:08

## 2020-06-23 RX ADMIN — LEVOTHYROXINE SODIUM 200 MCG: 100 TABLET ORAL at 07:03

## 2020-06-23 RX ADMIN — TORSEMIDE 40 MG: 20 TABLET ORAL at 07:02

## 2020-06-23 RX ADMIN — Medication 2 G: at 03:27

## 2020-06-23 RX ADMIN — MAGNESIUM OXIDE TAB 400 MG (241.3 MG ELEMENTAL MG) 400 MG: 400 (241.3 MG) TAB at 09:08

## 2020-06-23 RX ADMIN — POTASSIUM CHLORIDE 20 MEQ: 20 TABLET, EXTENDED RELEASE ORAL at 09:09

## 2020-06-23 RX ADMIN — Medication 100 MG: at 09:08

## 2020-06-23 RX ADMIN — ASPIRIN 81 MG 81 MG: 81 TABLET ORAL at 09:09

## 2020-06-23 RX ADMIN — ACETAMINOPHEN 650 MG: 325 TABLET ORAL at 09:08

## 2020-06-23 RX ADMIN — GABAPENTIN 100 MG: 100 CAPSULE ORAL at 09:09

## 2020-06-23 RX ADMIN — SODIUM CHLORIDE, PRESERVATIVE FREE 10 ML: 5 INJECTION INTRAVENOUS at 09:10

## 2020-06-23 ASSESSMENT — PAIN SCALES - GENERAL
PAINLEVEL_OUTOF10: 0
PAINLEVEL_OUTOF10: 2

## 2020-06-23 NOTE — PROGRESS NOTES
Physical Therapy    Facility/Department: [facility] SURGERY  PM Treatment Note  NAME: Bianca Lee  : 1940  MRN: [id number]    Date of Service: 2020      Attending Provider:  Mari aR Lin MD    Evaluating PT:  Donalee Cooks. Nel Ortiz., P.T. Room #:  5649/6905-C  Diagnosis:  L knee OA  Procedure/Surgery:  L TKA 20  Precautions:  WBAT LLE  Equipment Needs:  Wheeled walker    SUBJECTIVE:    Pt lives alone in a 2 story home with 3 stairs and 1 rail to enter. His bed and bath are on the first floor. Pt ambulated with a cane PTA. OBJECTIVE:   Initial Evaluation  Date: 20 Treatment Short Term/ Long Term   Goals   Was pt agreeable to Eval/treatment? yes yes    Does pt have pain? 5/10 L knee 7/10 L knee pain    Bed Mobility  Rolling: Independent  Supine to sit: Independent  Sit to supine: Independent  Scooting: Independent Rolling: Independent  Supine to sit: Independent  Sit to supine: Independent  Scooting: Independent, Independent   Transfers Sit to stand: Independent  Stand to sit: Independent  Stand pivot: Independent with ww Sit to stand: Independent  Stand to sit: Independent  Stand pivot: Independent with ww Independent    Ambulation   150 feet x 2 reps with ww Independent 50 feet x 2 reps with ww Independent  300 feet with ww Independent   Stair negotiation: ascended and descended 4 steps with 2 rails supervision NA 4 steps with 1 rail with cane Independent   AM-PAC 6 Clicks  47/47        BLE ROM is WFL, except L knee 10-80°. RLE strength is grossly 4+/5 and L hip 4/5, knee NA, ankle 4+/5. Therapeutic Exercises:  Pt was instructed in/performed supine exercises with the LLE: ankle PF/DF with bed sheet 2x15 reps, heel slides and quad sets AAROM with bed sheet 2x15 reps.      Patient education  Pt educated on above exercises as HEP    Patient response to education:   Pt verbalized understanding Pt demonstrated skill Pt requires further education in this area   yes TBA

## 2020-06-23 NOTE — PROGRESS NOTES
NURSES NOTE [facility]  POD # 1 LEFT KNEE TOTAL JOINT ARTHROPLASTY ROBOT ASSISTED    Subjective: \"I walked in the hallway. \" \"I think I can manage at home. \"                                                                                                                                           Objective/Assessment: Patient sitting in the chair at the bedside     Visited patient with Dr. Rebecca Zuluaga who examined patient and discussed discharge plans with patient    Neuro:  Alert and oriented  Appearance:  No distress   Pain Control:  Effective with prescribed pain reliever  Breathing:  Respirations - regular rate and rhythm, patient denies SOB or CP  Saline filemon:  Maintained   Appetite:  Restored   Voiding   Without difficulty into the urinal, qs  Abdomen:  Large, soft   Expelling Flatus:  No   BM:  No   Dressing:  Aquacel with spotty dark drainage along incision line, dressing over pin site with small amount of serous drainage  Motion:  Patient able to plantar and dorsi flex left foot and straight leg raise to command without difficulty  Compartments:  Thigh / calf very large, non tender  Neuro / Circulatory: History of neuropathy bilateral lower extremities, patient states, \"My feet / legs are always numb. \"         /60   Pulse 95   Temp 97.6 °F (36.4 °C) (Oral)   Resp 16   Ht 5' 8\" (1.727 m)   Wt 240 lb (108.9 kg)   SpO2 97%   BMI 36.49 kg/m²     Recent Labs     06/23/20  0346   WBC 10.8   HGB 11.0*   HCT 34.3*      Pre  Op 6/09/2020:  Hgb. 12.1  Hct. 38.2  EBL in OR - Minimal  Hgb was low pre op related to anticoagulant therapy, chronic kidney disease, liver disease, ETOH    Acute Blood Loss / Post Op Anemia    Plan:   Patient feels safe and independent ambulating with the walker to be discharged home today   Discharge home today with Home Physical Therapy 3 times a week until seen in office on July 2 cd.   Home Physical Therapy is needed due to:  1.) osteoarthritis knee, 2.) AM

## 2020-06-23 NOTE — PROGRESS NOTES
Occupational Therapy  OT BEDSIDE TREATMENT NOTE      Date:2020  Patient Name: nAna Bailey  MRN: [id number]  : 1940  Room: [address] [place] [address]     Referring Provider: DO Carolin Fonseca OT: [place] OTR/L [id number]     AM-PAC Daily Activity Raw Score:      Recommended Adaptive Equipment: Extended tub bench and reacher     Diagnosis: L knee OA   Surgery:  L TKA   Pertinent Medical History: CAD, HTN, OA   Precautions:  Falls, FWB L LE     Home Living: Pt lives alone in a single story home. Bathroom setup: tub/shower combo, handicapped height commode      Prior Level of Function: Independent with ADLs, Independent with IADLs; completed functional mobility with SPC. Pt reports son available to assist as needed. Driving: Yes     Pain Level: L knee- Mild  Cognition: A&O: 4/4 with fair+ ability to follow commands. Functional Assessment:    Initial Eval Status  Date: 20 Treatment session:  Short Term Goals  Treatment frequency: 2-5x/wk PRN x1-3 wks      Feeding Independent       Grooming Set up SBA standing at sink  Independent   UB Dressing Min A  Management of hospital gown S/U seated to doff gown and don pullover shirt  Independent   LB Dressing Mod A  Donning B socks Mod A  See comments  Mod I    Bathing Mod A UE: SBA  LE: Mod A  Mod I   Toileting Min A SBA standing during toileting task.  Mod I   Bed Mobility  Supine to sit: Min A       Functional Transfers STS: Min A  STS: CGA from arm chair    Tub transfer: Min A Mod I   Functional Mobility Min A with [employer]  Household distance [facility] using ww  Mod I during ADLs   Balance Sitting: fair plus     Standing: fair minus at [employer]  Sitting: Sup  Standing: SBA     Activity Tolerance Fair minus  Fair standing usha x6-7 min with fair plus balance during self care tasks                       B UE were WFL during tasks. Comments: Upon arrival pt was sitting in arm chair.  At end of session pt was transferred back to chair with alarm on, all lines and tubes intact and call light within reach. Treatment: Tub transfer was performed to simulate home s/u. Min A provided to clear L LE over edge of tub. Pt was instructed to extend L LE posteriorly and required assistance to avoid crossing B LE.  OT recommends extended tub bench to maximize pt safety due to balance deficits but pt decline DME. Encouraged pt to trial tub transfer with home health therapist prior to partaking in ADL to prevent falls. Instructed pt on the use of reacher to doff B socks. Pt noted owning sock aid but adamantly refused need for reacher despite encouragement fro therapist due to limited flexibility. Education: Transfer training, AE, DME and safety training. · Pt has made good progress towards set goals.    · Continue with current plan of care      Treatment Charges: Mins Units   Ther Ex  28087     Manual Therapy [id number]     Thera Activities [id number] 15 1   ADL/Home Mgt [id number] 40 3   Neuro Re-ed 94936     Group Therapy      Orthotic manage/training  [id number]     Non-Billable Time     Total Timed Treatment 55 4       Ameena SORIA/L [id number]

## 2020-06-23 NOTE — PATIENT CARE CONFERENCE
[facility] Quality Flow/Interdisciplinary Rounds Progress Note        Quality Flow Rounds held on June 23, 2020    Disciplines Attending:  Bedside Nurse, ,  and Nursing Unit Leadership    Frida Giron was admitted on 6/22/2020  6:47 AM    Anticipated Discharge Date:  Expected Discharge Date: 06/23/20    Disposition:    Derek Score:  Derek Scale Score: 20    Readmission Risk              Risk of Unplanned Readmission:        0           Discussed patient goal for the day, patient clinical progression, and barriers to discharge. The following Goal(s) of the Day/Commitment(s) have been identified:  Discharge home with [facility]. D/C instructions, pain control, progressive ambulation.       Joesph Maria  June 23, 2020

## 2020-06-23 NOTE — PROGRESS NOTES
Pulmonary/Chest: clear to auscultation bilaterally- no wheezes, rales or rhonchi, normal air movement, no respiratory distress  Cardiovascular: normal rate, normal S1 and S2, no gallops, intact distal pulses and no carotid bruits  Abdomen: soft, non-tender, non-distended, normal bowel sounds, no masses or organomegaly      Recent Labs     06/23/20  0346      K 4.0   CL 95*   CO2 24   BUN 26*   CREATININE 1.8*   GLUCOSE 163*   CALCIUM 8.0*       Recent Labs     06/23/20  0346   WBC 10.8   RBC 3.56*   HGB 11.0*   HCT 34.3*   MCV 96.3   MCH 30.9   MCHC 32.1   RDW 14.0      MPV 10.8     INR 1.2    Radiology:   No orders to display       Assessment:    Principal Problem:    Status post total left knee replacement  Active Problems:    History of surgery    Preop testing  Resolved Problems:    * No resolved hospital problems. *      Plan:  1. Left knee arthroplasty, management as per the primary service. 2. Hypothyroidism, patient's levothyroxine is already started. 3. Hyperlipidemia, patient remains on his statins. 4. Hypertension, patient is on metoprolol which is started, BP is controlled. 5. On anticoagulation with Coumadin, last INR 1.2, remains on Coumadin 5 mg daily. 6. Possible DC later today, okay from medicine for DC on his home medications.         Electronically signed by Usha Downing MD on 6/23/2020 at 9:28 AM

## 2020-06-25 ENCOUNTER — TELEPHONE (OUTPATIENT)
Dept: ORTHOPEDIC SURGERY | Age: 80
End: 2020-06-25

## 2020-06-25 ENCOUNTER — TELEPHONE (OUTPATIENT)
Dept: PRIMARY CARE CLINIC | Age: 80
End: 2020-06-25

## 2020-06-25 NOTE — TELEPHONE ENCOUNTER
Lonnie Barrios from [address] care called pt had a knee replacement on Monday; 1.2 coumidan level; please call her because dr Ester Blas wants to know if you want a nurse to go out and draw the blood  [facility] [phone]  Call her asap   She just called in to [facility] office

## 2020-06-26 ENCOUNTER — TELEPHONE (OUTPATIENT)
Dept: ORTHOPEDIC SURGERY | Age: 80
End: 2020-06-26

## 2020-06-29 NOTE — TELEPHONE ENCOUNTER
6/26/2020 5:48 pm Nimco Mejias, ALEJANDRA from [address] [facility] OF [place], [facility]. phoned office / message left on [facility] [address] [place], [place] [address]michael voice mail: Patient is noncompliant with compression hose and exercises. 6/29/2020 8:19 am Follow up phone call / spoke with Nimco Mejias who reports: KETURAH hose are soiled with urine, attempted to clean them up best she could. Son is going to do laundry today including KETURAH hose. Nimco Mejias reports patient's son refuses to reapply stockings. Nimco Mejias reports the house is a mess, not a good situation. Nimco Mejias will check with Nuvia Jara today regarding weather she contacted Dr. Fatuma Betancourt for a  consult. Informed Nimco Mejias:  Will send message to KENY

## 2020-07-01 ENCOUNTER — ANTI-COAG VISIT (OUTPATIENT)
Dept: FAMILY MEDICINE CLINIC | Age: 80
End: 2020-07-01
Payer: MEDICARE

## 2020-07-01 LAB — INR BLD: 1.4

## 2020-07-01 PROCEDURE — 93793 ANTICOAG MGMT PT WARFARIN: CPT | Performed by: INTERNAL MEDICINE

## 2020-07-01 NOTE — PROGRESS NOTES
Previous INR: 1.2     Previous dose: 5mg daily     Current INR: 1.40    Recommendation: continue 5mg daily     Next INR: 2 week     Maribel Nelson MD  7/1/2020  12:11 PM

## 2020-07-02 ENCOUNTER — OFFICE VISIT (OUTPATIENT)
Dept: ORTHOPEDIC SURGERY | Age: 80
End: 2020-07-02

## 2020-07-02 ENCOUNTER — TELEPHONE (OUTPATIENT)
Dept: ORTHOPEDIC SURGERY | Age: 80
End: 2020-07-02

## 2020-07-02 VITALS — TEMPERATURE: 96.8 F | HEIGHT: 68 IN | WEIGHT: 240 LBS | BODY MASS INDEX: 36.37 KG/M2

## 2020-07-02 PROCEDURE — 99024 POSTOP FOLLOW-UP VISIT: CPT | Performed by: ORTHOPAEDIC SURGERY

## 2020-07-02 RX ORDER — OXYCODONE HYDROCHLORIDE AND ACETAMINOPHEN 5; 325 MG/1; MG/1
1 TABLET ORAL EVERY 8 HOURS PRN
COMMUNITY
End: 2020-08-14

## 2020-07-02 NOTE — PROGRESS NOTES
Chief Complaint:   Chief Complaint   Patient presents with    Post-Op Check     Post-op Lt TKA 6/22/2020. Zahida Zaman is 10 days status post uneventful robotic assisted left total knee arthroplasty, recovering at home, reports from visiting nurses or home therapy suggest some degree of noncompliance however the patient states he is doing very well, he is ambulating with a single cane, tolerating pain with minimal medication. Denies fever chills sweats chest pain or dyspnea. States he has been compliant with KETURAH hose and aspirin prophylactic measures. Allergies; medications; past medical, surgical, family, and social history; and problem list have been reviewed today and updated as indicated in this encounter seen below. Exam: Left knee incision is healing without erythema fluctuance or drainage, no appreciable effusion. The knee is normally aligned with excellent medial lateral stability to stress and active assistive range of motion essentially 0-1 100 to 105 degrees of flexion. There is diffuse nonpitting edema of the calf which is nontender nonerythematous, negative Homans. Radiographs: AP lateral x-rays of the left knee today show restoration of normal limb alignment with femoral and tibial components in proper position well fit and fixed. Patella non-resurfaced. Carolina Boudreaux was seen today for post-op check.     Diagnoses and all orders for this visit:    S/P total knee replacement, left  -     XR KNEE LEFT (1-2 VIEWS)  -     Ambulatory referral to Physical Therapy       Clinically doing very well, given the patient's preoperative comorbidities and possible noncompliance I remain somewhat concerned about his condition but he is ambulating independently feels very comfortable, he will attend outpatient physical therapy and increase other activities at home with precautions against injury as reviewed, questions asked and answered follow-up in a month for clinical exam.      Return in about 1 month (around 8/2/2020). Current Outpatient Medications   Medication Sig Dispense Refill    oxyCODONE-acetaminophen (PERCOCET) 5-325 MG per tablet Take 1 tablet by mouth every 8 hours as needed for Pain.  levothyroxine (SYNTHROID) 200 MCG tablet Take 1 tablet by mouth Daily 90 tablet 0    potassium chloride (KLOR-CON M) 20 MEQ extended release tablet Take 1 tablet by mouth 2 times daily (Patient taking differently: Take 20 mEq by mouth 3 times daily ) 270 tablet 2    gabapentin (NEURONTIN) 100 MG capsule 200mg in am, 100mg at noon and 200mg in pm (Patient taking differently: Take by mouth See Admin Instructions. 200mg in am, 100mg at noon and 200mg in pm. Take am day of surgery 06/22/2020) 450 capsule 0    atorvastatin (LIPITOR) 40 MG tablet Take 1 tablet by mouth daily 90 tablet 3    metOLazone (ZAROXOLYN) 2.5 MG tablet Take 1 tablet by mouth once a week (Patient taking differently: Take 2.5 mg by mouth once a week monday) 90 tablet 1    aspirin (ECOTRIN LOW STRENGTH) 81 MG EC tablet Take 1 tablet by mouth daily (Patient taking differently: Take 81 mg by mouth daily No hold preop per cardiology) 30 tablet 3    warfarin (COUMADIN) 5 MG tablet Take 1 tablet by mouth daily (Patient taking differently: Take 5 mg by mouth daily Last taken 06/16/2020 am) 30 tablet 5    magnesium oxide (MAG-OX) 400 (241.3 Mg) MG TABS tablet TAKE ONE TABLET BY MOUTH DAILY      torsemide (DEMADEX) 20 MG tablet Take 40 mg by mouth 2 times daily       vitamin B-1 (THIAMINE) 100 MG tablet Take 100 mg by mouth daily Last dose 06/16      metoprolol tartrate (LOPRESSOR) 50 MG tablet Take 1 tablet by mouth 2 times daily (Patient taking differently: Take 50 mg by mouth 2 times daily Take am day of surgery 06/22/2020) 60 tablet 3    MULTIPLE VITAMIN PO Take 1 tablet by mouth daily Last taken 06/16/2020       No current facility-administered medications for this visit.         Patient Active Problem List   Diagnosis    Neuropathy (HCC)    Cirrhosis (HCC)    Tachycardia    Hyponatremia    Cardiomyopathy ([facility]    Alcohol abuse    Delirium due to multiple etiologies    Primary osteoarthritis of left knee    Osteoarthritis    Lymphedema    Hypertension    Hyperlipidemia    History of ascites    Fatigue    CKD (chronic kidney disease) stage 3, GFR 30-59 ml/min (HCC)    Hyperuricemia    Prostate enlargement    Impaired fasting glucose    Vitamin D deficiency    Other specified hypothyroidism    Atrial flutter ([facility]    Coronary artery disease involving native coronary artery of native heart without angina pectoris    History of surgery    Preop testing    Status post total left knee replacement       Past Medical History:   Diagnosis Date    Blood circulation, collateral     CAD (coronary artery disease)     Chronic kidney disease     History of alcohol use     History of ascites     history of, approx 5 years ago    Hyperlipidemia     Hypertension     Hyponatremia     Left knee pain 06/2020    Liver disease     Lymphedema     lower extrem    Neuropathy     both feet    Osteoarthritis     Psychiatric problem     Thyroid disease     Use of cane as ambulatory aid     Uses wheelchair     for distances    Wears glasses     for reading    Wears hearing aid        Past Surgical History:   Procedure Laterality Date    APPENDECTOMY      CARDIAC CATHETERIZATION  05/2020    CATARACT REMOVAL WITH IMPLANT Bilateral     COLONOSCOPY      EYE SURGERY      cataracts    TOTAL KNEE ARTHROPLASTY Left 6/22/2020    LEFT KNEE IVAN ROBOTIC TOTAL ARTHROPLASTY performed by Leanor Felty, [facility] at [facility] OR       Allergies   Allergen Reactions    Sulfa Antibiotics Hives     Hives         Social History     Socioeconomic History    Marital status: Single     Spouse name: None    Number of children: 1    Years of education: 16    Highest education level: Master's degree (e.g., MA, MS, Jordan, MEd, MSW, LUCERO) Occupational History    None   Social Needs    Financial resource strain: Patient refused    Food insecurity     Worry: Patient refused     Inability: Patient refused    Transportation needs     Medical: Patient refused     Non-medical: Patient refused   Tobacco Use    Smoking status: Former Smoker     Packs/day: 0.75     Years: 25.00     Pack years: 18.75     Types: Pipe, Cigars     Start date: 10/8/1964     Last attempt to quit: 1986     Years since quittin.2    Smokeless tobacco: Never Used    Tobacco comment: Cigar and Pipe smoking history only   Substance and Sexual Activity    Alcohol use: Yes     Alcohol/week: 0.0 standard drinks     Comment: quit 2017; socially as of 2020 on weekends    Drug use: No    Sexual activity: None   Lifestyle    Physical activity     Days per week: None     Minutes per session: None    Stress: None   Relationships    Social connections     Talks on phone: None     Gets together: None     Attends [employer]ism service: None     Active member of club or organization: None     Attends meetings of clubs or organizations: None     Relationship status: None    Intimate partner violence     Fear of current or ex partner: None     Emotionally abused: None     Physically abused: None     Forced sexual activity: None   Other Topics Concern    None   Social History Narrative    None       Family History   Problem Relation Age of Onset    Other Mother         old age          Review of Systems  As follows except as previously noted in HPI:  Constitutional: Negative for chills, diaphoresis, fatigue, fever and unexpected weight change. Respiratory: Negative for cough, shortness of breath and wheezing. Cardiovascular: Negative for chest pain and palpitations. Neurological: Negative for dizziness, syncope, cephalgia. GI / : negative  Musculoskeletal: see HPI       Objective:   Physical Exam   Constitutional: Oriented to person, place, and time.  and

## 2020-07-02 NOTE — TELEPHONE ENCOUNTER
7/02/2020 Tia Jung from [address] [place] [address] phoned office / message left on voice mail: Patient has been discharge from all home care services. Patient saw Dr. Jose Mcelroy yesterday. An INR was done.

## 2020-07-06 ENCOUNTER — EVALUATION (OUTPATIENT)
Dept: PHYSICAL THERAPY | Age: 80
End: 2020-07-06
Payer: MEDICARE

## 2020-07-06 PROCEDURE — 97161 PT EVAL LOW COMPLEX 20 MIN: CPT | Performed by: PHYSICAL THERAPIST

## 2020-07-06 NOTE — PROGRESS NOTES
disease     History of alcohol use     History of ascites     history of, approx 5 years ago    Hyperlipidemia     Hypertension     Hyponatremia     Left knee pain 06/2020    Liver disease     Lymphedema     lower extrem    Neuropathy     both feet    Osteoarthritis     Psychiatric problem     Thyroid disease     Use of cane as ambulatory aid     Uses wheelchair     for distances    Wears glasses     for reading    Wears hearing aid      Past Surgical History:   Procedure Laterality Date    APPENDECTOMY      CARDIAC CATHETERIZATION  05/2020    CATARACT REMOVAL WITH IMPLANT Bilateral     COLONOSCOPY      EYE SURGERY      cataracts    TOTAL KNEE ARTHROPLASTY Left 6/22/2020    LEFT KNEE IVAN ROBOTIC TOTAL ARTHROPLASTY performed by Dana Owen [facility] at [facility] OR       Medications:   Current Outpatient Medications   Medication Sig Dispense Refill    oxyCODONE-acetaminophen (PERCOCET) 5-325 MG per tablet Take 1 tablet by mouth every 8 hours as needed for Pain.  levothyroxine (SYNTHROID) 200 MCG tablet Take 1 tablet by mouth Daily 90 tablet 0    potassium chloride (KLOR-CON M) 20 MEQ extended release tablet Take 1 tablet by mouth 2 times daily (Patient taking differently: Take 20 mEq by mouth 3 times daily ) 270 tablet 2    gabapentin (NEURONTIN) 100 MG capsule 200mg in am, 100mg at noon and 200mg in pm (Patient taking differently: Take by mouth See Admin Instructions.  200mg in am, 100mg at noon and 200mg in pm. Take am day of surgery 06/22/2020) 450 capsule 0    atorvastatin (LIPITOR) 40 MG tablet Take 1 tablet by mouth daily 90 tablet 3    metOLazone (ZAROXOLYN) 2.5 MG tablet Take 1 tablet by mouth once a week (Patient taking differently: Take 2.5 mg by mouth once a week monday) 90 tablet 1    aspirin (ECOTRIN LOW STRENGTH) 81 MG EC tablet Take 1 tablet by mouth daily (Patient taking differently: Take 81 mg by mouth daily No hold preop per cardiology) 30 tablet 3    warfarin (COUMADIN) decreased    Strength decreased    Decreased functional ability with walking    [x] There are no barriers affecting plan of care or recovery    [] Barriers to this patient's plan of care or recovery include. Domestic Concerns:  [x] No  [] Yes:    Short Term goals (2 weeks)   Decrease reported pain to <3/10   Increase ROM to >100*   Increase Strength to 4/5 across L hip/knee    Able to perform/complete the following functions/tasks: Ambulate properly with use of AD    Long Term goals (4-6 weeks)   Decrease reported pain to <2/10   Increase ROM to >110*   Increase Strength to 4+/5    Able to perform/complete the following functions/tasks: ambulate properly without use of AD    Independent with Home Exercise Programs    Rehab Potential: [x] Good  [] Fair  [] Poor    PLAN       Treatment Plan: 2-3x/week x 4-6 weeks   [x] Therapeutic Exercise  [x] Therapeutic Activity  [x] Neuromuscular Re-education   [x] Gait Training  [] Balance Training  [] Aerobic conditioning  [x] Manual Therapy  [] Massage/Fascial release   [] Work/Sport specific activities    [] Pain Neuroscience [x] Cold/hotpack  [] Vasocompression  [] Electrical Stimulation  [] Lumbar/Cervical Traction  [] Ultrasound   [] Iontophoresis: 4 mg/mL Dexamethasone Sodium Phosphate 40-80 mAmin        [x] Instruction in HEP      []  Medication allergies reviewed for use of Dexamethasone Sodium Phosphate 4mg/ml  with iontophoresis treatments. Patient is not allergic.       The following CPT codes are likely to be used in the care of this patient: 59102 PT Re-Evaluation , 77155 Therapeutic Exercise , 51519 Neuromuscular Re-Education , 42318 Therapeutic Activities , 04621 Manual Therapy , 78351 Gait Training       Suggested Professional Referral: [x] No  [] Yes:     Patient Education:  [x] Plans/Goals, Risks/Benefits discussed  [x] Home exercise program  Method of Education: [x] Verbal  [x] Demo  [x] Written  Comprehension of Education:  [x] Margaux understanding. [x] Demonstrates understanding. [] Needs Review. [] Demonstrates/verbalizes understanding of HEP/Ed previously given. Frequency: 2-3 days per week for 4-6 weeks    Patient understands diagnosis/prognosis and consents to treatment, plan and goals: [x] Yes    [] No     Thank you for the opportunity to work with your patient. If you have questions or comments, please contact me at numbers listed above. Electronically signed by: Corby Kitchen, PT PT DPT [id number]         Please sign Physician's Certification and return to: [address] [place] [address] E  [address] [place] [address]   Dept: [phone]  Dept Fax: [phone] Certification / Comments     Frequency/Duration 2-3 days per week for 4-6 weeks. Certification period from 7/6/2020  to 10/6/2020. I have reviewed the Plan of Care established for skilled therapy services and certify that the services are required and that they will be provided while the patient is under my care.     Physician's Comments/Revisions:               Physician's Printed Name:                                           [de-identified] Signature:                                                               Date:

## 2020-07-09 ENCOUNTER — ANTI-COAG VISIT (OUTPATIENT)
Dept: FAMILY MEDICINE CLINIC | Age: 80
End: 2020-07-09
Payer: MEDICARE

## 2020-07-09 ENCOUNTER — NURSE ONLY (OUTPATIENT)
Dept: FAMILY MEDICINE CLINIC | Age: 80
End: 2020-07-09
Payer: MEDICARE

## 2020-07-09 LAB
INTERNATIONAL NORMALIZATION RATIO, POC: 1.6
PROTHROMBIN TIME, POC: 18.7

## 2020-07-09 PROCEDURE — 85610 PROTHROMBIN TIME: CPT | Performed by: INTERNAL MEDICINE

## 2020-07-09 PROCEDURE — 93793 ANTICOAG MGMT PT WARFARIN: CPT | Performed by: INTERNAL MEDICINE

## 2020-07-09 RX ORDER — WARFARIN SODIUM 1 MG/1
1 TABLET ORAL DAILY
Qty: 30 TABLET | Refills: 3 | Status: SHIPPED
Start: 2020-07-09 | End: 2021-08-31 | Stop reason: SDUPTHER

## 2020-07-09 NOTE — PROGRESS NOTES
Previous INR: 1.4     Previous dose: 5mg daily     Current INR: 1.60    Recommendation: change to 6mg mon and fri and 5mg all other days      Next INR: 1 week     Requested Prescriptions     Pending Prescriptions Disp Refills    warfarin (COUMADIN) 1 MG tablet 30 tablet 3     Sig: Take 1 tablet by mouth daily     Not sent, pended as no pharmacy listed    Esvin Vásquez MD  7/9/2020  1:03 PM

## 2020-07-17 ENCOUNTER — OFFICE VISIT (OUTPATIENT)
Dept: PRIMARY CARE CLINIC | Age: 80
End: 2020-07-17
Payer: MEDICARE

## 2020-07-17 VITALS
SYSTOLIC BLOOD PRESSURE: 120 MMHG | DIASTOLIC BLOOD PRESSURE: 68 MMHG | TEMPERATURE: 98 F | WEIGHT: 240 LBS | OXYGEN SATURATION: 98 % | HEIGHT: 68 IN | HEART RATE: 74 BPM | RESPIRATION RATE: 16 BRPM | BODY MASS INDEX: 36.37 KG/M2

## 2020-07-17 PROBLEM — R00.0 TACHYCARDIA: Status: RESOLVED | Noted: 2017-06-15 | Resolved: 2020-07-17

## 2020-07-17 PROBLEM — Z98.890 HISTORY OF SURGERY: Status: RESOLVED | Noted: 2020-06-22 | Resolved: 2020-07-17

## 2020-07-17 PROBLEM — I48.92 ATRIAL FLUTTER (HCC): Status: RESOLVED | Noted: 2020-03-05 | Resolved: 2020-07-17

## 2020-07-17 LAB
INTERNATIONAL NORMALIZATION RATIO, POC: 2.2
PROTHROMBIN TIME, POC: NORMAL

## 2020-07-17 PROCEDURE — 69210 REMOVE IMPACTED EAR WAX UNI: CPT | Performed by: INTERNAL MEDICINE

## 2020-07-17 PROCEDURE — 99214 OFFICE O/P EST MOD 30 MIN: CPT | Performed by: INTERNAL MEDICINE

## 2020-07-17 PROCEDURE — 85610 PROTHROMBIN TIME: CPT | Performed by: INTERNAL MEDICINE

## 2020-07-17 ASSESSMENT — ENCOUNTER SYMPTOMS
CHEST TIGHTNESS: 0
SHORTNESS OF BREATH: 0
BLOOD IN STOOL: 0
DIARRHEA: 0
COUGH: 0
ABDOMINAL PAIN: 0
EYE PAIN: 0
VOMITING: 0
CONSTIPATION: 0
SORE THROAT: 0
RHINORRHEA: 0
NAUSEA: 0

## 2020-07-17 NOTE — PROGRESS NOTES
borderline at 5.9     Has electrolyte abnormalities     Health Maintenance   - immunizations:   Influenza Vaccination - declines  Pneumonia Vaccination - declines   Zoster/Shingles Vaccine  Tetanus Vaccination    - Screenings:   Colonoscopy   Prostate     Stress test (4/2020) - The myocardial perfusion imaging was abnormal, moderate sized reversible defect in the entire inferior wall and apex suggestive of ischemia    echo (4/20) - ef 55-60%, mod l &r atiral dilation, mild to mod MR, indeterm diastolic dysfun    cardiac cath (5/20) - IMPRESSION:  1.  40% proximal LAD lesion, 50% mid LAD lesion at the takeoff of a  large diagonal branch. 2.  50% proximal left circumflex artery disease. 3.  Totally occluded right coronary artery with grade 3 left-to-right  collaterals. Couseled on Home Safety     ROS:  Review of Systems   Constitutional: Negative for appetite change, chills, fever and unexpected weight change. HENT: Negative for congestion, rhinorrhea and sore throat. Eyes: Negative for pain and visual disturbance. Respiratory: Negative for cough, chest tightness and shortness of breath. Cardiovascular: Negative for chest pain and palpitations. Gastrointestinal: Negative for abdominal pain, blood in stool, constipation, diarrhea, nausea and vomiting. Genitourinary: Negative for difficulty urinating, dysuria, hematuria, scrotal swelling, testicular pain and urgency. Musculoskeletal: Negative for arthralgias and gait problem. Skin: Negative for rash. Neurological: Positive for dizziness. Negative for syncope, weakness, light-headedness and headaches. Hematological: Negative for adenopathy. Does not bruise/bleed easily. Psychiatric/Behavioral: Negative for suicidal ideas. The patient is not nervous/anxious.           Current Outpatient Medications:     warfarin (COUMADIN) 1 MG tablet, Take 1 tablet by mouth daily, Disp: 30 tablet, Rfl: 3    oxyCODONE-acetaminophen (PERCOCET) 5-325 MG per tablet, Take 1 tablet by mouth every 8 hours as needed for Pain., Disp: , Rfl:     levothyroxine (SYNTHROID) 200 MCG tablet, Take 1 tablet by mouth Daily, Disp: 90 tablet, Rfl: 0    potassium chloride (KLOR-CON M) 20 MEQ extended release tablet, Take 1 tablet by mouth 2 times daily (Patient taking differently: Take 20 mEq by mouth 3 times daily ), Disp: 270 tablet, Rfl: 2    gabapentin (NEURONTIN) 100 MG capsule, 200mg in am, 100mg at noon and 200mg in pm (Patient taking differently: Take by mouth See Admin Instructions.  200mg in am, 100mg at noon and 200mg in pm. Take am day of surgery 06/22/2020), Disp: 450 capsule, Rfl: 0    atorvastatin (LIPITOR) 40 MG tablet, Take 1 tablet by mouth daily, Disp: 90 tablet, Rfl: 3    metOLazone (ZAROXOLYN) 2.5 MG tablet, Take 1 tablet by mouth once a week (Patient taking differently: Take 2.5 mg by mouth once a week monday), Disp: 90 tablet, Rfl: 1    aspirin (ECOTRIN LOW STRENGTH) 81 MG EC tablet, Take 1 tablet by mouth daily (Patient taking differently: Take 81 mg by mouth daily No hold preop per cardiology), Disp: 30 tablet, Rfl: 3    warfarin (COUMADIN) 5 MG tablet, Take 1 tablet by mouth daily (Patient taking differently: Take 5 mg by mouth daily Last taken 06/16/2020 am), Disp: 30 tablet, Rfl: 5    magnesium oxide (MAG-OX) 400 (241.3 Mg) MG TABS tablet, TAKE ONE TABLET BY MOUTH DAILY, Disp: , Rfl:     torsemide (DEMADEX) 20 MG tablet, Take 40 mg by mouth 2 times daily , Disp: , Rfl:     vitamin B-1 (THIAMINE) 100 MG tablet, Take 100 mg by mouth daily Last dose 06/16, Disp: , Rfl:     metoprolol tartrate (LOPRESSOR) 50 MG tablet, Take 1 tablet by mouth 2 times daily (Patient taking differently: Take 50 mg by mouth 2 times daily Take am day of surgery 06/22/2020), Disp: 60 tablet, Rfl: 3    MULTIPLE VITAMIN PO, Take 1 tablet by mouth daily Last taken 06/16/2020, Disp: , Rfl:     Allergies   Allergen Reactions    Sulfa Antibiotics Hives     Hives         Past Medical History:   Diagnosis Date    Blood circulation, collateral     CAD (coronary artery disease)     Chronic kidney disease     History of alcohol use     History of ascites     history of, approx 5 years ago    Hyperlipidemia     Hypertension     Hyponatremia     Left knee pain 2020    Liver disease     Lymphedema     lower extrem    Neuropathy     both feet    Osteoarthritis     Psychiatric problem     Thyroid disease     Use of cane as ambulatory aid     Uses wheelchair     for distances    Wears glasses     for reading    Wears hearing aid        Past Surgical History:   Procedure Laterality Date    APPENDECTOMY      CARDIAC CATHETERIZATION  2020    CATARACT REMOVAL WITH IMPLANT Bilateral     COLONOSCOPY      EYE SURGERY      cataracts    TOTAL KNEE ARTHROPLASTY Left 2020    LEFT KNEE IVAN ROBOTIC TOTAL ARTHROPLASTY performed by Marc Connelly [facility] at [facility] OR       Family History   Problem Relation Age of Onset   Cassia Arreguin Other Mother         old age        Social History     Socioeconomic History    Marital status: Single     Spouse name: None    Number of children: 1    Years of education: 16    Highest education level: Master's degree (e.g., MA, MS, Jordan, MEd, MSW, LUCERO)   Occupational History    None   Social Needs    Financial resource strain: Patient refused    Food insecurity     Worry: Patient refused     Inability: Patient refused    Transportation needs     Medical: Patient refused     Non-medical: Patient refused   Tobacco Use    Smoking status: Former Smoker     Packs/day: 0.75     Years: 25.00     Pack years: 18.75     Types: Pipe, Cigars     Start date: 10/8/1964     Last attempt to quit: 1986     Years since quittin.3    Smokeless tobacco: Never Used    Tobacco comment: Cigar and Pipe smoking history only   Substance and Sexual Activity    Alcohol use:  Yes     Alcohol/week: 0.0 standard drinks     Comment: quit 2017; socially as of 06/2020 on weekends    Drug use: No    Sexual activity: None   Lifestyle    Physical activity     Days per week: None     Minutes per session: None    Stress: None   Relationships    Social connections     Talks on phone: None     Gets together: None     Attends Protestant service: None     Active member of club or organization: None     Attends meetings of clubs or organizations: None     Relationship status: None    Intimate partner violence     Fear of current or ex partner: None     Emotionally abused: None     Physically abused: None     Forced sexual activity: None   Other Topics Concern    None   Social History Narrative    None        Vitals:    07/17/20 1304   BP: 120/68   Pulse: 74   Resp: 16   Temp: 98 °F (36.7 °C)   SpO2: 98%   Weight: 240 lb (108.9 kg)  Comment: per pt   Height: 5' 8\" (1.727 m)       Exam:  Physical Exam  Constitutional:       Appearance: He is well-developed. HENT:      Head: Normocephalic and atraumatic. Right Ear: External ear normal.      Left Ear: External ear normal.      Nose: Rhinorrhea present. Mouth/Throat:      Pharynx: Posterior oropharyngeal erythema present. Eyes:      Pupils: Pupils are equal, round, and reactive to light. Neck:      Musculoskeletal: Normal range of motion and neck supple. Thyroid: No thyromegaly. Cardiovascular:      Rate and Rhythm: Normal rate. Rhythm irregular. Heart sounds: Murmur present. Systolic murmur present with a grade of 2/6. Pulmonary:      Effort: Pulmonary effort is normal.      Breath sounds: Normal breath sounds. No wheezing or rales. Abdominal:      General: Bowel sounds are normal.      Palpations: Abdomen is soft. Tenderness: There is no abdominal tenderness. There is no guarding or rebound. Musculoskeletal: Normal range of motion. Right lower leg: Edema present. Left lower leg: Edema present. Lymphadenopathy:      Cervical: No cervical adenopathy.    Skin:     General: Skin is warm and dry. Neurological:      Mental Status: He is alert and oriented to person, place, and time. Cranial Nerves: No cranial nerve deficit. Psychiatric:         Judgment: Judgment normal.         Assessment and Plan:    Yuliya Ly was seen today for 6 month follow-up, hyperlipidemia, hypertension, hypothyroidism and health maintenance.     Diagnoses and all orders for this visit:    Persistent atrial fibrillation  - uncertain as to onset  - ekg from 2017 showed flutter   - now on anticoagulation   - follow with cardiolgy   - on coumadin 6mg mon/fri and 5mg all other days    - inr today 2.2   - continue same dose   - recheck in 1 month     Coronary artery disease involving native coronary artery of native heart without angina pectoris  - s/p stress and cath (2020)   - medical therapy   - now on atorvastatin   - on aspirin 81mg   - on metorolol tart 50mg twice a day   - follow with cardiology     Alcoholic cirrhosis of liver with ascites ([facility]  - Continue present treatment   - discussed and advised stopping alcohol altogether   - declines referral to GI   - off spironolactone   - on torsemide   - on metolazone     Hyponatremia  - Possible multifactorial (cirrhois and meds)   - last lab stable at 132 (6/2020)     Dilated cardiomyopathy ([facility] [address]  - possible due to alcohol   - discussed and advised stopping alcohol altogether     CKD (chronic kidney disease) stage 3, GFR 30-59 ml/min ([facility])  - Continue present treatment   - following with nephrology   - will advise to stop spirinolactone 25mg daily - given advised to hold by cardiology   - on metalozaone 2.5mg every 10 days   - on torsemide - advised by renal to take 40mg twice a day    - on potassium supplement   - follow labs     Lymphedema  - on metalozaone 2.5mg every 10 days   - on torsemide - advised by renal to take 40mg twice a day    - on potassium supplement   - compression stockings if able   - consider referral to lymphedema PT     Impaired fasting glucose  - Continue present treatment   - watch diet   - follow A1c - last was 5.9     Alcohol abuse  - discussed and advised stopping alcohol altogether     Essential hypertension  - Continue present treatment   - watch diet   - monitor blood pressure at home   - on spironolactone   - now on metoprolol tart   - stable     Pure hypercholesterolemia  - Continue present treatment   - watch diet   - simvastatin switched to atorvastatin   - follow labs   - uncontrolled     History of ascites    Neuropathy (HCC)  - Continue present treatment   - due to alcohol   - on gabapentin 200mg in am, 200mg at noon and 100mg in pm   - stable     Other specified hypothyroidism  - Continue present treatment   - on levothyroxine   - follow labs     Primary osteoarthritis involving multiple joints    Hyperuricemia  - Continue present treatment   - watch diet   - stopped allopurinol - thought possibly causing hives   - did not restart     Bilateral impacted cerumen  - lavage today   - some irritation pos lavage, still with retained cerumen. - attempted removal of cerumen with curette - removed on right, some mild trauma, unable to remove from left   - will review next visit, call if other issues. Return in about 1 month (around 8/17/2020) for check up and review.     Orders Placed This Encounter   Procedures    CA REMOVAL IMPACTED CERUMEN IRRIGATION/LVG UNILAT    CA REMOVAL IMPACTED CERUMEN INSTRUMENTATION UNILAT     Requested Prescriptions      No prescriptions requested or ordered in this encounter        Debby Segura MD  7/17/2020  4:06 PM

## 2020-07-30 ENCOUNTER — OFFICE VISIT (OUTPATIENT)
Dept: ORTHOPEDIC SURGERY | Age: 80
End: 2020-07-30

## 2020-07-30 VITALS — BODY MASS INDEX: 36.37 KG/M2 | HEIGHT: 68 IN | WEIGHT: 240 LBS | TEMPERATURE: 96.3 F

## 2020-07-30 PROCEDURE — 99024 POSTOP FOLLOW-UP VISIT: CPT | Performed by: ORTHOPAEDIC SURGERY

## 2020-07-30 NOTE — PROGRESS NOTES
exercise and other activities to tolerance, advised to make sure he follows through with his primary care physician and may need follow-up with cardiology and/or nephrology as etiology is likely central.  Follow-up in 6 weeks clinical exam left knee. Return in about 6 weeks (around 9/10/2020). Current Outpatient Medications   Medication Sig Dispense Refill    warfarin (COUMADIN) 1 MG tablet Take 1 tablet by mouth daily (Patient taking differently: Take 1 mg by mouth Takes 1 mg Mondays and Fridays) 30 tablet 3    oxyCODONE-acetaminophen (PERCOCET) 5-325 MG per tablet Take 1 tablet by mouth every 8 hours as needed for Pain.  levothyroxine (SYNTHROID) 200 MCG tablet Take 1 tablet by mouth Daily 90 tablet 0    potassium chloride (KLOR-CON M) 20 MEQ extended release tablet Take 1 tablet by mouth 2 times daily (Patient taking differently: Take 20 mEq by mouth 3 times daily ) 270 tablet 2    gabapentin (NEURONTIN) 100 MG capsule 200mg in am, 100mg at noon and 200mg in pm (Patient taking differently: Take by mouth See Admin Instructions.  200mg in am, 100mg at noon and 200mg in pm. Take am day of surgery 06/22/2020) 450 capsule 0    atorvastatin (LIPITOR) 40 MG tablet Take 1 tablet by mouth daily 90 tablet 3    metOLazone (ZAROXOLYN) 2.5 MG tablet Take 1 tablet by mouth once a week (Patient taking differently: Take 2.5 mg by mouth once a week monday) 90 tablet 1    aspirin (ECOTRIN LOW STRENGTH) 81 MG EC tablet Take 1 tablet by mouth daily (Patient taking differently: Take 81 mg by mouth daily No hold preop per cardiology) 30 tablet 3    warfarin (COUMADIN) 5 MG tablet Take 1 tablet by mouth daily (Patient taking differently: Take 5 mg by mouth daily Last taken 06/16/2020 am) 30 tablet 5    magnesium oxide (MAG-OX) 400 (241.3 Mg) MG TABS tablet TAKE ONE TABLET BY MOUTH DAILY      torsemide (DEMADEX) 20 MG tablet Take 40 mg by mouth 2 times daily       vitamin B-1 (THIAMINE) 100 MG tablet Take 100 mg by mouth daily Last dose 06/16      metoprolol tartrate (LOPRESSOR) 50 MG tablet Take 1 tablet by mouth 2 times daily (Patient taking differently: Take 50 mg by mouth 2 times daily Take am day of surgery 06/22/2020) 60 tablet 3    MULTIPLE VITAMIN PO Take 1 tablet by mouth daily Last taken 06/16/2020       No current facility-administered medications for this visit.         Patient Active Problem List   Diagnosis    Neuropathy ([facility]    [facility]osis ([facility]    Hyponatremia    Cardiomyopathy ([facility]    Alcohol abuse    Delirium due to multiple etiologies    Persistent atrial fibrillation    Primary osteoarthritis of left knee    Osteoarthritis    Lymphedema    Hypertension    Hyperlipidemia    History of ascites    Fatigue    CKD (chronic kidney disease) stage 3, GFR 30-59 ml/min ([facility])    Hyperuricemia    Prostate enlargement    Impaired fasting glucose    Vitamin D deficiency    Other specified hypothyroidism    Coronary artery disease involving native coronary artery of native heart without angina pectoris    Status post total left knee replacement       Past Medical History:   Diagnosis Date    Blood circulation, collateral     CAD (coronary artery disease)     Chronic kidney disease     History of alcohol use     History of ascites     history of, approx 5 years ago    Hyperlipidemia     Hypertension     Hyponatremia     Left knee pain 06/2020    Liver disease     Lymphedema     lower extrem    Neuropathy     both feet    Osteoarthritis     Psychiatric problem     Thyroid disease     Use of cane as ambulatory aid     Uses wheelchair     for distances    Wears glasses     for reading    Wears hearing aid        Past Surgical History:   Procedure Laterality Date    APPENDECTOMY      CARDIAC CATHETERIZATION  05/2020    CATARACT REMOVAL WITH IMPLANT Bilateral     COLONOSCOPY      EYE SURGERY      cataracts    TOTAL KNEE ARTHROPLASTY Left 6/22/2020    LEFT KNEE IVAN ROBOTIC TOTAL ARTHROPLASTY performed by Twila Abbott MD at [facility] OR       Allergies   Allergen Reactions    Sulfa Antibiotics Hives     Hives         Social History     Socioeconomic History    Marital status: Single     Spouse name: None    Number of children: 1    Years of education: 16    Highest education level: Master's degree (e.g., MA, MS, Jordan, MEd, MSW, LUCERO)   Occupational History    None   Social Needs    Financial resource strain: Patient refused    Food insecurity     Worry: Patient refused     Inability: Patient refused    Transportation needs     Medical: Patient refused     Non-medical: Patient refused   Tobacco Use    Smoking status: Former Smoker     Packs/day: 0.75     Years: 25.00     Pack years: 18.75     Types: Pipe, Cigars     Start date: 10/8/1964     Last attempt to quit: 1986     Years since quittin.3    Smokeless tobacco: Never Used    Tobacco comment: Cigar and Pipe smoking history only   Substance and Sexual Activity    Alcohol use:  Yes     Alcohol/week: 0.0 standard drinks     Comment: quit 2017; socially as of 2020 on weekends    Drug use: No    Sexual activity: None   Lifestyle    Physical activity     Days per week: None     Minutes per session: None    Stress: None   Relationships    Social connections     Talks on phone: None     Gets together: None     Attends [employer] service: None     Active member of club or organization: None     Attends meetings of clubs or organizations: None     Relationship status: None    Intimate partner violence     Fear of current or ex partner: None     Emotionally abused: None     Physically abused: None     Forced sexual activity: None   Other Topics Concern    None   Social History Narrative    None       Family History   Problem Relation Age of Onset    Other Mother         old age          Review of Systems  As follows except as previously noted in HPI:  Constitutional: Negative for chills, diaphoresis, fatigue, fever and unexpected weight change. Respiratory: Negative for cough, shortness of breath and wheezing. Cardiovascular: Negative for chest pain and palpitations. Neurological: Negative for dizziness, syncope, cephalgia. GI / : negative  Musculoskeletal: see HPI       Objective:   Physical Exam   Constitutional: Oriented to person, place, and time. and appears well-developed and well-nourished. :   Head: Normocephalic and atraumatic. Eyes: EOM are normal.   Neck: Neck supple. Cardiovascular: Normal rate and regular rhythm. Pulmonary/Chest: Effort normal. No stridor. No respiratory distress, no wheezes. Abdominal:  No abnormal distension. Neurological: Alert and oriented to person, place, and time. Skin: Skin is warm and dry. Psychiatric: Normal mood and affect.  Behavior is normal. Thought content normal.    7/30/2020  6:16 PM

## 2020-08-11 ENCOUNTER — TREATMENT (OUTPATIENT)
Dept: PHYSICAL THERAPY | Age: 80
End: 2020-08-11
Payer: MEDICARE

## 2020-08-11 PROCEDURE — 97110 THERAPEUTIC EXERCISES: CPT

## 2020-08-11 PROCEDURE — 97530 THERAPEUTIC ACTIVITIES: CPT

## 2020-08-11 NOTE — PROGRESS NOTES
[address] [facility]   Phone: [phone]   Fax: [phone]      Physical Therapy Daily Treatment Note    Date: 2020  Patient Name: Lissa Dawson  : 1940   MRN: [id number]  Haider Dumas: DOSx: 20  Referring Provider: Car Herron MD  [address] [facility]     Medical Diagnosis:   S/p L TKA    Outcome Measure:      S: Patient reports he doesn't have difficulty c any task at home. States he is feeling good this day. O:   Time 130-213     Visit Visit 1  Repeat outcome measure at mid point and end. Pain      ROM Left:   AROM:  94° Flexion,  -10° Extension     Modalities            Manual            Stretch                  Exercise      Bike  Attempted patient had difficulty getting on bike      Heel slides 2 x 10 10s holds Propped on wedge  TE   SLR 3 x 10  Propped on wedge  TE   Seated Clams  3 x 10 3s holds YTB TE   Seated Marches  3 x 10 3s holds YTB TE   STS  15x  c HHA TA                Hamstring Curl       TG squats      TG calf raises      Step-ups - FWD      Step-ups - LAT      Step-ups - BWD        NMR To improve balance for safe community and home ambulation    Resisted walk      FWD      BKWD      lat      March      Side stepping      Retro walk      Heel to toe      A:  Tolerated fair. Nimco Vazquez reports the hardest thing he does is ambulating stairs at home. Patient had increased difficulty c following direction this session. He had trouble laying flat on mat propped up d/t other medical reasons. Patient very deconditioned during treatment session and required frequent rest breaks. Progressed c STS activity and he demonstrates very weak balance. Will continue to progress as tolerated.      P: Continue with rehab plan  Carolyn Coles, ALEJANDRA PTA [id number]    Treatment Charges: Mins Units   Initial Evaluation     Re-Evaluation     Ther Exercise         TE 33 2   Manual Therapy     MT     Ther Activities        TA 10 1   Gait Training          GT Neuro Re-education NR     Modalities     Non-Billable Service Time     Other     Total Time/Units 43 3

## 2020-08-14 ENCOUNTER — OFFICE VISIT (OUTPATIENT)
Dept: PRIMARY CARE CLINIC | Age: 80
End: 2020-08-14
Payer: MEDICARE

## 2020-08-14 VITALS
HEIGHT: 68 IN | DIASTOLIC BLOOD PRESSURE: 78 MMHG | TEMPERATURE: 97.7 F | RESPIRATION RATE: 16 BRPM | WEIGHT: 240 LBS | HEART RATE: 81 BPM | OXYGEN SATURATION: 97 % | SYSTOLIC BLOOD PRESSURE: 130 MMHG | BODY MASS INDEX: 36.37 KG/M2

## 2020-08-14 LAB
INTERNATIONAL NORMALIZATION RATIO, POC: 2.2
PROTHROMBIN TIME, POC: NORMAL

## 2020-08-14 PROCEDURE — 85610 PROTHROMBIN TIME: CPT | Performed by: INTERNAL MEDICINE

## 2020-08-14 PROCEDURE — 99214 OFFICE O/P EST MOD 30 MIN: CPT | Performed by: INTERNAL MEDICINE

## 2020-08-14 PROCEDURE — 93793 ANTICOAG MGMT PT WARFARIN: CPT | Performed by: INTERNAL MEDICINE

## 2020-08-14 ASSESSMENT — ENCOUNTER SYMPTOMS
CONSTIPATION: 0
RHINORRHEA: 0
NAUSEA: 0
VOMITING: 0
SHORTNESS OF BREATH: 0
ABDOMINAL PAIN: 0
SORE THROAT: 0
EYE PAIN: 0
CHEST TIGHTNESS: 0
DIARRHEA: 0
COUGH: 0
BLOOD IN STOOL: 0

## 2020-08-14 NOTE — PROGRESS NOTES
bruise/bleed easily. Psychiatric/Behavioral: Negative for suicidal ideas. The patient is not nervous/anxious. Current Outpatient Medications:     warfarin (COUMADIN) 1 MG tablet, Take 1 tablet by mouth daily (Patient taking differently: Take 1 mg by mouth Takes 1 mg Mondays and Fridays), Disp: 30 tablet, Rfl: 3    levothyroxine (SYNTHROID) 200 MCG tablet, Take 1 tablet by mouth Daily, Disp: 90 tablet, Rfl: 0    potassium chloride (KLOR-CON M) 20 MEQ extended release tablet, Take 1 tablet by mouth 2 times daily (Patient taking differently: Take 20 mEq by mouth 3 times daily ), Disp: 270 tablet, Rfl: 2    gabapentin (NEURONTIN) 100 MG capsule, 200mg in am, 100mg at noon and 200mg in pm (Patient taking differently: Take by mouth See Admin Instructions.  200mg in am, 100mg at noon and 200mg in pm. Take am day of surgery 06/22/2020), Disp: 450 capsule, Rfl: 0    atorvastatin (LIPITOR) 40 MG tablet, Take 1 tablet by mouth daily, Disp: 90 tablet, Rfl: 3    metOLazone (ZAROXOLYN) 2.5 MG tablet, Take 1 tablet by mouth once a week (Patient taking differently: Take 2.5 mg by mouth once a week monday), Disp: 90 tablet, Rfl: 1    aspirin (ECOTRIN LOW STRENGTH) 81 MG EC tablet, Take 1 tablet by mouth daily (Patient taking differently: Take 81 mg by mouth daily No hold preop per cardiology), Disp: 30 tablet, Rfl: 3    warfarin (COUMADIN) 5 MG tablet, Take 1 tablet by mouth daily (Patient taking differently: Take 5 mg by mouth daily Last taken 06/16/2020 am), Disp: 30 tablet, Rfl: 5    magnesium oxide (MAG-OX) 400 (241.3 Mg) MG TABS tablet, TAKE ONE TABLET BY MOUTH DAILY, Disp: , Rfl:     torsemide (DEMADEX) 20 MG tablet, Take 40 mg by mouth 2 times daily , Disp: , Rfl:     vitamin B-1 (THIAMINE) 100 MG tablet, Take 100 mg by mouth daily Last dose 06/16, Disp: , Rfl:     metoprolol tartrate (LOPRESSOR) 50 MG tablet, Take 1 tablet by mouth 2 times daily (Patient taking differently: Take 50 mg by mouth 2 times daily Take am day of surgery 06/22/2020), Disp: 60 tablet, Rfl: 3    MULTIPLE VITAMIN PO, Take 1 tablet by mouth daily Last taken 06/16/2020, Disp: , Rfl:     Allergies   Allergen Reactions    Sulfa Antibiotics Hives     Hives         Past Medical History:   Diagnosis Date    Blood circulation, collateral     CAD (coronary artery disease)     Chronic kidney disease     History of alcohol use     History of ascites     history of, approx 5 years ago    Hyperlipidemia     Hypertension     Hyponatremia     Left knee pain 06/2020    Liver disease     Lymphedema     lower extrem    Neuropathy     both feet    Osteoarthritis     Psychiatric problem     Thyroid disease     Use of cane as ambulatory aid     Uses wheelchair     for distances    Wears glasses     for reading    Wears hearing aid        Past Surgical History:   Procedure Laterality Date    APPENDECTOMY      CARDIAC CATHETERIZATION  05/2020    CATARACT REMOVAL WITH IMPLANT Bilateral     COLONOSCOPY      EYE SURGERY      cataracts    TOTAL KNEE ARTHROPLASTY Left 6/22/2020    LEFT KNEE IVAN ROBOTIC TOTAL ARTHROPLASTY performed by Pancho Hopper MD at [facility] OR       Family History   Problem Relation Age of Onset   [facility] Other Mother         old age        Social History     Socioeconomic History    Marital status: Single     Spouse name: None    Number of children: 1    Years of education: 16    Highest education level: Master's degree (e.g., MA, MS, Jordan, MEd, MSW, LUCERO)   Occupational History    None   Social Needs    Financial resource strain: Patient refused    Food insecurity     Worry: Patient refused     Inability: Patient refused    Transportation needs     Medical: Patient refused     Non-medical: Patient refused   Tobacco Use    Smoking status: Former Smoker     Packs/day: 0.75     Years: 25.00     Pack years: 18.75     Types: Pipe, Cigars     Start date: 10/8/1964     Last attempt to quit: 4/1/1986     Years since quittin.3    Smokeless tobacco: Never Used    Tobacco comment: Cigar and Pipe smoking history only   Substance and Sexual Activity    Alcohol use: Yes     Alcohol/week: 0.0 standard drinks     Comment: quit 2017; socially as of 2020 on weekends    Drug use: No    Sexual activity: None   Lifestyle    Physical activity     Days per week: None     Minutes per session: None    Stress: None   Relationships    Social connections     Talks on phone: None     Gets together: None     Attends [employer]ism service: None     Active member of club or organization: None     Attends meetings of clubs or organizations: None     Relationship status: None    Intimate partner violence     Fear of current or ex partner: None     Emotionally abused: None     Physically abused: None     Forced sexual activity: None   Other Topics Concern    None   Social History Narrative    None        Vitals:    20 1526   BP: 130/78   Pulse: 81   Resp: 16   Temp: 97.7 °F (36.5 °C)   SpO2: 97%   Weight: 240 lb (108.9 kg)   Height: 5' 8\" (1.727 m)       Exam:  Physical Exam  Constitutional:       Appearance: He is well-developed. HENT:      Head: Normocephalic and atraumatic. Right Ear: External ear normal.      Left Ear: External ear normal.      Nose: Rhinorrhea present. Mouth/Throat:      Pharynx: Posterior oropharyngeal erythema present. Eyes:      Pupils: Pupils are equal, round, and reactive to light. Neck:      Musculoskeletal: Normal range of motion and neck supple. Thyroid: No thyromegaly. Cardiovascular:      Rate and Rhythm: Normal rate. Rhythm irregular. Heart sounds: Murmur present. Systolic murmur present with a grade of 2/6. Pulmonary:      Effort: Pulmonary effort is normal.      Breath sounds: Normal breath sounds. No wheezing or rales. Abdominal:      General: Bowel sounds are normal.      Palpations: Abdomen is soft. Tenderness: There is no abdominal tenderness.  There is no guarding or rebound. Musculoskeletal: Normal range of motion. Right lower leg: Edema present. Left lower leg: Edema present. Lymphadenopathy:      Cervical: No cervical adenopathy. Skin:     General: Skin is warm and dry. Neurological:      Mental Status: He is alert and oriented to person, place, and time. Cranial Nerves: No cranial nerve deficit. Psychiatric:         Judgment: Judgment normal.         Assessment and Plan:    Harmony Bell was seen today for 6 month follow-up, hyperlipidemia, hypertension, hypothyroidism and health maintenance.     Diagnoses and all orders for this visit:    Fall, initial encounter  - recommend walker   - no apparent injury     Persistent atrial fibrillation  - uncertain as to onset  - ekg from 2017 showed flutter   - now on anticoagulation   - follow with cardiolgy   - on coumadin 6mg mon/fri and 5mg all other days    - inr today 2.2 (8/14/2020)   - continue same dose   - recheck in 1 month     Coronary artery disease involving native coronary artery of native heart without angina pectoris  - s/p stress and cath (2020)   - medical therapy   - now on atorvastatin   - on aspirin 81mg   - on metorolol tart 50mg twice a day   - follow with cardiology     Alcoholic cirrhosis of liver with ascites ([facility] [address]  - Continue present treatment   - discussed and advised stopping alcohol altogether   - declines referral to GI   - off spironolactone   - on torsemide   - on metolazone     Hyponatremia  - Possible multifactorial (cirrhois and meds)   - last lab stable at 132 (6/2020)     Dilated cardiomyopathy ([facility] [address]  - possible due to alcohol   - discussed and advised stopping alcohol altogether     CKD (chronic kidney disease) stage 3, GFR 30-59 ml/min ([facility])  - Continue present treatment   - following with nephrology   - will advise to stop spirinolactone 25mg daily - given advised to hold by cardiology   - on metalozaone 2.5mg every 10 days   - on torsemide - advised by renal to Future     Standing Expiration Date:   8/14/2021    Vitamin D 25 Hydroxy     Standing Status:   Future     Standing Expiration Date:   8/14/2021    TSH without Reflex     Standing Status:   Future     Standing Expiration Date:   8/14/2021    Urinalysis     Standing Status:   Future     Standing Expiration Date:   8/14/2021    Microalbumin, Ur     Standing Status:   Future     Standing Expiration Date:   8/14/2021    Vitamin B12 & Folate     Standing Status:   Future     Standing Expiration Date:   8/14/2021    CBC Auto Differential     Standing Status:   Future     Standing Expiration Date:   8/14/2021     Requested Prescriptions      No prescriptions requested or ordered in this encounter        Dave Lilly MD  8/14/2020  4:49 PM

## 2020-08-24 RX ORDER — LEVOTHYROXINE SODIUM 0.2 MG/1
200 TABLET ORAL DAILY
Qty: 90 TABLET | Refills: 0 | Status: SHIPPED
Start: 2020-08-24 | End: 2020-11-23 | Stop reason: SDUPTHER

## 2020-08-24 NOTE — TELEPHONE ENCOUNTER
Last Appointment:  8/14/2020  Future Appointments   Date Time Provider Rafi Alvarenga   8/26/2020  2:15 PM Carolyn Coles PTA [facility]   9/1/2020  2:15 PM Carolyn Coles PTA [facility]   9/8/2020  2:15 PM Carolyn Coles PTA [facility]   9/10/2020  1:15 PM Twila Abbott MD [facility]   9/15/2020  2:15 PM Carolyn Coles PTA [facility]   9/25/2020 11:30 AM [facility] Trish Moura asking for refill on levothyroxine

## 2020-09-08 ENCOUNTER — TELEPHONE (OUTPATIENT)
Dept: FAMILY MEDICINE CLINIC | Age: 80
End: 2020-09-08

## 2020-09-08 RX ORDER — GABAPENTIN 100 MG/1
CAPSULE ORAL
Qty: 450 CAPSULE | Refills: 0 | Status: SHIPPED
Start: 2020-09-08 | End: 2020-12-14 | Stop reason: SDUPTHER

## 2020-09-08 RX ORDER — ASPIRIN 81 MG/1
81 TABLET ORAL DAILY
Qty: 30 TABLET | Refills: 11 | Status: SHIPPED
Start: 2020-09-08 | End: 2021-09-13 | Stop reason: SDUPTHER

## 2020-09-08 NOTE — TELEPHONE ENCOUNTER
Requested Prescriptions     Signed Prescriptions Disp Refills    gabapentin (NEURONTIN) 100 MG capsule 450 capsule 0     Simg in am, 100mg at noon and 200mg in pm     Authorizing Provider: Jaime Benito    aspirin (ECOTRIN LOW STRENGTH) 81 MG EC tablet 30 tablet 11     Sig: Take 1 tablet by mouth daily     Authorizing Provider: Zach Gillette

## 2020-09-10 ENCOUNTER — OFFICE VISIT (OUTPATIENT)
Dept: ORTHOPEDIC SURGERY | Age: 80
End: 2020-09-10

## 2020-09-10 VITALS — WEIGHT: 260 LBS | TEMPERATURE: 97.6 F | HEIGHT: 68 IN | BODY MASS INDEX: 39.4 KG/M2

## 2020-09-10 PROCEDURE — 99024 POSTOP FOLLOW-UP VISIT: CPT | Performed by: ORTHOPAEDIC SURGERY

## 2020-09-10 RX ORDER — SPIRONOLACTONE 25 MG/1
25 TABLET ORAL DAILY
COMMUNITY
Start: 2020-09-02

## 2020-09-11 NOTE — PROGRESS NOTES
Chief Complaint:   Chief Complaint   Patient presents with    Post-Op Check     FU Lt TKA 6/22/2020. Doing well. [facility]  Almost 3 months after left TKA, doing well in that specific regard but having ongoing medical issues including significant bilateral lower leg edema. Under E&M by PCP. Patient denies knee pain, also no dyspnea chest pain fever chills sweats. Independent in ambulation and self care, driving etc.    Allergies; medications; past medical, surgical, family, and social history; and problem list have been reviewed today and updated as indicated in this encounter seen below. Exam: left knee benign, incision is healed without erythema fluctuance or drainage. No effusion, knee is stable and ROM 0-105 limited only by soft tissue in flexion. 4+ nonpitting edema in bilat LE's down to ankles. Skin mildly erythematous and scaly but intact. Radiographs: deferred    Horacio Hahn was seen today for post-op check. Diagnoses and all orders for this visit:    S/P total knee replacement, left       Although the knee is doing well without evident complication the patient is experiencing aggravation related to comorbid issues, he was advised to continue working with and follow up with his other medical providers, and follow up here if any issues arise relative to his knee. Return if symptoms worsen or fail to improve.      Current Outpatient Medications   Medication Sig Dispense Refill    spironolactone (ALDACTONE) 25 MG tablet Take 25 mg by mouth daily      gabapentin (NEURONTIN) 100 MG capsule 200mg in am, 100mg at noon and 200mg in pm 450 capsule 0    aspirin (ECOTRIN LOW STRENGTH) 81 MG EC tablet Take 1 tablet by mouth daily 30 tablet 11    levothyroxine (SYNTHROID) 200 MCG tablet Take 1 tablet by mouth Daily 90 tablet 0    warfarin (COUMADIN) 1 MG tablet Take 1 tablet by mouth daily (Patient taking differently: Take 1 mg by mouth Takes 1 mg Mondays and Fridays) 30 tablet 3    potassium chloride (KLOR-CON M) 20 MEQ extended release tablet Take 1 tablet by mouth 2 times daily (Patient taking differently: Take 20 mEq by mouth 3 times daily ) 270 tablet 2    atorvastatin (LIPITOR) 40 MG tablet Take 1 tablet by mouth daily 90 tablet 3    metOLazone (ZAROXOLYN) 2.5 MG tablet Take 1 tablet by mouth once a week (Patient taking differently: Take 2.5 mg by mouth once a week monday) 90 tablet 1    warfarin (COUMADIN) 5 MG tablet Take 1 tablet by mouth daily (Patient taking differently: Take 5 mg by mouth daily Last taken 06/16/2020 am) 30 tablet 5    magnesium oxide (MAG-OX) 400 (241.3 Mg) MG TABS tablet TAKE ONE TABLET BY MOUTH DAILY      torsemide (DEMADEX) 20 MG tablet Take 40 mg by mouth 2 times daily       vitamin B-1 (THIAMINE) 100 MG tablet Take 100 mg by mouth daily Last dose 06/16      metoprolol tartrate (LOPRESSOR) 50 MG tablet Take 1 tablet by mouth 2 times daily (Patient taking differently: Take 50 mg by mouth 2 times daily Take am day of surgery 06/22/2020) 60 tablet 3    MULTIPLE VITAMIN PO Take 1 tablet by mouth daily Last taken 06/16/2020       No current facility-administered medications for this visit.         Patient Active Problem List   Diagnosis    Neuropathy ([facility] [address]    Cirrhosis ([facility] [address]    Hyponatremia    Cardiomyopathy ([facility] [address]    Alcohol abuse    Delirium due to multiple etiologies    Persistent atrial fibrillation    Primary osteoarthritis of left knee    Osteoarthritis    Lymphedema    Hypertension    Hyperlipidemia    History of ascites    Fatigue    CKD (chronic kidney disease) stage 3, GFR 30-59 ml/min ([facility])    Hyperuricemia    Prostate enlargement    Impaired fasting glucose    Vitamin D deficiency    Other specified hypothyroidism    Coronary artery disease involving native coronary artery of native heart without angina pectoris    Status post total left knee replacement       Past Medical History:   Diagnosis Date    Blood circulation, collateral     CAD (coronary artery disease)     Chronic kidney disease     History of alcohol use     History of ascites     history of, approx 5 years ago    Hyperlipidemia     Hypertension     Hyponatremia     Left knee pain 2020    Liver disease     Lymphedema     lower extrem    Neuropathy     both feet    Osteoarthritis     Psychiatric problem     Thyroid disease     Use of cane as ambulatory aid     Uses wheelchair     for distances    Wears glasses     for reading    Wears hearing aid        Past Surgical History:   Procedure Laterality Date    APPENDECTOMY      CARDIAC CATHETERIZATION  2020    CATARACT REMOVAL WITH IMPLANT Bilateral     COLONOSCOPY      EYE SURGERY      cataracts    TOTAL KNEE ARTHROPLASTY Left 2020    LEFT KNEE IVAN ROBOTIC TOTAL ARTHROPLASTY performed by Twila Abbott [facility] at [facility] OR       Allergies   Allergen Reactions    Sulfa Antibiotics Hives     Hives         Social History     Socioeconomic History    Marital status: Single     Spouse name: None    Number of children: 1    Years of education: 16    Highest education level: Master's degree (e.g., MA, MS, Jordan, MEd, MSW, LUCERO)   Occupational History    None   Social Needs    Financial resource strain: Patient refused    Food insecurity     Worry: Patient refused     Inability: Patient refused    Transportation needs     Medical: Patient refused     Non-medical: Patient refused   Tobacco Use    Smoking status: Former Smoker     Packs/day: 0.75     Years: 25.00     Pack years: 18.75     Types: Pipe, Cigars     Start date: 10/8/1964     Last attempt to quit: 1986     Years since quittin.4    Smokeless tobacco: Never Used    Tobacco comment: Cigar and Pipe smoking history only   Substance and Sexual Activity    Alcohol use:  Yes     Alcohol/week: 0.0 standard drinks     Comment: quit 2017; socially as of 2020 on weekends    Drug use: No    Sexual activity: None Lifestyle    Physical activity     Days per week: None     Minutes per session: None    Stress: None   Relationships    Social connections     Talks on phone: None     Gets together: None     Attends Islam service: None     Active member of club or organization: None     Attends meetings of clubs or organizations: None     Relationship status: None    Intimate partner violence     Fear of current or ex partner: None     Emotionally abused: None     Physically abused: None     Forced sexual activity: None   Other Topics Concern    None   Social History Narrative    None       Family History   Problem Relation Age of Onset    Other Mother         old age          Review of Systems  As follows except as previously noted in HPI:  Constitutional: Negative for chills, diaphoresis, fatigue, fever and unexpected weight change. Respiratory: Negative for cough, shortness of breath and wheezing. Cardiovascular: Negative for chest pain and palpitations. Neurological: Negative for dizziness, syncope, cephalgia. GI / : negative  Musculoskeletal: see HPI       Objective:   Physical Exam   Constitutional: Oriented to person, place, and time. and appears well-developed and well-nourished. :   Head: Normocephalic and atraumatic. Eyes: EOM are normal.   Neck: Neck supple. Cardiovascular: Normal rate and regular rhythm. Pulmonary/Chest: Effort normal. No stridor. No respiratory distress, no wheezes. Abdominal:  No abnormal distension. Neurological: Alert and oriented to person, place, and time. Skin: Skin is warm and dry. Psychiatric: Normal mood and affect.  Behavior is normal. Thought content normal.    9/11/2020  9:43 AM

## 2020-09-15 ENCOUNTER — TREATMENT (OUTPATIENT)
Dept: PHYSICAL THERAPY | Age: 80
End: 2020-09-15

## 2020-09-22 RX ORDER — WARFARIN SODIUM 5 MG/1
5 TABLET ORAL DAILY
Qty: 90 TABLET | Refills: 3 | Status: SHIPPED
Start: 2020-09-22 | End: 2021-09-20 | Stop reason: SDUPTHER

## 2020-09-25 ENCOUNTER — OFFICE VISIT (OUTPATIENT)
Dept: PRIMARY CARE CLINIC | Age: 80
End: 2020-09-25
Payer: MEDICARE

## 2020-09-25 VITALS
RESPIRATION RATE: 16 BRPM | SYSTOLIC BLOOD PRESSURE: 122 MMHG | TEMPERATURE: 97.5 F | HEIGHT: 68 IN | BODY MASS INDEX: 36.01 KG/M2 | DIASTOLIC BLOOD PRESSURE: 64 MMHG | OXYGEN SATURATION: 94 % | HEART RATE: 101 BPM | WEIGHT: 237.6 LBS

## 2020-09-25 LAB
INTERNATIONAL NORMALIZATION RATIO, POC: 3.2
PROTHROMBIN TIME, POC: NORMAL

## 2020-09-25 PROCEDURE — 99213 OFFICE O/P EST LOW 20 MIN: CPT | Performed by: INTERNAL MEDICINE

## 2020-09-25 PROCEDURE — 85610 PROTHROMBIN TIME: CPT | Performed by: INTERNAL MEDICINE

## 2020-09-25 ASSESSMENT — ENCOUNTER SYMPTOMS
DIARRHEA: 0
VOMITING: 0
SORE THROAT: 0
CHEST TIGHTNESS: 0
COUGH: 0
NAUSEA: 0
RHINORRHEA: 0
SHORTNESS OF BREATH: 0
EYE PAIN: 0
BLOOD IN STOOL: 0
CONSTIPATION: 0
ABDOMINAL PAIN: 0

## 2020-09-25 NOTE — PROGRESS NOTES
[facility]) Physicians   Internal Medicine     2020  Celso Mccurdy : 1940 Sex: male Age:79 y.o. Chief Complaint   Patient presents with    Atrial Fibrillation        HPI:   Patient presents to office for review and management of chronic medical conditions.    - States has osteoarthritis. States multiple joints. Had left knee replacement. 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 Follows with ortho. Needs physical therapy. - Stats has atrial fibrillation. Uncertain cardiology follow up. On metoprolol. On coumadin. continue 6mg mon/fri and 5mg all other days. INR today (2020) was 3.2.    - States has cardiomyopathy. States due to alcohol. Declines to follow with cardiology.     - States has CAD. States had stress test (2020) The myocardial perfusion imaging was abnormal, moderate sized reversible defect , cardiac cath (2020) - 40% proximal LAD lesion, 50% mid LAD lesion at the takeoff of a large diagonal branch, 50% proximal left circumflex artery disease. Totally occluded right coronary artery with grade 3 left-to-right collaterals. States needs aggressive risk factor management. On metoprolol    - States has been getting dizzy after walking. Possible orthostasis. - States has hypothyroid. 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 Still drinks on weekends. Discussed cessation. On metolazone  2.5mg every 10 days. On torsemide 40 mg twice a day. Back on sprinolactone. - States has chronic kidney disease. Follows with nephrology     - States has elevated uric acid. States was on allopurinol in the past, stopped due to thought was causing hives. States has hypertension, not checking blood pressure at home. Now on metoprolol. States has high cholesterol. Was on simvastatin - changed to atorvastatin. No reported side effects. States has neuropathy. Possible due to alcohol. On gabapentin.      Last lab shows elevated sugar. A1c borderline at 5.9     Has electrolyte abnormalities     Health Maintenance   - immunizations:   Influenza Vaccination - declines  Pneumonia Vaccination - declines   Zoster/Shingles Vaccine  Tetanus Vaccination    - Screenings:   Colonoscopy   Prostate     Stress test (4/2020) - The myocardial perfusion imaging was abnormal, moderate sized reversible defect in the entire inferior wall and apex suggestive of ischemia    echo (4/20) - ef 55-60%, mod l &r atiral dilation, mild to mod MR, indeterm diastolic dysfun    cardiac cath (5/20) - IMPRESSION:  1.  40% proximal LAD lesion, 50% mid LAD lesion at the takeoff of a  large diagonal branch. 2.  50% proximal left circumflex artery disease. 3.  Totally occluded right coronary artery with grade 3 left-to-right  collaterals. Couseled on Home Safety     ROS:  Review of Systems   Constitutional: Negative for appetite change, chills, fever and unexpected weight change. HENT: Negative for congestion, rhinorrhea and sore throat. Eyes: Negative for pain and visual disturbance. Respiratory: Negative for cough, chest tightness and shortness of breath. Cardiovascular: Negative for chest pain and palpitations. Gastrointestinal: Negative for abdominal pain, blood in stool, constipation, diarrhea, nausea and vomiting. Genitourinary: Negative for difficulty urinating, dysuria, hematuria, scrotal swelling, testicular pain and urgency. Musculoskeletal: Negative for arthralgias and gait problem. Skin: Negative for rash. Neurological: Positive for dizziness. Negative for syncope, weakness, light-headedness and headaches. Hematological: Negative for adenopathy. Does not bruise/bleed easily. Psychiatric/Behavioral: Negative for suicidal ideas. The patient is not nervous/anxious.           Current Outpatient Medications:     warfarin (COUMADIN) 5 MG tablet, Take 1 tablet by mouth daily, Disp: 90 tablet, Rfl: 3    spironolactone (ALDACTONE) 25 MG tablet, Take 25 mg by mouth daily, Disp: , Rfl:     gabapentin (NEURONTIN) 100 MG capsule, 200mg in am, 100mg at noon and 200mg in pm, Disp: 450 capsule, Rfl: 0    aspirin (ECOTRIN LOW STRENGTH) 81 MG EC tablet, Take 1 tablet by mouth daily, Disp: 30 tablet, Rfl: 11    levothyroxine (SYNTHROID) 200 MCG tablet, Take 1 tablet by mouth Daily, Disp: 90 tablet, Rfl: 0    warfarin (COUMADIN) 1 MG tablet, Take 1 tablet by mouth daily (Patient taking differently: Take 1 mg by mouth Takes 1 mg Mondays and Fridays), Disp: 30 tablet, Rfl: 3    potassium chloride (KLOR-CON M) 20 MEQ extended release tablet, Take 1 tablet by mouth 2 times daily (Patient taking differently: Take 20 mEq by mouth 3 times daily ), Disp: 270 tablet, Rfl: 2    atorvastatin (LIPITOR) 40 MG tablet, Take 1 tablet by mouth daily, Disp: 90 tablet, Rfl: 3    metOLazone (ZAROXOLYN) 2.5 MG tablet, Take 1 tablet by mouth once a week (Patient taking differently: Take 2.5 mg by mouth once a week monday), Disp: 90 tablet, Rfl: 1    magnesium oxide (MAG-OX) 400 (241.3 Mg) MG TABS tablet, TAKE ONE TABLET BY MOUTH DAILY, Disp: , Rfl:     torsemide (DEMADEX) 20 MG tablet, Take 40 mg by mouth 2 times daily , Disp: , Rfl:     vitamin B-1 (THIAMINE) 100 MG tablet, Take 100 mg by mouth daily Last dose 06/16, Disp: , Rfl:     metoprolol tartrate (LOPRESSOR) 50 MG tablet, Take 1 tablet by mouth 2 times daily (Patient taking differently: Take 50 mg by mouth 2 times daily Take am day of surgery 06/22/2020), Disp: 60 tablet, Rfl: 3    MULTIPLE VITAMIN PO, Take 1 tablet by mouth daily Last taken 06/16/2020, Disp: , Rfl:     Allergies   Allergen Reactions    Sulfa Antibiotics Hives     Hives         Past Medical History:   Diagnosis Date    Blood circulation, collateral     CAD (coronary artery disease)     Chronic kidney disease     History of alcohol use     History of ascites     history of, approx 5 years ago    Hyperlipidemia     Hypertension     Hyponatremia  Left knee pain 2020    Liver disease     Lymphedema     lower extrem    Neuropathy     both feet    Osteoarthritis     Psychiatric problem     Thyroid disease     Use of cane as ambulatory aid     Uses wheelchair     for distances    Wears glasses     for reading    Wears hearing aid        Past Surgical History:   Procedure Laterality Date    APPENDECTOMY      CARDIAC CATHETERIZATION  2020    CATARACT REMOVAL WITH IMPLANT Bilateral     COLONOSCOPY      EYE SURGERY      cataracts    TOTAL KNEE ARTHROPLASTY Left 2020    LEFT KNEE IVAN ROBOTIC TOTAL ARTHROPLASTY performed by Roxanne Osorio MD at [facility] OR       Family History   Problem Relation Age of Onset   [facility] Other Mother         old age        Social History     Socioeconomic History    Marital status: Single     Spouse name: None    Number of children: 1    Years of education: 16    Highest education level: Master's degree (e.g., MA, MS, Jordan, MEd, MSW, LUCERO)   Occupational History    None   Social Needs    Financial resource strain: Patient refused    Food insecurity     Worry: Patient refused     Inability: Patient refused    Transportation needs     Medical: Patient refused     Non-medical: Patient refused   Tobacco Use    Smoking status: Former Smoker     Packs/day: 0.75     Years: 25.00     Pack years: 18.75     Types: Pipe, Cigars     Start date: 10/8/1964     Last attempt to quit: 1986     Years since quittin.5    Smokeless tobacco: Never Used    Tobacco comment: Cigar and Pipe smoking history only   Substance and Sexual Activity    Alcohol use:  Yes     Alcohol/week: 0.0 standard drinks     Comment: quit 2017; socially as of 2020 on weekends    Drug use: No    Sexual activity: None   Lifestyle    Physical activity     Days per week: None     Minutes per session: None    Stress: None   Relationships    Social connections     Talks on phone: None     Gets together: None     Attends Alevism service: None     Active member of club or organization: None     Attends meetings of clubs or organizations: None     Relationship status: None    Intimate partner violence     Fear of current or ex partner: None     Emotionally abused: None     Physically abused: None     Forced sexual activity: None   Other Topics Concern    None   Social History Narrative    None        Vitals:    09/25/20 1145   BP: 122/64   Pulse: 101   Resp: 16   Temp: 97.5 °F (36.4 °C)   SpO2: 94%   Weight: 237 lb 9.6 oz (107.8 kg)   Height: 5' 8\" (1.727 m)       Exam:  Physical Exam  Constitutional:       Appearance: He is well-developed. HENT:      Head: Normocephalic and atraumatic. Right Ear: External ear normal.      Left Ear: External ear normal.      Nose: Rhinorrhea present. Mouth/Throat:      Pharynx: Posterior oropharyngeal erythema present. Eyes:      Pupils: Pupils are equal, round, and reactive to light. Neck:      Musculoskeletal: Normal range of motion and neck supple. Thyroid: No thyromegaly. Cardiovascular:      Rate and Rhythm: Normal rate. Rhythm irregular. Heart sounds: Murmur present. Systolic murmur present with a grade of 2/6. Pulmonary:      Effort: Pulmonary effort is normal.      Breath sounds: Normal breath sounds. No wheezing or rales. Abdominal:      General: Bowel sounds are normal.      Palpations: Abdomen is soft. Tenderness: There is no abdominal tenderness. There is no guarding or rebound. Musculoskeletal: Normal range of motion. Right lower leg: Edema present. Left lower leg: Edema present. Lymphadenopathy:      Cervical: No cervical adenopathy. Skin:     General: Skin is warm and dry. Neurological:      Mental Status: He is alert and oriented to person, place, and time. Cranial Nerves: No cranial nerve deficit.    Psychiatric:         Judgment: Judgment normal.         Assessment and Plan:    Harmony Bell was seen today for 6 month follow-up, hyperlipidemia, hypertension, hypothyroidism and health maintenance. Diagnoses and all orders for this visit:    Persistent atrial fibrillation  - uncertain as to onset  - ekg from 2017 showed flutter   - now on anticoagulation   - follow with cardiolgy   - on coumadin 6mg mon/fri and 5mg all other days    - inr today 3.2 (8/14/2020)   - take 5mg on Friday (9/25/2020) then resume above   - recheck in 1 month     Coronary artery disease involving native coronary artery of native heart without angina pectoris  - s/p stress and cath (2020)   - medical therapy   - now on atorvastatin   - on aspirin 81mg   - on metorolol tart 50mg twice a day   - follow with cardiology     Alcoholic cirrhosis of liver with ascites ([facility] [address]  - Continue present treatment   - discussed and advised stopping alcohol altogether   - declines referral to GI   - states back on spironolactone   - on torsemide   - on metolazone     Hyponatremia  - Possible multifactorial (cirrhois and meds)   - last lab stable at 132 (6/2020)     Dilated cardiomyopathy ([facility] [address]  - possible due to alcohol   - discussed and advised stopping alcohol altogether     CKD (chronic kidney disease) stage 3, GFR 30-59 ml/min ([facility])  - Continue present treatment   - following with nephrology   - was advised to stop spirinolactone 25mg daily - given advised to hold by cardiology.  Restarted   - on metalozaone 2.5mg every 10 days   - on torsemide - advised by renal to take 40mg twice a day    - on potassium supplement   - follow labs     Lymphedema  - on metalozaone 2.5mg every 10 days   - on torsemide - advised by renal to take 40mg twice a day    - on potassium supplement   - compression stockings if able   - consider referral to lymphedema PT     Impaired fasting glucose  - Continue present treatment   - watch diet   - follow A1c - last was 5.9     Alcohol abuse  - discussed and advised stopping alcohol altogether     Essential hypertension  - Continue present treatment   - watch diet   - monitor blood pressure at home   - on spironolactone   - now on metoprolol tart   - stable     Pure hypercholesterolemia  - Continue present treatment   - watch diet   - simvastatin switched to atorvastatin   - follow labs   - uncontrolled     History of ascites    Neuropathy (HCC)  - Continue present treatment   - due to alcohol   - on gabapentin 200mg in am, 200mg at noon and 100mg in pm   - stable     Other specified hypothyroidism  - Continue present treatment   - on levothyroxine   - follow labs     Primary osteoarthritis involving multiple joints    Hyperuricemia  - Continue present treatment   - watch diet   - stopped allopurinol - thought possibly causing hives   - did not restart     Bilateral impacted cerumen  - lavage today   - some irritation pos lavage, still with retained cerumen. - attempted removal of cerumen with curette - removed on right, some mild trauma, unable to remove from left   - will review next visit, call if other issues. Return in about 1 month (around 10/25/2020) for check up and review. No orders of the defined types were placed in this encounter.     Requested Prescriptions      No prescriptions requested or ordered in this encounter        Kenrick Cotton MD  9/25/2020  12:25 PM

## 2020-10-13 RX ORDER — POTASSIUM CHLORIDE 20 MEQ/1
20 TABLET, EXTENDED RELEASE ORAL 3 TIMES DAILY
Qty: 270 TABLET | Refills: 3 | OUTPATIENT
Start: 2020-10-13

## 2020-10-23 ENCOUNTER — OFFICE VISIT (OUTPATIENT)
Dept: PRIMARY CARE CLINIC | Age: 80
End: 2020-10-23
Payer: MEDICARE

## 2020-10-23 VITALS
HEART RATE: 73 BPM | BODY MASS INDEX: 35.68 KG/M2 | WEIGHT: 235.4 LBS | TEMPERATURE: 97.5 F | DIASTOLIC BLOOD PRESSURE: 68 MMHG | SYSTOLIC BLOOD PRESSURE: 120 MMHG | RESPIRATION RATE: 16 BRPM | HEIGHT: 68 IN | OXYGEN SATURATION: 98 %

## 2020-10-23 LAB
INTERNATIONAL NORMALIZATION RATIO, POC: 1.9
PROTHROMBIN TIME, POC: NORMAL

## 2020-10-23 PROCEDURE — 93793 ANTICOAG MGMT PT WARFARIN: CPT | Performed by: INTERNAL MEDICINE

## 2020-10-23 PROCEDURE — 85610 PROTHROMBIN TIME: CPT | Performed by: INTERNAL MEDICINE

## 2020-10-23 PROCEDURE — 99214 OFFICE O/P EST MOD 30 MIN: CPT | Performed by: INTERNAL MEDICINE

## 2020-10-23 ASSESSMENT — ENCOUNTER SYMPTOMS
VOMITING: 0
DIARRHEA: 0
RHINORRHEA: 0
ABDOMINAL PAIN: 0
CHEST TIGHTNESS: 0
NAUSEA: 0
EYE PAIN: 0
SORE THROAT: 0
CONSTIPATION: 0
BLOOD IN STOOL: 0
SHORTNESS OF BREATH: 0
COUGH: 0

## 2020-10-23 NOTE — PROGRESS NOTES
[facility]) Physicians   Internal Medicine     10/23/2020  Viktor Segura : 1940 Sex: male Age:80 y.o. Chief Complaint   Patient presents with    Coronary Artery Disease        HPI:   Patient presents to office for review and management of chronic medical conditions.    - States has osteoarthritis. States multiple joints. Had left knee replacement. 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 Follows with ortho. Needs physical therapy. - Stats has atrial fibrillation. Uncertain cardiology follow up. On metoprolol. On coumadin. continue 6mg mon/fri and 5mg all other days. INR today (10/23/2020) was 1.9.    - States has cardiomyopathy. States due to alcohol. Declines to follow with cardiology.     - States has CAD. States had stress test (2020) The myocardial perfusion imaging was abnormal, moderate sized reversible defect , cardiac cath (2020) - 40% proximal LAD lesion, 50% mid LAD lesion at the takeoff of a large diagonal branch, 50% proximal left circumflex artery disease. Totally occluded right coronary artery with grade 3 left-to-right collaterals. States needs aggressive risk factor management. On metoprolol    - States has been getting dizzy after walking. Possible orthostasis. - States has hypothyroid. 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 Still drinks on weekends. Discussed cessation. On metolazone  2.5mg - states switched to m//. On torsemide 40 mg twice a day. on sprinolactone. - States has chronic kidney disease. Follows with nephrology     - States has elevated uric acid. States was on allopurinol in the past, stopped due to thought was causing hives. States has hypertension, not checking blood pressure at home. Now on metoprolol. States has high cholesterol. Was on simvastatin - changed to atorvastatin. No reported side effects. States has neuropathy. Possible due to alcohol. On gabapentin.  States taking 2 tablets in am, 1 tablet in afternoon and 1 tablet in pm.      Last lab shows elevated sugar. A1c borderline at 5.9     Has electrolyte abnormalities     Health Maintenance   - immunizations:   Influenza Vaccination - declines  Pneumonia Vaccination - declines   Zoster/Shingles Vaccine  Tetanus Vaccination    - Screenings:   Colonoscopy   Prostate     Stress test (4/2020) - The myocardial perfusion imaging was abnormal, moderate sized reversible defect in the entire inferior wall and apex suggestive of ischemia    echo (4/20) - ef 55-60%, mod l &r atiral dilation, mild to mod MR, indeterm diastolic dysfun    cardiac cath (5/20) - IMPRESSION:  1.  40% proximal LAD lesion, 50% mid LAD lesion at the takeoff of a  large diagonal branch. 2.  50% proximal left circumflex artery disease. 3.  Totally occluded right coronary artery with grade 3 left-to-right  collaterals. Couseled on Home Safety     ROS:  Review of Systems   Constitutional: Negative for appetite change, chills, fever and unexpected weight change. HENT: Negative for congestion, rhinorrhea and sore throat. Eyes: Negative for pain and visual disturbance. Respiratory: Negative for cough, chest tightness and shortness of breath. Cardiovascular: Negative for chest pain and palpitations. Gastrointestinal: Negative for abdominal pain, blood in stool, constipation, diarrhea, nausea and vomiting. Genitourinary: Negative for difficulty urinating, dysuria, hematuria, scrotal swelling, testicular pain and urgency. Musculoskeletal: Negative for arthralgias and gait problem. Skin: Negative for rash. Neurological: Positive for dizziness. Negative for syncope, weakness, light-headedness and headaches. Hematological: Negative for adenopathy. Does not bruise/bleed easily. Psychiatric/Behavioral: Negative for suicidal ideas. The patient is not nervous/anxious.           Current Outpatient Medications:     warfarin (COUMADIN) 5 MG tablet, Take 1 ascites     history of, approx 5 years ago    Hyperlipidemia     Hypertension     Hyponatremia     Left knee pain 2020    Liver disease     Lymphedema     lower extrem    Neuropathy     both feet    Osteoarthritis     Psychiatric problem     Thyroid disease     Use of cane as ambulatory aid     Uses wheelchair     for distances    Wears glasses     for reading    Wears hearing aid        Past Surgical History:   Procedure Laterality Date    APPENDECTOMY      CARDIAC CATHETERIZATION  2020    CATARACT REMOVAL WITH IMPLANT Bilateral     COLONOSCOPY      EYE SURGERY      cataracts    TOTAL KNEE ARTHROPLASTY Left 2020    LEFT KNEE IVAN ROBOTIC TOTAL ARTHROPLASTY performed by Delfina Cochran MD at [facility] OR       Family History   Problem Relation Age of Onset   Dong Huggins Other Mother         old age        Social History     Socioeconomic History    Marital status: Single     Spouse name: None    Number of children: 1    Years of education: 16    Highest education level: Master's degree (e.g., MA, MS, Jordan, MEd, MSW, LUCERO)   Occupational History    None   Social Needs    Financial resource strain: Patient refused    Food insecurity     Worry: Patient refused     Inability: Patient refused    Transportation needs     Medical: Patient refused     Non-medical: Patient refused   Tobacco Use    Smoking status: Former Smoker     Packs/day: 0.75     Years: 25.00     Pack years: 18.75     Types: Pipe, Cigars     Start date: 10/8/1964     Last attempt to quit: 1986     Years since quittin.5    Smokeless tobacco: Never Used    Tobacco comment: Cigar and Pipe smoking history only   Substance and Sexual Activity    Alcohol use:  Yes     Alcohol/week: 0.0 standard drinks     Comment: quit 2017; socially as of 2020 on weekends    Drug use: No    Sexual activity: None   Lifestyle    Physical activity     Days per week: None     Minutes per session: None    Stress: None Relationships    Social connections     Talks on phone: None     Gets together: None     Attends Yazdanism service: None     Active member of club or organization: None     Attends meetings of clubs or organizations: None     Relationship status: None    Intimate partner violence     Fear of current or ex partner: None     Emotionally abused: None     Physically abused: None     Forced sexual activity: None   Other Topics Concern    None   Social History Narrative    None        Vitals:    10/23/20 1109   BP: 120/68   Pulse: 73   Resp: 16   Temp: 97.5 °F (36.4 °C)   SpO2: 98%   Weight: 235 lb 6.4 oz (106.8 kg)   Height: 5' 8\" (1.727 m)       Exam:  Physical Exam  Constitutional:       Appearance: He is well-developed. HENT:      Head: Normocephalic and atraumatic. Right Ear: External ear normal.      Left Ear: External ear normal.      Nose: Rhinorrhea present. Mouth/Throat:      Pharynx: Posterior oropharyngeal erythema present. Eyes:      Pupils: Pupils are equal, round, and reactive to light. Neck:      Musculoskeletal: Normal range of motion and neck supple. Thyroid: No thyromegaly. Cardiovascular:      Rate and Rhythm: Normal rate. Rhythm irregular. Heart sounds: Murmur present. Systolic murmur present with a grade of 2/6. Pulmonary:      Effort: Pulmonary effort is normal.      Breath sounds: Normal breath sounds. No wheezing or rales. Abdominal:      General: Bowel sounds are normal.      Palpations: Abdomen is soft. Tenderness: There is no abdominal tenderness. There is no guarding or rebound. Musculoskeletal: Normal range of motion. Right lower leg: Edema present. Left lower leg: Edema present. Lymphadenopathy:      Cervical: No cervical adenopathy. Skin:     General: Skin is warm and dry. Neurological:      Mental Status: He is alert and oriented to person, place, and time. Cranial Nerves: No cranial nerve deficit.    Psychiatric: Judgment: Judgment normal.         Assessment and Plan:    Davin Pemberton was seen today for 6 month follow-up, hyperlipidemia, hypertension, hypothyroidism and health maintenance. Diagnoses and all orders for this visit:    Persistent atrial fibrillation  - uncertain as to onset  - ekg from 2017 showed flutter   - now on anticoagulation   - follow with cardiolgy   - on coumadin 6mg mon/fri and 5mg all other days    - inr today 1.9 (10/23/2020)   - continue present   - recheck in 1 month     Coronary artery disease involving native coronary artery of native heart without angina pectoris  - s/p stress and cath (2020)   - medical therapy   - now on atorvastatin   - on aspirin 81mg   - on metorolol tart 50mg twice a day   - follow with cardiology     Alcoholic cirrhosis of liver with ascites ([facility] [address]  - Continue present treatment   - discussed and advised stopping alcohol altogether   - declines referral to GI   - on spironolactone   - on torsemide   - on metolazone - states changes to m/w/f     Hyponatremia  - Possible multifactorial (cirrhois and meds)   - last lab stable at 132 (6/2020)     Dilated cardiomyopathy ([facility] [address]  - possible due to alcohol   - discussed and advised stopping alcohol altogether     CKD (chronic kidney disease) stage 3, GFR 30-59 ml/min ([facility])  - Continue present treatment   - following with nephrology   - was advised to stop spirinolactone 25mg daily - given advised to hold by cardiology.  Restarted   - on metalozaone 2.5mg every 10 days   - on torsemide - advised by renal to take 40mg twice a day    - on potassium supplement   - follow labs     Lymphedema  - on metalozaone 2.5mg every 10 days - states changed to m/w/f   - on torsemide - advised by renal to take 40mg twice a day    - on potassium supplement   - compression stockings if able   - consider referral to lymphedema PT     Impaired fasting glucose  - Continue present treatment   - watch diet   - follow A1c - last was 5.9     Alcohol abuse  - discussed and advised stopping alcohol altogether     Essential hypertension  - Continue present treatment   - watch diet   - monitor blood pressure at home   - on spironolactone   - now on metoprolol tart   - stable     Pure hypercholesterolemia  - Continue present treatment   - watch diet   - simvastatin switched to atorvastatin   - follow labs   - uncontrolled     History of ascites    Neuropathy (HCC)  - Continue present treatment   - due to alcohol   - on gabapentin 200mg in am, 200mg at noon and 100mg in pm   - stable     Other specified hypothyroidism  - Continue present treatment   - on levothyroxine   - follow labs     Primary osteoarthritis involving multiple joints    Hyperuricemia  - Continue present treatment   - watch diet   - stopped allopurinol - thought possibly causing hives   - did not restart      Return in about 1 month (around 11/23/2020) for check up and review. No orders of the defined types were placed in this encounter.     Requested Prescriptions      No prescriptions requested or ordered in this encounter        Chichi Skinner MD  10/23/2020  2:02 PM

## 2020-11-10 ENCOUNTER — TELEPHONE (OUTPATIENT)
Dept: FAMILY MEDICINE CLINIC | Age: 80
End: 2020-11-10

## 2020-11-10 NOTE — TELEPHONE ENCOUNTER
Orders Placed This Encounter   Procedures    Covid-19, Antibody     Standing Status:   Future     Standing Expiration Date:   11/10/2021     Scheduling Instructions:      CDC does not recommend using antibody testing to diagnose acute infection. It is recommended to use a direct viral detection test to diagnose acute infection. (COVID-19 [facility] [id number])            Antibody tests are not 100% accurate, and some false-positive results or false-negative results may occur. A positive result may not ensure immunity from reinfection. Per CDC, positive or negative results do not confirm whether a patient is able to spread the virus that causes COVID-19     Order Specific Question:   Symptomatic for COVID-19 as defined by CDC? Answer:   No     Order Specific Question:   Date of Symptom Onset     Answer:   N/A     Order Specific Question:   Hospitalized for COVID-19? Answer:   No     Order Specific Question:   Admitted to ICU for COVID-19? Answer:   No     Order Specific Question:   Employed in healthcare setting? Answer:   No     Order Specific Question:   Resident in a congregate (group) care setting? Answer:   No     Order Specific Question:   Pregnant: Answer:   No     Order Specific Question:   Previously tested for COVID-19?      Answer:   Yes     Order signed

## 2020-11-10 NOTE — TELEPHONE ENCOUNTER
Pt is going to have blood work done next week at an outside lab. He is requesting an order to check for Covid-19 antibodies. He would like us to mail the order. Okay to leave a detailed message to update pt.

## 2020-11-17 ENCOUNTER — TELEPHONE (OUTPATIENT)
Dept: FAMILY MEDICINE CLINIC | Age: 80
End: 2020-11-17

## 2020-11-17 NOTE — TELEPHONE ENCOUNTER
Pt would like a call from Cherry. He stated that it was about Covid questions. I offered to help him to see if I could answer them for him but he refused and only wanted to speak with Cherry.

## 2020-11-18 LAB
ALBUMIN SERPL-MCNC: 4.1 G/DL
ALP BLD-CCNC: 87 U/L
ALT SERPL-CCNC: 12 U/L
ANION GAP SERPL CALCULATED.3IONS-SCNC: NORMAL MMOL/L
AST SERPL-CCNC: 17 U/L
BASOPHILS ABSOLUTE: 0.04 /ΜL
BASOPHILS RELATIVE PERCENT: 0.6 %
BILIRUB SERPL-MCNC: 0.7 MG/DL (ref 0.1–1.4)
BILIRUBIN, URINE: NORMAL
BLOOD, URINE: NORMAL
BUN BLDV-MCNC: 28 MG/DL
CALCIUM SERPL-MCNC: 8.8 MG/DL
CHLORIDE BLD-SCNC: 92 MMOL/L
CHOLESTEROL, TOTAL: 108 MG/DL
CHOLESTEROL/HDL RATIO: NORMAL
CLARITY: NORMAL
CO2: 29 MMOL/L
COLOR: NORMAL
CREAT SERPL-MCNC: 1.61 MG/DL
EOSINOPHILS ABSOLUTE: 0.16 /ΜL
EOSINOPHILS RELATIVE PERCENT: 2.3 %
GFR CALCULATED: NORMAL
GLUCOSE BLD-MCNC: 114 MG/DL
GLUCOSE URINE: NORMAL
HCT VFR BLD CALC: 34.7 % (ref 41–53)
HDLC SERPL-MCNC: 35 MG/DL (ref 35–70)
HEMOGLOBIN: 11 G/DL (ref 13.5–17.5)
KETONES, URINE: NORMAL
LDL CHOLESTEROL CALCULATED: 44 MG/DL (ref 0–160)
LEUKOCYTE ESTERASE, URINE: NORMAL
LYMPHOCYTES ABSOLUTE: 1.65 /ΜL
LYMPHOCYTES RELATIVE PERCENT: 23.4 %
MAGNESIUM: 2 MG/DL
MCH RBC QN AUTO: 29.9 PG
MCHC RBC AUTO-ENTMCNC: 31.7 G/DL
MCV RBC AUTO: 94.3 FL
MONOCYTES ABSOLUTE: 0.55 /ΜL
MONOCYTES RELATIVE PERCENT: 7.8 %
NEUTROPHILS ABSOLUTE: 4.64 /ΜL
NEUTROPHILS RELATIVE PERCENT: 65.9 %
NITRITE, URINE: NORMAL
NONHDLC SERPL-MCNC: NORMAL MG/DL
PH UA: NORMAL
PHOSPHORUS: 3.4 MG/DL
PLATELET # BLD: 255 K/ΜL
PMV BLD AUTO: 11 FL
POTASSIUM SERPL-SCNC: 3.7 MMOL/L
PROTEIN UA: NORMAL
RBC # BLD: 3.68 10^6/ΜL
SODIUM BLD-SCNC: 132 MMOL/L
SPECIFIC GRAVITY, URINE: NORMAL
TOTAL PROTEIN: 7.4
TRIGL SERPL-MCNC: 13 MG/DL
TSH SERPL DL<=0.05 MIU/L-ACNC: 2.17 UIU/ML
URIC ACID: 11.2
UROBILINOGEN, URINE: NORMAL
VLDLC SERPL CALC-MCNC: NORMAL MG/DL
WBC # BLD: 7.04 10^3/ML

## 2020-11-23 DIAGNOSIS — Z78.9 UNKNOWN STATUS OF IMMUNITY TO COVID-19 VIRUS: ICD-10-CM

## 2020-11-23 LAB — SARS-COV-2 ANTIBODY, TOTAL: NORMAL

## 2020-11-23 RX ORDER — LEVOTHYROXINE SODIUM 0.2 MG/1
200 TABLET ORAL DAILY
Qty: 90 TABLET | Refills: 0 | Status: SHIPPED
Start: 2020-11-23 | End: 2021-02-22 | Stop reason: SDUPTHER

## 2020-11-23 NOTE — TELEPHONE ENCOUNTER
Last Appointment:  10/23/2020  Future Appointments   Date Time Provider Rafi Alvarenga   12/4/2020 11:30 AM MD JHONATHAN Adhikari is needing a refill on levothyroxine

## 2020-11-24 ENCOUNTER — TELEPHONE (OUTPATIENT)
Dept: FAMILY MEDICINE CLINIC | Age: 80
End: 2020-11-24

## 2020-11-24 NOTE — TELEPHONE ENCOUNTER
Please let the patient know that Covid antibody test was nonreactive.   No current evidence for immunity to coronavirus    Thanks

## 2020-12-04 ENCOUNTER — OFFICE VISIT (OUTPATIENT)
Dept: PRIMARY CARE CLINIC | Age: 80
End: 2020-12-04
Payer: MEDICARE

## 2020-12-04 VITALS
HEART RATE: 74 BPM | TEMPERATURE: 97.9 F | HEIGHT: 68 IN | RESPIRATION RATE: 16 BRPM | SYSTOLIC BLOOD PRESSURE: 118 MMHG | BODY MASS INDEX: 37.38 KG/M2 | WEIGHT: 246.6 LBS | OXYGEN SATURATION: 96 % | DIASTOLIC BLOOD PRESSURE: 68 MMHG

## 2020-12-04 LAB
INTERNATIONAL NORMALIZATION RATIO, POC: 2.8
PROTHROMBIN TIME, POC: NORMAL

## 2020-12-04 PROCEDURE — 85610 PROTHROMBIN TIME: CPT | Performed by: INTERNAL MEDICINE

## 2020-12-04 PROCEDURE — 99214 OFFICE O/P EST MOD 30 MIN: CPT | Performed by: INTERNAL MEDICINE

## 2020-12-04 ASSESSMENT — ENCOUNTER SYMPTOMS
CHEST TIGHTNESS: 0
SHORTNESS OF BREATH: 0
ABDOMINAL PAIN: 0
RHINORRHEA: 0
SORE THROAT: 0
NAUSEA: 0
COUGH: 0
BLOOD IN STOOL: 0
DIARRHEA: 0
CONSTIPATION: 0
EYE PAIN: 0
VOMITING: 0

## 2020-12-04 NOTE — PROGRESS NOTES
[facility]) Physicians   Internal Medicine     2020  Ameya Pulliam : 1940 Sex: male Age:80 y.o. Chief Complaint   Patient presents with    Coronary Artery Disease        HPI:   Patient presents to office for review and management of chronic medical conditions.    - States has osteoarthritis. States multiple joints. Had left knee replacement. 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 Follows with ortho. Needs physical therapy. - Stats has atrial fibrillation. Uncertain cardiology follow up. On metoprolol. On coumadin. continue 6mg mon/fri and 5mg all other days. INR today (2020) was 2.8.    - States has cardiomyopathy. States due to alcohol. Declines to follow with cardiology.     - States has CAD. States had stress test (2020) The myocardial perfusion imaging was abnormal, moderate sized reversible defect , cardiac cath (2020) - 40% proximal LAD lesion, 50% mid LAD lesion at the takeoff of a large diagonal branch, 50% proximal left circumflex artery disease. Totally occluded right coronary artery with grade 3 left-to-right collaterals. States needs aggressive risk factor management. On metoprolol    - States has been getting dizzy after walking. Possible orthostasis. - States has hypothyroid. 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 Still drinks on weekends. Discussed cessation. On metolazone  2.5mg - states switched to m/w/. On torsemide 40 mg twice a day. on sprinolactone. - States has chronic kidney disease. Follows with nephrology. LAst creat was 1.6 (2020)     - States has elevated uric acid. States was on allopurinol in the past, stopped due to thought was causing hives. Last uroc acid was 11 (2020)     States has hypertension, not checking blood pressure at home. Now on metoprolol. States has high cholesterol. Was on simvastatin - changed to atorvastatin. No reported side effects. States has neuropathy. Possible due to alcohol. On gabapentin. States taking 2 tablets in am, 1 tablet in afternoon and 1 tablet in pm.      Last lab shows elevated sugar. A1c was 6.4 (11/2020)   Has electrolyte abnormalities     Health Maintenance   - immunizations:   Influenza Vaccination - declines  Pneumonia Vaccination - declines   Zoster/Shingles Vaccine  Tetanus Vaccination    - Screenings:   Colonoscopy   Prostate     Stress test (4/2020) - The myocardial perfusion imaging was abnormal, moderate sized reversible defect in the entire inferior wall and apex suggestive of ischemia    echo (4/20) - ef 55-60%, mod l &r atiral dilation, mild to mod MR, indeterm diastolic dysfun    cardiac cath (5/20) - IMPRESSION:  1.  40% proximal LAD lesion, 50% mid LAD lesion at the takeoff of a  large diagonal branch. 2.  50% proximal left circumflex artery disease. 3.  Totally occluded right coronary artery with grade 3 left-to-right  collaterals. Couseled on Home Safety     ROS:  Review of Systems   Constitutional: Negative for appetite change, chills, fever and unexpected weight change. HENT: Negative for congestion, rhinorrhea and sore throat. Eyes: Negative for pain and visual disturbance. Respiratory: Negative for cough, chest tightness and shortness of breath. Cardiovascular: Negative for chest pain and palpitations. Gastrointestinal: Negative for abdominal pain, blood in stool, constipation, diarrhea, nausea and vomiting. Genitourinary: Negative for difficulty urinating, dysuria, hematuria, scrotal swelling, testicular pain and urgency. Musculoskeletal: Negative for arthralgias and gait problem. Skin: Negative for rash. Neurological: Positive for dizziness. Negative for syncope, weakness, light-headedness and headaches. Hematological: Negative for adenopathy. Does not bruise/bleed easily. Psychiatric/Behavioral: Negative for suicidal ideas. The patient is not nervous/anxious.           Current Outpatient Medications:     levothyroxine (SYNTHROID) 200 MCG tablet, Take 1 tablet by mouth Daily, Disp: 90 tablet, Rfl: 0    warfarin (COUMADIN) 5 MG tablet, Take 1 tablet by mouth daily, Disp: 90 tablet, Rfl: 3    spironolactone (ALDACTONE) 25 MG tablet, Take 25 mg by mouth daily, Disp: , Rfl:     gabapentin (NEURONTIN) 100 MG capsule, 200mg in am, 100mg at noon and 200mg in pm, Disp: 450 capsule, Rfl: 0    aspirin (ECOTRIN LOW STRENGTH) 81 MG EC tablet, Take 1 tablet by mouth daily, Disp: 30 tablet, Rfl: 11    warfarin (COUMADIN) 1 MG tablet, Take 1 tablet by mouth daily (Patient taking differently: Take 1 mg by mouth Takes 1 mg Mondays and Fridays), Disp: 30 tablet, Rfl: 3    potassium chloride (KLOR-CON M) 20 MEQ extended release tablet, Take 1 tablet by mouth 2 times daily (Patient taking differently: Take 20 mEq by mouth 3 times daily ), Disp: 270 tablet, Rfl: 2    atorvastatin (LIPITOR) 40 MG tablet, Take 1 tablet by mouth daily, Disp: 90 tablet, Rfl: 3    metOLazone (ZAROXOLYN) 2.5 MG tablet, Take 1 tablet by mouth once a week (Patient taking differently: Take 2.5 mg by mouth once a week monday), Disp: 90 tablet, Rfl: 1    magnesium oxide (MAG-OX) 400 (241.3 Mg) MG TABS tablet, TAKE ONE TABLET BY MOUTH DAILY, Disp: , Rfl:     torsemide (DEMADEX) 20 MG tablet, Take 40 mg by mouth 2 times daily , Disp: , Rfl:     vitamin B-1 (THIAMINE) 100 MG tablet, Take 100 mg by mouth daily Last dose 06/16, Disp: , Rfl:     metoprolol tartrate (LOPRESSOR) 50 MG tablet, Take 1 tablet by mouth 2 times daily (Patient taking differently: Take 50 mg by mouth 2 times daily Take am day of surgery 06/22/2020), Disp: 60 tablet, Rfl: 3    MULTIPLE VITAMIN PO, Take 1 tablet by mouth daily Last taken 06/16/2020, Disp: , Rfl:     Allergies   Allergen Reactions    Sulfa Antibiotics Hives     Hives         Past Medical History:   Diagnosis Date    Blood circulation, collateral     CAD (coronary artery disease)     Chronic kidney disease     History of alcohol use     History of ascites     history of, approx 5 years ago    Hyperlipidemia     Hypertension     Hyponatremia     Left knee pain 2020    Liver disease     Lymphedema     lower extrem    Neuropathy     both feet    Osteoarthritis     Psychiatric problem     Thyroid disease     Use of cane as ambulatory aid     Uses wheelchair     for distances    Wears glasses     for reading    Wears hearing aid        Past Surgical History:   Procedure Laterality Date    APPENDECTOMY      CARDIAC CATHETERIZATION  2020    CATARACT REMOVAL WITH IMPLANT Bilateral     COLONOSCOPY      EYE SURGERY      cataracts    TOTAL KNEE ARTHROPLASTY Left 2020    LEFT KNEE IVAN ROBOTIC TOTAL ARTHROPLASTY performed by Tabby Carmen MD at [facility] OR       Family History   Problem Relation Age of Onset   Creston Sicard Other Mother         old age        Social History     Socioeconomic History    Marital status: Single     Spouse name: None    Number of children: 1    Years of education: 16    Highest education level: Master's degree (e.g., MA, MS, Jordan, MEd, MSW, LUCERO)   Occupational History    None   Social Needs    Financial resource strain: Patient refused    Food insecurity     Worry: Patient refused     Inability: Patient refused    Transportation needs     Medical: Patient refused     Non-medical: Patient refused   Tobacco Use    Smoking status: Former Smoker     Packs/day: 0.75     Years: 25.00     Pack years: 18.75     Types: Pipe, Cigars     Start date: 10/8/1964     Last attempt to quit: 1986     Years since quittin.7    Smokeless tobacco: Never Used    Tobacco comment: Cigar and Pipe smoking history only   Substance and Sexual Activity    Alcohol use:  Yes     Alcohol/week: 0.0 standard drinks     Comment: quit 2017; socially as of 2020 on weekends    Drug use: No    Sexual activity: None   Lifestyle    Physical activity     Days per week: None Minutes per session: None    Stress: None   Relationships    Social connections     Talks on phone: None     Gets together: None     Attends Church service: None     Active member of club or organization: None     Attends meetings of clubs or organizations: None     Relationship status: None    Intimate partner violence     Fear of current or ex partner: None     Emotionally abused: None     Physically abused: None     Forced sexual activity: None   Other Topics Concern    None   Social History Narrative    None        Vitals:    12/04/20 1125   BP: 118/68   Pulse: 74   Resp: 16   Temp: 97.9 °F (36.6 °C)   SpO2: 96%   Weight: 246 lb 9.6 oz (111.9 kg)   Height: 5' 8\" (1.727 m)       Exam:  Physical Exam  Constitutional:       Appearance: He is well-developed. HENT:      Head: Normocephalic and atraumatic. Right Ear: External ear normal.      Left Ear: External ear normal.      Nose: Rhinorrhea present. Mouth/Throat:      Pharynx: Posterior oropharyngeal erythema present. Eyes:      Pupils: Pupils are equal, round, and reactive to light. Neck:      Musculoskeletal: Normal range of motion and neck supple. Thyroid: No thyromegaly. Cardiovascular:      Rate and Rhythm: Normal rate. Rhythm irregular. Heart sounds: Murmur present. Systolic murmur present with a grade of 2/6. Pulmonary:      Effort: Pulmonary effort is normal.      Breath sounds: Normal breath sounds. No wheezing or rales. Abdominal:      General: Bowel sounds are normal.      Palpations: Abdomen is soft. Tenderness: There is no abdominal tenderness. There is no guarding or rebound. Musculoskeletal: Normal range of motion. Right lower leg: Edema present. Left lower leg: Edema present. Lymphadenopathy:      Cervical: No cervical adenopathy. Skin:     General: Skin is warm and dry. Neurological:      Mental Status: He is alert and oriented to person, place, and time. Cranial Nerves:  No cranial nerve deficit. Psychiatric:         Judgment: Judgment normal.         Assessment and Plan:    Lise Au was seen today for 6 month follow-up, hyperlipidemia, hypertension, hypothyroidism and health maintenance. Diagnoses and all orders for this visit:    Persistent atrial fibrillation  - uncertain as to onset  - ekg from 2017 showed flutter   - now on anticoagulation   - follow with cardiolgy   - on coumadin 6mg mon/fri and 5mg all other days    - inr today 2.8 (12/4/2020)   - continue present   - recheck in 1 month     Coronary artery disease involving native coronary artery of native heart without angina pectoris  - s/p stress and cath (2020)   - medical therapy   - now on atorvastatin   - on aspirin 81mg   - on metorolol tart 50mg twice a day   - follow with cardiology     Alcoholic cirrhosis of liver with ascites ([facility] [address]  - Continue present treatment   - discussed and advised stopping alcohol altogether   - declines referral to GI   - on spironolactone   - on torsemide   - on metolazone - states changes to m/w/f     Hyponatremia  - Possible multifactorial (cirrhois and meds)   - last lab stable at 132 (11/2020)     Dilated cardiomyopathy ([facility] [address]  - possible due to alcohol   - discussed and advised stopping alcohol altogether     CKD (chronic kidney disease) stage 3, GFR 30-59 ml/min ([facility])  - Continue present treatment   - following with nephrology   - was advised to stop spirinolactone 25mg daily - given advised to hold by cardiology.  Restarted   - on metalozaone 2.5mg every 10 days   - on torsemide - advised by renal to take 40mg twice a day    - on potassium supplement   - follow labs     Lymphedema  - on metalozaone 2.5mg every 10 days - states changed to m/w/f   - on torsemide - advised by renal to take 40mg twice a day    - on potassium supplement   - compression stockings if able   - consider referral to lymphedema PT     Impaired fasting glucose  - Continue present treatment   - watch diet   -

## 2020-12-14 RX ORDER — GABAPENTIN 100 MG/1
CAPSULE ORAL
Qty: 450 CAPSULE | Refills: 0 | Status: SHIPPED
Start: 2020-12-14 | End: 2021-03-22 | Stop reason: SDUPTHER

## 2020-12-14 NOTE — TELEPHONE ENCOUNTER
Name of Medication(s) Requested:  gabapendtin    Pharmacy Requested:   [facility]  Medication(s) pended? [x] Yes  [] No    Last Appointment:  12/4/2020    Future appts:  Future Appointments   Date Time Provider Rafi Colette   1/4/2021  1:00 PM SCHEDULE, USHA RING [facility] CATALINO [facility]   2/4/2021  1:00 PM SCHEDULE, [facility]   3/5/2021  1:15 PM MD JHONATHAN Salazar [facility]        Does patient need call back?   [] Yes  [x] No

## 2020-12-30 ENCOUNTER — APPOINTMENT (OUTPATIENT)
Dept: GENERAL RADIOLOGY | Age: 80
End: 2020-12-30
Payer: MEDICARE

## 2020-12-30 ENCOUNTER — HOSPITAL ENCOUNTER (EMERGENCY)
Age: 80
Discharge: HOME | End: 2020-12-30
Payer: MEDICARE

## 2020-12-30 VITALS
HEART RATE: 70 BPM | OXYGEN SATURATION: 98 % | DIASTOLIC BLOOD PRESSURE: 67 MMHG | RESPIRATION RATE: 16 BRPM | TEMPERATURE: 97.1 F | SYSTOLIC BLOOD PRESSURE: 125 MMHG

## 2020-12-30 LAB
ANION GAP SERPL CALCULATED.3IONS-SCNC: 10 MMOL/L (ref 7–16)
BASOPHILS ABSOLUTE: 0.03 E9/L (ref 0–0.2)
BASOPHILS RELATIVE PERCENT: 0.3 % (ref 0–2)
BUN BLDV-MCNC: 45 MG/DL (ref 8–23)
CALCIUM SERPL-MCNC: 9.2 MG/DL (ref 8.6–10.2)
CHLORIDE BLD-SCNC: 90 MMOL/L (ref 98–107)
CO2: 31 MMOL/L (ref 22–29)
CREAT SERPL-MCNC: 1.9 MG/DL (ref 0.7–1.2)
EOSINOPHILS ABSOLUTE: 0.06 E9/L (ref 0.05–0.5)
EOSINOPHILS RELATIVE PERCENT: 0.6 % (ref 0–6)
GFR AFRICAN AMERICAN: 42
GFR NON-AFRICAN AMERICAN: 34 ML/MIN/1.73
GLUCOSE BLD-MCNC: 132 MG/DL (ref 74–99)
HCT VFR BLD CALC: 36 % (ref 37–54)
HEMOGLOBIN: 11.2 G/DL (ref 12.5–16.5)
IMMATURE GRANULOCYTES #: 0.04 E9/L
IMMATURE GRANULOCYTES %: 0.4 % (ref 0–5)
INR BLD: 3.9
LYMPHOCYTES ABSOLUTE: 1.24 E9/L (ref 1.5–4)
LYMPHOCYTES RELATIVE PERCENT: 12.9 % (ref 20–42)
MCH RBC QN AUTO: 30.2 PG (ref 26–35)
MCHC RBC AUTO-ENTMCNC: 31.1 % (ref 32–34.5)
MCV RBC AUTO: 97 FL (ref 80–99.9)
MONOCYTES ABSOLUTE: 0.84 E9/L (ref 0.1–0.95)
MONOCYTES RELATIVE PERCENT: 8.8 % (ref 2–12)
NEUTROPHILS ABSOLUTE: 7.39 E9/L (ref 1.8–7.3)
NEUTROPHILS RELATIVE PERCENT: 77 % (ref 43–80)
PDW BLD-RTO: 16.6 FL (ref 11.5–15)
PLATELET # BLD: 202 E9/L (ref 130–450)
PMV BLD AUTO: 10.1 FL (ref 7–12)
POTASSIUM REFLEX MAGNESIUM: 3.7 MMOL/L (ref 3.5–5)
PROTHROMBIN TIME: 47.2 SEC (ref 9.3–12.4)
RBC # BLD: 3.71 E12/L (ref 3.8–5.8)
SEDIMENTATION RATE, ERYTHROCYTE: 48 MM/HR (ref 0–15)
SODIUM BLD-SCNC: 131 MMOL/L (ref 132–146)
URIC ACID, SERUM: 14.2 MG/DL (ref 3.4–7)
WBC # BLD: 9.6 E9/L (ref 4.5–11.5)

## 2020-12-30 PROCEDURE — 85025 COMPLETE CBC W/AUTO DIFF WBC: CPT

## 2020-12-30 PROCEDURE — 73130 X-RAY EXAM OF HAND: CPT

## 2020-12-30 PROCEDURE — 73110 X-RAY EXAM OF WRIST: CPT

## 2020-12-30 PROCEDURE — 6370000000 HC RX 637 (ALT 250 FOR IP): Performed by: NURSE PRACTITIONER

## 2020-12-30 PROCEDURE — 84550 ASSAY OF BLOOD/URIC ACID: CPT

## 2020-12-30 PROCEDURE — 85651 RBC SED RATE NONAUTOMATED: CPT

## 2020-12-30 PROCEDURE — 85610 PROTHROMBIN TIME: CPT

## 2020-12-30 PROCEDURE — 80048 BASIC METABOLIC PNL TOTAL CA: CPT

## 2020-12-30 PROCEDURE — 99284 EMERGENCY DEPT VISIT MOD MDM: CPT

## 2020-12-30 RX ORDER — HYDROCODONE BITARTRATE AND ACETAMINOPHEN 5; 325 MG/1; MG/1
1 TABLET ORAL ONCE
Status: COMPLETED | OUTPATIENT
Start: 2020-12-30 | End: 2020-12-30

## 2020-12-30 RX ORDER — HYDROCODONE BITARTRATE AND ACETAMINOPHEN 5; 325 MG/1; MG/1
1 TABLET ORAL EVERY 6 HOURS PRN
Qty: 12 TABLET | Refills: 0 | Status: SHIPPED | OUTPATIENT
Start: 2020-12-30 | End: 2021-01-02

## 2020-12-30 RX ADMIN — HYDROCODONE BITARTRATE AND ACETAMINOPHEN 1 TABLET: 5; 325 TABLET ORAL at 19:52

## 2020-12-30 ASSESSMENT — PAIN DESCRIPTION - PAIN TYPE: TYPE: ACUTE PAIN

## 2020-12-30 ASSESSMENT — PAIN DESCRIPTION - ORIENTATION: ORIENTATION: RIGHT

## 2020-12-30 ASSESSMENT — PAIN DESCRIPTION - LOCATION: LOCATION: HAND

## 2020-12-30 ASSESSMENT — PAIN SCALES - GENERAL: PAINLEVEL_OUTOF10: 8

## 2020-12-31 NOTE — ED NOTES
FIRST PROVIDER CONTACT ASSESSMENT NOTE      Department of Emergency Medicine   12/30/20  7:09 PM EST    Chief Complaint: Joint Swelling (right wrist warm swollen and tender. no known injury)      History of Present Illness:   Jules Vaz is a 68 y.o. male who presents to the ED for right hand and wrist pain swollen and tender. It is warmth. Denies any fevers or chills. Medical History:  has a past medical history of Blood circulation, collateral, CAD (coronary artery disease), Chronic kidney disease, History of alcohol use, History of ascites, Hyperlipidemia, Hypertension, Hyponatremia, Left knee pain, Liver disease, Lymphedema, Neuropathy, Osteoarthritis, Psychiatric problem, Thyroid disease, Use of cane as ambulatory aid, Uses wheelchair, Wears glasses, and Wears hearing aid. Surgical History:  has a past surgical history that includes Appendectomy; eye surgery; Colonoscopy; Cataract removal with implant (Bilateral); Cardiac catheterization (05/2020); and Total knee arthroplasty (Left, 6/22/2020). Social History:  reports that he quit smoking about 34 years ago. His smoking use included pipe and cigars. He started smoking about 56 years ago. He has a 18.75 pack-year smoking history. He has never used smokeless tobacco. He reports current alcohol use. He reports that he does not use drugs. Family History: family history includes Other in his mother.     *ALLERGIES*     Sulfa antibiotics     Physical Exam:      VS:  /67   Pulse 111   Resp 16      Initial Plan of Care:  Initiate Treatment-Testing, Proceed toTreatment Area When Bed Available for ED Attending/MLP to Continue Care    -----------------END OF FIRST PROVIDER CONTACT ASSESSMENT NOTE--------------  Electronically signed by MONTSE Diaz Cera, CNP   DD: 12/30/20             MONTSE Diaz Cera, CNP  12/30/20 7610

## 2020-12-31 NOTE — ED NOTES
Bed:  [place] [address]  Expected date:   Expected time:   Means of arrival:   Comments:  Jose Cordova RN  12/30/20 1911

## 2020-12-31 NOTE — ED PROVIDER NOTES
Independent MLP     HPI:  12/30/20,   Time: 7:19 PM CHAD Enriquez is a 68 y.o. male presenting to the ED for right hand pain, beginning few days ago. The complaint has been constant, moderate in severity, and worsened by nothing. complaining of pain to the right wrist which he states began 2 days ago. He denies any known fall or known injury. He has no obvious deformity however has significant swelling. He is right-hand dominant. He is on Coumadin. He denies any chest pain or shortness of breath. Denies history of gout. ROS:   Pertinent positives and negatives are stated within HPI, all other systems reviewed and are negative.  --------------------------------------------- PAST HISTORY ---------------------------------------------  Past Medical History:  has a past medical history of Blood circulation, collateral, CAD (coronary artery disease), Chronic kidney disease, History of alcohol use, History of ascites, Hyperlipidemia, Hypertension, Hyponatremia, Left knee pain, Liver disease, Lymphedema, Neuropathy, Osteoarthritis, Psychiatric problem, Thyroid disease, Use of cane as ambulatory aid, Uses wheelchair, Wears glasses, and Wears hearing aid. Past Surgical History:  has a past surgical history that includes Appendectomy; eye surgery; Colonoscopy; Cataract removal with implant (Bilateral); Cardiac catheterization (05/2020); and Total knee arthroplasty (Left, 6/22/2020). Social History:  reports that he quit smoking about 34 years ago. His smoking use included pipe and cigars. He started smoking about 56 years ago. He has a 18.75 pack-year smoking history. He has never used smokeless tobacco. He reports current alcohol use. He reports that he does not use drugs. Family History: family history includes Other in his mother. The patients home medications have been reviewed.     Allergies: Sulfa antibiotics    -------------------------------------------------- RESULTS -------------------------------------------------  All laboratory and radiology results have been personally reviewed by myself   LABS:  Results for orders placed or performed during the hospital encounter of 12/30/20   CBC Auto Differential   Result Value Ref Range    WBC 9.6 4.5 - 11.5 E9/L    RBC 3.71 (L) 3.80 - 5.80 E12/L    Hemoglobin 11.2 (L) 12.5 - 16.5 g/dL    Hematocrit 36.0 (L) 37.0 - 54.0 %    MCV 97.0 80.0 - 99.9 fL    MCH 30.2 26.0 - 35.0 pg    MCHC 31.1 (L) 32.0 - 34.5 %    RDW 16.6 (H) 11.5 - 15.0 fL    Platelets 521 854 - 664 E9/L    MPV 10.1 7.0 - 12.0 fL    Neutrophils % 77.0 43.0 - 80.0 %    Immature Granulocytes % 0.4 0.0 - 5.0 %    Lymphocytes % 12.9 (L) 20.0 - 42.0 %    Monocytes % 8.8 2.0 - 12.0 %    Eosinophils % 0.6 0.0 - 6.0 %    Basophils % 0.3 0.0 - 2.0 %    Neutrophils Absolute 7.39 (H) 1.80 - 7.30 E9/L    Immature Granulocytes # 0.04 E9/L    Lymphocytes Absolute 1.24 (L) 1.50 - 4.00 E9/L    Monocytes Absolute 0.84 0.10 - 0.95 E9/L    Eosinophils Absolute 0.06 0.05 - 0.50 E9/L    Basophils Absolute 0.03 0.00 - 0.20 S0/D   Basic Metabolic Panel w/ Reflex to MG   Result Value Ref Range    Sodium 131 (L) 132 - 146 mmol/L    Potassium reflex Magnesium 3.7 3.5 - 5.0 mmol/L    Chloride 90 (L) 98 - 107 mmol/L    CO2 31 (H) 22 - 29 mmol/L    Anion Gap 10 7 - 16 mmol/L    Glucose 132 (H) 74 - 99 mg/dL    BUN 45 (H) 8 - 23 mg/dL    CREATININE 1.9 (H) 0.7 - 1.2 mg/dL    GFR Non-African American 34 >=60 mL/min/1.73    GFR African American 42     Calcium 9.2 8.6 - 10.2 mg/dL   Sedimentation Rate   Result Value Ref Range    Sed Rate 48 (H) 0 - 15 mm/Hr   Uric acid   Result Value Ref Range    Uric Acid, Serum 14.2 (H) 3.4 - 7.0 mg/dL   Protime-INR   Result Value Ref Range    Protime 47.2 (H) 9.3 - 12.4 sec    INR 3.9        RADIOLOGY:  Interpreted by Radiologist.  XR HAND RIGHT (MIN 3 VIEWS)   Final Result   1. Soft tissue swelling involving posterior wrist and distal forearm.    2.  No evidence of fracture or dislocation involving the right wrist or hand. 3.  Severe osteoarthritis involving 1st carpometacarpal joint. XR WRIST RIGHT (MIN 3 VIEWS)   Final Result   1. Soft tissue swelling involving posterior wrist and distal forearm. 2.  No evidence of fracture or dislocation involving the right wrist or hand. 3.  Severe osteoarthritis involving 1st carpometacarpal joint.          ------------------------- NURSING NOTES AND VITALS REVIEWED ---------------------------   The nursing notes within the ED encounter and vital signs as below have been reviewed. /67   Pulse 70   Temp 97.1 °F (36.2 °C)   Resp 16   SpO2 98%   Oxygen Saturation Interpretation: Normal      ---------------------------------------------------PHYSICAL EXAM--------------------------------------      Constitutional/General: Alert and oriented x3, well appearing, non toxic in NAD  Head: NC/AT  Eyes: PERRL, EOMI  Mouth: Oropharynx clear, handling secretions, no trismus  Neck: Supple, full ROM, no meningeal signs  Pulmonary: Lungs clear to auscultation bilaterally, no wheezes, rales, or rhonchi. Not in respiratory distress  Cardiovascular:  Regular rate and rhythm, no murmurs, gallops, or rubs. 2+ distal pulses  Abdomen: Soft, non tender, non distended,   Extremities: Moves all extremities x 4. Warm and well perfused, has moderate swelling noted to the dorsum of the right wrist with mild erythema is warm to touch. Has pain with range of motion. Radial pulses palpable 2+ capillary refill less than 3 seconds. Has full range of motion of all 5 digits with flexion and extension.   Skin: warm and dry without rash  Neurologic: GCS 15,  Psych: Normal Affect      ------------------------------ ED COURSE/MEDICAL DECISION MAKING----------------------  Medications   HYDROcodone-acetaminophen (NORCO) 5-325 MG per tablet 1 tablet (1 tablet Oral Given 12/30/20 1952)         Medical Decision Making:    Patient informed of elevated uric acid result as well as ESR, Ace bandage applied will be treated for gout however due to renal insufficiency will provide pain medication versus anti-inflammatory. Advised to follow-up with PCP if any further problems or any change or worsening symptoms. Counseling: The emergency provider has spoken with the patient and discussed todays results, in addition to providing specific details for the plan of care and counseling regarding the diagnosis and prognosis. Questions are answered at this time and they are agreeable with the plan.      --------------------------------- IMPRESSION AND DISPOSITION ---------------------------------    IMPRESSION  1. Primary osteoarthritis of right wrist    2.  Acute gout of right wrist, unspecified cause        DISPOSITION  Disposition: Discharge to home  Patient condition is good                 Whitney Olivas, MONTSE - CNP  12/31/20 0142

## 2021-01-04 ENCOUNTER — ANTI-COAG VISIT (OUTPATIENT)
Dept: FAMILY MEDICINE CLINIC | Age: 81
End: 2021-01-04
Payer: MEDICARE

## 2021-01-04 ENCOUNTER — TELEPHONE (OUTPATIENT)
Dept: FAMILY MEDICINE CLINIC | Age: 81
End: 2021-01-04

## 2021-01-04 DIAGNOSIS — M10.9 ACUTE GOUT, UNSPECIFIED CAUSE, UNSPECIFIED SITE: Primary | ICD-10-CM

## 2021-01-04 PROCEDURE — 93793 ANTICOAG MGMT PT WARFARIN: CPT | Performed by: INTERNAL MEDICINE

## 2021-01-04 RX ORDER — METHYLPREDNISOLONE 4 MG/1
TABLET ORAL
Qty: 1 KIT | Refills: 0 | Status: SHIPPED | OUTPATIENT
Start: 2021-01-04 | End: 2021-01-10

## 2021-01-04 NOTE — TELEPHONE ENCOUNTER
Mj calling to speak with Trena. He has several issues and got upset when I asked how I can help him. He wants to speak with only Trena. Sorry.

## 2021-01-04 NOTE — PROGRESS NOTES
Previous INR: 2.8     Previous dose: 5mg daily     Current INR: 3.9    Recommendation: hold x 1 and 2.5mg x1, then change to 6mg mon and fri and 5mg all other days      Next INR: 2 week     Requested Prescriptions     Signed Prescriptions Disp Refills    methylPREDNISolone (MEDROL DOSEPACK) 4 MG tablet 1 kit 0     Sig: Take by mouth.      Nodule pack for gout and please let us know how the symptoms are improving    Mounika Greene MD  1/4/2021  5:24 PM

## 2021-01-04 NOTE — TELEPHONE ENCOUNTER
Patient has called a few times. He would really like Anne-Marie to call him. Explained that  has yet to get back regarding medication for gout. Still wants a call.

## 2021-01-04 NOTE — TELEPHONE ENCOUNTER
Pt was scheduled to have an INR today in [place], the ER ran a PT INR on 12/30/20, pt would like to know if you can address that result instead of him coming in to the office today? (I routed an INR encounter to you to address)     He would also like to know if you can give him something to help w/ the pain and inflammation of gout? The ED did not treat w/ amti-inflammatories b/c of renal disease. Pt is still having significant pain and would like to know if something can be sent in to [facility]?     Would you like me to work him in to the schedule for this week or next week?

## 2021-01-04 NOTE — TELEPHONE ENCOUNTER
Reassured pt that Dr Nya Porras is still working in the flu clinic and that I will call him before [address] [place] [address] home today at 5:30.

## 2021-01-18 ENCOUNTER — ANTI-COAG VISIT (OUTPATIENT)
Dept: PRIMARY CARE CLINIC | Age: 81
End: 2021-01-18
Payer: MEDICARE

## 2021-01-18 ENCOUNTER — NURSE ONLY (OUTPATIENT)
Dept: PRIMARY CARE CLINIC | Age: 81
End: 2021-01-18
Payer: MEDICARE

## 2021-01-18 DIAGNOSIS — I48.19 PERSISTENT ATRIAL FIBRILLATION (HCC): ICD-10-CM

## 2021-01-18 LAB
INTERNATIONAL NORMALIZATION RATIO, POC: 2.4
INTERNATIONAL NORMALIZATION RATIO, POC: 2.4
PROTHROMBIN TIME, POC: NORMAL

## 2021-01-18 PROCEDURE — 85610 PROTHROMBIN TIME: CPT | Performed by: INTERNAL MEDICINE

## 2021-01-18 PROCEDURE — 93793 ANTICOAG MGMT PT WARFARIN: CPT | Performed by: INTERNAL MEDICINE

## 2021-01-18 NOTE — PROGRESS NOTES
Previous INR: 3.9    Previous dose: 6mg Monday and Friday, 5mg all other days. Current INR: 2.4    Recommendation: 6mg Monday and Friday, 5mg all other days.      Next INR: 2 weeks    Siddhartha Rainey MD  1/18/2021  2:00 PM

## 2021-01-20 ENCOUNTER — TELEPHONE (OUTPATIENT)
Dept: FAMILY MEDICINE CLINIC | Age: 81
End: 2021-01-20

## 2021-01-20 NOTE — TELEPHONE ENCOUNTER
Pt called for an appt for his neuropathy and gout in his wrist. There was only an opening at 9, which he states he cannot do that early in the morning. There are no other openings in Dr. Marc Sorensen schedule for weeks. Please contact pt with further instructions/appt.

## 2021-01-21 ENCOUNTER — OFFICE VISIT (OUTPATIENT)
Dept: FAMILY MEDICINE CLINIC | Age: 81
End: 2021-01-21
Payer: MEDICARE

## 2021-01-21 VITALS
BODY MASS INDEX: 34.25 KG/M2 | WEIGHT: 226 LBS | DIASTOLIC BLOOD PRESSURE: 62 MMHG | OXYGEN SATURATION: 99 % | HEART RATE: 60 BPM | TEMPERATURE: 97.1 F | HEIGHT: 68 IN | SYSTOLIC BLOOD PRESSURE: 110 MMHG

## 2021-01-21 DIAGNOSIS — M10.031 ACUTE IDIOPATHIC GOUT OF RIGHT WRIST: Primary | ICD-10-CM

## 2021-01-21 PROCEDURE — 99213 OFFICE O/P EST LOW 20 MIN: CPT | Performed by: INTERNAL MEDICINE

## 2021-01-21 RX ORDER — METHYLPREDNISOLONE 4 MG/1
TABLET ORAL
Qty: 1 KIT | Refills: 0 | Status: SHIPPED | OUTPATIENT
Start: 2021-01-21 | End: 2021-01-27

## 2021-01-21 ASSESSMENT — ENCOUNTER SYMPTOMS
BLOOD IN STOOL: 0
EYE PAIN: 0
SHORTNESS OF BREATH: 0
CHEST TIGHTNESS: 0
DIARRHEA: 0
SORE THROAT: 0
RHINORRHEA: 0
CONSTIPATION: 0
COUGH: 0
ABDOMINAL PAIN: 0
VOMITING: 0
NAUSEA: 0

## 2021-01-21 ASSESSMENT — PATIENT HEALTH QUESTIONNAIRE - PHQ9: SUM OF ALL RESPONSES TO PHQ QUESTIONS 1-9: 0

## 2021-01-21 NOTE — PROGRESS NOTES
[facility]) Physicians   Internal Medicine     2021  Naldo Francis : 1940 Sex: male Age:80 y.o. Chief Complaint   Patient presents with    Other     Neuropathy in feet     Gout        HPI:   Patient presents to office for review and management of chronic medical conditions.    - States having issues with swelling and pain to right wrist and hand. States was seen in ER. No truama or injury. Had xray of wrist and hand - showing severe OA and some soft tissue swelling. States was given medrol pack and completed. States symptoms have improve dbut has not resolved. - States has osteoarthritis. States multiple joints. Had left knee replacement. 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 Follows with ortho. Needs physical therapy. - Stats has atrial fibrillation. Uncertain cardiology follow up. On metoprolol. On coumadin. continue 6mg mon/fri and 5mg all other days. INR today (2020) was 2.8.    - States has cardiomyopathy. States due to alcohol. Declines to follow with cardiology.     - States has CAD. States had stress test (2020) The myocardial perfusion imaging was abnormal, moderate sized reversible defect , cardiac cath (2020) - 40% proximal LAD lesion, 50% mid LAD lesion at the takeoff of a large diagonal branch, 50% proximal left circumflex artery disease. Totally occluded right coronary artery with grade 3 left-to-right collaterals. States needs aggressive risk factor management. On metoprolol    - States has been getting dizzy after walking. Possible orthostasis. - States has hypothyroid. 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 Still drinks on weekends. Discussed cessation. On metolazone  2.5mg - states switched to m/w/f. On torsemide 40 mg twice a day. on sprinolactone. - States has chronic kidney disease. Follows with nephrology. LAst creat was 1.6 (2020)     - States has elevated uric acid.  States was on by mouth 2 times daily Take am day of surgery 06/22/2020), Disp: 60 tablet, Rfl: 3    MULTIPLE VITAMIN PO, Take 1 tablet by mouth daily Last taken 06/16/2020, Disp: , Rfl:     Allergies   Allergen Reactions    Sulfa Antibiotics Hives     Hives         Past Medical History:   Diagnosis Date    Blood circulation, collateral     CAD (coronary artery disease)     Chronic kidney disease     History of alcohol use     History of ascites     history of, approx 5 years ago    Hyperlipidemia     Hypertension     Hyponatremia     Left knee pain 06/2020    Liver disease     Lymphedema     lower extrem    Neuropathy     both feet    Osteoarthritis     Psychiatric problem     Thyroid disease     Use of cane as ambulatory aid     Uses wheelchair     for distances    Wears glasses     for reading    Wears hearing aid        Past Surgical History:   Procedure Laterality Date    APPENDECTOMY      CARDIAC CATHETERIZATION  05/2020    CATARACT REMOVAL WITH IMPLANT Bilateral     COLONOSCOPY      EYE SURGERY      cataracts    TOTAL KNEE ARTHROPLASTY Left 6/22/2020    LEFT KNEE IVAN ROBOTIC TOTAL ARTHROPLASTY performed by Gagandeep Nava MD at [facility] OR       Family History   Problem Relation Age of Onset   Yessi Officer Other Mother         old age        Social History     Socioeconomic History    Marital status: Single     Spouse name: None    Number of children: 1    Years of education: 16    Highest education level: Master's degree (e.g., MA, MS, Jordan, MEd, MSW, LUCERO)   Occupational History    None   Social Needs    Financial resource strain: Patient refused    Food insecurity     Worry: Patient refused     Inability: Patient refused    Transportation needs     Medical: Patient refused     Non-medical: Patient refused   Tobacco Use    Smoking status: Former Smoker     Packs/day: 0.75     Years: 25.00     Pack years: 18.75     Types: Pipe, Cigars     Start date: 10/8/1964     Quit date: 4/1/1986     Years since quittin.8    Smokeless tobacco: Never Used    Tobacco comment: Cigar and Pipe smoking history only   Substance and Sexual Activity    Alcohol use: Yes     Alcohol/week: 0.0 standard drinks     Comment: quit 2017; socially as of 2020 on weekends    Drug use: No    Sexual activity: None   Lifestyle    Physical activity     Days per week: None     Minutes per session: None    Stress: None   Relationships    Social connections     Talks on phone: None     Gets together: None     Attends Mosque service: None     Active member of club or organization: None     Attends meetings of clubs or organizations: None     Relationship status: None    Intimate partner violence     Fear of current or ex partner: None     Emotionally abused: None     Physically abused: None     Forced sexual activity: None   Other Topics Concern    None   Social History Narrative    None        Vitals:    21 1139   BP: 110/62   Pulse: 60   Temp: 97.1 °F (36.2 °C)   SpO2: 99%   Weight: 226 lb (102.5 kg)   Height: 5' 8\" (1.727 m)       Exam:  Physical Exam  Constitutional:       Appearance: He is well-developed. HENT:      Head: Normocephalic and atraumatic. Right Ear: External ear normal.      Left Ear: External ear normal.      Nose: Rhinorrhea present. Mouth/Throat:      Pharynx: Posterior oropharyngeal erythema present. Eyes:      Pupils: Pupils are equal, round, and reactive to light. Neck:      Musculoskeletal: Normal range of motion and neck supple. Thyroid: No thyromegaly. Cardiovascular:      Rate and Rhythm: Normal rate. Rhythm irregular. Heart sounds: Murmur present. Systolic murmur present with a grade of 2/6. Pulmonary:      Effort: Pulmonary effort is normal.      Breath sounds: Normal breath sounds. No wheezing or rales. Abdominal:      General: Bowel sounds are normal.      Palpations: Abdomen is soft. Tenderness: There is no abdominal tenderness.  There is no guarding or rebound. Musculoskeletal: Normal range of motion. Right lower leg: Edema present. Left lower leg: Edema present. Lymphadenopathy:      Cervical: No cervical adenopathy. Skin:     General: Skin is warm and dry. Neurological:      Mental Status: He is alert and oriented to person, place, and time. Cranial Nerves: No cranial nerve deficit. Psychiatric:         Judgment: Judgment normal.         Assessment and Plan:    Gloria Robledo was seen today for 6 month follow-up, hyperlipidemia, hypertension, hypothyroidism and health maintenance.     Diagnoses and all orders for this visit:    Acute idiopathic gout of right wrist  - persistent   - improved post medrol pack but not resolved   - previous labs showed elevated uric acid   - was on allopurinol in the past - stopped due to poss reaction   - xray reports reviewed, ER report reviewed (1/2021)   - declines depo medrl today   - re order medrol pack   - may need to consider uloric and discuss with renal   - low purine diet handout provided     Persistent atrial fibrillation  - uncertain as to onset  - ekg from 2017 showed flutter   - now on anticoagulation   - follow with cardiolgy   - on coumadin 6mg mon/fri and 5mg all other days    - inr today 2.8 (12/4/2020)   - continue present   - recheck in 1 month     Coronary artery disease involving native coronary artery of native heart without angina pectoris  - s/p stress and cath (2020)   - medical therapy   - now on atorvastatin   - on aspirin 81mg   - on metorolol tart 50mg twice a day   - follow with cardiology     Alcoholic cirrhosis of liver with ascites ([address]  - Continue present treatment   - discussed and advised stopping alcohol altogether   - declines referral to GI   - on spironolactone   - on torsemide   - on metolazone - states changes to m/w/f     Hyponatremia  - Possible multifactorial (cirrhois and meds)   - last lab stable at 132 (11/2020)     Dilated cardiomyopathy ([address]  - possible due to alcohol   - discussed and advised stopping alcohol altogether     CKD (chronic kidney disease) stage 3, GFR 30-59 ml/min ([facility])  - Continue present treatment   - following with nephrology   - was advised to stop spirinolactone 25mg daily - given advised to hold by cardiology. Restarted   - on metalozaone 2.5mg every 10 days   - on torsemide - advised by renal to take 40mg twice a day    - on potassium supplement   - follow labs     Lymphedema  - on metalozaone 2.5mg every 10 days - states changed to m/w/f   - on torsemide - advised by renal to take 40mg twice a day    - on potassium supplement   - compression stockings if able   - consider referral to lymphedema PT     Impaired fasting glucose  - Continue present treatment   - watch diet   - follow A1c - last was 6.4 (11/20)     Alcohol abuse  - discussed and advised stopping alcohol altogether     Essential hypertension  - Continue present treatment   - watch diet   - monitor blood pressure at home   - on spironolactone   - now on metoprolol tart   - stable     Pure hypercholesterolemia  - Continue present treatment   - watch diet   - simvastatin switched to atorvastatin   - follow labs   - uncontrolled     History of ascites    Neuropathy ([facility]  - Continue present treatment   - due to alcohol   - on gabapentin 200mg in am, 200mg at noon and 100mg in pm   - stable     Other specified hypothyroidism  - Continue present treatment   - on levothyroxine   - follow labs     Primary osteoarthritis involving multiple joints    Hyperuricemia  - Continue present treatment   - watch diet   - stopped allopurinol - thought possibly causing hives   - did not restart      Return for check up and review, as scheduled. No orders of the defined types were placed in this encounter. Requested Prescriptions     Signed Prescriptions Disp Refills    methylPREDNISolone (MEDROL DOSEPACK) 4 MG tablet 1 kit 0     Sig: Take by mouth.         Carla Kearns, MD  1/21/2021  12:20 PM

## 2021-01-21 NOTE — PATIENT INSTRUCTIONS
Patient Education        Purine-Restricted Diet: Care Instructions  Your Care Instructions     Purines are substances that are found in some foods. Your body turns purines into uric acid. High levels of uric acid can cause gout, which is a form of arthritis that causes pain and inflammation in joints. You may be able to help control the amount of uric acid in your body by limiting high-purine foods in your diet. Follow-up care is a key part of your treatment and safety. Be sure to make and go to all appointments, and call your doctor if you are having problems. It's also a good idea to know your test results and keep a list of the medicines you take. How can you care for yourself at home? · Plan your meals and snacks around foods that are low in purines and are safe for you to eat. These foods include:  ? Green vegetables and tomatoes. ? Fruits. ? Whole-grain breads, rice, and cereals. ? Eggs, peanut butter, and nuts. ? Low-fat milk, cheese, and other milk products. ? Popcorn. ? Gelatin desserts, chocolate, cocoa, and cakes and sweets, in small amounts. · You can eat certain foods that are medium-high in purines, but eat them only once in a while. These foods include:  ? Legumes, such as dried beans and dried peas. You can have 1 cup cooked legumes each day. ? Asparagus, cauliflower, spinach, mushrooms, and green peas. ? Fish and seafood (other than very high-purine seafood). ? Oatmeal, wheat bran, and wheat germ. · Limit very high-purine foods, including:  ? Organ meats, such as liver, kidneys, sweetbreads, and brains. ? Meats, including winter, beef, pork, and lamb. ? Game meats and any other meats in large amounts. ? Anchovies, sardines, herring, mackerel, and scallops. ? Gravy. ? Beer. Where can you learn more? Go to https://[email or web address].[email or web address] org and sign in to your [employer] account.  Enter F448 in the [facility] box to learn more about \"Purine-Restricted Diet: Care Instructions. \"     If you do not have an account, please click on the \"Sign Up Now\" link. Current as of: August 22, 2019               Content Version: 12.6  © 2006-2020 [employer]. Care instructions adapted under license by [facility] ([facility]). If you have questions about a medical condition or this instruction, always ask your healthcare professional. [address] any warranty or liability for your use of this information. Patient Education        Gout: Care Instructions  Your Care Instructions     Gout is a form of arthritis caused by a buildup of uric acid crystals in a joint. It causes sudden attacks of pain, swelling, redness, and stiffness, usually in one joint, especially the big toe. Gout usually comes on without a cause. But it can be brought on by drinking alcohol (especially beer) or eating seafood and red meat. Taking certain medicines, such as diuretics or aspirin, also can bring on an attack of gout. Taking your medicines as prescribed and following up with your doctor regularly can help you avoid gout attacks in the future. Follow-up care is a key part of your treatment and safety. Be sure to make and go to all appointments, and call your doctor if you are having problems. It's also a good idea to know your test results and keep a list of the medicines you take. How can you care for yourself at home? · If the joint is swollen, put ice or a cold pack on the area for 10 to 20 minutes at a time. Put a thin cloth between the ice and your skin. · Prop up the sore limb on a pillow when you ice it or anytime you sit or lie down during the next 3 days. Try to keep it above the level of your heart. This will help reduce swelling. · Rest sore joints. Avoid activities that put weight or strain on the joints for a few days. Take short rest breaks from your regular activities during the day. · Take your medicines exactly as prescribed.  Call your doctor if you think you are having a problem with your medicine. · Take pain medicines exactly as directed. ? If the doctor gave you a prescription medicine for pain, take it as prescribed. ? If you are not taking a prescription pain medicine, ask your doctor if you can take an over-the-counter medicine. · Eat less seafood and red meat. · Check with your doctor before drinking alcohol. · Losing weight, if you are overweight, may help reduce attacks of gout. But do not go on a [employer]. \" Losing a lot of weight in a short amount of time can cause a gout attack. When should you call for help? Call your doctor now or seek immediate medical care if:    · You have a fever.     · The joint is so painful you cannot use it.     · You have sudden, unexplained swelling, redness, warmth, or severe pain in one or more joints. Watch closely for changes in your health, and be sure to contact your doctor if:    · You have joint pain.     · Your symptoms get worse or are not improving after 2 or 3 days. Where can you learn more? Go to https://Kwan Mobile.SeeChange [email or web address] org and sign in to your [employer] account. Enter P556 in the [employer] box to learn more about \"Gout: Care Instructions. \"     If you do not have an account, please click on the \"Sign Up Now\" link. Current as of: December 9, 2019               Content Version: 12.6  © 2651-5593 [employer]. Care instructions adapted under license by [facility] ([facility]). If you have questions about a medical condition or this instruction, always ask your healthcare professional. [place] [address] any warranty or liability for your use of this information.

## 2021-02-22 RX ORDER — LEVOTHYROXINE SODIUM 0.2 MG/1
200 TABLET ORAL DAILY
Qty: 90 TABLET | Refills: 0 | Status: SHIPPED
Start: 2021-02-22 | End: 2021-05-24 | Stop reason: SDUPTHER

## 2021-02-22 NOTE — TELEPHONE ENCOUNTER
Name of Medication(s) Requested:  Levothyroxine    Pharmacy Requested:   [facility]    Medication(s) pended? [x] Yes  [] No    Last Appointment:  1/21/2021    Future appts:  Future Appointments   Date Time Provider Rafi Alvarenga   3/5/2021  1:15 PM MD JHONATHAN Beltrán [facility]        Does patient need call back?   [] Yes  [x] No

## 2021-03-05 ENCOUNTER — OFFICE VISIT (OUTPATIENT)
Dept: PRIMARY CARE CLINIC | Age: 81
End: 2021-03-05
Payer: MEDICARE

## 2021-03-05 VITALS
WEIGHT: 243.3 LBS | HEART RATE: 76 BPM | SYSTOLIC BLOOD PRESSURE: 122 MMHG | OXYGEN SATURATION: 97 % | HEIGHT: 68 IN | BODY MASS INDEX: 36.87 KG/M2 | RESPIRATION RATE: 16 BRPM | TEMPERATURE: 97.3 F | DIASTOLIC BLOOD PRESSURE: 64 MMHG

## 2021-03-05 DIAGNOSIS — E03.8 OTHER SPECIFIED HYPOTHYROIDISM: ICD-10-CM

## 2021-03-05 DIAGNOSIS — I10 ESSENTIAL HYPERTENSION: Primary | ICD-10-CM

## 2021-03-05 DIAGNOSIS — E78.00 PURE HYPERCHOLESTEROLEMIA: ICD-10-CM

## 2021-03-05 DIAGNOSIS — M15.9 PRIMARY OSTEOARTHRITIS INVOLVING MULTIPLE JOINTS: ICD-10-CM

## 2021-03-05 DIAGNOSIS — I89.0 LYMPHEDEMA: ICD-10-CM

## 2021-03-05 DIAGNOSIS — K70.31 ALCOHOLIC CIRRHOSIS OF LIVER WITH ASCITES (HCC): ICD-10-CM

## 2021-03-05 DIAGNOSIS — N52.9 ERECTILE DYSFUNCTION, UNSPECIFIED ERECTILE DYSFUNCTION TYPE: ICD-10-CM

## 2021-03-05 DIAGNOSIS — G62.9 NEUROPATHY: ICD-10-CM

## 2021-03-05 DIAGNOSIS — I25.10 CORONARY ARTERY DISEASE INVOLVING NATIVE CORONARY ARTERY OF NATIVE HEART WITHOUT ANGINA PECTORIS: ICD-10-CM

## 2021-03-05 DIAGNOSIS — R73.01 IMPAIRED FASTING GLUCOSE: ICD-10-CM

## 2021-03-05 DIAGNOSIS — E87.1 HYPONATREMIA: ICD-10-CM

## 2021-03-05 DIAGNOSIS — N18.30 STAGE 3 CHRONIC KIDNEY DISEASE, UNSPECIFIED WHETHER STAGE 3A OR 3B CKD (HCC): ICD-10-CM

## 2021-03-05 DIAGNOSIS — I48.19 PERSISTENT ATRIAL FIBRILLATION (HCC): ICD-10-CM

## 2021-03-05 DIAGNOSIS — F10.10 ALCOHOL ABUSE: ICD-10-CM

## 2021-03-05 DIAGNOSIS — I42.9 CARDIOMYOPATHY, UNSPECIFIED TYPE (HCC): ICD-10-CM

## 2021-03-05 DIAGNOSIS — Z87.898 HISTORY OF ASCITES: ICD-10-CM

## 2021-03-05 DIAGNOSIS — M1A.09X0 IDIOPATHIC CHRONIC GOUT OF MULTIPLE SITES WITHOUT TOPHUS: ICD-10-CM

## 2021-03-05 LAB
INTERNATIONAL NORMALIZATION RATIO, POC: 2.9
PROTHROMBIN TIME, POC: NORMAL

## 2021-03-05 PROCEDURE — 99214 OFFICE O/P EST MOD 30 MIN: CPT | Performed by: INTERNAL MEDICINE

## 2021-03-05 PROCEDURE — 85610 PROTHROMBIN TIME: CPT | Performed by: INTERNAL MEDICINE

## 2021-03-05 RX ORDER — DULOXETIN HYDROCHLORIDE 20 MG/1
20 CAPSULE, DELAYED RELEASE ORAL DAILY
Qty: 30 CAPSULE | Refills: 3 | Status: SHIPPED | OUTPATIENT
Start: 2021-03-05 | End: 2021-07-06 | Stop reason: SDUPTHER

## 2021-03-05 RX ORDER — SILDENAFIL 50 MG/1
50 TABLET, FILM COATED ORAL DAILY PRN
Qty: 10 TABLET | Refills: 0 | Status: SHIPPED | OUTPATIENT
Start: 2021-03-05 | End: 2022-02-11 | Stop reason: ALTCHOICE

## 2021-03-05 ASSESSMENT — ENCOUNTER SYMPTOMS
ABDOMINAL PAIN: 0
BLOOD IN STOOL: 0
CHEST TIGHTNESS: 0
NAUSEA: 0
SORE THROAT: 0
RHINORRHEA: 0
CONSTIPATION: 0
DIARRHEA: 0
COUGH: 0
VOMITING: 0
SHORTNESS OF BREATH: 0
EYE PAIN: 0

## 2021-03-05 NOTE — PROGRESS NOTES
[facility]) Physicians   Internal Medicine     3/5/2021  Fransisca Monroe : 1940 Sex: male Age:80 y.o. Chief Complaint   Patient presents with    Coronary Artery Disease    Leg Swelling    Hypothyroidism    Chronic Kidney Disease    Hypertension    Hyperlipidemia    Peripheral Neuropathy        HPI:   Patient presents to office for evaluation of the following medical concerns. - States having issues with tinnitus.     - States having issues with b/l LE edema. On diuretics but swelling persists. Asking for additional treatment     - Having erectile dysfunction. Asking about treatment     - Stats has atrial fibrillation. Uncertain cardiology follow up. On metoprolol. On coumadin. continue 6mg mon/fri and 5mg all other days. INR today (3/5/2021) was 2.9.    - States has cardiomyopathy. States due to alcohol. Declines to follow with cardiology.     - States has CAD. States had stress test (2020) The myocardial perfusion imaging was abnormal, moderate sized reversible defect , cardiac cath (2020) - 40% proximal LAD lesion, 50% mid LAD lesion at the takeoff of a large diagonal branch, 50% proximal left circumflex artery disease. Totally occluded right coronary artery with grade 3 left-to-right collaterals. States needs aggressive risk factor management. On metoprolol    - States has been getting dizzy after walking. Possible orthostasis. - States has hypothyroid. 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 Still drinks on weekends. Discussed cessation. On metolazone  2.5mg - states switched to m/w/f. On torsemide 40 mg twice a day. on sprinolactone. - States has chronic kidney disease. Follows with nephrology. LAst creat was 1.6 (2020)     - States has elevated uric acid. States was on allopurinol in the past, stopped due to thought was causing hives. Last uric acid was 11 (2020)     States has hypertension, not checking blood pressure at home. On metoprolol.      States has high cholesterol. Was on simvastatin - changed to atorvastatin. No reported side effects. States has neuropathy. Possible due to alcohol. On gabapentin. States taking 2 tablets in am, 1 tablet in afternoon and 1 tablet in pm.  Asking about alternatives     Last lab shows elevated sugar. A1c was 6.4 (11/2020)   Has electrolyte abnormalities     - States has osteoarthritis. States multiple joints. Had left knee replacement. 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 Follows with ortho. Needs physical therapy. Health Maintenance   - immunizations:   Influenza Vaccination - declines  Pneumonia Vaccination - declines   Zoster/Shingles Vaccine  Tetanus Vaccination    - Screenings:   Colonoscopy   Prostate     Stress test (4/2020) - The myocardial perfusion imaging was abnormal, moderate sized reversible defect in the entire inferior wall and apex suggestive of ischemia    echo (4/20) - ef 55-60%, mod l &r atiral dilation, mild to mod MR, indeterm diastolic dysfun    cardiac cath (5/20) - IMPRESSION:  1.  40% proximal LAD lesion, 50% mid LAD lesion at the takeoff of a  large diagonal branch. 2.  50% proximal left circumflex artery disease. 3.  Totally occluded right coronary artery with grade 3 left-to-right  collaterals. Couseled on Home Safety     ROS:  Review of Systems   Constitutional: Negative for appetite change, chills, fever and unexpected weight change. HENT: Negative for congestion, rhinorrhea and sore throat. Eyes: Negative for pain and visual disturbance. Respiratory: Negative for cough, chest tightness and shortness of breath. Cardiovascular: Negative for chest pain and palpitations. Gastrointestinal: Negative for abdominal pain, blood in stool, constipation, diarrhea, nausea and vomiting. Genitourinary: Negative for difficulty urinating, dysuria, hematuria, scrotal swelling, testicular pain and urgency.    Musculoskeletal: Negative for arthralgias and gait problem. Skin: Negative for rash. Neurological: Positive for dizziness. Negative for syncope, weakness, light-headedness and headaches. Hematological: Negative for adenopathy. Does not bruise/bleed easily. Psychiatric/Behavioral: Negative for suicidal ideas. The patient is not nervous/anxious.           Current Outpatient Medications:     sildenafil (VIAGRA) 50 MG tablet, Take 1 tablet by mouth daily as needed for Erectile Dysfunction, Disp: 10 tablet, Rfl: 0    DULoxetine (CYMBALTA) 20 MG extended release capsule, Take 1 capsule by mouth daily, Disp: 30 capsule, Rfl: 3    levothyroxine (SYNTHROID) 200 MCG tablet, Take 1 tablet by mouth Daily, Disp: 90 tablet, Rfl: 0    gabapentin (NEURONTIN) 100 MG capsule, 200mg in am, 100mg at noon and 200mg in pm, Disp: 450 capsule, Rfl: 0    warfarin (COUMADIN) 5 MG tablet, Take 1 tablet by mouth daily, Disp: 90 tablet, Rfl: 3    spironolactone (ALDACTONE) 25 MG tablet, Take 25 mg by mouth daily, Disp: , Rfl:     aspirin (ECOTRIN LOW STRENGTH) 81 MG EC tablet, Take 1 tablet by mouth daily, Disp: 30 tablet, Rfl: 11    warfarin (COUMADIN) 1 MG tablet, Take 1 tablet by mouth daily (Patient taking differently: Take 1 mg by mouth Takes 1 mg Mondays and Fridays), Disp: 30 tablet, Rfl: 3    potassium chloride (KLOR-CON M) 20 MEQ extended release tablet, Take 1 tablet by mouth 2 times daily (Patient taking differently: Take 20 mEq by mouth 3 times daily ), Disp: 270 tablet, Rfl: 2    atorvastatin (LIPITOR) 40 MG tablet, Take 1 tablet by mouth daily, Disp: 90 tablet, Rfl: 3    metOLazone (ZAROXOLYN) 2.5 MG tablet, Take 1 tablet by mouth once a week (Patient taking differently: Take 2.5 mg by mouth M W F), Disp: 90 tablet, Rfl: 1    magnesium oxide (MAG-OX) 400 (241.3 Mg) MG TABS tablet, TAKE ONE TABLET BY MOUTH DAILY, Disp: , Rfl:     torsemide (DEMADEX) 20 MG tablet, Take 40 mg by mouth 2 times daily , Disp: , Rfl:     vitamin B-1 (THIAMINE) 100 MG tablet, Take 100 mg by mouth daily Last dose 06/16, Disp: , Rfl:     metoprolol tartrate (LOPRESSOR) 50 MG tablet, Take 1 tablet by mouth 2 times daily (Patient taking differently: Take 50 mg by mouth 2 times daily Take am day of surgery 06/22/2020), Disp: 60 tablet, Rfl: 3    MULTIPLE VITAMIN PO, Take 1 tablet by mouth daily Last taken 06/16/2020, Disp: , Rfl:     Allergies   Allergen Reactions    Sulfa Antibiotics Hives     Hives         Past Medical History:   Diagnosis Date    Blood circulation, collateral     CAD (coronary artery disease)     Chronic kidney disease     History of alcohol use     History of ascites     history of, approx 5 years ago    Hyperlipidemia     Hypertension     Hyponatremia     Left knee pain 06/2020    Liver disease     Lymphedema     lower extrem    Neuropathy     both feet    Osteoarthritis     Psychiatric problem     Thyroid disease     Use of cane as ambulatory aid     Uses wheelchair     for distances    Wears glasses     for reading    Wears hearing aid        Past Surgical History:   Procedure Laterality Date    APPENDECTOMY      CARDIAC CATHETERIZATION  05/2020    CATARACT REMOVAL WITH IMPLANT Bilateral     COLONOSCOPY      EYE SURGERY      cataracts    TOTAL KNEE ARTHROPLASTY Left 6/22/2020    LEFT KNEE IVAN ROBOTIC TOTAL ARTHROPLASTY performed by Jeronimo Smith MD at [facility] OR       Family History   Problem Relation Age of Onset   [facility] Other Mother         old age        Social History     Socioeconomic History    Marital status: Single     Spouse name: None    Number of children: 1    Years of education: 16    Highest education level: Master's degree (e.g., MA, MS, [place], MEd, MSW, LUCERO)   Occupational History    None   Social Needs    Financial resource strain: Patient refused    Food insecurity     Worry: Patient refused     Inability: Patient refused    Transportation needs     Medical: Patient refused     Non-medical: Patient refused   Tobacco Use    Smoking status: Former Smoker     Packs/day: 0.75     Years: 25.00     Pack years: 18.75     Types: Pipe, Cigars     Start date: 10/8/1964     Quit date: 1986     Years since quittin.9    Smokeless tobacco: Never Used    Tobacco comment: Cigar and Pipe smoking history only   Substance and Sexual Activity    Alcohol use: Yes     Alcohol/week: 0.0 standard drinks     Comment: quit 2017; socially as of 2020 on weekends    Drug use: No    Sexual activity: None   Lifestyle    Physical activity     Days per week: None     Minutes per session: None    Stress: None   Relationships    Social connections     Talks on phone: None     Gets together: None     Attends Denominational service: None     Active member of club or organization: None     Attends meetings of clubs or organizations: None     Relationship status: None    Intimate partner violence     Fear of current or ex partner: None     Emotionally abused: None     Physically abused: None     Forced sexual activity: None   Other Topics Concern    None   Social History Narrative    None        Vitals:    21 1305   BP: 122/64   Pulse: 76   Resp: 16   Temp: 97.3 °F (36.3 °C)   SpO2: 97%   Weight: 243 lb 4.8 oz (110.4 kg)   Height: 5' 8\" (1.727 m)       Exam:  Physical Exam  Constitutional:       Appearance: He is well-developed. HENT:      Head: Normocephalic and atraumatic. Right Ear: External ear normal.      Left Ear: External ear normal.      Nose: Rhinorrhea present. Mouth/Throat:      Pharynx: Posterior oropharyngeal erythema present. Eyes:      Pupils: Pupils are equal, round, and reactive to light. Neck:      Musculoskeletal: Normal range of motion and neck supple. Thyroid: No thyromegaly. Cardiovascular:      Rate and Rhythm: Normal rate. Rhythm irregular. Heart sounds: Murmur present. Systolic murmur present with a grade of 2/6.    Pulmonary:      Effort: Pulmonary effort is normal.      Breath sounds: Normal breath sounds. No wheezing or rales. Abdominal:      General: Bowel sounds are normal.      Palpations: Abdomen is soft. Tenderness: There is no abdominal tenderness. There is no guarding or rebound. Musculoskeletal: Normal range of motion. Right lower leg: Edema present. Left lower leg: Edema present. Lymphadenopathy:      Cervical: No cervical adenopathy. Skin:     General: Skin is warm and dry. Neurological:      Mental Status: He is alert and oriented to person, place, and time. Cranial Nerves: No cranial nerve deficit.    Psychiatric:         Judgment: Judgment normal.         Assessment and Plan:    Diagnoses and all orders for this visit:    Persistent atrial fibrillation  - uncertain as to onset  - ekg from 2017 showed flutter   - now on anticoagulation   - follow with cardiolgy   - on coumadin 6mg mon/fri and 5mg all other days    - inr today 2.9 (3/5/2021)   - continue present   - recheck in 1 month     Essential hypertension  - watch diet   - monitor blood pressure at home   - on spironolactone   - on metoprolol tart   - stable     Pure hypercholesterolemia  - watch diet   - simvastatin switched to atorvastatin   - follow labs   - uncontrolled     Coronary artery disease involving native coronary artery of native heart without angina pectoris  - s/p stress and cath (2020)   - medical therapy   - now on atorvastatin   - on aspirin 81mg   - on metorolol tart 50mg twice a day   - follow with cardiology     Alcoholic cirrhosis of liver with ascites ([address]  - discussed and advised stopping alcohol altogether   - declines referral to GI   - on spironolactone   - on torsemide   - on metolazone - states changes to m/w/f     Hyponatremia  - Possible multifactorial (cirrhois and meds)   - last lab stable at 132 (11/2020)     Dilated cardiomyopathy ([address]  - possible due to alcohol   - discussed and advised stopping alcohol altogether     CKD (chronic kidney disease) stage 3, GFR 30-59 ml/min (HCC)  - following with nephrology   - on spirinolactone 25mg daily   - on metalozaone 2.5mg every 10 days   - on torsemide - advised by renal to take 40mg twice a day    - on potassium supplement   - follow labs     Lymphedema  - on metalozaone 2.5mg every 10 days - states changed to m/w/f   - on torsemide - advised by renal to take 40mg twice a day    - on spirinolactone 25mg daily   - on potassium supplement   - compression stockings if able   - recommended referral to lymphedema PT - declines at present     Idiopathic chronic gout of multiple sites without tophus  - improved post medrol pack but not resolved   - previous labs showed elevated uric acid   - was on allopurinol in the past - stopped due to poss reaction   - xray reports reviewed, ER report reviewed (1/2021)   - may need to consider uloric and discuss with renal   - low purine diet handout provided     Impaired fasting glucose  - Continue present treatment   - watch diet   - follow A1c - last was 6.4 (11/20)     Alcohol abuse  - discussed and advised stopping alcohol altogether     History of ascites    Neuropathy (HCC)  - due to alcohol   - on gabapentin 200mg in am, 200mg at noon and 100mg in pm   - asking for alternative - discssed duloxetine and lyrica   - add duloxetine   - stable     Other specified hypothyroidism  - on levothyroxine   - follow labs     Primary osteoarthritis involving multiple joints    Erectile dysfunction, unspecified erectile dysfunction type  - trial of viagra      Return in about 3 months (around 6/5/2021) for check up and review and labs.     Orders Placed This Encounter   Procedures    Comprehensive Metabolic Panel     Standing Status:   Future     Standing Expiration Date:   3/5/2022    Magnesium     Standing Status:   Future     Standing Expiration Date:   3/5/2022    Lipid, Fasting     Standing Status:   Future     Standing Expiration Date:   3/5/2022    Hemoglobin A1C Standing Status:   Future     Standing Expiration Date:   3/5/2022    TSH without Reflex     Standing Status:   Future     Standing Expiration Date:   6/5/2021    Urinalysis     Standing Status:   Future     Standing Expiration Date:   3/5/2022    CBC Auto Differential     Standing Status:   Future     Standing Expiration Date:   3/5/2022    Uric Acid     Standing Status:   Future     Standing Expiration Date:   6/5/2021    Microalbumin, Ur     Standing Status:   Future     Standing Expiration Date:   3/5/2022     Requested Prescriptions     Signed Prescriptions Disp Refills    sildenafil (VIAGRA) 50 MG tablet 10 tablet 0     Sig: Take 1 tablet by mouth daily as needed for Erectile Dysfunction    DULoxetine (CYMBALTA) 20 MG extended release capsule 30 capsule 3     Sig: Take 1 capsule by mouth daily        Geno Thompson MD  3/5/2021  5:00 PM

## 2021-03-22 DIAGNOSIS — G62.9 NEUROPATHY: ICD-10-CM

## 2021-03-22 RX ORDER — GABAPENTIN 100 MG/1
CAPSULE ORAL
Qty: 450 CAPSULE | Refills: 0 | Status: SHIPPED
Start: 2021-03-22 | End: 2021-07-20 | Stop reason: SDUPTHER

## 2021-03-22 NOTE — TELEPHONE ENCOUNTER
Last Appointment:  3/5/2021  Future Appointments   Date Time Provider Rafi Alvarenga   4/5/2021 11:50 AM SCHEDULE, [facility]   6/4/2021 11:00 AM MD JHONATHAN [facility]

## 2021-03-25 ENCOUNTER — CARE COORDINATION (OUTPATIENT)
Dept: CARE COORDINATION | Age: 81
End: 2021-03-25

## 2021-03-25 NOTE — CARE COORDINATION
Care Coordination Services explained to patient. Patient verbalizes understanding, but declines at this time. Pt denies any needs at this time. ACM inquired about pt's leg swelling and inquired of pt ready to quit drinking, pt reports that he only drinks on the weekends socially and that he has to think about if he wants to do PT for the lymphedema. Patient encouraged to call PCP office with any questions/concerns.

## 2021-04-05 ENCOUNTER — TELEPHONE (OUTPATIENT)
Dept: PRIMARY CARE CLINIC | Age: 81
End: 2021-04-05

## 2021-04-05 DIAGNOSIS — M1A.09X0 IDIOPATHIC CHRONIC GOUT OF MULTIPLE SITES WITHOUT TOPHUS: Primary | ICD-10-CM

## 2021-04-05 RX ORDER — METHYLPREDNISOLONE 4 MG/1
TABLET ORAL
Qty: 1 KIT | Refills: 0 | Status: SHIPPED | OUTPATIENT
Start: 2021-04-05 | End: 2021-04-11

## 2021-04-05 NOTE — TELEPHONE ENCOUNTER
Pt is requesting a refill for the medrol dosepack for his gout. If approved he uses [facility] on [facility] [address].

## 2021-04-05 NOTE — TELEPHONE ENCOUNTER
Requested Prescriptions     Signed Prescriptions Disp Refills    methylPREDNISolone (MEDROL DOSEPACK) 4 MG tablet 1 kit 0     Sig: Take by mouth.      Authorizing Provider: Rachel Trinidad     Medication sent    If still having issues will need evaluation whether urgent care and may need to consider specialty referral if not improving since it seems to be very recurrent despite attempts to control

## 2021-05-10 RX ORDER — ATORVASTATIN CALCIUM 40 MG/1
40 TABLET, FILM COATED ORAL DAILY
Qty: 90 TABLET | Refills: 3 | Status: SHIPPED
Start: 2021-05-10 | End: 2022-05-11 | Stop reason: SDUPTHER

## 2021-05-20 LAB
ALBUMIN SERPL-MCNC: 3.8 G/DL
ALP BLD-CCNC: 82 U/L
ALT SERPL-CCNC: 18 U/L
ANION GAP SERPL CALCULATED.3IONS-SCNC: 13 MMOL/L
AST SERPL-CCNC: 27 U/L
AVERAGE GLUCOSE: NORMAL
BASOPHILS ABSOLUTE: 0.04 /ΜL
BASOPHILS RELATIVE PERCENT: 0.6 %
BILIRUB SERPL-MCNC: 0.6 MG/DL (ref 0.1–1.4)
BILIRUBIN, URINE: NEGATIVE
BLOOD, URINE: NEGATIVE
BUN BLDV-MCNC: 20 MG/DL
CALCIUM SERPL-MCNC: 8.8 MG/DL
CHLORIDE BLD-SCNC: 88 MMOL/L
CHOLESTEROL, FASTING: 120
CLARITY: CLEAR
CO2: 25 MMOL/L
COLOR: YELLOW
CREAT SERPL-MCNC: 1.34 MG/DL
CREATININE URINE: 61.2 MG/DL
EOSINOPHILS ABSOLUTE: 0.09 /ΜL
EOSINOPHILS RELATIVE PERCENT: 1.2 %
GFR CALCULATED: 52
GLUCOSE BLD-MCNC: 103 MG/DL
GLUCOSE URINE: NEGATIVE
HBA1C MFR BLD: 5.9 %
HCT VFR BLD CALC: 33.5 % (ref 41–53)
HDLC SERPL-MCNC: 31 MG/DL (ref 35–70)
HEMOGLOBIN: 10.8 G/DL (ref 13.5–17.5)
KETONES, URINE: NEGATIVE
LDL CHOLESTEROL CALCULATED: 63 MG/DL (ref 0–160)
LEUKOCYTE ESTERASE, URINE: NEGATIVE
LYMPHOCYTES ABSOLUTE: 2 /ΜL
LYMPHOCYTES RELATIVE PERCENT: 27.7 %
MAGNESIUM: 1.7 MG/DL
MCH RBC QN AUTO: 29.7 PG
MCHC RBC AUTO-ENTMCNC: 32.2 G/DL
MCV RBC AUTO: 92 FL
MICROALBUMIN/CREAT 24H UR: <12 MG/G{CREAT}
MONOCYTES ABSOLUTE: 0.71 /ΜL
MONOCYTES RELATIVE PERCENT: 9.8 %
NEUTROPHILS ABSOLUTE: 4.39 /ΜL
NEUTROPHILS RELATIVE PERCENT: 60.7 %
NITRITE, URINE: NEGATIVE
PDW BLD-RTO: 14.6 %
PH UA: 7 (ref 4.5–8)
PHOSPHORUS: 4.1 MG/DL
PLATELET # BLD: 319 K/ΜL
PMV BLD AUTO: 10.3 FL
POTASSIUM SERPL-SCNC: 3.7 MMOL/L
PROTEIN UA: NEGATIVE
RBC # BLD: 3.64 10^6/ΜL
SODIUM BLD-SCNC: 126 MMOL/L
SPECIFIC GRAVITY, URINE: 1.01
TOTAL PROTEIN: 6.8
TRIGLYCERIDE, FASTING: 128
TSH SERPL DL<=0.05 MIU/L-ACNC: 0.1 UIU/ML
URIC ACID: 9.7
UROBILINOGEN, URINE: NORMAL
WBC # BLD: 7.23 10^3/ML

## 2021-05-24 RX ORDER — LEVOTHYROXINE SODIUM 0.2 MG/1
200 TABLET ORAL DAILY
Qty: 90 TABLET | Refills: 0 | Status: SHIPPED
Start: 2021-05-24 | End: 2021-08-31 | Stop reason: SDUPTHER

## 2021-05-29 ENCOUNTER — TELEPHONE (OUTPATIENT)
Dept: FAMILY MEDICINE CLINIC | Age: 81
End: 2021-05-29

## 2021-05-29 NOTE — TELEPHONE ENCOUNTER
Please let the patient know that blood work results from nephrology were received    Blood work results showed     urine analysis was normal    Blood count showed low hemoglobin which is consistent with anemia, other blood counts were normal    Sugar was elevated, hemoglobin A1c, measure 3-month sugar control was slightly elevated at 5.9 consistent with prediabetes.      Protein levels were mildly low    Other electrolytes and liver function tests were normal    Labs still showed slight kidney dysfunction    Cholesterol levels were fair HDL or good cholesterol was lower than recommended    Thyroid labs were suggestive of overactive and may need to consider dose reduction    Thanks

## 2021-06-07 DIAGNOSIS — E78.00 PURE HYPERCHOLESTEROLEMIA: ICD-10-CM

## 2021-06-07 DIAGNOSIS — R73.01 IMPAIRED FASTING GLUCOSE: ICD-10-CM

## 2021-06-09 ENCOUNTER — TELEPHONE (OUTPATIENT)
Dept: ADMINISTRATIVE | Age: 81
End: 2021-06-09

## 2021-06-09 ENCOUNTER — OFFICE VISIT (OUTPATIENT)
Dept: FAMILY MEDICINE CLINIC | Age: 81
End: 2021-06-09
Payer: MEDICARE

## 2021-06-09 VITALS
HEIGHT: 68 IN | BODY MASS INDEX: 33.34 KG/M2 | HEART RATE: 50 BPM | TEMPERATURE: 97.3 F | WEIGHT: 220 LBS | DIASTOLIC BLOOD PRESSURE: 60 MMHG | SYSTOLIC BLOOD PRESSURE: 110 MMHG | OXYGEN SATURATION: 98 %

## 2021-06-09 DIAGNOSIS — I25.10 CORONARY ARTERY DISEASE INVOLVING NATIVE CORONARY ARTERY OF NATIVE HEART WITHOUT ANGINA PECTORIS: ICD-10-CM

## 2021-06-09 DIAGNOSIS — I89.0 LYMPHEDEMA: ICD-10-CM

## 2021-06-09 DIAGNOSIS — I10 ESSENTIAL HYPERTENSION: ICD-10-CM

## 2021-06-09 DIAGNOSIS — R73.01 IMPAIRED FASTING GLUCOSE: ICD-10-CM

## 2021-06-09 DIAGNOSIS — G62.9 NEUROPATHY: ICD-10-CM

## 2021-06-09 DIAGNOSIS — K70.31 ALCOHOLIC CIRRHOSIS OF LIVER WITH ASCITES (HCC): ICD-10-CM

## 2021-06-09 DIAGNOSIS — E03.8 OTHER SPECIFIED HYPOTHYROIDISM: ICD-10-CM

## 2021-06-09 DIAGNOSIS — N18.30 ANEMIA DUE TO STAGE 3 CHRONIC KIDNEY DISEASE, UNSPECIFIED WHETHER STAGE 3A OR 3B CKD (HCC): ICD-10-CM

## 2021-06-09 DIAGNOSIS — D63.1 ANEMIA DUE TO STAGE 3 CHRONIC KIDNEY DISEASE, UNSPECIFIED WHETHER STAGE 3A OR 3B CKD (HCC): ICD-10-CM

## 2021-06-09 DIAGNOSIS — I48.19 PERSISTENT ATRIAL FIBRILLATION (HCC): Primary | ICD-10-CM

## 2021-06-09 DIAGNOSIS — N18.30 STAGE 3 CHRONIC KIDNEY DISEASE, UNSPECIFIED WHETHER STAGE 3A OR 3B CKD (HCC): ICD-10-CM

## 2021-06-09 DIAGNOSIS — E78.00 PURE HYPERCHOLESTEROLEMIA: ICD-10-CM

## 2021-06-09 DIAGNOSIS — I42.9 CARDIOMYOPATHY, UNSPECIFIED TYPE (HCC): ICD-10-CM

## 2021-06-09 DIAGNOSIS — M15.9 PRIMARY OSTEOARTHRITIS INVOLVING MULTIPLE JOINTS: ICD-10-CM

## 2021-06-09 LAB
INTERNATIONAL NORMALIZATION RATIO, POC: 3.8
PROTHROMBIN TIME, POC: 45.2

## 2021-06-09 PROCEDURE — 99214 OFFICE O/P EST MOD 30 MIN: CPT | Performed by: INTERNAL MEDICINE

## 2021-06-09 PROCEDURE — 93793 ANTICOAG MGMT PT WARFARIN: CPT | Performed by: INTERNAL MEDICINE

## 2021-06-09 PROCEDURE — 85610 PROTHROMBIN TIME: CPT | Performed by: INTERNAL MEDICINE

## 2021-06-09 ASSESSMENT — ENCOUNTER SYMPTOMS
ABDOMINAL PAIN: 0
CONSTIPATION: 0
SORE THROAT: 0
SHORTNESS OF BREATH: 0
VOMITING: 0
BLOOD IN STOOL: 0
RHINORRHEA: 0
COUGH: 0
CHEST TIGHTNESS: 0
NAUSEA: 0
EYE PAIN: 0
DIARRHEA: 0

## 2021-06-09 NOTE — PROGRESS NOTES
[facility]   Internal Medicine     2021  [place] Se[place] : 1940 Sex: male Age:80 y.o. Chief Complaint   Patient presents with    3 Month Follow-Up     Tongue feels more sensative     Discuss Medications     Questions about Gabapentin     Anticoagulation        HPI:   Patient presents to office for evaluation of the following medical concerns. - States has lost 20lbs since last visit. Only difference was adding duloxetine.     - States having issues with tinnitus.     - States having issues with b/l LE edema. On diuretics but swelling persists. States has improved     - Having erectile dysfunction. Asking about treatment     - Stats has atrial fibrillation. Uncertain cardiology follow up. On metoprolol. On coumadin. continue 6mg mon/fri and 5mg all other days. INR today (2021) was 3.8    - States has cardiomyopathy. States due to alcohol. Declines to follow with cardiology.     - States has CAD. States had stress test (2020) The myocardial perfusion imaging was abnormal, moderate sized reversible defect , cardiac cath (2020) - 40% proximal LAD lesion, 50% mid LAD lesion at the takeoff of a large diagonal branch, 50% proximal left circumflex artery disease. Totally occluded right coronary artery with grade 3 left-to-right collaterals. States needs aggressive risk factor management. On metoprolol    - States has hypothyroid. On synthroid. Last lab (2021) was [employer] has cirrhosis due to alcohol. Still drinks on weekends. Discussed cessation. On metolazone  2.5mg - states switched to m/w/f. On torsemide 40 mg twice a day. on sprinolactone. - States has chronic kidney disease. Follows with nephrology. LAst creat was 1.3 (2021)     - States has elevated uric acid. States was on allopurinol in the past, stopped due to thought was causing hives. Last uric acid was 9 ()     States has hypertension, not checking blood pressure at home. On metoprolol. States has high cholesterol. Was on simvastatin - changed to atorvastatin. No reported side effects. States has neuropathy. Possible due to alcohol. On gabapentin. States taking 2 tablets in am, 1 tablet in afternoon and 1 tablet in pm.  Asking about alternatives. Added duloxtine. Not much improved     - States has osteoarthritis. States multiple joints. Had left knee replacement. 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 Follows with ortho. Needs physical therapy. - labs from 5/20/2021 reviewed with patient 6/9/2021  - low sodium - uncertain in duloxetine     Health Maintenance   - immunizations:   Influenza Vaccination - declines  Pneumonia Vaccination - declines   Zoster/Shingles Vaccine  Tetanus Vaccination    - Screenings:   Colonoscopy   Prostate     Stress test (4/2020) - The myocardial perfusion imaging was abnormal, moderate sized reversible defect in the entire inferior wall and apex suggestive of ischemia    echo (4/20) - ef 55-60%, mod l &r atiral dilation, mild to mod MR, indeterm diastolic dysfun    cardiac cath (5/20) - IMPRESSION:  1.  40% proximal LAD lesion, 50% mid LAD lesion at the takeoff of a  large diagonal branch. 2.  50% proximal left circumflex artery disease. 3.  Totally occluded right coronary artery with grade 3 left-to-right  collaterals. Couseled on Home Safety     ROS:  Review of Systems   Constitutional: Negative for appetite change, chills, fever and unexpected weight change. HENT: Negative for congestion, rhinorrhea and sore throat. Eyes: Negative for pain and visual disturbance. Respiratory: Negative for cough, chest tightness and shortness of breath. Cardiovascular: Negative for chest pain and palpitations. Gastrointestinal: Negative for abdominal pain, blood in stool, constipation, diarrhea, nausea and vomiting.    Genitourinary: Negative for difficulty urinating, dysuria, hematuria, scrotal swelling, testicular pain and urgency. Musculoskeletal: Negative for arthralgias and gait problem. Skin: Negative for rash. Neurological: Positive for dizziness. Negative for syncope, weakness, light-headedness and headaches. Hematological: Negative for adenopathy. Does not bruise/bleed easily. Psychiatric/Behavioral: Negative for suicidal ideas. The patient is not nervous/anxious.           Current Outpatient Medications:     levothyroxine (SYNTHROID) 200 MCG tablet, Take 1 tablet by mouth Daily, Disp: 90 tablet, Rfl: 0    atorvastatin (LIPITOR) 40 MG tablet, Take 1 tablet by mouth daily, Disp: 90 tablet, Rfl: 3    gabapentin (NEURONTIN) 100 MG capsule, 200mg in am, 100mg at noon and 200mg in pm, Disp: 450 capsule, Rfl: 0    DULoxetine (CYMBALTA) 20 MG extended release capsule, Take 1 capsule by mouth daily, Disp: 30 capsule, Rfl: 3    warfarin (COUMADIN) 5 MG tablet, Take 1 tablet by mouth daily, Disp: 90 tablet, Rfl: 3    spironolactone (ALDACTONE) 25 MG tablet, Take 25 mg by mouth daily, Disp: , Rfl:     aspirin (ECOTRIN LOW STRENGTH) 81 MG EC tablet, Take 1 tablet by mouth daily, Disp: 30 tablet, Rfl: 11    warfarin (COUMADIN) 1 MG tablet, Take 1 tablet by mouth daily (Patient taking differently: Take 1 mg by mouth Takes 1 mg Mondays and Fridays), Disp: 30 tablet, Rfl: 3    potassium chloride (KLOR-CON M) 20 MEQ extended release tablet, Take 1 tablet by mouth 2 times daily (Patient taking differently: Take 20 mEq by mouth 3 times daily ), Disp: 270 tablet, Rfl: 2    metOLazone (ZAROXOLYN) 2.5 MG tablet, Take 1 tablet by mouth once a week (Patient taking differently: Take 2.5 mg by mouth M W F), Disp: 90 tablet, Rfl: 1    magnesium oxide (MAG-OX) 400 (241.3 Mg) MG TABS tablet, TAKE ONE TABLET BY MOUTH DAILY, Disp: , Rfl:     torsemide (DEMADEX) 20 MG tablet, Take 40 mg by mouth 2 times daily , Disp: , Rfl:     vitamin B-1 (THIAMINE) 100 MG tablet, Take 100 mg by mouth daily Last dose 06/16, Disp: , Rfl:    metoprolol tartrate (LOPRESSOR) 50 MG tablet, Take 1 tablet by mouth 2 times daily, Disp: 60 tablet, Rfl: 3    MULTIPLE VITAMIN PO, Take 1 tablet by mouth daily Last taken 2020, Disp: , Rfl:     sildenafil (VIAGRA) 50 MG tablet, Take 1 tablet by mouth daily as needed for Erectile Dysfunction (Patient not taking: Reported on 2021), Disp: 10 tablet, Rfl: 0    Allergies   Allergen Reactions    Sulfa Antibiotics Hives     Hives         Past Medical History:   Diagnosis Date    Blood circulation, collateral     CAD (coronary artery disease)     Chronic kidney disease     History of alcohol use     History of ascites     history of, approx 5 years ago    Hyperlipidemia     Hypertension     Hyponatremia     Left knee pain 2020    Liver disease     Lymphedema     lower extrem    Neuropathy     both feet    Osteoarthritis     Psychiatric problem     Thyroid disease     Use of cane as ambulatory aid     Uses wheelchair     for distances    Wears glasses     for reading    Wears hearing aid        Past Surgical History:   Procedure Laterality Date    APPENDECTOMY      CARDIAC CATHETERIZATION  2020    CATARACT REMOVAL WITH IMPLANT Bilateral     COLONOSCOPY      EYE SURGERY      cataracts    TOTAL KNEE ARTHROPLASTY Left 2020    LEFT KNEE IVAN ROBOTIC TOTAL ARTHROPLASTY performed by Dana Gutierrez MD at [facility] OR       Family History   Problem Relation Age of Onset   Mago Dickey Other Mother         old age        Social History     Socioeconomic History    Marital status: Single     Spouse name: None    Number of children: 1    Years of education: 16    Highest education level: Master's degree (e.g., MA, MS, [place], MEd, MSW, LUCERO)   Occupational History    None   Tobacco Use    Smoking status: Former Smoker     Packs/day: 0.75     Years: 25.00     Pack years: 18.75     Types: Pipe, Cigars     Start date: 10/8/1964     Quit date: 1986     Years since quittin.2    Smokeless tobacco: Never Used    Tobacco comment: Cigar and Pipe smoking history only   Vaping Use    Vaping Use: Never used   Substance and Sexual Activity    Alcohol use: Yes     Alcohol/week: 0.0 standard drinks     Comment: quit june 8 2017; socially as of 06/2020 on weekends    Drug use: No    Sexual activity: None   Other Topics Concern    None   Social History Narrative    None     Social Determinants of Health     Financial Resource Strain:     Difficulty of Paying Living Expenses:    Food Insecurity:     Worried About Running Out of Food in the Last Year:     [address] in the Last Year:    Transportation Needs:     Lack of Transportation (Medical):  Lack of Transportation (Non-Medical):    Physical Activity:     Days of Exercise per Week:     Minutes of Exercise per Session:    Stress:     Feeling of Stress :    Social Connections:     Frequency of Communication with Friends and Family:     Frequency of Social Gatherings with Friends and Family:     Attends Rastafari Services:     Active Member of Clubs or Organizations:     Attends Club or Organization Meetings:     Marital Status:    Intimate Partner Violence:     Fear of Current or Ex-Partner:     Emotionally Abused:     Physically Abused:     Sexually Abused:         Vitals:    06/09/21 1108   BP: 110/60   Site: Left Upper Arm   Position: Sitting   Cuff Size: Large Adult   Pulse: 50   Temp: 97.3 °F (36.3 °C)   SpO2: 98%   Weight: 220 lb (99.8 kg)   Height: 5' 8\" (1.727 m)       Exam:  Physical Exam  Constitutional:       Appearance: He is well-developed. HENT:      Head: Normocephalic and atraumatic. Right Ear: External ear normal.      Left Ear: External ear normal.      Nose: Rhinorrhea present. Mouth/Throat:      Pharynx: Posterior oropharyngeal erythema present. Eyes:      Pupils: Pupils are equal, round, and reactive to light. Neck:      Thyroid: No thyromegaly.    Cardiovascular:      Rate and Rhythm: Normal rate. Rhythm irregular. Heart sounds: Murmur heard. Systolic murmur is present with a grade of 2/6. Pulmonary:      Effort: Pulmonary effort is normal.      Breath sounds: Normal breath sounds. No wheezing or rales. Abdominal:      General: Bowel sounds are normal.      Palpations: Abdomen is soft. Tenderness: There is no abdominal tenderness. There is no guarding or rebound. Musculoskeletal:         General: Normal range of motion. Cervical back: Normal range of motion and neck supple. Right lower leg: Edema present. Left lower leg: Edema present. Lymphadenopathy:      Cervical: No cervical adenopathy. Skin:     General: Skin is warm and dry. Neurological:      Mental Status: He is alert and oriented to person, place, and time. Cranial Nerves: No cranial nerve deficit.    Psychiatric:         Judgment: Judgment normal.         Assessment and Plan:    Diagnoses and all orders for this visit:    Persistent atrial fibrillation  - uncertain as to onset  - ekg from 2017 showed flutter   - now on anticoagulation   - follow with cardiolgy   - on coumadin 6mg mon/fri and 5mg all other days    - inr today 3.8 (6/9/2021)   - hold x 1 today, then decrease to 6mg on mon and 5mg all other days   - recheck in 1 month     Essential hypertension  - watch diet   - monitor blood pressure at home   - on spironolactone   - on metoprolol tart   - stable     Pure hypercholesterolemia  - watch diet   - simvastatin switched to atorvastatin   - follow labs   - uncontrolled     Coronary artery disease involving native coronary artery of native heart without angina pectoris  - s/p stress and cath (2020)   - medical therapy   - now on atorvastatin   - on aspirin 81mg   - on metorolol tart 50mg twice a day   - follow with cardiology - rerefer     Alcoholic cirrhosis of liver with ascites ([facility] [address]  - discussed and advised stopping alcohol altogether   - declines referral to GI   - on spironolactone   - on torsemide   - on metolazone - states changes to m/w/f     Hyponatremia  - Possible multifactorial (cirrhois and meds)   - last lab decreased to 126 (5/2021)     Dilated cardiomyopathy ([facility] [address]  - possible due to alcohol   - discussed and advised stopping alcohol altogether     CKD (chronic kidney disease) stage 3, GFR 30-59 ml/min ([facility])  - following with nephrology   - on spirinolactone 25mg daily   - on metalozaone 2.5mg every 10 days   - on torsemide - advised by renal to take 40mg twice a day    - on potassium supplement   - follow labs     Lymphedema  - on metalozaone 2.5mg every 10 days - states changed to m/w/f   - on torsemide - advised by renal to take 40mg twice a day    - on spirinolactone 25mg daily   - on potassium supplement   - compression stockings if able   - recommended referral to lymphedema PT - declines at present     Idiopathic chronic gout of multiple sites without tophus  - improved post medrol pack but not resolved   - previous labs showed elevated uric acid   - was on allopurinol in the past - stopped due to poss reaction   - xray reports reviewed, ER report reviewed (1/2021)   - may need to consider uloric and discuss with renal   - low purine diet handout provided     Impaired fasting glucose  - Continue present treatment   - watch diet   - follow A1c - last was 5.9 (5/2021)     Alcohol abuse  - discussed and advised stopping alcohol altogether     History of ascites    Neuropathy ([facility] [address]  - due to alcohol   - on gabapentin 200mg in am, 200mg at noon and 100mg in pm   - asking for alternative - discssed duloxetine and lyrica   - added duloxetine   - stable     Other specified hypothyroidism  - on levothyroxine   - follow labs - last (5/2021) - overactive   - decrease dose to 200mcg mon-fri and 100mcg sat/sun   - recheck in 3 months     Primary osteoarthritis involving multiple joints    Erectile dysfunction, unspecified erectile dysfunction type  - trial of viagra      No follow-ups on

## 2021-06-28 ENCOUNTER — CARE COORDINATION (OUTPATIENT)
Dept: CARE COORDINATION | Age: 81
End: 2021-06-28

## 2021-06-28 NOTE — LETTER
6/28/2021    [address] [address]      Dear Polina Albrecht    My name is Yanci Hammer RN and I am an Ambulatory Care Manager who partners with Dr Tamiko Hamilton to improve patients' health. I've been trying to reach you via phone to let you know that, as a member of your care team, I will work with other providers involved in your care, offer education for your specific health conditions, and connect you with more resources as needed. This program is designed to provide you with the opportunity to have a [facility] partner with you for the following situations:     1) if you come home from the hospital or emergency room   2) to help manage your disease   3) when you would like assistance coordinating services or appointments    This added support is provided at no additional cost to you. My primary focus is to help you achieve specific goals and improve your health. Please call me at [phone] to further discuss how I can support your health care needs.     Sincerely,    Yanci Hammer RN

## 2021-06-28 NOTE — CARE COORDINATION
Called patient to discuss Care Coordination Program. I left a voice mail message introducing myself and a brief description of the Care Coordination Program.   Requested a call back to discuss the program, enrollment, and schedule a time to speak with me. Direct contact information given. Will have introduction letter mailed to pt.

## 2021-07-02 ENCOUNTER — OFFICE VISIT (OUTPATIENT)
Dept: CARDIOLOGY CLINIC | Age: 81
End: 2021-07-02
Payer: MEDICARE

## 2021-07-02 VITALS
WEIGHT: 218.7 LBS | HEART RATE: 65 BPM | HEIGHT: 68 IN | SYSTOLIC BLOOD PRESSURE: 140 MMHG | DIASTOLIC BLOOD PRESSURE: 80 MMHG | BODY MASS INDEX: 33.15 KG/M2 | RESPIRATION RATE: 18 BRPM

## 2021-07-02 DIAGNOSIS — I42.9 CARDIOMYOPATHY, UNSPECIFIED TYPE (HCC): Primary | ICD-10-CM

## 2021-07-02 PROCEDURE — 99214 OFFICE O/P EST MOD 30 MIN: CPT | Performed by: INTERNAL MEDICINE

## 2021-07-02 PROCEDURE — 93000 ELECTROCARDIOGRAM COMPLETE: CPT | Performed by: INTERNAL MEDICINE

## 2021-07-02 NOTE — PROGRESS NOTES
OUTPATIENT CARDIOLOGY FOLLOW-UP    Name: Ifeanyi Painter    Age: 68 y.o. Primary Care Physician: Dontae Arthur MD    Date of Service: 7/2/2021    Chief Complaint:   Chief Complaint   Patient presents with    Cardiomyopathy     Yearly ov- Patient has no complaints. Interim History:   Here for follow-up. History of longstanding persistent A. 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 Last seen in virtual visit last spring prior to his heart cath which showed occluded RCA with robust collaterals, and moderate left-sided disease. He subsequently underwent left knee replacement surgery and has been doing well since then. He has lost about 20 pounds intentionally. He denies chest pain, shortness of breath or palpitation. He has chronic lower extremity edema which is worse on the left. Overall he feels well.     Review of Systems:   Negative except as described above irregular rhythm with a normal rate    Past Medical History:  Past Medical History:   Diagnosis Date    Blood circulation, collateral     CAD (coronary artery disease)     Chronic kidney disease     History of alcohol use     History of ascites     history of, approx 5 years ago    Hyperlipidemia     Hypertension     Hyponatremia     Left knee pain 06/2020    Liver disease     Lymphedema     lower extrem    Neuropathy     both feet    Osteoarthritis     Psychiatric problem     Thyroid disease     Use of cane as ambulatory aid     Uses wheelchair     for distances    Wears glasses     for reading    Wears hearing aid        Past Surgical History:  Past Surgical History:   Procedure Laterality Date    APPENDECTOMY      CARDIAC CATHETERIZATION  05/2020    CATARACT REMOVAL WITH IMPLANT Bilateral     COLONOSCOPY      EYE SURGERY      cataracts    TOTAL KNEE ARTHROPLASTY Left 6/22/2020    LEFT KNEE IVAN ROBOTIC TOTAL ARTHROPLASTY performed by Angeles Lala MD at [facility] OR       Family History:  Family History   Problem Relation Age of Onset   Ariela London Other Mother         old age        Social History:  Social History     Tobacco Use    Smoking status: Former Smoker     Packs/day: 0.75     Years: 25.00     Pack years: 18.75     Types: Pipe, Cigars     Start date: 10/8/1964     Quit date: 1986     Years since quittin.2    Smokeless tobacco: Never Used    Tobacco comment: Cigar and Pipe smoking history only   Vaping Use    Vaping Use: Never used   Substance Use Topics    Alcohol use: Yes     Alcohol/week: 0.0 standard drinks     Comment: quit 2017; socially as of 2020 on weekends    Drug use: No        Allergies:   Allergies   Allergen Reactions    Sulfa Antibiotics Hives     Hives         Current Medications:    Current Outpatient Medications:     levothyroxine (SYNTHROID) 200 MCG tablet, Take 1 tablet by mouth Daily (Patient taking differently: Take 200 mcg by mouth Daily Pt take 0.5 Saturday and ), Disp: 90 tablet, Rfl: 0    atorvastatin (LIPITOR) 40 MG tablet, Take 1 tablet by mouth daily, Disp: 90 tablet, Rfl: 3    gabapentin (NEURONTIN) 100 MG capsule, 200mg in am, 100mg at noon and 200mg in pm, Disp: 450 capsule, Rfl: 0    DULoxetine (CYMBALTA) 20 MG extended release capsule, Take 1 capsule by mouth daily, Disp: 30 capsule, Rfl: 3    warfarin (COUMADIN) 5 MG tablet, Take 1 tablet by mouth daily, Disp: 90 tablet, Rfl: 3    spironolactone (ALDACTONE) 25 MG tablet, Take 25 mg by mouth daily, Disp: , Rfl:     aspirin (ECOTRIN LOW STRENGTH) 81 MG EC tablet, Take 1 tablet by mouth daily, Disp: 30 tablet, Rfl: 11    warfarin (COUMADIN) 1 MG tablet, Take 1 tablet by mouth daily (Patient taking differently: Take 1 mg by mouth Takes 1 mg  and ), Disp: 30 tablet, Rfl: 3    potassium chloride (KLOR-CON M) 20 MEQ extended release tablet, Take 1 tablet by mouth 2 times daily, Disp: 270 tablet, Rfl: 2    metOLazone (ZAROXOLYN) 2.5 25 2021     Lab Results   Component Value Date    MG 1.7 2021     Lab Results   Component Value Date    WBC 7.23 2021    HGB 10.8 (A) 2021    HCT 33.5 (A) 2021    MCV 92.0 2021     2021     Lab Results   Component Value Date    ALT 18 2021    AST 27 2021    ALKPHOS 82 2021    BILITOT 0.6 2021     No results found for: CKTOTAL, CKMB, CKMBINDEX, TROPONINI  Lab Results   Component Value Date    INR 3.8 2021    INR 2.9 2021    INR 2.4 2021    PROTIME 45.2 2021    PROTIME 47.2 (H) 2020    PROTIME 18.7 2020     Lab Results   Component Value Date    TSH 0.097 2021     Lab Results   Component Value Date    LABA1C 5.9 2021     No results found for: EAG  Lab Results   Component Value Date    CHOL 108 2020    CHOL 241 (H) 2019    CHOL 181 10/04/2018     Lab Results   Component Value Date    TRIG 13 2020    TRIG 228 (H) 2019    TRIG 158 10/04/2018     Lab Results   Component Value Date    HDL 31 (A) 2021    HDL 35 2020    HDL 34 2019     Lab Results   Component Value Date    LDLCALC 63 2021    LDLCALC 44 2020    LDLCALC 161 (H) 2019     Lab Results   Component Value Date    LABVLDL 46 2019    LABVLDL 17 2017    VLDL 32 10/04/2018     Lab Results   Component Value Date    CHOLHDLRATIO 5.3 2018    CHOLHDLRATIO 2.8 2017     No results for input(s): PROBNP in the last 72 hours. Cardiac Tests:  EC2021: Atrial fibrillation 65 bpm.  Normal axis. Normal intervals. Possible prior anterior infarct. Echocardiogram:   Transthoracic echo 4/3/2020   Summary   Atrial fibrillation noted. Normal left ventricular size and systolic function. Ejection fraction is visually estimated at 55-60%. Indeterminate diastolic function. No regional wall motion abnormalities seen.    Mild left ventricular concentric hypertrophy noted.   Normal right ventricular size and function. The left atrium is moderately dilated. Moderately enlarged right atrium. Mild-moderate mitral regurgitation. Stress test:    Pharmacologic stress 4/2/2020  Gated SPECT left ventricular ejection fraction was calculated to   be 73%, with normal myocardial thickening and wall motion. Impression:     1. ECG during the infusion did not change. 2. The myocardial perfusion imaging was abnormal.  The   abnormality was a moderate sized reversible defect in the entire   inferior wall and apex suggestive of ischemia. 3. Overall left ventricular systolic function was normal without   regional wall motion abnormalities. 4. Intermediate risk pre-operative pharmacologic stress test.     Cardiac catheterization:   Left heart cath 5/12/2020    IMPRESSION:  1.  40% proximal LAD lesion, 50% mid LAD lesion at the takeoff of a  large diagonal branch. 2.  50% proximal left circumflex artery disease. 3.  Totally occluded right coronary artery with grade 3 left-to-right  collaterals.     RECOMMENDATIONS:  Aggressive medical therapy with aggressive coronary  artery disease risk factor modifications. Orders Placed This Encounter   Procedures    EKG 12 Lead        Requested Prescriptions      No prescriptions requested or ordered in this encounter        ASSESSMENT / PLAN:  1. Coronary artery disease.  of RCA with robust collaterals. Moderate left-sided disease. 2. History of cardiomyopathy, presumed alcohol related. EF 37% by echo 2017. Normal by echo 4/2020.  3. Chronic HFpEF  4. Longstanding persistent atrial fibrillation. Diagnosed 6/2017. AC with warfarin. No prior AAD or DCC. 5. Hypertension  6. CKD stage II, most recent creatinine 1.3 5/2021  7. Alcoholic cirrhosis  8. Borderline diabetes A1c 5.9%  9. Anemia Hgb 10.8  10. Peripheral neuropathy  11. Hyperlipidemia  12. Osteoarthritis  13. Hypothyroidism  14.  Left knee replacement 6/2020    Recommendations:  Patient doing well from cardiac standpoint. No anginal symptoms, he is unaware of his arrhythmia. He has some chronic lower extremity edema and takes diuretics and followed by nephrology. Discussed at length about options of rate versus rhythm control, as he feels well he is okay continue with rate control strategy at this time and deferring cardioversion. · Continue metoprolol tartrate 50 twice daily for rate control  · Continue aggressive medical therapy of CAD with aspirin atorvastatin and metoprolol  · Continue warfarin for stroke risk reduction  · Continue torsemide and spironolactone per nephrology, follows with Dr. Torri Charles  · Aggressive risk factor modification  · Increase physical activity as able  · Follow-up in 1 year or sooner if need arises    The patient's current medication list, allergies, problem list and results of all previously ordered testing were reviewed at today's visit.     Edgar Wilcox MD, [facility]

## 2021-07-02 NOTE — LETTER
[facility]  [address]  Phone: [phone]  Fax: [phone]           Jessica Perez MD      July 2, 2021     Patient: Sophia Noel   MR Number: [id number]   YOB: 1940   Date of Visit: 7/2/2021       Dear Dr. Link Oh: Thank you for referring Valery Morrell to me for evaluation/treatment. Below are the relevant portions of my assessment and plan of care. If you have questions, please do not hesitate to call me. I look forward to following Penelope Duque along with you.     Sincerely,    MD Jessica Elliott MD    CC providers:  MD Josh Arteaga [id number]  [facility] [employer]

## 2021-07-06 DIAGNOSIS — G62.9 NEUROPATHY: ICD-10-CM

## 2021-07-06 RX ORDER — DULOXETIN HYDROCHLORIDE 20 MG/1
20 CAPSULE, DELAYED RELEASE ORAL DAILY
Qty: 30 CAPSULE | Refills: 3 | Status: SHIPPED
Start: 2021-07-06 | End: 2021-11-08 | Stop reason: SDUPTHER

## 2021-07-14 ENCOUNTER — CARE COORDINATION (OUTPATIENT)
Dept: CARE COORDINATION | Age: 81
End: 2021-07-14

## 2021-07-14 NOTE — CARE COORDINATION
Care Coordination Services explained to patient. Patient verbalizes understanding, but declines at this time, denies any needs currently. Patient encouraged to call PCP office with any questions/concerns/updates.

## 2021-07-20 DIAGNOSIS — G62.9 NEUROPATHY: ICD-10-CM

## 2021-07-20 RX ORDER — GABAPENTIN 100 MG/1
CAPSULE ORAL
Qty: 450 CAPSULE | Refills: 0 | Status: SHIPPED
Start: 2021-07-20 | End: 2021-11-16 | Stop reason: SDUPTHER

## 2021-08-06 RX ORDER — POTASSIUM CHLORIDE 20 MEQ/1
20 TABLET, EXTENDED RELEASE ORAL 2 TIMES DAILY
Qty: 270 TABLET | Refills: 2 | Status: SHIPPED
Start: 2021-08-06 | End: 2022-02-11

## 2021-08-06 RX ORDER — POTASSIUM CHLORIDE 20 MEQ/1
20 TABLET, EXTENDED RELEASE ORAL 2 TIMES DAILY
Qty: 180 TABLET | Refills: 3 | OUTPATIENT
Start: 2021-08-06

## 2021-08-06 NOTE — TELEPHONE ENCOUNTER
----- Message from [facility] sent at 8/6/2021  9:27 AM EDT -----  Subject: Refill Request    QUESTIONS  Name of Medication? potassium chloride (KLOR-CON M) 20 MEQ extended   release tablet  Patient-reported dosage and instructions? 1 20mg twice a day  How many days do you have left? 10  Preferred Pharmacy? [facility] phone number (if available)? [phone]  ---------------------------------------------------------------------------  --------------  CALL BACK INFO  What is the best way for the office to contact you? OK to leave message on   voicemail  Preferred Call Back Phone Number?  [phone]

## 2021-08-31 DIAGNOSIS — I48.92 ATRIAL FLUTTER, UNSPECIFIED TYPE (HCC): ICD-10-CM

## 2021-08-31 RX ORDER — LEVOTHYROXINE SODIUM 0.2 MG/1
200 TABLET ORAL DAILY
Qty: 90 TABLET | Refills: 1 | Status: SHIPPED
Start: 2021-08-31 | End: 2022-03-30 | Stop reason: SDUPTHER

## 2021-08-31 RX ORDER — WARFARIN SODIUM 1 MG/1
1 TABLET ORAL DAILY
Qty: 30 TABLET | Refills: 1 | Status: SHIPPED
Start: 2021-08-31 | End: 2022-04-07 | Stop reason: SDUPTHER

## 2021-08-31 NOTE — TELEPHONE ENCOUNTER
Patient stated that you had discussed lowering his levothyroxine to 175mcg and wanted to check before the 200mcg was filled.

## 2021-09-13 RX ORDER — ASPIRIN 81 MG/1
81 TABLET ORAL DAILY
Qty: 30 TABLET | Refills: 11 | Status: SHIPPED
Start: 2021-09-13 | End: 2021-10-15 | Stop reason: SDUPTHER

## 2021-09-20 RX ORDER — WARFARIN SODIUM 5 MG/1
5 TABLET ORAL DAILY
Qty: 90 TABLET | Refills: 3 | Status: ON HOLD
Start: 2021-09-20 | End: 2022-08-29 | Stop reason: HOSPADM

## 2021-09-22 ENCOUNTER — OFFICE VISIT (OUTPATIENT)
Dept: FAMILY MEDICINE CLINIC | Age: 81
End: 2021-09-22
Payer: MEDICARE

## 2021-09-22 ENCOUNTER — TELEPHONE (OUTPATIENT)
Dept: FAMILY MEDICINE CLINIC | Age: 81
End: 2021-09-22

## 2021-09-22 VITALS
HEIGHT: 68 IN | TEMPERATURE: 97.2 F | BODY MASS INDEX: 32.64 KG/M2 | WEIGHT: 215.4 LBS | OXYGEN SATURATION: 96 % | DIASTOLIC BLOOD PRESSURE: 72 MMHG | RESPIRATION RATE: 16 BRPM | HEART RATE: 85 BPM | SYSTOLIC BLOOD PRESSURE: 124 MMHG

## 2021-09-22 DIAGNOSIS — M10.9 ACUTE GOUT OF LEFT HAND, UNSPECIFIED CAUSE: Primary | ICD-10-CM

## 2021-09-22 DIAGNOSIS — Z79.01 CURRENT USE OF LONG TERM ANTICOAGULATION: ICD-10-CM

## 2021-09-22 LAB
INTERNATIONAL NORMALIZATION RATIO, POC: 2.6
PROTHROMBIN TIME, POC: NORMAL

## 2021-09-22 PROCEDURE — 96372 THER/PROPH/DIAG INJ SC/IM: CPT | Performed by: PHYSICIAN ASSISTANT

## 2021-09-22 PROCEDURE — G0439 PPPS, SUBSEQ VISIT: HCPCS | Performed by: PHYSICIAN ASSISTANT

## 2021-09-22 PROCEDURE — 99214 OFFICE O/P EST MOD 30 MIN: CPT | Performed by: PHYSICIAN ASSISTANT

## 2021-09-22 PROCEDURE — 29126 APPL SHORT ARM SPLINT DYN: CPT | Performed by: PHYSICIAN ASSISTANT

## 2021-09-22 PROCEDURE — 85610 PROTHROMBIN TIME: CPT | Performed by: PHYSICIAN ASSISTANT

## 2021-09-22 RX ORDER — METHYLPREDNISOLONE ACETATE 40 MG/ML
40 INJECTION, SUSPENSION INTRA-ARTICULAR; INTRALESIONAL; INTRAMUSCULAR; SOFT TISSUE ONCE
Status: COMPLETED | OUTPATIENT
Start: 2021-09-22 | End: 2021-09-22

## 2021-09-22 RX ORDER — PREDNISONE 10 MG/1
TABLET ORAL
Qty: 18 TABLET | Refills: 0 | Status: SHIPPED
Start: 2021-09-22 | End: 2022-02-11 | Stop reason: ALTCHOICE

## 2021-09-22 RX ADMIN — METHYLPREDNISOLONE ACETATE 40 MG: 40 INJECTION, SUSPENSION INTRA-ARTICULAR; INTRALESIONAL; INTRAMUSCULAR; SOFT TISSUE at 13:17

## 2021-09-22 NOTE — PROGRESS NOTES
21  Govind Rangel : 1940 Sex: male  Age 68 y.o. Subjective:  Chief Complaint   Patient presents with    Hand Pain         HPI:   Govind Rangel , 68 y.o. male presents to express care for evaluation of left wrist/hand pain. HPI  70-year-old male has noted the symptoms for the last 5 days. The patient has had some increased pain discomfort to the left wrist and hand. The patient has had some increased amount discomfort to the wrist and hand. The patient does have a history of gout. No traumatic injury. No falls. The patient has noted some slight warmth. This is pretty classic of his gout flareup. Patient states that he has gotten it in both of his wrist in the past.  He sometimes will get in the left great toe. Patient is not any chest pain, shortness of breath, fever, chills. The patient does not have any red streaking. The patient denies any other complaints at this time. He is on warfarin      ROS:   Unless otherwise stated in this report the patient's positive and negative responses for review of systems for constitutional, eyes, ENT, cardiovascular, respiratory, gastrointestinal, neurological, , musculoskeletal, and integument systems and related systems to the presenting problem are either stated in the history of present illness or were not pertinent or were negative for the symptoms and/or complaints related to the presenting medical problem. Positives and pertinent negatives as per HPI. All others reviewed and are negative.       PMH:     Past Medical History:   Diagnosis Date    Blood circulation, collateral     CAD (coronary artery disease)     Chronic kidney disease     History of alcohol use     History of ascites     history of, approx 5 years ago    Hyperlipidemia     Hypertension     Hyponatremia     Left knee pain 2020    Liver disease     Lymphedema     lower extrem    Neuropathy     both feet    Osteoarthritis     Psychiatric problem  Thyroid disease     Use of cane as ambulatory aid     Uses wheelchair     for distances    Wears glasses     for reading    Wears hearing aid        Past Surgical History:   Procedure Laterality Date    APPENDECTOMY      CARDIAC CATHETERIZATION  05/2020    CATARACT REMOVAL WITH IMPLANT Bilateral     COLONOSCOPY      EYE SURGERY      cataracts    TOTAL KNEE ARTHROPLASTY Left 6/22/2020    LEFT KNEE IVAN ROBOTIC TOTAL ARTHROPLASTY performed by Nubia Alba MD at [facility] OR       Family History   Problem Relation Age of Onset   Ardyth Moritz Other Mother         old age        Medications:     Current Outpatient Medications:     predniSONE (DELTASONE) 10 MG tablet, 3 tabs once daily for 3 days, 2 tabs once daily for 3 days, 1 tab once daily for 3 days, Disp: 18 tablet, Rfl: 0    warfarin (COUMADIN) 5 MG tablet, Take 1 tablet by mouth daily, Disp: 90 tablet, Rfl: 3    aspirin (ECOTRIN LOW STRENGTH) 81 MG EC tablet, Take 1 tablet by mouth daily, Disp: 30 tablet, Rfl: 11    levothyroxine (SYNTHROID) 200 MCG tablet, Take 1 tablet by mouth Daily Pt take 0.5 Saturday and Sunday, Disp: 90 tablet, Rfl: 1    warfarin (COUMADIN) 1 MG tablet, Take 1 tablet by mouth daily Takes 1 mg Mondays and Fridays in addition to 5mg tab to equal 6mg, Disp: 30 tablet, Rfl: 1    potassium chloride (KLOR-CON M) 20 MEQ extended release tablet, Take 1 tablet by mouth 2 times daily, Disp: 270 tablet, Rfl: 2    gabapentin (NEURONTIN) 100 MG capsule, 200mg in am, 100mg at noon and 200mg in pm, Disp: 450 capsule, Rfl: 0    DULoxetine (CYMBALTA) 20 MG extended release capsule, Take 1 capsule by mouth daily, Disp: 30 capsule, Rfl: 3    atorvastatin (LIPITOR) 40 MG tablet, Take 1 tablet by mouth daily, Disp: 90 tablet, Rfl: 3    sildenafil (VIAGRA) 50 MG tablet, Take 1 tablet by mouth daily as needed for Erectile Dysfunction, Disp: 10 tablet, Rfl: 0    spironolactone (ALDACTONE) 25 MG tablet, Take 25 mg by mouth daily, Disp: , Rfl:    metOLazone (ZAROXOLYN) 2.5 MG tablet, Take 1 tablet by mouth once a week (Patient taking differently: Take 2.5 mg by mouth M W F), Disp: 90 tablet, Rfl: 1    magnesium oxide (MAG-OX) 400 (241.3 Mg) MG TABS tablet, TAKE ONE TABLET BY MOUTH DAILY, Disp: , Rfl:     torsemide (DEMADEX) 20 MG tablet, Take 40 mg by mouth 2 times daily , Disp: , Rfl:     vitamin B-1 (THIAMINE) 100 MG tablet, Take 100 mg by mouth daily Last dose , Disp: , Rfl:     metoprolol tartrate (LOPRESSOR) 50 MG tablet, Take 1 tablet by mouth 2 times daily, Disp: 60 tablet, Rfl: 3    MULTIPLE VITAMIN PO, Take 1 tablet by mouth daily Last taken 2020, Disp: , Rfl:     Allergies: Allergies   Allergen Reactions    Sulfa Antibiotics Hives     Hives         Social History:     Social History     Tobacco Use    Smoking status: Former Smoker     Packs/day: 0.75     Years: 25.00     Pack years: 18.75     Types: Pipe, Cigars     Start date: 10/8/1964     Quit date: 1986     Years since quittin.5    Smokeless tobacco: Never Used    Tobacco comment: Cigar and Pipe smoking history only   Vaping Use    Vaping Use: Never used   Substance Use Topics    Alcohol use: Yes     Alcohol/week: 0.0 standard drinks     Comment: quit 2017; socially as of 2020 on weekends    Drug use: No       Patient lives at home. Physical Exam:     Vitals:    21 1242   BP: 124/72   Pulse: 85   Resp: 16   Temp: 97.2 °F (36.2 °C)   SpO2: 96%   Weight: 215 lb 6.4 oz (97.7 kg)   Height: 5' 8\" (1.727 m)       Exam:  Physical Exam  Vital signs reviewed and nurse's notes. The patient is not hypoxic.    General: Alert, no acute distress, patient resting comfortably   Skin: warm, intact, no pallor noted   Head: Normocephalic, atraumatic   Eye: Normal conjunctiva   Respiratory: No acute distress   Musculoskeletal: No obvious deformity noted to the left hand and the left wrist.  The patient does have notable swelling in the left wrist and some (DEPO-MEDROL) injection 40 mg        The patient is to call for any concerns or return if any of the signs or symptoms worsen. The patient is to follow-up with PCP in the next 2-3 days for repeat evaluation repeat assessment or go directly to the emergency department.      SIGNATURE: Nehal Zapata III, PA-C

## 2021-09-22 NOTE — TELEPHONE ENCOUNTER
Last night Lisa Germain started having pain in the left wrist.  This morning the pain is pretty bad and he is sure it is gout. He is asking if you send the medication for it to [facility] in [facility]?

## 2021-09-22 NOTE — TELEPHONE ENCOUNTER
Sandoval Sutherland called back and was advised. He is willing to go to [facility] and be seen today. He wants to be seen for this and to have his INR done, but not sure what time he will go there today. I told him to be sure to let the nurse know that he needs both done today.

## 2021-10-15 ENCOUNTER — OFFICE VISIT (OUTPATIENT)
Dept: PRIMARY CARE CLINIC | Age: 81
End: 2021-10-15
Payer: MEDICARE

## 2021-10-15 VITALS
SYSTOLIC BLOOD PRESSURE: 126 MMHG | DIASTOLIC BLOOD PRESSURE: 72 MMHG | OXYGEN SATURATION: 98 % | TEMPERATURE: 97.2 F | WEIGHT: 216.4 LBS | BODY MASS INDEX: 32.9 KG/M2 | HEART RATE: 58 BPM

## 2021-10-15 DIAGNOSIS — N18.30 STAGE 3 CHRONIC KIDNEY DISEASE, UNSPECIFIED WHETHER STAGE 3A OR 3B CKD (HCC): ICD-10-CM

## 2021-10-15 DIAGNOSIS — I89.0 LYMPHEDEMA: ICD-10-CM

## 2021-10-15 DIAGNOSIS — I42.9 CARDIOMYOPATHY, UNSPECIFIED TYPE (HCC): ICD-10-CM

## 2021-10-15 DIAGNOSIS — R73.01 IMPAIRED FASTING GLUCOSE: ICD-10-CM

## 2021-10-15 DIAGNOSIS — G62.9 NEUROPATHY: ICD-10-CM

## 2021-10-15 DIAGNOSIS — K70.31 ALCOHOLIC CIRRHOSIS OF LIVER WITH ASCITES (HCC): ICD-10-CM

## 2021-10-15 DIAGNOSIS — E03.8 OTHER SPECIFIED HYPOTHYROIDISM: ICD-10-CM

## 2021-10-15 DIAGNOSIS — M1A.09X0 IDIOPATHIC CHRONIC GOUT OF MULTIPLE SITES WITHOUT TOPHUS: ICD-10-CM

## 2021-10-15 DIAGNOSIS — M15.9 PRIMARY OSTEOARTHRITIS INVOLVING MULTIPLE JOINTS: ICD-10-CM

## 2021-10-15 DIAGNOSIS — E87.1 HYPONATREMIA: ICD-10-CM

## 2021-10-15 DIAGNOSIS — Z79.899 LONG TERM CURRENT USE OF THERAPEUTIC DRUG: ICD-10-CM

## 2021-10-15 DIAGNOSIS — I48.19 PERSISTENT ATRIAL FIBRILLATION (HCC): ICD-10-CM

## 2021-10-15 DIAGNOSIS — I10 ESSENTIAL HYPERTENSION: Primary | ICD-10-CM

## 2021-10-15 DIAGNOSIS — F10.10 ALCOHOL ABUSE: ICD-10-CM

## 2021-10-15 DIAGNOSIS — E78.00 PURE HYPERCHOLESTEROLEMIA: ICD-10-CM

## 2021-10-15 DIAGNOSIS — I25.10 CORONARY ARTERY DISEASE INVOLVING NATIVE CORONARY ARTERY OF NATIVE HEART WITHOUT ANGINA PECTORIS: ICD-10-CM

## 2021-10-15 LAB
INTERNATIONAL NORMALIZATION RATIO, POC: 2.8
PROTHROMBIN TIME, POC: 33.5

## 2021-10-15 PROCEDURE — 85610 PROTHROMBIN TIME: CPT | Performed by: INTERNAL MEDICINE

## 2021-10-15 PROCEDURE — 99214 OFFICE O/P EST MOD 30 MIN: CPT | Performed by: INTERNAL MEDICINE

## 2021-10-15 RX ORDER — ASPIRIN 81 MG/1
81 TABLET ORAL DAILY
Qty: 30 TABLET | Refills: 11 | Status: SHIPPED
Start: 2021-10-15 | End: 2022-07-20 | Stop reason: ALTCHOICE

## 2021-10-15 SDOH — ECONOMIC STABILITY: FOOD INSECURITY: WITHIN THE PAST 12 MONTHS, THE FOOD YOU BOUGHT JUST DIDN'T LAST AND YOU DIDN'T HAVE MONEY TO GET MORE.: NEVER TRUE

## 2021-10-15 SDOH — ECONOMIC STABILITY: FOOD INSECURITY: WITHIN THE PAST 12 MONTHS, YOU WORRIED THAT YOUR FOOD WOULD RUN OUT BEFORE YOU GOT MONEY TO BUY MORE.: NEVER TRUE

## 2021-10-15 ASSESSMENT — ENCOUNTER SYMPTOMS
RHINORRHEA: 0
CONSTIPATION: 0
ABDOMINAL PAIN: 0
CHEST TIGHTNESS: 0
VOMITING: 0
COUGH: 0
DIARRHEA: 0
EYE PAIN: 0
NAUSEA: 0
BLOOD IN STOOL: 0
SHORTNESS OF BREATH: 0
SORE THROAT: 0

## 2021-10-15 ASSESSMENT — SOCIAL DETERMINANTS OF HEALTH (SDOH): HOW HARD IS IT FOR YOU TO PAY FOR THE VERY BASICS LIKE FOOD, HOUSING, MEDICAL CARE, AND HEATING?: NOT HARD AT ALL

## 2021-10-15 NOTE — PROGRESS NOTES
[facility]) Physicians   Internal Medicine     10/15/2021  Ange Tapia : 1940 Sex: male Age:81 y.o. Chief Complaint   Patient presents with    Atrial Fibrillation    Hypertension        HPI:   Patient presents to office for evaluation of the following medical concerns. - States having issues with tinnitus.     - States having issues with b/l LE edema. Improved, On diuretics  (10/21) decre metalozone to 2.5mg 2x per week, hypokalmia incr kcl to 20meq tid, lab in 2wks, f/u 3 mon with labs    - Having erectile dysfunction. Asking about treatment     - Stats has atrial fibrillation. Uncertain cardiology follow up. On metoprolol. On coumadin. continue 6mg mon and 5mg all other days. INR today (10/14/2021) was 2.8    - States has cardiomyopathy. States due to alcohol. Declines to follow with cardiology.     - States has CAD. States had stress test (2020) The myocardial perfusion imaging was abnormal, moderate sized reversible defect , cardiac cath (2020) - 40% proximal LAD lesion, 50% mid LAD lesion at the takeoff of a large diagonal branch, 50% proximal left circumflex artery disease. Totally occluded right coronary artery with grade 3 left-to-right collaterals. States needs aggressive risk factor management. On metoprolol last visit per reviewed note () -continue current medications and follow-up in 1 year    - States has hypothyroid. On synthroid. Last lab (2021) was [employer] has cirrhosis due to alcohol. Still drinks on weekends. Discussed cessation. On metolazone  2.5mg - states switched to 2x per week. On torsemide 40 mg twice a day. on sprinolactone. - States has chronic kidney disease. Follows with nephrology. LAst creat was 1.58 (10/2021). (10/21) decreased metalozone to 2.5mg 2x per week, hypokalmia incr kcl to 20meq tid, lab in 2wks, f/u 3 mon with labs    - States has elevated uric acid.  States was on allopurinol in the past, stopped due to thought was causing hives. Last uric acid was 9 (5/201). States was seen in urgent care (9/2021) wrist pain - depomedrol x1, medrol pack     States has hypertension, not checking blood pressure at home. On metoprolol. States has high cholesterol. Was on simvastatin - changed to atorvastatin. No reported side effects. States has neuropathy. Possible due to alcohol. On gabapentin. States taking 2 tablets in am, 1 tablet in afternoon and 1 tablet in pm.  Asking about alternatives. Added duloxtine. Not much improved     - States has osteoarthritis. States multiple joints. Had left knee replacement. 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 Follows with ortho. Needs physical therapy. - low sodium - uncertain in duloxetine or diuretics     Health Maintenance   - immunizations:   Influenza Vaccination - declines  Pneumonia Vaccination - declines   Zoster/Shingles Vaccine  Tetanus Vaccination (2017)- tdap   covid (3/2/2021) #1, (3/30/2021) #2 - moderna     - Screenings:   Colonoscopy   Prostate     Stress test (4/2020) - The myocardial perfusion imaging was abnormal, moderate sized reversible defect in the entire inferior wall and apex suggestive of ischemia    echo (4/20) - ef 55-60%, mod l &r atiral dilation, mild to mod MR, indeterm diastolic dysfun    cardiac cath (5/20) - IMPRESSION:  1.  40% proximal LAD lesion, 50% mid LAD lesion at the takeoff of a  large diagonal branch. 2.  50% proximal left circumflex artery disease. 3.  Totally occluded right coronary artery with grade 3 left-to-right  collaterals. Couseled on Home Safety     ROS:  Review of Systems   Constitutional: Negative for appetite change, chills, fever and unexpected weight change. HENT: Negative for congestion, rhinorrhea and sore throat. Eyes: Negative for pain and visual disturbance. Respiratory: Negative for cough, chest tightness and shortness of breath.     Cardiovascular: Negative for chest pain and palpitations. Gastrointestinal: Negative for abdominal pain, blood in stool, constipation, diarrhea, nausea and vomiting. Genitourinary: Negative for difficulty urinating, dysuria, hematuria, scrotal swelling, testicular pain and urgency. Musculoskeletal: Negative for arthralgias and gait problem. Skin: Negative for rash. Neurological: Positive for dizziness. Negative for syncope, weakness, light-headedness and headaches. Hematological: Negative for adenopathy. Does not bruise/bleed easily. Psychiatric/Behavioral: Negative for suicidal ideas. The patient is not nervous/anxious.           Current Outpatient Medications:     aspirin (ECOTRIN LOW STRENGTH) 81 MG EC tablet, Take 1 tablet by mouth daily, Disp: 30 tablet, Rfl: 11    predniSONE (DELTASONE) 10 MG tablet, 3 tabs once daily for 3 days, 2 tabs once daily for 3 days, 1 tab once daily for 3 days, Disp: 18 tablet, Rfl: 0    warfarin (COUMADIN) 5 MG tablet, Take 1 tablet by mouth daily, Disp: 90 tablet, Rfl: 3    levothyroxine (SYNTHROID) 200 MCG tablet, Take 1 tablet by mouth Daily Pt take 0.5 Saturday and Sunday, Disp: 90 tablet, Rfl: 1    warfarin (COUMADIN) 1 MG tablet, Take 1 tablet by mouth daily Takes 1 mg Mondays and Fridays in addition to 5mg tab to equal 6mg, Disp: 30 tablet, Rfl: 1    potassium chloride (KLOR-CON M) 20 MEQ extended release tablet, Take 1 tablet by mouth 2 times daily, Disp: 270 tablet, Rfl: 2    gabapentin (NEURONTIN) 100 MG capsule, 200mg in am, 100mg at noon and 200mg in pm, Disp: 450 capsule, Rfl: 0    DULoxetine (CYMBALTA) 20 MG extended release capsule, Take 1 capsule by mouth daily, Disp: 30 capsule, Rfl: 3    atorvastatin (LIPITOR) 40 MG tablet, Take 1 tablet by mouth daily, Disp: 90 tablet, Rfl: 3    sildenafil (VIAGRA) 50 MG tablet, Take 1 tablet by mouth daily as needed for Erectile Dysfunction, Disp: 10 tablet, Rfl: 0    spironolactone (ALDACTONE) 25 MG tablet, Take 25 mg by mouth daily, Disp: , Rfl:   metOLazone (ZAROXOLYN) 2.5 MG tablet, Take 1 tablet by mouth once a week (Patient taking differently: Take 2.5 mg by mouth M W F), Disp: 90 tablet, Rfl: 1    magnesium oxide (MAG-OX) 400 (241.3 Mg) MG TABS tablet, TAKE ONE TABLET BY MOUTH DAILY, Disp: , Rfl:     torsemide (DEMADEX) 20 MG tablet, Take 40 mg by mouth 2 times daily , Disp: , Rfl:     vitamin B-1 (THIAMINE) 100 MG tablet, Take 100 mg by mouth daily Last dose 06/16, Disp: , Rfl:     metoprolol tartrate (LOPRESSOR) 50 MG tablet, Take 1 tablet by mouth 2 times daily, Disp: 60 tablet, Rfl: 3    MULTIPLE VITAMIN PO, Take 1 tablet by mouth daily Last taken 06/16/2020, Disp: , Rfl:     Allergies   Allergen Reactions    Sulfa Antibiotics Hives     Hives         Past Medical History:   Diagnosis Date    Blood circulation, collateral     CAD (coronary artery disease)     Chronic kidney disease     History of alcohol use     History of ascites     history of, approx 5 years ago    Hyperlipidemia     Hypertension     Hyponatremia     Left knee pain 06/2020    Liver disease     Lymphedema     lower extrem    Neuropathy     both feet    Osteoarthritis     Psychiatric problem     Thyroid disease     Use of cane as ambulatory aid     Uses wheelchair     for distances    Wears glasses     for reading    Wears hearing aid        Past Surgical History:   Procedure Laterality Date    APPENDECTOMY      CARDIAC CATHETERIZATION  05/2020    CATARACT REMOVAL WITH IMPLANT Bilateral     COLONOSCOPY      EYE SURGERY      cataracts    TOTAL KNEE ARTHROPLASTY Left 6/22/2020    LEFT KNEE IVAN ROBOTIC TOTAL ARTHROPLASTY performed by Juan [facility] at [facility]       Family History   Problem Relation Age of Onset   Uriostegui Other Mother         old age        Social History     Socioeconomic History    Marital status: Single     Spouse name: None    Number of children: 1    Years of education: 16    Highest education level: Master's degree (e.g., MA, MS, [place], MEd, MSW, LUCERO)   Occupational History    None   Tobacco Use    Smoking status: Former Smoker     Packs/day: 0.75     Years: 25.00     Pack years: 18.75     Types: Pipe, Cigars     Start date: 10/8/1964     Quit date: 1986     Years since quittin.5    Smokeless tobacco: Never Used    Tobacco comment: Cigar and Pipe smoking history only   Vaping Use    Vaping Use: Never used   Substance and Sexual Activity    Alcohol use: Yes     Alcohol/week: 0.0 standard drinks     Comment: quit 2017; socially as of 2020 on weekends    Drug use: No    Sexual activity: None   Other Topics Concern    None   Social History Narrative    None     Social Determinants of Health     Financial Resource Strain: Low Risk     Difficulty of Paying Living Expenses: Not hard at all   Food Insecurity: No Food Insecurity    Worried About Running Out of Food in the Last Year: Never true    Adonay of Food in the Last Year: Never true   Transportation Needs:     Lack of Transportation (Medical):  Lack of Transportation (Non-Medical):    Physical Activity:     Days of Exercise per Week:     Minutes of Exercise per Session:    Stress:     Feeling of Stress :    Social Connections:     Frequency of Communication with Friends and Family:     Frequency of Social Gatherings with Friends and Family:     Attends [employer] Services:     Active Member of Clubs or Organizations:     Attends Club or Organization Meetings:     Marital Status:    Intimate Partner Violence:     Fear of Current or Ex-Partner:     Emotionally Abused:     Physically Abused:     Sexually Abused:         Vitals:    10/15/21 1025   BP: 126/72   Site: Left Upper Arm   Position: Sitting   Pulse: 58   Temp: 97.2 °F (36.2 °C)   TempSrc: Temporal   SpO2: 98%   Weight: 216 lb 6.4 oz (98.2 kg)       Exam:  Physical Exam  Constitutional:       Appearance: He is well-developed. HENT:      Head: Normocephalic and atraumatic. Right Ear: External ear normal.      Left Ear: External ear normal.      Nose: Rhinorrhea present. Mouth/Throat:      Pharynx: Posterior oropharyngeal erythema present. Eyes:      Pupils: Pupils are equal, round, and reactive to light. Neck:      Thyroid: No thyromegaly. Cardiovascular:      Rate and Rhythm: Normal rate. Rhythm irregular. Heart sounds: Murmur heard. Systolic murmur is present with a grade of 2/6. Pulmonary:      Effort: Pulmonary effort is normal.      Breath sounds: Normal breath sounds. No wheezing or rales. Abdominal:      General: Bowel sounds are normal.      Palpations: Abdomen is soft. Tenderness: There is no abdominal tenderness. There is no guarding or rebound. Musculoskeletal:         General: Normal range of motion. Cervical back: Normal range of motion and neck supple. Right lower leg: Edema present. Left lower leg: Edema present. Lymphadenopathy:      Cervical: No cervical adenopathy. Skin:     General: Skin is warm and dry. Neurological:      Mental Status: He is alert and oriented to person, place, and time. Cranial Nerves: No cranial nerve deficit.    Psychiatric:         Judgment: Judgment normal.         Assessment and Plan:    Diagnoses and all orders for this visit:    Persistent atrial fibrillation  - uncertain as to onset  - ekg from 2017 showed flutter   - on anticoagulation   - follow with cardiolgy   - on coumadin 6mg mon and 5mg all other days    - inr today 2.8 (10/15/2021)   - recheck in 1 month     Essential hypertension  - watch diet   - monitor blood pressure at home   - on spironolactone   - on metoprolol tart   - stable     Pure hypercholesterolemia  - watch diet   - simvastatin switched to atorvastatin   - follow labs   - uncontrolled     Coronary artery disease involving native coronary artery of native heart without angina pectoris  - s/p stress and cath (2020)   - medical therapy   - now on atorvastatin   - on aspirin 81mg   - on metorolol tart 50mg twice a day   - follow with cardiology     Alcoholic cirrhosis of liver with ascites ([facility] [address]  - discussed and advised stopping alcohol altogether   - declines referral to GI   - on spironolactone   - on torsemide   - on metolazone - states changed to 2x per week      Hyponatremia  - Possible multifactorial (cirrhois and meds)   - last lab decreased to 128 (10/2021)     Dilated cardiomyopathy ([facility] [address]  - possible due to alcohol   - discussed and advised stopping alcohol altogether     CKD (chronic kidney disease) stage 3, GFR 30-59 ml/min ([facility])  - following with nephrology   - on spirinolactone 25mg daily   - on metalozaone 2.5mg 2x per week    - on torsemide - advised by renal to take 40mg twice a day    - on potassium supplement   - follow labs     Lymphedema  - on metalozaone 2.5mg 2x per week   - on torsemide - advised by renal to take 40mg twice a day    - on spirinolactone 25mg daily   - on potassium supplement   - compression stockings if able   - recommended referral to lymphedema PT - declines at present   - improved     Idiopathic chronic gout of multiple sites without tophus  - improved post medrol pack but not resolved   - previous labs showed elevated uric acid   - was on allopurinol in the past - stopped due to poss reaction   - may need to consider uloric and discuss with renal   - low purine diet handout provided   - exacerbation (9/2021) - treated with medrol for pain to wrist     Impaired fasting glucose  - watch diet   - follow A1c - last was 5.9 (5/2021)     Alcohol abuse  - discussed and advised stopping alcohol altogether     History of ascites    Neuropathy ([facility] [address]  - due to alcohol   - on gabapentin 200mg in am, 200mg at noon and 100mg in pm   - asking for alternative - discssed duloxetine and lyrica   - on duloxetine   - stable     Other specified hypothyroidism  - on levothyroxine   - follow labs - last (5/2021) - overactive   - decrease dose to 200mcg

## 2021-11-03 ENCOUNTER — APPOINTMENT (OUTPATIENT)
Dept: GENERAL RADIOLOGY | Age: 81
End: 2021-11-03
Payer: MEDICARE

## 2021-11-03 ENCOUNTER — HOSPITAL ENCOUNTER (EMERGENCY)
Age: 81
Discharge: HOME | End: 2021-11-03
Payer: MEDICARE

## 2021-11-03 VITALS
BODY MASS INDEX: 32.13 KG/M2 | WEIGHT: 212 LBS | RESPIRATION RATE: 15 BRPM | OXYGEN SATURATION: 99 % | SYSTOLIC BLOOD PRESSURE: 113 MMHG | HEART RATE: 66 BPM | HEIGHT: 68 IN | DIASTOLIC BLOOD PRESSURE: 61 MMHG | TEMPERATURE: 97.5 F

## 2021-11-03 DIAGNOSIS — S63.659A: ICD-10-CM

## 2021-11-03 DIAGNOSIS — S63.502A SPRAIN OF LEFT WRIST, INITIAL ENCOUNTER: Primary | ICD-10-CM

## 2021-11-03 DIAGNOSIS — R79.1 SUPRATHERAPEUTIC INR: ICD-10-CM

## 2021-11-03 LAB
INR BLD: 4.2
PROTHROMBIN TIME: 47 SEC (ref 9.3–12.4)

## 2021-11-03 PROCEDURE — 6370000000 HC RX 637 (ALT 250 FOR IP)

## 2021-11-03 PROCEDURE — 99284 EMERGENCY DEPT VISIT MOD MDM: CPT

## 2021-11-03 PROCEDURE — 73130 X-RAY EXAM OF HAND: CPT

## 2021-11-03 PROCEDURE — 73110 X-RAY EXAM OF WRIST: CPT

## 2021-11-03 PROCEDURE — 85610 PROTHROMBIN TIME: CPT

## 2021-11-03 PROCEDURE — 29125 APPL SHORT ARM SPLINT STATIC: CPT

## 2021-11-03 RX ORDER — HYDROCODONE BITARTRATE AND ACETAMINOPHEN 5; 325 MG/1; MG/1
1 TABLET ORAL EVERY 6 HOURS PRN
Qty: 8 TABLET | Refills: 0 | Status: SHIPPED | OUTPATIENT
Start: 2021-11-03 | End: 2021-11-05

## 2021-11-03 RX ORDER — HYDROCODONE BITARTRATE AND ACETAMINOPHEN 5; 325 MG/1; MG/1
TABLET ORAL
Status: COMPLETED
Start: 2021-11-03 | End: 2021-11-03

## 2021-11-03 RX ORDER — HYDROCODONE BITARTRATE AND ACETAMINOPHEN 5; 325 MG/1; MG/1
1 TABLET ORAL ONCE
Status: COMPLETED | OUTPATIENT
Start: 2021-11-03 | End: 2021-11-03

## 2021-11-03 RX ADMIN — HYDROCODONE BITARTRATE AND ACETAMINOPHEN 1 TABLET: 5; 325 TABLET ORAL at 21:05

## 2021-11-03 ASSESSMENT — PAIN DESCRIPTION - FREQUENCY: FREQUENCY: CONTINUOUS

## 2021-11-03 ASSESSMENT — PAIN DESCRIPTION - DESCRIPTORS: DESCRIPTORS: SORE

## 2021-11-03 ASSESSMENT — PAIN DESCRIPTION - LOCATION: LOCATION: HAND;WRIST

## 2021-11-03 ASSESSMENT — PAIN SCALES - GENERAL
PAINLEVEL_OUTOF10: 10
PAINLEVEL_OUTOF10: 9

## 2021-11-03 ASSESSMENT — PAIN DESCRIPTION - PAIN TYPE: TYPE: ACUTE PAIN

## 2021-11-03 ASSESSMENT — PAIN DESCRIPTION - ORIENTATION: ORIENTATION: LEFT

## 2021-11-03 NOTE — ED PROVIDER NOTES
FIRST PROVIDER CONTACT ASSESSMENT NOTE           Department of Emergency Medicine                 First Provider Note            11/3/21  5:36 PM EDT    Date of Encounter: No admission date for patient encounter. Patient Name: Gino Arthur  : 1940  MRN: [id number]    Chief Complaint: Hand Pain (left sided. Pt tripped and fell last week. Denies any other injury. Denies head injury or LOC. Pt is on coumadin. Last INR 2 wks ago. ) and Wrist Pain      History of Present Illness:   Gino Arthur is a 80 y.o. male who presents to the ED for trip and fall. Complaining of left hand pain and bruising. Limited ROM due to pain. Pt is on Coumadin. Last INR was 2 weeks ago and was 2.6       Focused Physical Exam:  VS:    ED Triage Vitals   BP Temp Temp src Pulse Resp SpO2 Height Weight   -- -- -- -- -- -- -- --        Physical Ex: Constitutional: Alert and non-toxic. Medical History:  has a past medical history of Blood circulation, collateral, CAD (coronary artery disease), Chronic kidney disease, History of alcohol use, History of ascites, Hyperlipidemia, Hypertension, Hyponatremia, Left knee pain, Liver disease, Lymphedema, Neuropathy, Osteoarthritis, Psychiatric problem, Thyroid disease, Use of cane as ambulatory aid, Uses wheelchair, Wears glasses, and Wears hearing aid. Surgical History:  has a past surgical history that includes Appendectomy; eye surgery; Colonoscopy; Cataract removal with implant (Bilateral); Cardiac catheterization (2020); and Total knee arthroplasty (Left, 2020). Social History:  reports that he quit smoking about 35 years ago. His smoking use included pipe and cigars. He started smoking about 57 years ago. He has a 18.75 pack-year smoking history. He has never used smokeless tobacco. He reports current alcohol use. He reports that he does not use drugs. Family History: family history includes Other in his mother.     Allergies: Sulfa antibiotics     Initial Plan of Care: Initiate Treatment-Testing, Proceed toTreatment Area When Bed Available for ED Attending/MLP to Continue Care      ---END OF FIRST PROVIDER CONTACT ASSESSMENT NOTE---  Electronically signed by Elisa Blair PA-C   DD: 11/3/21       Elisa Blair PA-C  11/03/21 1736

## 2021-11-04 NOTE — ED PROVIDER NOTES
[address] [place] [address]  Department of Emergency Medicine   ED  Encounter Note  Admit Date/RoomTime: 11/3/2021  5:40 PM  ED Room: 33    NAME: Gino Arthur  : 1940  MRN: [id number]     Chief Complaint:  Hand Pain (left sided. Pt tripped and fell last week. Denies any other injury. Denies head injury or LOC. Pt is on coumadin. Last INR 2 wks ago. ) and Wrist Pain    History of Present Illness       Gino Arthur is a 80 y.o. old male who presents to the emergency department by private vehicle, for traumatic Left hand and wrist pain which occured 1 week(s) prior to arrival.  The complaint is due to a fall from stumbling while at home. Patient has no prior history of pain/injury with regards to today's visit. He is right handed. The patients tetanus status is up to date. Since onset the symptoms have been gradually worsening. His pain is aggraveated by using hand and moving hand and relieved by nothing, as no treatment has been provided prior to this visit. He denies any head injury. ROS   Pertinent positives and negatives are stated within HPI, all other systems reviewed and are negative. Past Medical History:  has a past medical history of Blood circulation, collateral, CAD (coronary artery disease), Chronic kidney disease, History of alcohol use, History of ascites, Hyperlipidemia, Hypertension, Hyponatremia, Left knee pain, Liver disease, Lymphedema, Neuropathy, Osteoarthritis, Psychiatric problem, Thyroid disease, Use of cane as ambulatory aid, Uses wheelchair, Wears glasses, and Wears hearing aid. Surgical History:  has a past surgical history that includes Appendectomy; eye surgery; Colonoscopy; Cataract removal with implant (Bilateral); Cardiac catheterization (2020); and Total knee arthroplasty (Left, 2020). Social History:  reports that he quit smoking about 35 years ago. His smoking use included pipe and cigars.  He started smoking about hospital encounter of 11/03/21   Protime-INR   Result Value Ref Range    Protime 47.0 (H) 9.3 - 12.4 sec    INR 4.2      Imaging: All Radiology results interpreted by Radiologist unless otherwise noted. XR HAND LEFT (MIN 3 VIEWS)   Final Result   No acute osseous abnormality. 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      Diffuse soft tissue swelling. XR WRIST LEFT (MIN 3 VIEWS)   Final Result   No acute bony abnormalities. 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      Diffuse soft tissue swelling. ED Course / Medical Decision Making     Medications   HYDROcodone-acetaminophen (NORCO) 5-325 MG per tablet 1 tablet (1 tablet Oral Given 11/3/21 0915)        Consult:   none    Procedure(s):  None    MDM:      Imaging was obtained based on moderate suspicion for fracture / bony abnormality as per history/physical findings. Patient was also found to have a an elevated INR. He is advised to not take his Coumadin tomorrow. Check with his primary care doctor tomorrow to determine whether he wants him to skip it for the second day and when they want to be tested. Patient is advised to follow-up with his orthopedic surgeon which is Dr. Lizzie Alicia who did his knee last year for recheck of the hand. They read the x-ray is negative and there may be a small fracture at the distal first metacarpal. Was placed in a thumb spica splint and advised that he could take it off if it became uncomfortable at any time. Advised him if he is leaving on to not allow it to get wet. If it gets wet he is advised to take it off. A few pain pills were provided to patient. I advised him that if it makes him dizzy or groggy that he should only take a half a tablet. If this makes him groggy or dizzy he is advised not to take this medication at home when he is alone. All the same information was shared with his son.   Plan of Care/Counseling:  Toby Greenberg PA-C reviewed today's visit with the patient and his son on the phone in addition to providing specific details for the plan of care and counseling regarding the diagnosis and prognosis. Questions are answered at this time and are agreeable with the plan. Assessment      1. Sprain of left wrist, initial encounter    2. Metacarpophalangeal joint sprain, initial encounter    3. Supratherapeutic INR      Plan   Disposition:   Discharged home. Patient condition is stable    New Medications     Discharge Medication List as of 11/3/2021  9:29 PM      START taking these medications    Details   HYDROcodone-acetaminophen (NORCO) 5-325 MG per tablet Take 1 tablet by mouth every 6 hours as needed for Pain for up to 2 days. , Disp-8 tablet, R-0Print           Electronically signed by Zachary Issa PA-C   DD: 11/4/21  **This report was transcribed using voice recognition software. Every effort was made to ensure accuracy; however, inadvertent computerized transcription errors may be present.   END OF ED PROVIDER NOTE       Zachary Issa PA-C  11/04/21 0106

## 2021-11-05 ENCOUNTER — OFFICE VISIT (OUTPATIENT)
Dept: PRIMARY CARE CLINIC | Age: 81
End: 2021-11-05
Payer: MEDICARE

## 2021-11-05 VITALS
OXYGEN SATURATION: 100 % | DIASTOLIC BLOOD PRESSURE: 72 MMHG | WEIGHT: 216 LBS | BODY MASS INDEX: 32.84 KG/M2 | HEART RATE: 82 BPM | TEMPERATURE: 97.1 F | SYSTOLIC BLOOD PRESSURE: 124 MMHG

## 2021-11-05 DIAGNOSIS — I48.19 PERSISTENT ATRIAL FIBRILLATION (HCC): ICD-10-CM

## 2021-11-05 DIAGNOSIS — S63.92XD SPRAIN OF LEFT HAND, SUBSEQUENT ENCOUNTER: Primary | ICD-10-CM

## 2021-11-05 LAB
INTERNATIONAL NORMALIZATION RATIO, POC: 3.4
PROTHROMBIN TIME, POC: 41.1

## 2021-11-05 PROCEDURE — 99213 OFFICE O/P EST LOW 20 MIN: CPT | Performed by: INTERNAL MEDICINE

## 2021-11-05 PROCEDURE — 85610 PROTHROMBIN TIME: CPT | Performed by: INTERNAL MEDICINE

## 2021-11-05 RX ORDER — METHYLPREDNISOLONE 4 MG/1
TABLET ORAL
Qty: 1 KIT | Refills: 0 | Status: SHIPPED | OUTPATIENT
Start: 2021-11-05 | End: 2021-11-11

## 2021-11-05 ASSESSMENT — ENCOUNTER SYMPTOMS
COUGH: 0
CHEST TIGHTNESS: 0
CONSTIPATION: 0
DIARRHEA: 0
RHINORRHEA: 0
NAUSEA: 0
VOMITING: 0
EYE PAIN: 0
BLOOD IN STOOL: 0
SHORTNESS OF BREATH: 0
SORE THROAT: 0
ABDOMINAL PAIN: 0

## 2021-11-05 NOTE — PATIENT INSTRUCTIONS
Patient Education        Hand Arthritis: Exercises  Introduction  Here are some examples of exercises for you to try. The exercises may be suggested for a condition or for rehabilitation. Start each exercise slowly. Ease off the exercises if you start to have pain. You will be told when to start these exercises and which ones will work best for you. How to do the exercises  Tendon glides    1. In this exercise, the steps follow one another to a make a continuous movement. 2. With your affected hand, point your fingers and thumb straight up. Your wrist should be relaxed, following the line of your fingers and thumb. 3. Curl your fingers so that the top two joints in them are bent, and your fingers wrap down. Your fingertips should touch or be near the base of your fingers. Your fingers will look like a hook. 4. Make a fist by bending your knuckles. Your thumb can gently rest against your index (pointing) finger. 5. Unwind your fingers slightly so that your fingertips can touch the base of your palm. Your thumb can rest against your index finger. 6. Move back to your starting position, with your fingers and thumb pointing up. 7. Repeat the series of motions 8 to 12 times. 8. Switch hands and repeat steps 1 through 6, even if only one hand is sore. Intrinsic flexion    1. Rest your affected hand on a table and bend the large joints where your fingers connect to your hand. Keep your thumb and the other joints in your fingers straight. 2. Slowly straighten your fingers. Your wrist should be relaxed, following the line of your fingers and thumb. 3. Move back to your starting position, with your hand bent. 4. Repeat 8 to 12 times. 5. Switch hands and repeat steps 1 through 4, even if only one hand is sore. Finger extension    1. Place your affected hand flat on a table. 2. Lift and then lower one finger at a time off the table. 3. Repeat 8 to 12 times.   4. Switch hands and repeat steps 1 through 3, even if only one hand is sore. MP extension    1. Place your good hand on a table, palm up. Put your affected hand on top of your good hand with your fingers wrapped around the thumb of your good hand like you are making a fist.  2. Slowly uncurl the joints of your affected hand where your fingers connect to your hand so that only the top two joints of your fingers are bent. Your fingers will look like a hook. 3. Move back to your starting position, with your fingers wrapped around your good thumb. 4. Repeat 8 to 12 times. 5. Switch hands and repeat steps 1 through 4, even if only one hand is sore. PIP extension (with MP extension)    1. Place your good hand on a table, palm up. Put your affected hand on top of your good hand, palm up. 2. Use the thumb and fingers of your good hand to grasp below the middle joint of one finger of your affected hand. 3. Straighten the last two joints of that finger. 4. Repeat 8 to 12 times. 5. Repeat steps 1 through 4 with each finger. 6. Switch hands and repeat steps 1 through 5, even if only one hand is sore. DIP flexion    1. With your good hand, grasp one finger of your affected hand. Your thumb will be on the top side of your finger just below the joint that is closest to your fingernail. 2. Slowly bend your affected finger only at the joint closest to your fingernail. 3. Repeat 8 to 12 times. 4. Repeat steps 1 through 3 with each finger. 5. Switch hands and repeat steps 1 through 4, even if only one hand is sore. Follow-up care is a key part of your treatment and safety. Be sure to make and go to all appointments, and call your doctor if you are having problems. It's also a good idea to know your test results and keep a list of the medicines you take. Where can you learn more? Go to https://CiDRAsarithaeb.[employer]. org and sign in to your bitHound account. Enter I690 in the [employer]com box to learn more about \"Hand Arthritis: Exercises. \"     If you do not have an account, please click on the \"Sign Up Now\" link. Current as of: July 1, 2021               Content Version: 13.0  © 2006-2021 Healthwise, Incorporated. Care instructions adapted under license by [facility] ([facility]). If you have questions about a medical condition or this instruction, always ask your healthcare professional. [address] any warranty or liability for your use of this information. Patient Education        Thumb Sprain: Rehab Exercises  Introduction  Here are some examples of exercises for you to try. The exercises may be suggested for a condition or for rehabilitation. Start each exercise slowly. Ease off the exercises if you start to have pain. You will be told when to start these exercises and which ones will work best for you. How to do the exercises  Thumb IP flexion    1. Place your forearm and hand on a table with your affected thumb pointing up. 2. With your other hand, hold your thumb steady just below the joint nearest your thumbnail. 3. Bend the tip of your thumb downward, then straighten it. 4. Repeat 8 to 12 times. Thumb MP flexion    1. Place your forearm and hand on a table with your affected thumb pointing up. 2. With your other hand, hold the base of your thumb and palm steady. 3. Bend your thumb downward where it meets your palm, then straighten it. 4. Repeat 8 to 12 times. Thumb opposition    1. With your affected hand, point your fingers and thumb straight up. Your wrist should be relaxed, following the line of your fingers and thumb. 2. Touch your affected thumb to each finger, one finger at a time. This will look like an \"okay\" sign, but try to keep your other fingers straight and pointing upward as much as you can. 3. Repeat 8 to 12 times. Follow-up care is a key part of your treatment and safety. Be sure to make and go to all appointments, and call your doctor if you are having problems.  It's also a good idea to know your test results and keep a list of the medicines you take. Where can you learn more? Go to https://chpepiceweb.[employer]. org and sign in to your [employer]Comb account. Enter D278 in the [facility] box to learn more about \"Thumb Sprain: Rehab Exercises. \"     If you do not have an account, please click on the \"Sign Up Now\" link. Current as of: July 1, 2021               Content Version: 13.0  © 2006-2021 Healthwise, Incorporated. Care instructions adapted under license by [facility] ([facility]). If you have questions about a medical condition or this instruction, always ask your healthcare professional. [place] [address] any warranty or liability for your use of this information. Patient Education        Wrist: Exercises  Introduction  Here are some examples of exercises for you to try. The exercises may be suggested for a condition or for rehabilitation. Start each exercise slowly. Ease off the exercises if you start to have pain. You will be told when to start these exercises and which ones will work best for you. How to do the exercises  Prayer stretch    5. Start with your palms together in front of your chest just below your chin. 6. Slowly lower your hands toward your waistline, keeping your hands close to your stomach and your palms together until you feel a mild to moderate stretch under your forearms. 7. Hold for at least 15 to 30 seconds. Repeat 2 to 4 times. Wrist flexor stretch    5. Extend your arm in front of you with your palm up. 6. Bend your wrist, pointing your hand toward the floor. 7. With your other hand, gently bend your wrist farther until you feel a mild to moderate stretch in your forearm. 8. Hold for at least 15 to 30 seconds. Repeat 2 to 4 times. Wrist extensor stretch    4. Repeat steps 1 through 4 of the stretch above, but begin with your extended hand palm down. Follow-up care is a key part of your treatment and safety.  Be sure to make and go to all appointments, and call your doctor if you are having problems. It's also a good idea to know your test results and keep a list of the medicines you take. Where can you learn more? Go to https://[employer]isabella.FamilyFinds. org and sign in to your [employer] account. Enter 49055 [id number] [phone] in the [facility] box to learn more about \"Wrist: Exercises. \"     If you do not have an account, please click on the \"Sign Up Now\" link. Current as of: July 1, 2021               Content Version: 13.0  © 4637-9318 Healthwise, Incorporated. Care instructions adapted under license by [facility] ([facility]). If you have questions about a medical condition or this instruction, always ask your healthcare professional. [address] any warranty or liability for your use of this information.

## 2021-11-05 NOTE — PROGRESS NOTES
[facility]) Physicians   Internal Medicine     2021  [facility] : 1940 Sex: male Age:81 y.o. Chief Complaint   Patient presents with    Hypertension    Atrial Fibrillation    Swelling     left hand, injury to left wrist was in ER on 11/3        HPI:   Patient presents to office for evaluation of the following medical concerns. -  had slip on rug causing him to fall and landed on on outstretched hand. States injury 3-4 days ago. States was seenin Er 2 days ago. States had xray per report no fracture, showing severe OA and diffuse soft tissue. States was placed in a splint. States some improvement in rom of fingers. Still with pain to ROM     - States in ER was found to have elevated INR. States held couadin x 1 day. Needs recheck today     - States having issues with tinnitus.     - States having issues with b/l LE edema. Improved, On diuretics  (10/21) decre metalozone to 2.5mg 2x per week, hypokalmia incr kcl to 20meq tid, lab in 2wks, f/u 3 mon with labs    - Having erectile dysfunction. Asking about treatment     - Stats has atrial fibrillation. Uncertain cardiology follow up. On metoprolol. On coumadin. continue 6mg mon and 5mg all other days. INR today (10/14/2021) was 2.8    - States has cardiomyopathy. States due to alcohol. Declines to follow with cardiology.     - States has CAD. States had stress test (2020) The myocardial perfusion imaging was abnormal, moderate sized reversible defect , cardiac cath (2020) - 40% proximal LAD lesion, 50% mid LAD lesion at the takeoff of a large diagonal branch, 50% proximal left circumflex artery disease. Totally occluded right coronary artery with grade 3 left-to-right collaterals. States needs aggressive risk factor management. On metoprolol last visit per reviewed note () -continue current medications and follow-up in 1 year    - States has hypothyroid. On synthroid.  Last lab (2021) was [employer] has cirrhosis due to alcohol. Still drinks on weekends. Discussed cessation. On metolazone  2.5mg - states switched to 2x per week. On torsemide 40 mg twice a day. on sprinolactone. - States has chronic kidney disease. Follows with nephrology. LAst creat was 1.58 (10/2021). (10/21) decreased metalozone to 2.5mg 2x per week, hypokalmia incr kcl to 20meq tid, lab in 2wks, f/u 3 mon with labs    - States has elevated uric acid. States was on allopurinol in the past, stopped due to thought was causing hives. Last uric acid was 9 (5/201). States was seen in urgent care (9/2021) wrist pain - depomedrol x1, medrol pack     States has hypertension, not checking blood pressure at home. On metoprolol. States has high cholesterol. Was on simvastatin - changed to atorvastatin. No reported side effects. States has neuropathy. Possible due to alcohol. On gabapentin. States taking 2 tablets in am, 1 tablet in afternoon and 1 tablet in pm.  Asking about alternatives. Added duloxtine. Not much improved     - States has osteoarthritis. States multiple joints. Had left knee replacement. 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 Follows with ortho. Needs physical therapy. - low sodium - uncertain in duloxetine or diuretics     Health Maintenance   - immunizations:   Influenza Vaccination - declines  Pneumonia Vaccination - declines   Zoster/Shingles Vaccine  Tetanus Vaccination (2017)- tdap   covid (3/2/2021) #1, (3/30/2021) #2 - moderna     - Screenings:   Colonoscopy   Prostate     Stress test (4/2020) - The myocardial perfusion imaging was abnormal, moderate sized reversible defect in the entire inferior wall and apex suggestive of ischemia    echo (4/20) - ef 55-60%, mod l &r atiral dilation, mild to mod MR, indeterm diastolic dysfun    cardiac cath (5/20) - IMPRESSION:  1.  40% proximal LAD lesion, 50% mid LAD lesion at the takeoff of a  large diagonal branch.   2.  50% proximal left circumflex artery disease. 3.  Totally occluded right coronary artery with grade 3 left-to-right  collaterals. Couseled on Home Safety     ROS:  Review of Systems   Constitutional: Negative for appetite change, chills, fever and unexpected weight change. HENT: Negative for congestion, rhinorrhea and sore throat. Eyes: Negative for pain and visual disturbance. Respiratory: Negative for cough, chest tightness and shortness of breath. Cardiovascular: Negative for chest pain and palpitations. Gastrointestinal: Negative for abdominal pain, blood in stool, constipation, diarrhea, nausea and vomiting. Genitourinary: Negative for difficulty urinating, dysuria, hematuria, scrotal swelling, testicular pain and urgency. Musculoskeletal: Negative for arthralgias and gait problem. Skin: Negative for rash. Neurological: Positive for dizziness. Negative for syncope, weakness, light-headedness and headaches. Hematological: Negative for adenopathy. Does not bruise/bleed easily. Psychiatric/Behavioral: Negative for suicidal ideas. The patient is not nervous/anxious. Current Outpatient Medications:     HYDROcodone-acetaminophen (NORCO) 5-325 MG per tablet, Take 1 tablet by mouth every 6 hours as needed for Pain for up to 2 days. , Disp: 8 tablet, Rfl: 0    aspirin (ECOTRIN LOW STRENGTH) 81 MG EC tablet, Take 1 tablet by mouth daily, Disp: 30 tablet, Rfl: 11    predniSONE (DELTASONE) 10 MG tablet, 3 tabs once daily for 3 days, 2 tabs once daily for 3 days, 1 tab once daily for 3 days, Disp: 18 tablet, Rfl: 0    warfarin (COUMADIN) 5 MG tablet, Take 1 tablet by mouth daily, Disp: 90 tablet, Rfl: 3    levothyroxine (SYNTHROID) 200 MCG tablet, Take 1 tablet by mouth Daily Pt take 0.5 Saturday and Sunday, Disp: 90 tablet, Rfl: 1    warfarin (COUMADIN) 1 MG tablet, Take 1 tablet by mouth daily Takes 1 mg Mondays and Fridays in addition to 5mg tab to equal 6mg, Disp: 30 tablet, Rfl: 1   reading    Wears hearing aid        Past Surgical History:   Procedure Laterality Date    APPENDECTOMY      CARDIAC CATHETERIZATION  2020    CATARACT REMOVAL WITH IMPLANT Bilateral     COLONOSCOPY      EYE SURGERY      cataracts    TOTAL KNEE ARTHROPLASTY Left 2020    LEFT KNEE IVAN ROBOTIC TOTAL ARTHROPLASTY performed by Amaya Nice MD at [facility] OR       Family History   Problem Relation Age of Onset   Stevan Bustamante Other Mother         old age        Social History     Socioeconomic History    Marital status: Single     Spouse name: Not on file    Number of children: 1    Years of education: 16    Highest education level: Master's degree (e.g., MA, MS, Jordan, MEd, MSW, LUCERO)   Occupational History    Not on file   Tobacco Use    Smoking status: Former Smoker     Packs/day: 0.75     Years: 25.00     Pack years: 18.75     Types: Pipe, Cigars     Start date: 10/8/1964     Quit date: 1986     Years since quittin.6    Smokeless tobacco: Never Used    Tobacco comment: Cigar and Pipe smoking history only   Vaping Use    Vaping Use: Never used   Substance and Sexual Activity    Alcohol use: Yes     Alcohol/week: 0.0 standard drinks     Comment: quit 2017; socially as of 2020 on weekends    Drug use: No    Sexual activity: Not on file   Other Topics Concern    Not on file   Social History Narrative    Not on file     Social Determinants of Health     Financial Resource Strain: Low Risk     Difficulty of Paying Living Expenses: Not hard at all   Food Insecurity: No Food Insecurity    Worried About [facility] in the Last Year: Never true    [facility] in the Last Year: Never true   Transportation Needs:     Lack of Transportation (Medical):      Lack of Transportation (Non-Medical):    Physical Activity:     Days of Exercise per Week:     Minutes of Exercise per Session:    Stress:     Feeling of Stress :    Social Connections:     Frequency of Communication with Friends and Family:     Frequency of Social Gatherings with Friends and Family:     Attends Sikhism Services:     Active Member of Clubs or Organizations:     Attends Club or Organization Meetings:     Marital Status:    Intimate Partner Violence:     Fear of Current or Ex-Partner:     Emotionally Abused:     Physically Abused:     Sexually Abused:         Vitals:    11/05/21 1300   BP: 124/72   Site: Right Upper Arm   Position: Sitting   Pulse: 82   Temp: 97.1 °F (36.2 °C)   TempSrc: Temporal   SpO2: 100%   Weight: 216 lb (98 kg)       Exam:  Physical Exam  Constitutional:       Appearance: He is well-developed. HENT:      Head: Normocephalic and atraumatic. Right Ear: External ear normal.      Left Ear: External ear normal.      Nose: Rhinorrhea present. Mouth/Throat:      Pharynx: Posterior oropharyngeal erythema present. Eyes:      Pupils: Pupils are equal, round, and reactive to light. Neck:      Thyroid: No thyromegaly. Cardiovascular:      Rate and Rhythm: Normal rate. Rhythm irregular. Heart sounds: Murmur heard. Systolic murmur is present with a grade of 2/6. Pulmonary:      Effort: Pulmonary effort is normal.      Breath sounds: Normal breath sounds. No wheezing or rales. Abdominal:      General: Bowel sounds are normal.      Palpations: Abdomen is soft. Tenderness: There is no abdominal tenderness. There is no guarding or rebound. Musculoskeletal:         General: Normal range of motion. Cervical back: Normal range of motion and neck supple. Right lower leg: Edema present. Left lower leg: Edema present. Lymphadenopathy:      Cervical: No cervical adenopathy. Skin:     General: Skin is warm and dry. Neurological:      Mental Status: He is alert and oriented to person, place, and time. Cranial Nerves: No cranial nerve deficit.    Psychiatric:         Judgment: Judgment normal.         Assessment and Plan:    Diagnoses and all orders for this visit:    Sprain of left hand, subsequent encounter  - continue splint   - recommend elevation and ice   - home exercises printed for patient   - add medrol pack   - referral to ortho hand   - call if not improve or changes     Persistent atrial fibrillation  - uncertain as to onset  - ekg from 2017 showed flutter   - on anticoagulation   - follow with cardiolgy   - on coumadin 6mg mon and 5mg all other days    - inr in ER 11/3/2021 was 4.2 - held dose x 1   - inr (11/5/2021) was 3.4  - will hold dose x 1, then 2.5mg x 1 then resume   - recheck in 2 weeks     Essential hypertension  - watch diet   - monitor blood pressure at home   - on spironolactone   - on metoprolol tart   - stable     Pure hypercholesterolemia  - watch diet   - simvastatin switched to atorvastatin   - follow labs   - uncontrolled     Coronary artery disease involving native coronary artery of native heart without angina pectoris  - s/p stress and cath (2020)   - medical therapy   - now on atorvastatin   - on aspirin 81mg   - on metorolol tart 50mg twice a day   - follow with cardiology     Alcoholic cirrhosis of liver with ascites ([facility] [address]  - discussed and advised stopping alcohol altogether   - declines referral to GI   - on spironolactone   - on torsemide   - on metolazone - states changed to 2x per week      Hyponatremia  - Possible multifactorial (cirrhois and meds)   - last lab decreased to 128 (10/2021)     Dilated cardiomyopathy ([address]  - possible due to alcohol   - discussed and advised stopping alcohol altogether     CKD (chronic kidney disease) stage 3, GFR 30-59 ml/min ([facility])  - following with nephrology   - on spirinolactone 25mg daily   - on metalozaone 2.5mg 2x per week    - on torsemide - advised by renal to take 40mg twice a day    - on potassium supplement   - follow labs     Lymphedema  - on metalozaone 2.5mg 2x per week   - on torsemide - advised by renal to take 40mg twice a day    - on spirinolactone 25mg daily   - on potassium supplement   - compression stockings if able   - recommended referral to lymphedema PT - declines at present   - improved     Idiopathic chronic gout of multiple sites without tophus  - improved post medrol pack but not resolved   - previous labs showed elevated uric acid   - was on allopurinol in the past - stopped due to poss reaction   - may need to consider uloric and discuss with renal   - low purine diet handout provided   - exacerbation (9/2021) - treated with medrol for pain to wrist     Impaired fasting glucose  - watch diet   - follow A1c - last was 5.9 (5/2021)     Alcohol abuse  - discussed and advised stopping alcohol altogether     History of ascites    Neuropathy ([facility] [address]  - due to alcohol   - on gabapentin 200mg in am, 200mg at noon and 100mg in pm   - asking for alternative - discssed duloxetine and lyrica   - on duloxetine   - stable     Other specified hypothyroidism  - on levothyroxine   - follow labs - last (5/2021) - overactive   - decrease dose to 200mcg mon-fri and 100mcg sat/sun   - recheck labs     Primary osteoarthritis involving multiple joints    Erectile dysfunction, unspecified erectile dysfunction type  - trial of viagra      No follow-ups on file. No orders of the defined types were placed in this encounter.     Requested Prescriptions      No prescriptions requested or ordered in this encounter        Kang Reynolds MD  11/5/2021  1:09 PM

## 2021-11-08 DIAGNOSIS — G62.9 NEUROPATHY: ICD-10-CM

## 2021-11-08 RX ORDER — DULOXETIN HYDROCHLORIDE 20 MG/1
20 CAPSULE, DELAYED RELEASE ORAL DAILY
Qty: 30 CAPSULE | Refills: 3 | Status: SHIPPED
Start: 2021-11-08 | End: 2022-03-08 | Stop reason: SDUPTHER

## 2021-11-08 NOTE — TELEPHONE ENCOUNTER
Last Appointment:  11/5/2021  Future Appointments   Date Time Provider Rafi Alvarenga   11/19/2021  1:00 PM SCHEDULE, [facility]   12/17/2021  1:00 PM SCHEDULE, [facility]   1/21/2022  1:00 PM MD JHONATHAN Hansen [facility]      Mj asking for a new script for Duloxetine sent to [facility]

## 2021-11-16 DIAGNOSIS — G62.9 NEUROPATHY: ICD-10-CM

## 2021-11-16 RX ORDER — GABAPENTIN 100 MG/1
CAPSULE ORAL
Qty: 450 CAPSULE | Refills: 0 | Status: SHIPPED
Start: 2021-11-16 | End: 2022-03-21 | Stop reason: SDUPTHER

## 2021-11-16 RX ORDER — METOLAZONE 2.5 MG/1
TABLET ORAL
Qty: 90 TABLET | Refills: 1 | Status: SHIPPED
Start: 2021-11-16 | End: 2022-02-11

## 2021-11-16 NOTE — TELEPHONE ENCOUNTER
Hali Spicer needs a new script for Neurontin  And Zaroxolyn sent to [facility].

## 2021-11-19 ENCOUNTER — NURSE ONLY (OUTPATIENT)
Dept: PRIMARY CARE CLINIC | Age: 81
End: 2021-11-19
Payer: MEDICARE

## 2021-11-19 DIAGNOSIS — I48.19 PERSISTENT ATRIAL FIBRILLATION (HCC): ICD-10-CM

## 2021-11-19 LAB
INTERNATIONAL NORMALIZATION RATIO, POC: 2.6
PROTHROMBIN TIME, POC: 31.2

## 2021-11-19 PROCEDURE — 85610 PROTHROMBIN TIME: CPT | Performed by: INTERNAL MEDICINE

## 2021-11-19 NOTE — PROGRESS NOTES
Previous INR: 3.4    Previous dose: held x 1 then 2.5mg x 1 then 6mg mon and 5mg all other days     Current INR: 2.6    Recommendation: 6mg mon and 5mg all other days    Next INR: 1 month     Luis E Gamino MD  11/19/2021  4:24 PM

## 2022-01-31 ENCOUNTER — APPOINTMENT (OUTPATIENT)
Dept: GENERAL RADIOLOGY | Age: 82
End: 2022-01-31
Payer: MEDICARE

## 2022-01-31 ENCOUNTER — HOSPITAL ENCOUNTER (EMERGENCY)
Age: 82
Discharge: HOME | End: 2022-02-01
Attending: EMERGENCY MEDICINE
Payer: MEDICARE

## 2022-01-31 DIAGNOSIS — R07.9 CHEST PAIN, UNSPECIFIED TYPE: Primary | ICD-10-CM

## 2022-01-31 LAB
APTT: 42.7 SEC (ref 24.5–35.1)
BASOPHILS ABSOLUTE: 0.03 E9/L (ref 0–0.2)
BASOPHILS RELATIVE PERCENT: 0.3 % (ref 0–2)
EOSINOPHILS ABSOLUTE: 0.01 E9/L (ref 0.05–0.5)
EOSINOPHILS RELATIVE PERCENT: 0.1 % (ref 0–6)
HCT VFR BLD CALC: 36.6 % (ref 37–54)
HEMOGLOBIN: 12.4 G/DL (ref 12.5–16.5)
IMMATURE GRANULOCYTES #: 0.05 E9/L
IMMATURE GRANULOCYTES %: 0.6 % (ref 0–5)
INR BLD: 3.9
LYMPHOCYTES ABSOLUTE: 1.35 E9/L (ref 1.5–4)
LYMPHOCYTES RELATIVE PERCENT: 15.3 % (ref 20–42)
MCH RBC QN AUTO: 30.5 PG (ref 26–35)
MCHC RBC AUTO-ENTMCNC: 33.9 % (ref 32–34.5)
MCV RBC AUTO: 89.9 FL (ref 80–99.9)
MONOCYTES ABSOLUTE: 0.75 E9/L (ref 0.1–0.95)
MONOCYTES RELATIVE PERCENT: 8.5 % (ref 2–12)
NEUTROPHILS ABSOLUTE: 6.62 E9/L (ref 1.8–7.3)
NEUTROPHILS RELATIVE PERCENT: 75.2 % (ref 43–80)
PDW BLD-RTO: 13.8 FL (ref 11.5–15)
PLATELET # BLD: 295 E9/L (ref 130–450)
PMV BLD AUTO: 9.7 FL (ref 7–12)
PROTHROMBIN TIME: 42.8 SEC (ref 9.3–12.4)
RBC # BLD: 4.07 E12/L (ref 3.8–5.8)
WBC # BLD: 8.8 E9/L (ref 4.5–11.5)

## 2022-01-31 PROCEDURE — 80048 BASIC METABOLIC PNL TOTAL CA: CPT

## 2022-01-31 PROCEDURE — 84484 ASSAY OF TROPONIN QUANT: CPT

## 2022-01-31 PROCEDURE — 85730 THROMBOPLASTIN TIME PARTIAL: CPT

## 2022-01-31 PROCEDURE — 93005 ELECTROCARDIOGRAM TRACING: CPT | Performed by: PHYSICIAN ASSISTANT

## 2022-01-31 PROCEDURE — 83880 ASSAY OF NATRIURETIC PEPTIDE: CPT

## 2022-01-31 PROCEDURE — 85025 COMPLETE CBC W/AUTO DIFF WBC: CPT

## 2022-01-31 PROCEDURE — 71045 X-RAY EXAM CHEST 1 VIEW: CPT

## 2022-01-31 PROCEDURE — 80076 HEPATIC FUNCTION PANEL: CPT

## 2022-01-31 PROCEDURE — 99283 EMERGENCY DEPT VISIT LOW MDM: CPT

## 2022-01-31 PROCEDURE — 85610 PROTHROMBIN TIME: CPT

## 2022-01-31 ASSESSMENT — PAIN SCALES - GENERAL: PAINLEVEL_OUTOF10: 8

## 2022-02-01 ENCOUNTER — ANTI-COAG VISIT (OUTPATIENT)
Dept: FAMILY MEDICINE CLINIC | Age: 82
End: 2022-02-01
Payer: MEDICARE

## 2022-02-01 VITALS
OXYGEN SATURATION: 98 % | HEART RATE: 78 BPM | HEIGHT: 68 IN | WEIGHT: 208 LBS | TEMPERATURE: 98 F | DIASTOLIC BLOOD PRESSURE: 74 MMHG | BODY MASS INDEX: 31.52 KG/M2 | SYSTOLIC BLOOD PRESSURE: 105 MMHG | RESPIRATION RATE: 16 BRPM

## 2022-02-01 DIAGNOSIS — I48.19 PERSISTENT ATRIAL FIBRILLATION (HCC): ICD-10-CM

## 2022-02-01 LAB
ALBUMIN SERPL-MCNC: 4 G/DL (ref 3.5–5.2)
ALP BLD-CCNC: 98 U/L (ref 40–129)
ALT SERPL-CCNC: 15 U/L (ref 0–40)
ANION GAP SERPL CALCULATED.3IONS-SCNC: 12 MMOL/L (ref 7–16)
AST SERPL-CCNC: 27 U/L (ref 0–39)
BILIRUB SERPL-MCNC: 1.2 MG/DL (ref 0–1.2)
BILIRUBIN DIRECT: 0.3 MG/DL (ref 0–0.3)
BILIRUBIN, INDIRECT: 0.9 MG/DL (ref 0–1)
BUN BLDV-MCNC: 24 MG/DL (ref 6–23)
CALCIUM SERPL-MCNC: 9.4 MG/DL (ref 8.6–10.2)
CHLORIDE BLD-SCNC: 89 MMOL/L (ref 98–107)
CO2: 27 MMOL/L (ref 22–29)
CREAT SERPL-MCNC: 1.4 MG/DL (ref 0.7–1.2)
EKG ATRIAL RATE: 57 BPM
EKG Q-T INTERVAL: 446 MS
EKG QRS DURATION: 92 MS
EKG QTC CALCULATION (BAZETT): 530 MS
EKG R AXIS: 65 DEGREES
EKG T AXIS: 53 DEGREES
EKG VENTRICULAR RATE: 85 BPM
GFR AFRICAN AMERICAN: 59
GFR NON-AFRICAN AMERICAN: 49 ML/MIN/1.73
GLUCOSE BLD-MCNC: 126 MG/DL (ref 74–99)
POTASSIUM REFLEX MAGNESIUM: 4.1 MMOL/L (ref 3.5–5)
PRO-BNP: 4335 PG/ML (ref 0–450)
SARS-COV-2, NAAT: NOT DETECTED
SODIUM BLD-SCNC: 128 MMOL/L (ref 132–146)
TOTAL PROTEIN: 7.4 G/DL (ref 6.4–8.3)
TROPONIN, HIGH SENSITIVITY: 55 NG/L (ref 0–11)
TROPONIN, HIGH SENSITIVITY: 58 NG/L (ref 0–11)

## 2022-02-01 PROCEDURE — 87635 SARS-COV-2 COVID-19 AMP PRB: CPT

## 2022-02-01 PROCEDURE — 93793 ANTICOAG MGMT PT WARFARIN: CPT | Performed by: INTERNAL MEDICINE

## 2022-02-01 PROCEDURE — 84484 ASSAY OF TROPONIN QUANT: CPT

## 2022-02-01 ASSESSMENT — ENCOUNTER SYMPTOMS
DIARRHEA: 0
WHEEZING: 0
NAUSEA: 0
EYE PAIN: 0
EYE DISCHARGE: 0
VOMITING: 0
ABDOMINAL PAIN: 0
COUGH: 0
BACK PAIN: 0
SINUS PRESSURE: 0
SHORTNESS OF BREATH: 0
SORE THROAT: 0

## 2022-02-01 NOTE — PROGRESS NOTES
Spoke w/ Miranda Naranjo, he is taking 5mg of Jantoven daily. When he was seen at the ED on 1/31/22 his INR was 3.9. Per verbal from Dr Ashley Jaimes pt was told to hold Harris today only and then go back to taking 5mg qd. He will be seen in [place] this Friday for an ED f/u and a repeat INR. Spoke w/ Miranda Naranjo about importance of keeping his appointments to check his INR. He admitted that he forgot about his last appt. He will call the office if the weather is too bad and he cannot make it in.

## 2022-02-01 NOTE — PROGRESS NOTES
Previous INR: 2.6     Previous dose: 5mg daily     Current INR: 3.9    Recommendation: hold x 1 then continue 5mg daily     Next INR: Friday 2/4/2022        Opal Moore MD  2/1/2022  12:09 PM

## 2022-02-01 NOTE — ED PROVIDER NOTES
72-year-old male presenting emergency department for chest pain, ongoing since this morning, pleuritic in nature, worse with palpation, nothing makes it better, was sudden onset, is been persistent, denies any recent travel, injuries, surgeries, history of blood clots. Chest pain worsens palpation, nothing makes it better, moderate in severity. He has a history of A. fib, is on Coumadin, last INR rechecked was 3.2. Review of Systems   Constitutional: Negative for chills and fever. HENT: Negative for ear pain, sinus pressure and sore throat. Eyes: Negative for pain and discharge. Respiratory: Negative for cough, shortness of breath and wheezing. Cardiovascular: Positive for chest pain (Chest pain worsens with laying flat, improves with sitting). Gastrointestinal: Negative for abdominal pain, diarrhea, nausea and vomiting. Genitourinary: Negative for dysuria and frequency. Musculoskeletal: Negative for arthralgias and back pain. Skin: Negative for rash and wound. Neurological: Negative for weakness and headaches. Hematological: Negative for adenopathy. All other systems reviewed and are negative. Physical Exam  Vitals and nursing note reviewed. Constitutional:       Appearance: He is well-developed. He is not ill-appearing or toxic-appearing. HENT:      Head: Normocephalic and atraumatic. Right Ear: External ear normal.      Left Ear: External ear normal.      Nose: Nose normal.      Mouth/Throat:      Mouth: Mucous membranes are moist.   Eyes:      Extraocular Movements: Extraocular movements intact. Conjunctiva/sclera: Conjunctivae normal.      Pupils: Pupils are equal, round, and reactive to light. Cardiovascular:      Rate and Rhythm: Tachycardia present. Rhythm irregular. Heart sounds: Normal heart sounds. No murmur heard. Pulmonary:      Effort: Pulmonary effort is normal. No respiratory distress. Breath sounds: Normal breath sounds.  No wheezing or rales. Chest:      Chest wall: Tenderness present. Abdominal:      General: Bowel sounds are normal.      Palpations: Abdomen is soft. Tenderness: There is no abdominal tenderness. There is no guarding or rebound. Musculoskeletal:         General: No tenderness or deformity. Cervical back: Normal range of motion and neck supple. Skin:     General: Skin is warm and dry. Neurological:      Mental Status: He is alert and oriented to person, place, and time. Cranial Nerves: No cranial nerve deficit. Coordination: Coordination normal.          Procedures     MDM     ED Course as of 02/01/22 0103   Mon Jan 31, 2022   2357 INR 3.9, unlikely patient has a PE [JG]   Tue Feb 01, 2022   0012 EKG: This EKG is signed by emergency department physician. Rate: 85  Rhythm: Atrial fibrillation  Interpretation: non-specific EKG  Comparison: Sinus rhythm has replaced A. fib, nonspecific ST changes     [JG]   0058 Troponin is downtrending, is 3, unlikely ACS, EKG shows patient is in A. fib, which she has a history of. [JG]   0102 Patient presented emergency department for sharp chest pain, started this morning, has a history of A. fib, on Coumadin, found to have a supratherapeutic INR level here, suggested he follow-up with his primary care doctor about this, EKG unremarkable, did not show any acute ST elevations or concerning ischemic changes, chest pain was reproducible on exam and was sharp in nature, he was not short of breath, PE less likely due to patient's supratherapeutic INR, patient was discharged, instructed follow-up with his primary care physician, return precautions given.  [JG]      ED Course User Index  [JG] Moe Wyatt MD      Patient presented emergency department for sharp chest pain, started this morning, has a history of A. fib, on Coumadin, found to have a supratherapeutic INR level here, suggested he follow-up with his primary care doctor about this, EKG unremarkable, did not show any acute ST elevations or concerning ischemic changes, chest pain was reproducible on exam and was sharp in nature, he was not short of breath, PE less likely due to patient's supratherapeutic INR, patient was discharged, instructed follow-up with his primary care physician, return precautions given. ED Course as of 02/01/22 0104   Mon Jan 31, 2022   2357 INR 3.9, unlikely patient has a PE [JG]   Tue Feb 01, 2022   0012 EKG: This EKG is signed by emergency department physician. Rate: 85  Rhythm: Atrial fibrillation  Interpretation: non-specific EKG  Comparison: Sinus rhythm has replaced A. fib, nonspecific ST changes     [JG]   0058 Troponin is downtrending, is 3, unlikely ACS, EKG shows patient is in A. fib, which she has a history of. [JG]   0102 Patient presented emergency department for sharp chest pain, started this morning, has a history of A. fib, on Coumadin, found to have a supratherapeutic INR level here, suggested he follow-up with his primary care doctor about this, EKG unremarkable, did not show any acute ST elevations or concerning ischemic changes, chest pain was reproducible on exam and was sharp in nature, he was not short of breath, PE less likely due to patient's supratherapeutic INR, patient was discharged, instructed follow-up with his primary care physician, return precautions given.  [JG]      ED Course User Index  [JG] Sherice Anne MD       --------------------------------------------- PAST HISTORY ---------------------------------------------  Past Medical History:  has a past medical history of Blood circulation, collateral, CAD (coronary artery disease), Chronic kidney disease, History of alcohol use, History of ascites, Hyperlipidemia, Hypertension, Hyponatremia, Left knee pain, Liver disease, Lymphedema, Neuropathy, Osteoarthritis, Psychiatric problem, Thyroid disease, Use of cane as ambulatory aid, Uses wheelchair, Wears glasses, and Wears hearing aid.    Past Surgical History:  has a past surgical history that includes Appendectomy; eye surgery; Colonoscopy; Cataract removal with implant (Bilateral); Cardiac catheterization (05/2020); and Total knee arthroplasty (Left, 6/22/2020). Social History:  reports that he quit smoking about 35 years ago. His smoking use included pipe and cigars. He started smoking about 57 years ago. He has a 18.75 pack-year smoking history. He has never used smokeless tobacco. He reports current alcohol use. He reports that he does not use drugs. Family History: family history includes Other in his mother. The patients home medications have been reviewed.     Allergies: Sulfa antibiotics    -------------------------------------------------- RESULTS -------------------------------------------------  Labs:  Results for orders placed or performed during the hospital encounter of 01/31/22   COVID-19, Rapid    Specimen: Nasopharyngeal Swab   Result Value Ref Range    SARS-CoV-2, NAAT Not Detected Not Detected   CBC Auto Differential   Result Value Ref Range    WBC 8.8 4.5 - 11.5 E9/L    RBC 4.07 3.80 - 5.80 E12/L    Hemoglobin 12.4 (L) 12.5 - 16.5 g/dL    Hematocrit 36.6 (L) 37.0 - 54.0 %    MCV 89.9 80.0 - 99.9 fL    MCH 30.5 26.0 - 35.0 pg    MCHC 33.9 32.0 - 34.5 %    RDW 13.8 11.5 - 15.0 fL    Platelets 260 564 - 578 E9/L    MPV 9.7 7.0 - 12.0 fL    Neutrophils % 75.2 43.0 - 80.0 %    Immature Granulocytes % 0.6 0.0 - 5.0 %    Lymphocytes % 15.3 (L) 20.0 - 42.0 %    Monocytes % 8.5 2.0 - 12.0 %    Eosinophils % 0.1 0.0 - 6.0 %    Basophils % 0.3 0.0 - 2.0 %    Neutrophils Absolute 6.62 1.80 - 7.30 E9/L    Immature Granulocytes # 0.05 E9/L    Lymphocytes Absolute 1.35 (L) 1.50 - 4.00 E9/L    Monocytes Absolute 0.75 0.10 - 0.95 E9/L    Eosinophils Absolute 0.01 (L) 0.05 - 0.50 E9/L    Basophils Absolute 0.03 0.00 - 0.20 S4/X   Basic Metabolic Panel w/ Reflex to MG   Result Value Ref Range    Sodium 128 (L) 132 - 146 mmol/L Potassium reflex Magnesium 4.1 3.5 - 5.0 mmol/L    Chloride 89 (L) 98 - 107 mmol/L    CO2 27 22 - 29 mmol/L    Anion Gap 12 7 - 16 mmol/L    Glucose 126 (H) 74 - 99 mg/dL    BUN 24 (H) 6 - 23 mg/dL    CREATININE 1.4 (H) 0.7 - 1.2 mg/dL    GFR Non-African American 49 >=60 mL/min/1.73    GFR African American 59     Calcium 9.4 8.6 - 10.2 mg/dL   Hepatic Function Panel   Result Value Ref Range    Total Protein 7.4 6.4 - 8.3 g/dL    Albumin 4.0 3.5 - 5.2 g/dL    Alkaline Phosphatase 98 40 - 129 U/L    ALT 15 0 - 40 U/L    AST 27 0 - 39 U/L    Total Bilirubin 1.2 0.0 - 1.2 mg/dL   Troponin   Result Value Ref Range    Troponin, High Sensitivity 58 (H) 0 - 11 ng/L   Brain Natriuretic Peptide   Result Value Ref Range    Pro-BNP 4,335 (H) 0 - 450 pg/mL   Protime-INR   Result Value Ref Range    Protime 42.8 (H) 9.3 - 12.4 sec    INR 3.9    APTT   Result Value Ref Range    aPTT 42.7 (H) 24.5 - 35.1 sec   Troponin   Result Value Ref Range    Troponin, High Sensitivity 55 (H) 0 - 11 ng/L   EKG 12 Lead   Result Value Ref Range    Ventricular Rate 85 BPM    Atrial Rate 57 BPM    QRS Duration 92 ms    Q-T Interval 446 ms    QTc Calculation (Bazett) 530 ms    R Axis 65 degrees    T Axis 53 degrees       Radiology:  XR CHEST PORTABLE   Final Result   No acute cardiopulmonary findings. PA and lateral views would be useful for   further assessment, if symptoms persist.             ------------------------- NURSING NOTES AND VITALS REVIEWED ---------------------------  Date / Time Roomed:  1/31/2022 10:57 PM  ED Bed Assignment:  13/13    The nursing notes within the ED encounter and vital signs as below have been reviewed.    /64   Pulse 79   Temp 97.4 °F (36.3 °C)   Resp 14   Ht 5' 8\" (1.727 m)   Wt 208 lb (94.3 kg)   SpO2 97%   BMI 31.63 kg/m²   Oxygen Saturation Interpretation: Normal      ------------------------------------------ PROGRESS NOTES ------------------------------------------  1:04 AM EST  I have spoken with the patient and discussed todays results, in addition to providing specific details for the plan of care and counseling regarding the diagnosis and prognosis. Their questions are answered at this time and they are agreeable with the plan. I discussed at length with them reasons for immediate return here for re evaluation. They will followup with their primary care physician by calling their office tomorrow. --------------------------------- ADDITIONAL PROVIDER NOTES ---------------------------------  At this time the patient is without objective evidence of an acute process requiring hospitalization or inpatient management. They have remained hemodynamically stable throughout their entire ED visit and are stable for discharge with outpatient follow-up. The plan has been discussed in detail and they are aware of the specific conditions for emergent return, as well as the importance of follow-up. New Prescriptions    No medications on file       Diagnosis:  1. Chest pain, unspecified type        Disposition:  Patient's disposition: Discharge to home  Patient's condition is stable.             Rolf Gomez MD  Resident  02/01/22 0038

## 2022-02-03 ENCOUNTER — CARE COORDINATION (OUTPATIENT)
Dept: CARE COORDINATION | Age: 82
End: 2022-02-03

## 2022-02-03 NOTE — CARE COORDINATION
ACM spoke with Sia Cardoza as requested by Dr. Jim Nixon. ACM explained care coordination services and he is agreeable. He is scheduled to see Dr. Jim Nixon tomorrow and will have his INR checked. Sia Cardoza has missed his INR check for both December and January. Sia Cardoza states he drives himself to appointments and states he forgot about the appointments for his INR check. ACM educated patient on importance of routine INR checks and potential consequences of having a non therapeutic coumadin level. He voiced understanding and does admit to easily bruising. He denies any falls and states he ambulates independently in his home. He does use a cane when he is out. Patient would like to have his follow up visit with Dr. Jim Nixon before completing enrollment. AC to follow up with Sia Cardoza next week as requested.       Plan  Complete enrollment at next contact  Update Dr. Jim Nixon

## 2022-02-09 ENCOUNTER — CARE COORDINATION (OUTPATIENT)
Dept: CARE COORDINATION | Age: 82
End: 2022-02-09

## 2022-02-09 NOTE — TELEPHONE ENCOUNTER
Thank you for the update!     I did make a notation to review these medications with him at his upcoming appointment
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
## 2022-02-11 ENCOUNTER — OFFICE VISIT (OUTPATIENT)
Dept: PRIMARY CARE CLINIC | Age: 82
End: 2022-02-11
Payer: MEDICARE

## 2022-02-11 VITALS
SYSTOLIC BLOOD PRESSURE: 130 MMHG | TEMPERATURE: 97.3 F | WEIGHT: 209.8 LBS | HEART RATE: 83 BPM | OXYGEN SATURATION: 98 % | DIASTOLIC BLOOD PRESSURE: 72 MMHG | BODY MASS INDEX: 31.9 KG/M2

## 2022-02-11 DIAGNOSIS — I25.10 CORONARY ARTERY DISEASE INVOLVING NATIVE CORONARY ARTERY OF NATIVE HEART WITHOUT ANGINA PECTORIS: ICD-10-CM

## 2022-02-11 DIAGNOSIS — I89.0 LYMPHEDEMA: ICD-10-CM

## 2022-02-11 DIAGNOSIS — K70.31 ALCOHOLIC CIRRHOSIS OF LIVER WITH ASCITES (HCC): ICD-10-CM

## 2022-02-11 DIAGNOSIS — N18.30 STAGE 3 CHRONIC KIDNEY DISEASE, UNSPECIFIED WHETHER STAGE 3A OR 3B CKD (HCC): ICD-10-CM

## 2022-02-11 DIAGNOSIS — I42.9 CARDIOMYOPATHY, UNSPECIFIED TYPE (HCC): ICD-10-CM

## 2022-02-11 DIAGNOSIS — I10 ESSENTIAL HYPERTENSION: ICD-10-CM

## 2022-02-11 DIAGNOSIS — E78.00 PURE HYPERCHOLESTEROLEMIA: ICD-10-CM

## 2022-02-11 DIAGNOSIS — M1A.09X0 IDIOPATHIC CHRONIC GOUT OF MULTIPLE SITES WITHOUT TOPHUS: ICD-10-CM

## 2022-02-11 DIAGNOSIS — G62.9 NEUROPATHY: ICD-10-CM

## 2022-02-11 DIAGNOSIS — R73.01 IMPAIRED FASTING GLUCOSE: ICD-10-CM

## 2022-02-11 DIAGNOSIS — F10.10 ALCOHOL ABUSE: ICD-10-CM

## 2022-02-11 DIAGNOSIS — E03.8 OTHER SPECIFIED HYPOTHYROIDISM: ICD-10-CM

## 2022-02-11 DIAGNOSIS — M15.9 PRIMARY OSTEOARTHRITIS INVOLVING MULTIPLE JOINTS: ICD-10-CM

## 2022-02-11 DIAGNOSIS — E87.1 HYPONATREMIA: ICD-10-CM

## 2022-02-11 DIAGNOSIS — I48.19 PERSISTENT ATRIAL FIBRILLATION (HCC): Primary | ICD-10-CM

## 2022-02-11 LAB
INTERNATIONAL NORMALIZATION RATIO, POC: 2.5
PROTHROMBIN TIME, POC: 30.5

## 2022-02-11 PROCEDURE — 85610 PROTHROMBIN TIME: CPT | Performed by: INTERNAL MEDICINE

## 2022-02-11 PROCEDURE — 99214 OFFICE O/P EST MOD 30 MIN: CPT | Performed by: INTERNAL MEDICINE

## 2022-02-11 RX ORDER — POTASSIUM CHLORIDE 20 MEQ/1
20 TABLET, EXTENDED RELEASE ORAL 3 TIMES DAILY
Qty: 270 TABLET | Refills: 2
Start: 2022-02-11

## 2022-02-11 RX ORDER — METOLAZONE 2.5 MG/1
TABLET ORAL
Qty: 90 TABLET | Refills: 1
Start: 2022-02-11

## 2022-02-11 ASSESSMENT — ENCOUNTER SYMPTOMS
COUGH: 0
CHEST TIGHTNESS: 0
EYE PAIN: 0
VOMITING: 0
DIARRHEA: 0
SHORTNESS OF BREATH: 0
ABDOMINAL PAIN: 0
SORE THROAT: 0
BLOOD IN STOOL: 0
CONSTIPATION: 0
RHINORRHEA: 0
NAUSEA: 0

## 2022-02-11 ASSESSMENT — PATIENT HEALTH QUESTIONNAIRE - PHQ9
SUM OF ALL RESPONSES TO PHQ QUESTIONS 1-9: 0
1. LITTLE INTEREST OR PLEASURE IN DOING THINGS: 0
SUM OF ALL RESPONSES TO PHQ QUESTIONS 1-9: 0
SUM OF ALL RESPONSES TO PHQ9 QUESTIONS 1 & 2: 0
SUM OF ALL RESPONSES TO PHQ QUESTIONS 1-9: 0
SUM OF ALL RESPONSES TO PHQ QUESTIONS 1-9: 0
2. FEELING DOWN, DEPRESSED OR HOPELESS: 0

## 2022-02-11 NOTE — PROGRESS NOTES
[facility]) Physicians   Internal Medicine     2022  Jasper Bello : 1940 Sex: male Age:81 y.o. Chief Complaint   Patient presents with    Follow-Up from Hospital     was in ed on         HPI:   Patient presents to office for evaluation of the following medical concerns. cc call () - questions about gabepentin (takes bid not tid), metolazone (takes twice weekly not three weekly). These arel noted on his medication list.  He states he has been taking 5 mg coumadin daily    - States was seen in ER for chest pain (2022). States labs including troponin were negative. INR was elevated. States pain is till present. Worse movement. States upper chest and mid back pain. Improved    - States having issues with tinnitus.     - States having issues with b/l LE edema. Improved, On diuretics  (10/21) decreased metalozone to 2.5mg 2x per week, hypokalmia incr kcl to 20meq tid, lab in 2wks, f/u 3 mon with labs    - Having erectile dysfunction. Asking about treatment     - Stats has atrial fibrillation. Uncertain cardiology follow up. On metoprolol. On coumadin. continue 6mg mon/fri and 5mg all other days. INR today (2022) was 2.5    - States has cardiomyopathy. States due to alcohol. Follows with cardiology.     - States has CAD. States had stress test (2020) The myocardial perfusion imaging was abnormal, moderate sized reversible defect , cardiac cath (2020) - 40% proximal LAD lesion, 50% mid LAD lesion at the takeoff of a large diagonal branch, 50% proximal left circumflex artery disease. Totally occluded right coronary artery with grade 3 left-to-right collaterals. States needs aggressive risk factor management. On metoprolol last visit per reviewed note () continue current medications and follow-up in 1 year    - States has hypothyroid. On synthroid. Last lab (2021) was overreactive. 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 Still drinks on weekends. Discussed cessation. On metolazone  2.5mg - states switched to 2x per week. On torsemide 40 mg twice a day. on sprinolactone. - States has chronic kidney disease. Follows with nephrology. LAst creat was 1.58 (10/2021). (10/21) decreased metalozone to 2.5mg 2x per week, hypokalmia incr kcl to 20meq tid, lab in 2wks, f/u 3 mon with labs    - States has elevated uric acid. States was on allopurinol in the past, stopped due to thought was causing hives. Last uric acid was 9 (5/201). States was seen in urgent care (9/2021) wrist pain - depomedrol x1, medrol pack     States has hypertension, not checking blood pressure at home. On metoprolol. States has high cholesterol. Was on simvastatin - changed to atorvastatin. No reported side effects. States has neuropathy. Possible due to alcohol. On gabapentin. States taking 2 tablets in am, 1 tablet in afternoon and 1 tablet in pm. States alters and takes 2 twice a day  Added duloxtine. Stable    - States has osteoarthritis. States multiple joints. Had left knee replacement. 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 Follows with ortho. Needs physical therapy. - low sodium - uncertain in duloxetine or diuretics     Health Maintenance   - immunizations:   Influenza Vaccination - declines  Pneumonia Vaccination - declines   Zoster/Shingles Vaccine  Tetanus Vaccination (2017)- tdap   covid (3/2/2021) #1, (3/30/2021) #2, (11/30/2021) #3  - moderna     - Screenings:   Colonoscopy   Prostate     Stress test (4/2020) - The myocardial perfusion imaging was abnormal, moderate sized reversible defect in the entire inferior wall and apex suggestive of ischemia    echo (4/20) - ef 55-60%, mod l &r atiral dilation, mild to mod MR, indeterm diastolic dysfun    cardiac cath (5/20) - IMPRESSION:  1.  40% proximal LAD lesion, 50% mid LAD lesion at the takeoff of a  large diagonal branch.   2.  50% proximal left circumflex artery disease. 3.  Totally occluded right coronary artery with grade 3 left-to-right  collaterals. Couseled on Home Safety     ROS:  Review of Systems   Constitutional: Negative for appetite change, chills, fever and unexpected weight change. HENT: Negative for congestion, rhinorrhea and sore throat. Eyes: Negative for pain and visual disturbance. Respiratory: Negative for cough, chest tightness and shortness of breath. Cardiovascular: Negative for chest pain and palpitations. Gastrointestinal: Negative for abdominal pain, blood in stool, constipation, diarrhea, nausea and vomiting. Genitourinary: Negative for difficulty urinating, dysuria, hematuria, scrotal swelling, testicular pain and urgency. Musculoskeletal: Negative for arthralgias and gait problem. Skin: Negative for rash. Neurological: Positive for dizziness. Negative for syncope, weakness, light-headedness and headaches. Hematological: Negative for adenopathy. Does not bruise/bleed easily. Psychiatric/Behavioral: Negative for suicidal ideas. The patient is not nervous/anxious.           Current Outpatient Medications:     potassium chloride (KLOR-CON M) 20 MEQ extended release tablet, Take 1 tablet by mouth 3 times daily, Disp: 270 tablet, Rfl: 2    metOLazone (ZAROXOLYN) 2.5 MG tablet, 1 tablet by mouth monday and Friday only, Disp: 90 tablet, Rfl: 1    gabapentin (NEURONTIN) 100 MG capsule, 200mg in am, 100mg at noon and 200mg in pm, Disp: 450 capsule, Rfl: 0    DULoxetine (CYMBALTA) 20 MG extended release capsule, Take 1 capsule by mouth daily, Disp: 30 capsule, Rfl: 3    aspirin (ECOTRIN LOW STRENGTH) 81 MG EC tablet, Take 1 tablet by mouth daily, Disp: 30 tablet, Rfl: 11    warfarin (COUMADIN) 5 MG tablet, Take 1 tablet by mouth daily, Disp: 90 tablet, Rfl: 3    levothyroxine (SYNTHROID) 200 MCG tablet, Take 1 tablet by mouth Daily Pt take 0.5 Saturday and Sunday, Disp: 90 tablet, Rfl: 1    warfarin (COUMADIN) 1 MG tablet, Take 1 tablet by mouth daily Takes 1 mg Mondays and Fridays in addition to 5mg tab to equal 6mg (Patient not taking: Reported on 2/9/2022), Disp: 30 tablet, Rfl: 1    atorvastatin (LIPITOR) 40 MG tablet, Take 1 tablet by mouth daily, Disp: 90 tablet, Rfl: 3    spironolactone (ALDACTONE) 25 MG tablet, Take 25 mg by mouth daily, Disp: , Rfl:     magnesium oxide (MAG-OX) 400 (241.3 Mg) MG TABS tablet, TAKE ONE TABLET BY MOUTH DAILY, Disp: , Rfl:     torsemide (DEMADEX) 20 MG tablet, Take 40 mg by mouth 2 times daily , Disp: , Rfl:     vitamin B-1 (THIAMINE) 100 MG tablet, Take 100 mg by mouth daily, Disp: , Rfl:     metoprolol tartrate (LOPRESSOR) 50 MG tablet, Take 1 tablet by mouth 2 times daily, Disp: 60 tablet, Rfl: 3    MULTIPLE VITAMIN PO, Take 1 tablet by mouth daily Last taken 06/16/2020, Disp: , Rfl:     Allergies   Allergen Reactions    Sulfa Antibiotics Hives     Hives         Past Medical History:   Diagnosis Date    Blood circulation, collateral     CAD (coronary artery disease)     Chronic kidney disease     History of alcohol use     History of ascites     history of, approx 5 years ago    Hyperlipidemia     Hypertension     Hyponatremia     Left knee pain 06/2020    Liver disease     Lymphedema     lower extrem    Neuropathy     both feet    Osteoarthritis     Psychiatric problem     Thyroid disease     Use of cane as ambulatory aid     Uses wheelchair     for distances    Wears glasses     for reading    Wears hearing aid        Past Surgical History:   Procedure Laterality Date    APPENDECTOMY      CARDIAC CATHETERIZATION  05/2020    CATARACT REMOVAL WITH IMPLANT Bilateral     COLONOSCOPY      EYE SURGERY      cataracts    TOTAL KNEE ARTHROPLASTY Left 6/22/2020    LEFT KNEE IVAN ROBOTIC TOTAL ARTHROPLASTY performed by Art Bence, MD [facility] OR       Family History   Problem Relation Age of Onset    Other Mother         old age Social History     Socioeconomic History    Marital status: Single     Spouse name: Not on file    Number of children: 1    Years of education: 16    Highest education level: Master's degree (e.g., MA, MS, [place], MEd, MSW, LUCERO)   Occupational History    Not on file   Tobacco Use    Smoking status: Former Smoker     Packs/day: 0.75     Years: 25.00     Pack years: 18.75     Types: Pipe, Cigars     Start date: 10/8/1964     Quit date: 1986     Years since quittin.8    Smokeless tobacco: Never Used    Tobacco comment: Cigar and Pipe smoking history only   Vaping Use    Vaping Use: Never used   Substance and Sexual Activity    Alcohol use: Yes     Alcohol/week: 0.0 standard drinks     Comment: quit 2017; socially as of 2020 on weekends    Drug use: No    Sexual activity: Not on file   Other Topics Concern    Not on file   Social History Narrative    Not on file     Social Determinants of Health     Financial Resource Strain: Low Risk     Difficulty of Paying Living Expenses: Not hard at all   Food Insecurity: No Food Insecurity    Worried About [address] in the Last Year: Never true    [facility] in the Last Year: Never true   Transportation Needs:     Lack of Transportation (Medical): Not on file    Lack of Transportation (Non-Medical):  Not on file   Physical Activity:     Days of Exercise per Week: Not on file    Minutes of Exercise per Session: Not on file   Stress:     Feeling of Stress : Not on file   Social Connections:     Frequency of Communication with Friends and Family: Not on file    Frequency of Social Gatherings with Friends and Family: Not on file    Attends [employer] Services: Not on file    Active Member of Clubs or Organizations: Not on file    Attends Club or Organization Meetings: Not on file    Marital Status: Not on file   Intimate Partner Violence:     Fear of Current or Ex-Partner: Not on file    Emotionally Abused: Not on file  Physically Abused: Not on file    Sexually Abused: Not on file   Housing Stability:     Unable to Pay for Housing in the Last Year: Not on file    Number of Places Lived in the Last Year: Not on file    Unstable Housing in the Last Year: Not on file        Vitals:    02/11/22 1356   BP: 130/72   Site: Left Upper Arm   Position: Sitting   Pulse: 83   Temp: 97.3 °F (36.3 °C)   TempSrc: Temporal   SpO2: 98%   Weight: 209 lb 12.8 oz (95.2 kg)       Exam:  Physical Exam  Constitutional:       Appearance: He is well-developed. HENT:      Head: Normocephalic and atraumatic. Right Ear: External ear normal.      Left Ear: External ear normal.      Nose: Rhinorrhea present. Mouth/Throat:      Pharynx: Posterior oropharyngeal erythema present. Eyes:      Pupils: Pupils are equal, round, and reactive to light. Neck:      Thyroid: No thyromegaly. Cardiovascular:      Rate and Rhythm: Normal rate. Rhythm irregular. Heart sounds: Murmur heard. Systolic murmur is present with a grade of 2/6. Pulmonary:      Effort: Pulmonary effort is normal.      Breath sounds: Normal breath sounds. No wheezing or rales. Abdominal:      General: Bowel sounds are normal.      Palpations: Abdomen is soft. Tenderness: There is no abdominal tenderness. There is no guarding or rebound. Musculoskeletal:         General: Normal range of motion. Cervical back: Normal range of motion and neck supple. Right lower leg: Edema present. Left lower leg: Edema present. Lymphadenopathy:      Cervical: No cervical adenopathy. Skin:     General: Skin is warm and dry. Neurological:      Mental Status: He is alert and oriented to person, place, and time. Cranial Nerves: No cranial nerve deficit.    Psychiatric:         Judgment: Judgment normal.         Assessment and Plan:    Diagnoses and all orders for this visit:    Persistent atrial fibrillation  - uncertain as to onset  - ekg from 2017 showed flutter   - on anticoagulation   - follow with cardiolgy   - on coumadin 6mg mon/fri  and 5mg all other days    - inr (2/11/2022) was 2.5  - recheck in 1 month      Essential hypertension  - watch diet   - monitor blood pressure at home   - on spironolactone   - on metoprolol tart   - stable     Pure hypercholesterolemia  - watch diet   - simvastatin switched to atorvastatin   - follow labs   - uncontrolled     Coronary artery disease involving native coronary artery of native heart without angina pectoris  - s/p stress and cath (2020)   - medical therapy   - now on atorvastatin   - on aspirin 81mg   - on metorolol tart 50mg twice a day   - follow with cardiology     Alcoholic cirrhosis of liver with ascites ([facility] [address]  - discussed and advised stopping alcohol altogether   - declines referral to GI   - on spironolactone   - on torsemide   - on metolazone - states changed to 2x per week      Hyponatremia  - Possible multifactorial (cirrhois and meds)   - last lab decreased to 128 (10/2021)     Dilated cardiomyopathy ([facility] [address]  - possible due to alcohol   - discussed and advised stopping alcohol altogether     CKD (chronic kidney disease) stage 3, GFR 30-59 ml/min ([facility])  - following with nephrology   - on spirinolactone 25mg daily   - on metalozaone 2.5mg 2x per week    - on torsemide - advised by renal to take 40mg twice a day    - on potassium supplement   - follow labs     Lymphedema  - on metalozaone 2.5mg 2x per week   - on torsemide - advised by renal to take 40mg twice a day    - on spirinolactone 25mg daily   - on potassium supplement   - compression stockings if able   - recommended referral to lymphedema PT - declines at present   - improved     Idiopathic chronic gout of multiple sites without tophus  - improved post medrol pack but not resolved   - previous labs showed elevated uric acid   - was on allopurinol in the past - stopped due to poss reaction   - may need to consider uloric and discuss with renal - low purine diet handout provided   - exacerbation (2021) - treated with medrol for pain to wrist     Impaired fasting glucose  - watch diet   - follow A1c - last was 5.9 (2021)     Alcohol abuse  - discussed and advised stopping alcohol altogether     History of ascites    Neuropathy ([facility] [address]  - due to alcohol   - on gabapentin 200mg in am, 200mg at noon and 100mg in pm   - asking for alternative - discssed duloxetine and lyrica   - on duloxetine   - stable     Other specified hypothyroidism  - on levothyroxine   - follow labs - last (2021) - overactive   - decrease dose to 200mcg mon-fri and 100mcg sat/sun   - recheck labs     Primary osteoarthritis involving multiple joints    Erectile dysfunction, unspecified erectile dysfunction type  - trial of viagra      Return in about 2 weeks (around 2022) for check up and review.     Orders Placed This Encounter   Procedures    POCT INR     Requested Prescriptions     Signed Prescriptions Disp Refills    potassium chloride (KLOR-CON M) 20 MEQ extended release tablet 270 tablet 2     Sig: Take 1 tablet by mouth 3 times daily    metOLazone (ZAROXOLYN) 2.5 MG tablet 90 tablet 1     Si tablet by mouth monday and Friday only        Zechariah Bautista MD  2022  2:49 PM

## 2022-02-15 LAB
AVERAGE GLUCOSE: NORMAL
CHOLESTEROL, FASTING: 127
FOLATE: 17.1
HBA1C MFR BLD: 5.7 %
HDLC SERPL-MCNC: 40 MG/DL (ref 35–70)
LDL CHOLESTEROL CALCULATED: 53 MG/DL (ref 0–160)
MAGNESIUM: 1.8 MG/DL
TRIGLYCERIDE, FASTING: 172
TSH SERPL DL<=0.05 MIU/L-ACNC: 0.43 UIU/ML
VITAMIN B-12: 524

## 2022-02-17 ENCOUNTER — CARE COORDINATION (OUTPATIENT)
Dept: CARE COORDINATION | Age: 82
End: 2022-02-17

## 2022-02-19 ENCOUNTER — TELEPHONE (OUTPATIENT)
Dept: FAMILY MEDICINE CLINIC | Age: 82
End: 2022-02-19

## 2022-02-19 NOTE — TELEPHONE ENCOUNTER
Please let the patient know that blood work results showed    Vitamin T80 level and folic acid levels were normal    Magnesium level was normal    Cholesterol levels were fair. Levels are similar when compared to previous triglyceride levels were borderline elevated. Recommend to continue current medications for cholesterol    Thyroid levels were normal but on the overactive end of normal and would continue to follow and continue present dose of medication    Hemoglobin A1c is a measure 3-month sugar control was borderline elevated at 5.7 indicating prediabetes.   Recommend to watch diet from carbohydrates and sugars    Thanks

## 2022-02-23 DIAGNOSIS — E78.00 PURE HYPERCHOLESTEROLEMIA: ICD-10-CM

## 2022-02-23 DIAGNOSIS — R73.01 IMPAIRED FASTING GLUCOSE: ICD-10-CM

## 2022-02-23 DIAGNOSIS — Z79.899 LONG TERM CURRENT USE OF THERAPEUTIC DRUG: ICD-10-CM

## 2022-02-23 DIAGNOSIS — I10 ESSENTIAL HYPERTENSION: ICD-10-CM

## 2022-02-24 ENCOUNTER — CARE COORDINATION (OUTPATIENT)
Dept: CARE COORDINATION | Age: 82
End: 2022-02-24

## 2022-02-25 ENCOUNTER — NURSE ONLY (OUTPATIENT)
Dept: PRIMARY CARE CLINIC | Age: 82
End: 2022-02-25
Payer: MEDICARE

## 2022-02-25 ENCOUNTER — ANTI-COAG VISIT (OUTPATIENT)
Dept: PRIMARY CARE CLINIC | Age: 82
End: 2022-02-25

## 2022-02-25 DIAGNOSIS — I48.19 PERSISTENT ATRIAL FIBRILLATION (HCC): Primary | ICD-10-CM

## 2022-02-25 LAB
INTERNATIONAL NORMALIZATION RATIO, POC: 3.2
PROTHROMBIN TIME, POC: 38.4

## 2022-02-25 PROCEDURE — 85610 PROTHROMBIN TIME: CPT | Performed by: INTERNAL MEDICINE

## 2022-02-25 NOTE — PROGRESS NOTES
Previous INR: 2.5    Previous dose: 6mg mon/fri  and 5mg all other days      Current INR: 2.6    Recommendation: 2.5mg x 1 then change to 6mg mon and 5mg all other days    Next INR: 1 month     Colonel Elidia MD  2/25/2022  2:35 PM

## 2022-03-07 ENCOUNTER — CARE COORDINATION (OUTPATIENT)
Dept: CARE COORDINATION | Age: 82
End: 2022-03-07

## 2022-03-08 DIAGNOSIS — G62.9 NEUROPATHY: ICD-10-CM

## 2022-03-08 RX ORDER — DULOXETIN HYDROCHLORIDE 20 MG/1
20 CAPSULE, DELAYED RELEASE ORAL DAILY
Qty: 90 CAPSULE | Refills: 0 | Status: SHIPPED
Start: 2022-03-08 | End: 2022-06-08 | Stop reason: SDUPTHER

## 2022-03-15 ENCOUNTER — CARE COORDINATION (OUTPATIENT)
Dept: CARE COORDINATION | Age: 82
End: 2022-03-15

## 2022-03-15 NOTE — CARE COORDINATION
Attempted to reach patient by telephone. Left HIPAA compliant message requesting a return call. Patient was a PCP referral due to missed appointments for INR checks, AC will attempt another outreach to reach patient. Patient has been keeping appointments for his lab draws.

## 2022-03-21 DIAGNOSIS — G62.9 NEUROPATHY: ICD-10-CM

## 2022-03-21 RX ORDER — GABAPENTIN 100 MG/1
CAPSULE ORAL
Qty: 450 CAPSULE | Refills: 0 | Status: SHIPPED
Start: 2022-03-21 | End: 2022-08-22 | Stop reason: SDUPTHER

## 2022-03-21 NOTE — TELEPHONE ENCOUNTER
Last Appointment:  2/11/2022  Future Appointments   Date Time Provider Rafi Alvarenga   3/25/2022  2:30 PM MD [facility]

## 2022-03-25 ENCOUNTER — OFFICE VISIT (OUTPATIENT)
Dept: PRIMARY CARE CLINIC | Age: 82
End: 2022-03-25
Payer: COMMERCIAL

## 2022-03-25 ENCOUNTER — CARE COORDINATION (OUTPATIENT)
Dept: CARE COORDINATION | Age: 82
End: 2022-03-25

## 2022-03-25 ENCOUNTER — ANTI-COAG VISIT (OUTPATIENT)
Dept: PRIMARY CARE CLINIC | Age: 82
End: 2022-03-25

## 2022-03-25 VITALS
DIASTOLIC BLOOD PRESSURE: 72 MMHG | OXYGEN SATURATION: 99 % | HEART RATE: 84 BPM | SYSTOLIC BLOOD PRESSURE: 120 MMHG | TEMPERATURE: 97 F | WEIGHT: 211.8 LBS | BODY MASS INDEX: 32.2 KG/M2

## 2022-03-25 DIAGNOSIS — I10 ESSENTIAL HYPERTENSION: ICD-10-CM

## 2022-03-25 DIAGNOSIS — N18.30 STAGE 3 CHRONIC KIDNEY DISEASE, UNSPECIFIED WHETHER STAGE 3A OR 3B CKD (HCC): ICD-10-CM

## 2022-03-25 DIAGNOSIS — E87.1 HYPONATREMIA: ICD-10-CM

## 2022-03-25 DIAGNOSIS — M1A.09X0 IDIOPATHIC CHRONIC GOUT OF MULTIPLE SITES WITHOUT TOPHUS: ICD-10-CM

## 2022-03-25 DIAGNOSIS — I89.0 LYMPHEDEMA: ICD-10-CM

## 2022-03-25 DIAGNOSIS — K70.31 ALCOHOLIC CIRRHOSIS OF LIVER WITH ASCITES (HCC): ICD-10-CM

## 2022-03-25 DIAGNOSIS — E78.00 PURE HYPERCHOLESTEROLEMIA: ICD-10-CM

## 2022-03-25 DIAGNOSIS — R73.01 IMPAIRED FASTING GLUCOSE: ICD-10-CM

## 2022-03-25 DIAGNOSIS — I48.19 PERSISTENT ATRIAL FIBRILLATION (HCC): Primary | ICD-10-CM

## 2022-03-25 DIAGNOSIS — F10.10 ALCOHOL ABUSE: ICD-10-CM

## 2022-03-25 DIAGNOSIS — I25.10 CORONARY ARTERY DISEASE INVOLVING NATIVE CORONARY ARTERY OF NATIVE HEART WITHOUT ANGINA PECTORIS: ICD-10-CM

## 2022-03-25 LAB
INTERNATIONAL NORMALIZATION RATIO, POC: 3.4
PROTHROMBIN TIME, POC: 40.9

## 2022-03-25 PROCEDURE — 99214 OFFICE O/P EST MOD 30 MIN: CPT | Performed by: INTERNAL MEDICINE

## 2022-03-25 PROCEDURE — 85610 PROTHROMBIN TIME: CPT | Performed by: INTERNAL MEDICINE

## 2022-03-25 ASSESSMENT — ENCOUNTER SYMPTOMS
COUGH: 0
NAUSEA: 0
DIARRHEA: 0
RHINORRHEA: 0
BLOOD IN STOOL: 0
SHORTNESS OF BREATH: 0
VOMITING: 0
CONSTIPATION: 0
EYE PAIN: 0
ABDOMINAL PAIN: 0
CHEST TIGHTNESS: 0
SORE THROAT: 0

## 2022-03-25 NOTE — CARE COORDINATION
Attempted to reach patient by telephone. Left HIPAA compliant message requesting a return call (including a reminder for today's appointment with Dr. John Lundberg). If no return call from patient, will send unable to contact letter. ACM to update PCP.

## 2022-03-25 NOTE — PROGRESS NOTES
[facility]) Physicians   Internal Medicine     3/25/2022  Pastor Alfredo : 1940 Sex: male Age:81 y.o. Chief Complaint   Patient presents with    Atrial Fibrillation        HPI:   Patient presents to office for evaluation of the following medical concerns. - States having issues with tinnitus.     - States having issues with b/l LE edema. Improved, On diuretics last visit with nephrology per reviewed note o (3/22) -hyponatremia drink electrolyte fluids instead of free water, no changes follow-up in 4 months with labs    - Having erectile dysfunction. Asking about treatment     - Stats has atrial fibrillation. Uncertain cardiology follow up. On metoprolol. On coumadin. continue 6mg mon/fri and 5mg all other days. Was to change to 6mg mon and 5mg all other days (did not make this adjustment (2022)  INR today (3/25/2022) was 3.4. No bleeding.     - States has cardiomyopathy. States due to alcohol. Follows with cardiology.     - States has CAD. States had stress test (2020) The myocardial perfusion imaging was abnormal, moderate sized reversible defect , cardiac cath (2020) - 40% proximal LAD lesion, 50% mid LAD lesion at the takeoff of a large diagonal branch, 50% proximal left circumflex artery disease. Totally occluded right coronary artery with grade 3 left-to-right collaterals. States needs aggressive risk factor management. On metoprolol last visit per reviewed note () continue current medications and follow-up in 1 year    - States has hypothyroid. On synthroid. Last lab (2022) was overreactive. 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 Still drinks on weekends. Discussed cessation. On metolazone  2.5mg - states switched to 2x per week. On torsemide 40 mg twice a day. on sprinolactone. - States has chronic kidney disease. Follows with nephrology. LAst creat was 1.58 (10/2021).  Last visit with nephrology per reviewed note o (3/22) -hyponatremia drink electrolyte fluids instead of free water, no changes follow-up in 4 months with labs    - States has elevated uric acid. States was on allopurinol in the past, stopped due to thought was causing hives. Last uric acid was 8.6 (2/2022). States was seen in urgent care (9/2021) wrist pain - depomedrol x1, medrol pack     States has hypertension, not checking blood pressure at home. On metoprolol. States has high cholesterol. Was on simvastatin - changed to atorvastatin. No reported side effects. States has neuropathy. Possible due to alcohol. On gabapentin, (takes bid not tid)  Added duloxtine. Stable    - States has osteoarthritis. States multiple joints. Had left knee replacement. 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 Follows with ortho. Needs physical therapy. - low sodium - uncertain in duloxetine or diuretics     Health Maintenance   - immunizations:   Influenza Vaccination - declines  Pneumonia Vaccination - declines   Zoster/Shingles Vaccine  Tetanus Vaccination (2017)- tdap   covid (3/2/2021) #1, (3/30/2021) #2, (11/30/2021) #3  - moderna     - Screenings:   Colonoscopy   Prostate     Stress test (4/2020) - The myocardial perfusion imaging was abnormal, moderate sized reversible defect in the entire inferior wall and apex suggestive of ischemia    echo (4/20) - ef 55-60%, mod l &r atiral dilation, mild to mod MR, indeterm diastolic dysfun    cardiac cath (5/20) - IMPRESSION:  1.  40% proximal LAD lesion, 50% mid LAD lesion at the takeoff of a  large diagonal branch. 2.  50% proximal left circumflex artery disease. 3.  Totally occluded right coronary artery with grade 3 left-to-right  collaterals. Couseled on Home Safety     ROS:  Review of Systems   Constitutional: Negative for appetite change, chills, fever and unexpected weight change. HENT: Negative for congestion, rhinorrhea and sore throat.     Eyes: Negative for pain and visual disturbance. Respiratory: Negative for cough, chest tightness and shortness of breath. Cardiovascular: Negative for chest pain and palpitations. Gastrointestinal: Negative for abdominal pain, blood in stool, constipation, diarrhea, nausea and vomiting. Genitourinary: Negative for difficulty urinating, dysuria, hematuria, scrotal swelling, testicular pain and urgency. Musculoskeletal: Negative for arthralgias and gait problem. Skin: Negative for rash. Neurological: Positive for dizziness. Negative for syncope, weakness, light-headedness and headaches. Hematological: Negative for adenopathy. Does not bruise/bleed easily. Psychiatric/Behavioral: Negative for suicidal ideas. The patient is not nervous/anxious.           Current Outpatient Medications:     gabapentin (NEURONTIN) 100 MG capsule, 200mg in am, 100mg at noon and 200mg in pm, Disp: 450 capsule, Rfl: 0    DULoxetine (CYMBALTA) 20 MG extended release capsule, Take 1 capsule by mouth daily, Disp: 90 capsule, Rfl: 0    potassium chloride (KLOR-CON M) 20 MEQ extended release tablet, Take 1 tablet by mouth 3 times daily, Disp: 270 tablet, Rfl: 2    metOLazone (ZAROXOLYN) 2.5 MG tablet, 1 tablet by mouth monday and Friday only, Disp: 90 tablet, Rfl: 1    aspirin (ECOTRIN LOW STRENGTH) 81 MG EC tablet, Take 1 tablet by mouth daily, Disp: 30 tablet, Rfl: 11    warfarin (COUMADIN) 5 MG tablet, Take 1 tablet by mouth daily, Disp: 90 tablet, Rfl: 3    levothyroxine (SYNTHROID) 200 MCG tablet, Take 1 tablet by mouth Daily Pt take 0.5 Saturday and Sunday, Disp: 90 tablet, Rfl: 1    warfarin (COUMADIN) 1 MG tablet, Take 1 tablet by mouth daily Takes 1 mg Mondays and Fridays in addition to 5mg tab to equal 6mg (Patient not taking: Reported on 2/9/2022), Disp: 30 tablet, Rfl: 1    atorvastatin (LIPITOR) 40 MG tablet, Take 1 tablet by mouth daily, Disp: 90 tablet, Rfl: 3    spironolactone (ALDACTONE) 25 MG tablet, Take 25 mg by mouth daily, Disp: , Rfl:     magnesium oxide (MAG-OX) 400 (241.3 Mg) MG TABS tablet, TAKE ONE TABLET BY MOUTH DAILY, Disp: , Rfl:     torsemide (DEMADEX) 20 MG tablet, Take 40 mg by mouth 2 times daily , Disp: , Rfl:     vitamin B-1 (THIAMINE) 100 MG tablet, Take 100 mg by mouth daily, Disp: , Rfl:     metoprolol tartrate (LOPRESSOR) 50 MG tablet, Take 1 tablet by mouth 2 times daily, Disp: 60 tablet, Rfl: 3    MULTIPLE VITAMIN PO, Take 1 tablet by mouth daily Last taken 06/16/2020, Disp: , Rfl:     Allergies   Allergen Reactions    Sulfa Antibiotics Hives     Hives         Past Medical History:   Diagnosis Date    Blood circulation, collateral     CAD (coronary artery disease)     Chronic kidney disease     History of alcohol use     History of ascites     history of, approx 5 years ago    Hyperlipidemia     Hypertension     Hyponatremia     Left knee pain 06/2020    Liver disease     Lymphedema     lower extrem    Neuropathy     both feet    Osteoarthritis     Psychiatric problem     Thyroid disease     Use of cane as ambulatory aid     Uses wheelchair     for distances    Wears glasses     for reading    Wears hearing aid        Past Surgical History:   Procedure Laterality Date    APPENDECTOMY      CARDIAC CATHETERIZATION  05/2020    CATARACT REMOVAL WITH IMPLANT Bilateral     COLONOSCOPY      EYE SURGERY      cataracts    TOTAL KNEE ARTHROPLASTY Left 6/22/2020    LEFT KNEE IVAN ROBOTIC TOTAL ARTHROPLASTY performed by Tanisha Castillo MD at [facility]       Family History   Problem Relation Age of Onset   Uriostegui Other Mother         old age        Social History     Socioeconomic History    Marital status: Single     Spouse name: Not on file    Number of children: 1    Years of education: 16    Highest education level: Master's degree (e.g., MA, MS, Jordan, MEd, MSW, LUCERO)   Occupational History    Not on file   Tobacco Use    Smoking status: Former Smoker     Packs/day: 0.75     Years: 25.00     Pack years: 18.75     Types: Pipe, Cigars     Start date: 10/8/1964     Quit date: 1986     Years since quittin.0    Smokeless tobacco: Never Used    Tobacco comment: Cigar and Pipe smoking history only   Vaping Use    Vaping Use: Never used   Substance and Sexual Activity    Alcohol use: Yes     Alcohol/week: 0.0 standard drinks     Comment: quit 2017; socially as of 2020 on weekends    Drug use: No    Sexual activity: Not on file   Other Topics Concern    Not on file   Social History Narrative    Not on file     Social Determinants of Health     Financial Resource Strain: Low Risk     Difficulty of Paying Living Expenses: Not hard at all   Food Insecurity: No Food Insecurity    Worried About [address] [employer] in the Last Year: Never true    [address]  [address] in the Last Year: Never true   Transportation Needs:     Lack of Transportation (Medical): Not on file    Lack of Transportation (Non-Medical):  Not on file   Physical Activity:     Days of Exercise per Week: Not on file    Minutes of Exercise per Session: Not on file   Stress:     Feeling of Stress : Not on file   Social Connections:     Frequency of Communication with Friends and Family: Not on file    Frequency of Social Gatherings with Friends and Family: Not on file    Attends Temple Services: Not on file    Active Member of [address] [place] [address] or Organizations: Not on file    Attends Club or Organization Meetings: Not on file    Marital Status: Not on file   Intimate Partner Violence:     Fear of Current or Ex-Partner: Not on file    Emotionally Abused: Not on file    Physically Abused: Not on file    Sexually Abused: Not on file   Housing Stability:     Unable to Pay for Housing in the Last Year: Not on file    Number of [place] in the Last Year: Not on file    Unstable Housing in the Last Year: Not on file        Vitals:    22 1420   BP: 120/72   Site: Right Upper Arm   Position: Sitting   Pulse: 84   Temp: 97 °F (36.1 °C)   TempSrc: Temporal   SpO2: 99%   Weight: 211 lb 12.8 oz (96.1 kg)       Exam:  Physical Exam  Constitutional:       Appearance: He is well-developed. HENT:      Head: Normocephalic and atraumatic. Right Ear: External ear normal.      Left Ear: External ear normal.      Nose: Rhinorrhea present. Mouth/Throat:      Pharynx: Posterior oropharyngeal erythema present. Eyes:      Pupils: Pupils are equal, round, and reactive to light. Neck:      Thyroid: No thyromegaly. Cardiovascular:      Rate and Rhythm: Normal rate. Rhythm irregular. Heart sounds: Murmur heard. Systolic murmur is present with a grade of 2/6. Pulmonary:      Effort: Pulmonary effort is normal.      Breath sounds: Normal breath sounds. No wheezing or rales. Abdominal:      General: Bowel sounds are normal.      Palpations: Abdomen is soft. Tenderness: There is no abdominal tenderness. There is no guarding or rebound. Musculoskeletal:         General: Normal range of motion. Cervical back: Normal range of motion and neck supple. Right lower leg: Edema present. Left lower leg: Edema present. Lymphadenopathy:      Cervical: No cervical adenopathy. Skin:     General: Skin is warm and dry. Neurological:      Mental Status: He is alert and oriented to person, place, and time. Cranial Nerves: No cranial nerve deficit.    Psychiatric:         Judgment: Judgment normal.         Assessment and Plan:    Diagnoses and all orders for this visit:    Persistent atrial fibrillation  - uncertain as to onset  - ekg from 2017 showed flutter   - on anticoagulation   - follow with cardiolgy   - on coumadin 6mg mon/fri  and 5mg all other days    - inr (3/25/2022) was 3.4  - recommend to hold x 1 then change to 6mg mon and 5mg all other days (did not make this adjustment (2/25/2022)   - recheck in 1 month      Essential hypertension  - watch diet   - monitor blood pressure at home   - on spironolactone   - on metoprolol tart   - stable 3/25/2022    Pure hypercholesterolemia  - watch diet   - simvastatin switched to atorvastatin   - follow labs   - last lab (2/2022) - stable     Coronary artery disease involving native coronary artery of native heart without angina pectoris  - s/p stress and cath (2020)   - medical therapy   - now on atorvastatin   - on aspirin 81mg   - on metorolol tart 50mg twice a day   - follow with cardiology     Alcoholic cirrhosis of liver with ascites ([facility] [address]  - discussed and advised stopping alcohol altogether   - declines referral to GI   - on spironolactone   - on torsemide   - on metolazone - states changed to 2x per week      Hyponatremia  - Possible multifactorial (cirrhois and meds) - diurteics, duloxetine?   - last lab decreased to 130 (2/2022)     Dilated cardiomyopathy ([facility] [address]  - possible due to alcohol   - discussed and advised stopping alcohol altogether     CKD (chronic kidney disease) stage 3, GFR 30-59 ml/min ([facility])  - following with nephrology   - on spirinolactone 25mg daily   - on metalozaone 2.5mg 2x per week    - on torsemide - advised by renal to take 40mg twice a day    - on potassium supplement   - follow labs     Lymphedema  - on metalozaone 2.5mg 2x per week   - on torsemide - advised by renal to take 40mg twice a day    - on spirinolactone 25mg daily   - on potassium supplement   - compression stockings if able   - recommended referral to lymphedema PT - declines at present   - improved     Idiopathic chronic gout of multiple sites without tophus  - previous labs showed elevated uric acid - last 8.6 (2/2022)   - was on allopurinol in the past - stopped due to poss reaction   - may need to consider uloric and discuss with renal   - low purine diet handout provided   - exacerbation (9/2021) - treated with medrol for pain to wrist     Impaired fasting glucose  - watch diet   - follow A1c - last was 5.9 (5/2021)     Alcohol abuse  - discussed and advised stopping alcohol altogether     History of ascites    Neuropathy ([facility] [address]  - due to alcohol   - on gabapentin 200mg in am, 200mg at noon and 100mg in pm   - asking for alternative - discssed duloxetine and lyrica   - on duloxetine   - stable     Other specified hypothyroidism  - on levothyroxine   - follow labs - last (2/2022) - stable  - decrease dose to 200mcg mon-fri and 100mcg sat/sun   - recheck labs     Primary osteoarthritis involving multiple joints    Erectile dysfunction, unspecified erectile dysfunction type  - trial of viagra      Return in about 3 months (around 6/25/2022) for check up and review.     Orders Placed This Encounter   Procedures    POCT INR     Requested Prescriptions      No prescriptions requested or ordered in this encounter        Audrey Miller MD  3/25/2022  2:51 PM

## 2022-03-30 RX ORDER — LEVOTHYROXINE SODIUM 0.2 MG/1
200 TABLET ORAL DAILY
Qty: 90 TABLET | Refills: 1 | Status: SHIPPED | OUTPATIENT
Start: 2022-03-30

## 2022-03-30 NOTE — TELEPHONE ENCOUNTER
Last Appointment:  3/25/2022  Future Appointments   Date Time Provider Rafi Colette   4/29/2022  1:30 PM SCHEDULE, MHYX N [facility] PC N [facility] PC [facility]   5/27/2022  1:30 PM SCHEDULE, MHYX N LIMA PC N [facility] PC [facility]   7/1/2022  2:30 PM MD JHONATHAN Blancas [facility]

## 2022-04-04 ENCOUNTER — CARE COORDINATION (OUTPATIENT)
Dept: CARE COORDINATION | Age: 82
End: 2022-04-04

## 2022-04-04 NOTE — CARE COORDINATION
[facility] spoke with Yvette Zimmerman. He states he is doing \"well\". Yvette Elsie has been attending his appointments to have his inr routinely tested. ACM reviewed with Yvette Zimmerman the importance of keeping his appointments and taking his medication as prescribed. [facility] also educated patient on bleeding precautions (unusual/easy bruising, blood in urine, stool, mouth/gums, and nosebleeds) and when to contact his PCP. Yvette Zimmerman voiced understanding. Yvette Zimmerman denies any further needs from [facility] and states he is able to manage his health care with further assistance from [facility],  and feels he can graduate from Care Coordination. AC educated patient that if he needs additional help in managing his health care in the future, he can call [facility] to re-enroll in Care Coordination.      Plan  Update PCP  Care Coordination graduation

## 2022-04-07 DIAGNOSIS — I48.92 ATRIAL FLUTTER, UNSPECIFIED TYPE (HCC): ICD-10-CM

## 2022-04-07 RX ORDER — WARFARIN SODIUM 1 MG/1
1 TABLET ORAL DAILY
Qty: 30 TABLET | Refills: 1 | Status: ON HOLD
Start: 2022-04-07 | End: 2022-08-29 | Stop reason: HOSPADM

## 2022-04-07 RX ORDER — WARFARIN SODIUM 1 MG/1
1 TABLET ORAL DAILY
Qty: 30 TABLET | Refills: 3 | Status: ON HOLD
Start: 2022-04-07 | End: 2022-08-29 | Stop reason: HOSPADM

## 2022-04-07 NOTE — TELEPHONE ENCOUNTER
Last Appointment:  3/25/2022  Future Appointments   Date Time Provider Rafi Colette   4/29/2022  1:30 PM SCHEDULE, MHYX N LIMA PC N [facility] PC [facility]   5/27/2022  1:30 PM SCHEDULE, MHYX N LIM[facility] PC N [facility] PC [facility]   7/1/2022  2:30 PM MD JHONATHAN Soliman [facility]

## 2022-04-07 NOTE — TELEPHONE ENCOUNTER
Last Appointment:  3/25/2022  Future Appointments   Date Time Provider Rafi Colette   4/29/2022  1:30 PM SCHEDULE, MHYX N LIM[facility] PC N [facility] PC [facility]   5/27/2022  1:30 PM SCHEDULE, MHYX N LIMA PC N [facility] PC [facility]   7/1/2022  2:30 PM MD JHONATHAN Ulloa [facility]

## 2022-04-29 ENCOUNTER — NURSE ONLY (OUTPATIENT)
Dept: PRIMARY CARE CLINIC | Age: 82
End: 2022-04-29
Payer: COMMERCIAL

## 2022-04-29 DIAGNOSIS — I48.19 PERSISTENT ATRIAL FIBRILLATION (HCC): Primary | ICD-10-CM

## 2022-04-29 LAB
INTERNATIONAL NORMALIZATION RATIO, POC: 2.2
PROTHROMBIN TIME, POC: 26.2

## 2022-04-29 PROCEDURE — 85610 PROTHROMBIN TIME: CPT | Performed by: INTERNAL MEDICINE

## 2022-04-29 NOTE — PROGRESS NOTES
Previous INR: 3.4    Previous dose: held x 1 then 6mg mon and 5mg all other days      Current INR: 2.2    Recommendation: continue  6mg mon and 5mg all other days    Next INR: 1 month     Graciela Beyer MD  4/29/2022  2:15 PM

## 2022-05-11 RX ORDER — ATORVASTATIN CALCIUM 40 MG/1
40 TABLET, FILM COATED ORAL DAILY
Qty: 90 TABLET | Refills: 3 | Status: SHIPPED | OUTPATIENT
Start: 2022-05-11

## 2022-05-27 ENCOUNTER — NURSE ONLY (OUTPATIENT)
Dept: PRIMARY CARE CLINIC | Age: 82
End: 2022-05-27
Payer: COMMERCIAL

## 2022-05-27 DIAGNOSIS — I48.19 PERSISTENT ATRIAL FIBRILLATION (HCC): Primary | ICD-10-CM

## 2022-05-27 LAB
INTERNATIONAL NORMALIZATION RATIO, POC: 2.6
PROTHROMBIN TIME, POC: NORMAL

## 2022-05-27 PROCEDURE — 85610 PROTHROMBIN TIME: CPT | Performed by: INTERNAL MEDICINE

## 2022-05-27 NOTE — PROGRESS NOTES
Previous INR: 2.2    Previous dose:  6mg mon and 5mg all other days      Current INR: 2.6    Recommendation: continue  6mg mon and 5mg all other days    Next INR: 1 month     Mahesh Elias MD  5/27/2022  3:06 PM

## 2022-06-08 DIAGNOSIS — G62.9 NEUROPATHY: ICD-10-CM

## 2022-06-08 RX ORDER — DULOXETIN HYDROCHLORIDE 20 MG/1
20 CAPSULE, DELAYED RELEASE ORAL DAILY
Qty: 90 CAPSULE | Refills: 1 | Status: SHIPPED | OUTPATIENT
Start: 2022-06-08

## 2022-06-08 NOTE — TELEPHONE ENCOUNTER
Last Appointment:  3/25/2022  Future Appointments   Date Time Provider Rafi Alvarenga   7/1/2022  2:30 PM MD [facility]

## 2022-06-21 ENCOUNTER — TELEPHONE (OUTPATIENT)
Dept: FAMILY MEDICINE CLINIC | Age: 82
End: 2022-06-21

## 2022-06-21 RX ORDER — METHYLPREDNISOLONE 4 MG/1
TABLET ORAL
Qty: 1 KIT | Refills: 0 | Status: SHIPPED | OUTPATIENT
Start: 2022-06-21 | End: 2022-06-27

## 2022-06-21 NOTE — TELEPHONE ENCOUNTER
Requested Prescriptions     Signed Prescriptions Disp Refills    methylPREDNISolone (MEDROL DOSEPACK) 4 MG tablet 1 kit 0     Sig: Take by mouth.      Authorizing Provider: Feroz De La Fuente     Medication sent to pharmacy    Recommend evaluation in appointment or urgent care if not improving or worsening

## 2022-06-21 NOTE — TELEPHONE ENCOUNTER
Carmelo Charleston calling will you order a med for a gout attack to [address] [place] [address] & [facility]?  He has gout pain in his right hand for a day and a half

## 2022-07-01 ENCOUNTER — OFFICE VISIT (OUTPATIENT)
Dept: PRIMARY CARE CLINIC | Age: 82
End: 2022-07-01
Payer: COMMERCIAL

## 2022-07-01 VITALS
HEART RATE: 64 BPM | WEIGHT: 206 LBS | DIASTOLIC BLOOD PRESSURE: 82 MMHG | HEIGHT: 68 IN | SYSTOLIC BLOOD PRESSURE: 126 MMHG | TEMPERATURE: 97.6 F | OXYGEN SATURATION: 98 % | BODY MASS INDEX: 31.22 KG/M2

## 2022-07-01 DIAGNOSIS — I25.10 CORONARY ARTERY DISEASE INVOLVING NATIVE CORONARY ARTERY OF NATIVE HEART WITHOUT ANGINA PECTORIS: ICD-10-CM

## 2022-07-01 DIAGNOSIS — N18.30 STAGE 3 CHRONIC KIDNEY DISEASE, UNSPECIFIED WHETHER STAGE 3A OR 3B CKD (HCC): ICD-10-CM

## 2022-07-01 DIAGNOSIS — Z79.899 LONG TERM CURRENT USE OF THERAPEUTIC DRUG: ICD-10-CM

## 2022-07-01 DIAGNOSIS — E03.8 OTHER SPECIFIED HYPOTHYROIDISM: ICD-10-CM

## 2022-07-01 DIAGNOSIS — I48.19 PERSISTENT ATRIAL FIBRILLATION (HCC): Primary | ICD-10-CM

## 2022-07-01 DIAGNOSIS — R73.01 IMPAIRED FASTING GLUCOSE: ICD-10-CM

## 2022-07-01 DIAGNOSIS — F10.10 ALCOHOL ABUSE: ICD-10-CM

## 2022-07-01 DIAGNOSIS — M1A.09X0 IDIOPATHIC CHRONIC GOUT OF MULTIPLE SITES WITHOUT TOPHUS: ICD-10-CM

## 2022-07-01 DIAGNOSIS — I89.0 LYMPHEDEMA: ICD-10-CM

## 2022-07-01 DIAGNOSIS — Z12.5 SCREENING FOR MALIGNANT NEOPLASM OF PROSTATE: ICD-10-CM

## 2022-07-01 DIAGNOSIS — E87.1 HYPONATREMIA: ICD-10-CM

## 2022-07-01 DIAGNOSIS — K70.31 ALCOHOLIC CIRRHOSIS OF LIVER WITH ASCITES (HCC): ICD-10-CM

## 2022-07-01 DIAGNOSIS — E78.00 PURE HYPERCHOLESTEROLEMIA: ICD-10-CM

## 2022-07-01 DIAGNOSIS — I10 ESSENTIAL HYPERTENSION: ICD-10-CM

## 2022-07-01 DIAGNOSIS — I42.0 DILATED CARDIOMYOPATHY (HCC): ICD-10-CM

## 2022-07-01 LAB
INTERNATIONAL NORMALIZATION RATIO, POC: 3.8
PROTHROMBIN TIME, POC: 46

## 2022-07-01 PROCEDURE — 1123F ACP DISCUSS/DSCN MKR DOCD: CPT | Performed by: INTERNAL MEDICINE

## 2022-07-01 PROCEDURE — 85610 PROTHROMBIN TIME: CPT | Performed by: INTERNAL MEDICINE

## 2022-07-01 PROCEDURE — 99214 OFFICE O/P EST MOD 30 MIN: CPT | Performed by: INTERNAL MEDICINE

## 2022-07-01 ASSESSMENT — ENCOUNTER SYMPTOMS
COUGH: 0
SORE THROAT: 0
CONSTIPATION: 0
SHORTNESS OF BREATH: 0
BLOOD IN STOOL: 0
VOMITING: 0
NAUSEA: 0
EYE PAIN: 0
ABDOMINAL PAIN: 0
DIARRHEA: 0
RHINORRHEA: 0
CHEST TIGHTNESS: 0

## 2022-07-01 NOTE — PROGRESS NOTES
[facility]) Physicians   Internal Medicine     2022  Karen Mota : 1940 Sex: male Age:81 y.o. Chief Complaint   Patient presents with   [employer] Office Visit for Anticoagulation Management     missed yesterday dose of meds        HPI:   Patient presents to office for evaluation of the following medical concerns. - States having issues with tinnitus.     - States having issues with b/l LE edema. Improved, On diuretics last visit with nephrology per reviewed note (3/22) -hyponatremia drink electrolyte fluids instead of free water, no changes follow-up in 4 months with labs    - Having erectile dysfunction. Asking about treatment     - Stats has atrial fibrillation. Uncertain cardiology follow up. On metoprolol. On coumadin. Current dose 6mg mon and 5mg all other days. INR today (2022) was 3.8. No bleeding.     - States has cardiomyopathy. States due to alcohol. Follows with cardiology.     - States has CAD. States had stress test (2020) The myocardial perfusion imaging was abnormal, moderate sized reversible defect , cardiac cath (2020) - 40% proximal LAD lesion, 50% mid LAD lesion at the takeoff of a large diagonal branch, 50% proximal left circumflex artery disease. Totally occluded right coronary artery with grade 3 left-to-right collaterals. States needs aggressive risk factor management. On metoprolol last visit per reviewed note () continue current medications and follow-up in 1 year    - States has hypothyroid. On synthroid. Last lab (2022) was overreactive. 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 Still drinks on weekends. Discussed cessation. On metolazone  2.5mg - states switched to 2x per week. On torsemide 40 mg twice a day. on sprinolactone. - States has chronic kidney disease. Follows with nephrology. LAst creat was 1.58 (10/2021).  Last visit with nephrology per reviewed note (3/22) -hyponatremia drink electrolyte fluids instead of free water, no changes follow-up in 4 months with labs    - States has elevated uric acid. States was on allopurinol in the past, stopped due to thought was causing hives. Last uric acid was 8.6 (2/2022). States was seen in urgent care (9/2021) wrist pain - depomedrol x1, medrol pack     States has hypertension, not checking blood pressure at home. On metoprolol. States has high cholesterol. Was on simvastatin - changed to atorvastatin. No reported side effects. States has neuropathy. Possible due to alcohol. On gabapentin, (takes bid not tid)  Added duloxtine. Stable    - States has osteoarthritis. States multiple joints. Had left knee replacement. 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 Follows with ortho. Needs physical therapy. - low sodium - uncertain in duloxetine or diuretics     Health Maintenance   - immunizations:   Influenza Vaccination - declines  Pneumonia Vaccination - declines   Zoster/Shingles Vaccine  Tetanus Vaccination (2017)- tdap   covid (3/2/2021) #1, (3/30/2021) #2, (11/30/2021) #3  - moderna     - Screenings:   Colonoscopy   Prostate     Stress test (4/2020) - The myocardial perfusion imaging was abnormal, moderate sized reversible defect in the entire inferior wall and apex suggestive of ischemia    echo (4/20) - ef 55-60%, mod l &r atiral dilation, mild to mod MR, indeterm diastolic dysfun    cardiac cath (5/20) - IMPRESSION:  1.  40% proximal LAD lesion, 50% mid LAD lesion at the takeoff of a  large diagonal branch. 2.  50% proximal left circumflex artery disease. 3.  Totally occluded right coronary artery with grade 3 left-to-right  collaterals. Couseled on [employer] Safety     ROS:  Review of Systems   Constitutional: Negative for appetite change, chills, fever and unexpected weight change. HENT: Negative for congestion, rhinorrhea and sore throat. Eyes: Negative for pain and visual disturbance.    Respiratory: Negative for cough, chest tightness and shortness of breath. Cardiovascular: Negative for chest pain and palpitations. Gastrointestinal: Negative for abdominal pain, blood in stool, constipation, diarrhea, nausea and vomiting. Genitourinary: Negative for difficulty urinating, dysuria, hematuria, scrotal swelling, testicular pain and urgency. Musculoskeletal: Negative for arthralgias and gait problem. Skin: Negative for rash. Neurological: Positive for dizziness. Negative for syncope, weakness, light-headedness and headaches. Hematological: Negative for adenopathy. Does not bruise/bleed easily. Psychiatric/Behavioral: Negative for suicidal ideas. The patient is not nervous/anxious.           Current Outpatient Medications:     DULoxetine (CYMBALTA) 20 MG extended release capsule, Take 1 capsule by mouth daily, Disp: 90 capsule, Rfl: 1    atorvastatin (LIPITOR) 40 MG tablet, Take 1 tablet by mouth daily, Disp: 90 tablet, Rfl: 3    warfarin (COUMADIN) 1 MG tablet, Take 1 tablet by mouth daily Takes 1 mg Mondays and Fridays in addition to 5mg tab to equal 6mg, Disp: 30 tablet, Rfl: 3    warfarin (COUMADIN) 1 MG tablet, Take 1 tablet by mouth daily Takes 1 mg Mondays and Fridays in addition to 5mg tab to equal 6mg, Disp: 30 tablet, Rfl: 1    levothyroxine (SYNTHROID) 200 MCG tablet, Take 1 tablet by mouth Daily Pt take 0.5 Saturday and Sunday, Disp: 90 tablet, Rfl: 1    gabapentin (NEURONTIN) 100 MG capsule, 200mg in am, 100mg at noon and 200mg in pm, Disp: 450 capsule, Rfl: 0    potassium chloride (KLOR-CON M) 20 MEQ extended release tablet, Take 1 tablet by mouth 3 times daily, Disp: 270 tablet, Rfl: 2    metOLazone (ZAROXOLYN) 2.5 MG tablet, 1 tablet by mouth monday and Friday only, Disp: 90 tablet, Rfl: 1    aspirin (ECOTRIN LOW STRENGTH) 81 MG EC tablet, Take 1 tablet by mouth daily, Disp: 30 tablet, Rfl: 11    warfarin (COUMADIN) 5 MG tablet, Take 1 tablet by mouth daily, Disp: 90 tablet, Rfl: 3   spironolactone (ALDACTONE) 25 MG tablet, Take 25 mg by mouth daily, Disp: , Rfl:     magnesium oxide (MAG-OX) 400 (241.3 Mg) MG TABS tablet, TAKE ONE TABLET BY MOUTH DAILY, Disp: , Rfl:     torsemide (DEMADEX) 20 MG tablet, Take 40 mg by mouth 2 times daily , Disp: , Rfl:     vitamin B-1 (THIAMINE) 100 MG tablet, Take 100 mg by mouth daily, Disp: , Rfl:     metoprolol tartrate (LOPRESSOR) 50 MG tablet, Take 1 tablet by mouth 2 times daily, Disp: 60 tablet, Rfl: 3    MULTIPLE VITAMIN PO, Take 1 tablet by mouth daily Last taken 06/16/2020, Disp: , Rfl:     Allergies   Allergen Reactions    Sulfa Antibiotics Hives     Hives         Past Medical History:   Diagnosis Date    Blood circulation, collateral     CAD (coronary artery disease)     Chronic kidney disease     History of alcohol use     History of ascites     history of, approx 5 years ago    Hyperlipidemia     Hypertension     Hyponatremia     Left knee pain 06/2020    Liver disease     Lymphedema     lower extrem    Neuropathy     both feet    Osteoarthritis     Psychiatric problem     Thyroid disease     Use of cane as ambulatory aid     Uses wheelchair     for distances    Wears glasses     for reading    Wears hearing aid        Past Surgical History:   Procedure Laterality Date    APPENDECTOMY      CARDIAC CATHETERIZATION  05/2020    CATARACT REMOVAL WITH IMPLANT Bilateral     COLONOSCOPY      EYE SURGERY      cataracts    TOTAL KNEE ARTHROPLASTY Left 6/22/2020    LEFT KNEE IVAN ROBOTIC TOTAL ARTHROPLASTY performed by Kayy Hernandez MD at [facility] OR       Family History   Problem Relation Age of Onset   Hortencia Se Other Mother         old age        Social History     Socioeconomic History    Marital status: Single     Spouse name: None    Number of children: 1    Years of education: 16    Highest education level: Master's degree (e.g., MA, MS, Jordan, MEd, MSW, LUCERO)   Occupational History    None   Tobacco Use    Smoking status: (36.4 °C)   SpO2: 98%   Weight: 206 lb (93.4 kg)   Height: 5' 8\" (1.727 m)       Exam:  Physical Exam  Constitutional:       Appearance: He is well-developed. HENT:      Head: Normocephalic and atraumatic. Right Ear: External ear normal.      Left Ear: External ear normal.      Nose: Rhinorrhea present. Mouth/Throat:      Pharynx: Posterior oropharyngeal erythema present. Eyes:      Pupils: Pupils are equal, round, and reactive to light. Neck:      Thyroid: No thyromegaly. Cardiovascular:      Rate and Rhythm: Normal rate. Rhythm irregular. Heart sounds: Murmur heard. Systolic murmur is present with a grade of 2/6. Pulmonary:      Effort: Pulmonary effort is normal.      Breath sounds: Normal breath sounds. No wheezing or rales. Abdominal:      General: Bowel sounds are normal.      Palpations: Abdomen is soft. Tenderness: There is no abdominal tenderness. There is no guarding or rebound. Musculoskeletal:         General: Normal range of motion. Cervical back: Normal range of motion and neck supple. Right lower leg: Edema present. Left lower leg: Edema present. Lymphadenopathy:      Cervical: No cervical adenopathy. Skin:     General: Skin is warm and dry. Neurological:      Mental Status: He is alert and oriented to person, place, and time. Cranial Nerves: No cranial nerve deficit.    Psychiatric:         Judgment: Judgment normal.         Assessment and Plan:    Diagnoses and all orders for this visit:    Persistent atrial fibrillation  - uncertain as to onset  - ekg from 2017 showed flutter   - on anticoagulation   - follow with cardiolgy   - on coumadin 6mg mon and 5mg all other days  - however uncertain as to ecatly what he is taking as soon gets   - inr (7/1/2022) was 3.8  - recommend to hold x 1 then change to 5mg daily   - recheck in 1 month      Essential hypertension  - watch diet   - monitor blood pressure at home   - on spironolactone   - on metoprolol tart   - stable 7/1/2022    Pure hypercholesterolemia  - watch diet   - simvastatin switched to atorvastatin   - follow labs   - last lab (2/2022) - stable     Coronary artery disease involving native coronary artery of native heart without angina pectoris  - s/p stress and cath (2020)   - medical therapy   - now on atorvastatin   - on aspirin 81mg   - on metorolol tart 50mg twice a day   - follow with cardiology     Alcoholic cirrhosis of liver with ascites ([facility] [address]  - discussed and advised stopping alcohol altogether   - declines referral to GI   - on spironolactone   - on torsemide   - on metolazone - states changed to 2x per week    - still drinks alcohol despite encouragement and recommendation to stop     Hyponatremia  - Possible multifactorial (cirrhois and meds) - diurteics, duloxetine?   - last lab decreased to 130 (2/2022)     Dilated cardiomyopathy ([facility] [address]  - possible due to alcohol   - discussed and advised stopping alcohol altogether     CKD (chronic kidney disease) stage 3, GFR 30-59 ml/min ([facility])  - following with nephrology   - on spirinolactone 25mg daily   - on metalozaone 2.5mg 2x per week    - on torsemide - advised by renal to take 40mg twice a day    - on potassium supplement   - follow labs     Lymphedema  - on metalozaone 2.5mg 2x per week   - on torsemide - advised by renal to take 40mg twice a day    - on spirinolactone 25mg daily   - on potassium supplement   - compression stockings if able   - recommended referral to lymphedema PT - declines at present   - improved     Idiopathic chronic gout of multiple sites without tophus  - previous labs showed elevated uric acid - last 8.6 (2/2022)   - was on allopurinol in the past - stopped due to poss reaction   - may need to consider uloric and discuss with renal   - low purine diet handout provided   - exacerbation (9/2021) - treated with medrol for pain to wrist     Impaired fasting glucose  - watch diet   - follow A1c - last was 5.7 MD  7/1/2022  2:53 PM

## 2022-07-20 ENCOUNTER — OFFICE VISIT (OUTPATIENT)
Dept: CARDIOLOGY CLINIC | Age: 82
End: 2022-07-20
Payer: MEDICARE

## 2022-07-20 VITALS
SYSTOLIC BLOOD PRESSURE: 102 MMHG | RESPIRATION RATE: 16 BRPM | DIASTOLIC BLOOD PRESSURE: 62 MMHG | HEART RATE: 61 BPM | WEIGHT: 207.6 LBS | HEIGHT: 68 IN | BODY MASS INDEX: 31.46 KG/M2

## 2022-07-20 DIAGNOSIS — I50.42 CHRONIC COMBINED SYSTOLIC AND DIASTOLIC CONGESTIVE HEART FAILURE (HCC): ICD-10-CM

## 2022-07-20 DIAGNOSIS — I48.21 PERMANENT ATRIAL FIBRILLATION (HCC): ICD-10-CM

## 2022-07-20 DIAGNOSIS — I25.10 CORONARY ARTERY DISEASE INVOLVING NATIVE CORONARY ARTERY OF NATIVE HEART WITHOUT ANGINA PECTORIS: ICD-10-CM

## 2022-07-20 DIAGNOSIS — I42.0 DILATED CARDIOMYOPATHY (HCC): Primary | ICD-10-CM

## 2022-07-20 PROCEDURE — 1123F ACP DISCUSS/DSCN MKR DOCD: CPT | Performed by: INTERNAL MEDICINE

## 2022-07-20 PROCEDURE — 99214 OFFICE O/P EST MOD 30 MIN: CPT | Performed by: INTERNAL MEDICINE

## 2022-07-20 PROCEDURE — 93000 ELECTROCARDIOGRAM COMPLETE: CPT | Performed by: INTERNAL MEDICINE

## 2022-07-20 NOTE — PROGRESS NOTES
OUTPATIENT CARDIOLOGY FOLLOW-UP    Name: Refugio Ko    Age: 80 y.o. Primary Care Physician: Puma Marshall MD    Date of Service: 7/20/2022    Chief Complaint:   Chief Complaint   Patient presents with    Cardiomyopathy    Atrial Fibrillation    Coronary Artery Disease          Interim History:   Here for follow-up. History of longstanding persistent A. 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 Is doing well and has no complaints of chest pain, shortness of breath, palpitations, lower extremity edema. He did have some epistaxis recently and INRs have fluctuated sometimes greater than 4. DOAC's were cost prohibitive. He also follows with nephrology.     Review of Systems:   Negative except as described above irregular rhythm with a normal rate    Past Medical History:  Past Medical History:   Diagnosis Date    Blood circulation, collateral     CAD (coronary artery disease)     Chronic kidney disease     History of alcohol use     History of ascites     history of, approx 5 years ago    Hyperlipidemia     Hypertension     Hyponatremia     Left knee pain 06/2020    Liver disease     Lymphedema     lower extrem    Neuropathy     both feet    Osteoarthritis     Psychiatric problem     Thyroid disease     Use of cane as ambulatory aid     Uses wheelchair     for distances    Wears glasses     for reading    Wears hearing aid        Past Surgical History:  Past Surgical History:   Procedure Laterality Date    APPENDECTOMY      CARDIAC CATHETERIZATION  05/2020    CATARACT REMOVAL WITH IMPLANT Bilateral     COLONOSCOPY      EYE SURGERY      cataracts    TOTAL KNEE ARTHROPLASTY Left 6/22/2020    LEFT KNEE IVAN ROBOTIC TOTAL ARTHROPLASTY performed by Chemo Escobar MD at [facility] OR       Family History:  Family History   Problem Relation Age of Onset    Other Mother         old age        Social History:  Social History     Tobacco Use    Smoking status: Former     Packs/day: 0.75     Years: 25.00     Pack years: 18.75     Types: Pipe, Cigars, Cigarettes     Start date: 10/8/1964     Quit date: 1986     Years since quittin.3    Smokeless tobacco: Never    Tobacco comments:     Cigar and Pipe smoking history only   Vaping Use    Vaping Use: Never used   Substance Use Topics    Alcohol use: Yes     Alcohol/week: 0.0 standard drinks     Comment: quit 2017; socially as of 2020 on weekends    Drug use: No        Allergies:   Allergies   Allergen Reactions    Sulfa Antibiotics Hives     Hives         Current Medications:    Current Outpatient Medications:     DULoxetine (CYMBALTA) 20 MG extended release capsule, Take 1 capsule by mouth daily, Disp: 90 capsule, Rfl: 1    atorvastatin (LIPITOR) 40 MG tablet, Take 1 tablet by mouth daily, Disp: 90 tablet, Rfl: 3    warfarin (COUMADIN) 1 MG tablet, Take 1 tablet by mouth daily Takes 1 mg  and  in addition to 5mg tab to equal 6mg, Disp: 30 tablet, Rfl: 3    warfarin (COUMADIN) 1 MG tablet, Take 1 tablet by mouth daily Takes 1 mg  and  in addition to 5mg tab to equal 6mg, Disp: 30 tablet, Rfl: 1    levothyroxine (SYNTHROID) 200 MCG tablet, Take 1 tablet by mouth Daily Pt take 0.5 Saturday and , Disp: 90 tablet, Rfl: 1    gabapentin (NEURONTIN) 100 MG capsule, 200mg in am, 100mg at noon and 200mg in pm, Disp: 450 capsule, Rfl: 0    potassium chloride (KLOR-CON M) 20 MEQ extended release tablet, Take 1 tablet by mouth 3 times daily, Disp: 270 tablet, Rfl: 2    metOLazone (ZAROXOLYN) 2.5 MG tablet, 1 tablet by mouth monday and Friday only, Disp: 90 tablet, Rfl: 1    warfarin (COUMADIN) 5 MG tablet, Take 1 tablet by mouth daily, Disp: 90 tablet, Rfl: 3    spironolactone (ALDACTONE) 25 MG tablet, Take 25 mg by mouth daily, Disp: , Rfl:     magnesium oxide (MAG-OX) 400 (241.3 Mg) MG TABS tablet, TAKE ONE TABLET BY MOUTH DAILY, Disp: , Rfl:     torsemide (DEMADEX) 20 MG tablet, Take 40 mg by mouth 2 times daily , Disp: , Rfl:     vitamin B-1 (THIAMINE) 100 MG tablet, Take 100 mg by mouth daily, Disp: , Rfl:     metoprolol tartrate (LOPRESSOR) 50 MG tablet, Take 1 tablet by mouth 2 times daily, Disp: 60 tablet, Rfl: 3    MULTIPLE VITAMIN PO, Take 1 tablet by mouth daily Last taken 06/16/2020, Disp: , Rfl:     Physical Exam:  /62   Pulse 61   Resp 16   Ht 5' 8\" (1.727 m)   Wt 207 lb 9.6 oz (94.2 kg)   BMI 31.57 kg/m²   Wt Readings from Last 3 Encounters:   07/20/22 207 lb 9.6 oz (94.2 kg)   07/01/22 206 lb (93.4 kg)   03/25/22 211 lb 12.8 oz (96.1 kg)     Appearance: Awake, alert and oriented x 3, no acute respiratory distress  Skin: Intact, no rash  Head: Normocephalic, atraumatic  Eyes: EOMI, no conjunctival erythema  ENMT: No pharyngeal erythema, MMM, no rhinorrhea  Neck: Supple, no elevated JVP, no carotid bruits  Lungs: Clear to auscultation bilaterally. No wheezes, rales, or rhonchi.   Cardiac: Irregular rhythm with a normal rate, +S1S2, no murmurs apparent  Abdomen: Soft, nontender, +bowel sounds  Extremities: Moves all extremities x 4, trace bilateral lower extremity edema with some skin thickening  Neurologic: No focal motor deficits apparent, normal mood and affect, alert and oriented x 3  Peripheral Pulses: Intact posterior tibial pulses bilaterally    Laboratory Tests:  Lab Results   Component Value Date    CREATININE 1.4 (H) 01/31/2022    BUN 24 (H) 01/31/2022     (L) 01/31/2022    K 4.1 01/31/2022    CL 89 (L) 01/31/2022    CO2 27 01/31/2022     Lab Results   Component Value Date/Time    MG 1.8 02/15/2022 12:00 AM     Lab Results   Component Value Date    WBC 8.8 01/31/2022    HGB 12.4 (L) 01/31/2022    HCT 36.6 (L) 01/31/2022    MCV 89.9 01/31/2022     01/31/2022     Lab Results   Component Value Date    ALT 15 01/31/2022    AST 27 01/31/2022    ALKPHOS 98 01/31/2022    BILITOT 1.2 01/31/2022     No results found for: CKTOTAL, CKMB, CKMBINDEX, TROPONINI  Lab Results   Component Value Date    INR 3.8 2022    INR 2.6 2022    INR 2.2 2022    PROTIME 46.0 2022    PROTIME 26.2 2022    PROTIME 40.9 2022     Lab Results   Component Value Date    TSH 0.434 02/15/2022     Lab Results   Component Value Date    LABA1C 5.7 02/15/2022     No results found for: EAG  Lab Results   Component Value Date    CHOL 108 2020    CHOL 241 (H) 2019    CHOL 181 10/04/2018     Lab Results   Component Value Date    TRIG 13 2020    TRIG 228 (H) 2019    TRIG 158 10/04/2018     Lab Results   Component Value Date    HDL 40 02/15/2022    HDL 31 (A) 2021    HDL 35 2020     Lab Results   Component Value Date    LDLCALC 53 02/15/2022    LDLCALC 63 2021    LDLCALC 44 2020     Lab Results   Component Value Date    LABVLDL 46 2019    LABVLDL 17 2017    VLDL 32 10/04/2018     Lab Results   Component Value Date    CHOLHDLRATIO 5.3 2018    CHOLHDLRATIO 2.8 2017     No results for input(s): PROBNP in the last 72 hours. Cardiac Tests:  EC2022: Atrial fibrillation 61 bpm.  Normal axis and intervals. Poor R wave progression. Echocardiogram:   Transthoracic echo 4/3/2020   Summary   Atrial fibrillation noted. Normal left ventricular size and systolic function. Ejection fraction is visually estimated at 55-60%. Indeterminate diastolic function. No regional wall motion abnormalities seen. Mild left ventricular concentric hypertrophy noted. Normal right ventricular size and function. The left atrium is moderately dilated. Moderately enlarged right atrium. Mild-moderate mitral regurgitation. Stress test:    Pharmacologic stress 2020  Gated SPECT left ventricular ejection fraction was calculated to   be 73%, with normal myocardial thickening and wall motion. Impression:     1. ECG during the infusion did not change.    2. The myocardial perfusion atorvastatin and metoprolol  Continue warfarin for stroke risk reduction; ideally would change him to a DOAC given fluctuating INRs but this is being cost prohibitive  Discontinue aspirin given epistaxis and skin bruising  Continue torsemide and spironolactone per nephrology, follows with Dr. Johan Chakraborty for annual blood work with nephrology and has a follow-up appointment scheduled  Aggressive risk factor modification  Increase physical activity as able  Follow-up in 1 year or sooner if need arises    The patient's current medication list, allergies, problem list and results of all previously ordered testing were reviewed at today's visit.     Alannah Williamson MD, [address] [facility]

## 2022-07-28 DIAGNOSIS — E78.00 PURE HYPERCHOLESTEROLEMIA: ICD-10-CM

## 2022-07-28 DIAGNOSIS — Z12.5 SCREENING FOR MALIGNANT NEOPLASM OF PROSTATE: ICD-10-CM

## 2022-07-28 LAB
CHOLESTEROL, FASTING: 132
HDLC SERPL-MCNC: 36 MG/DL (ref 35–70)
LDL CHOLESTEROL CALCULATED: 53 MG/DL (ref 0–160)
MAGNESIUM: 1.9 MG/DL
PROSTATE SPECIFIC ANTIGEN: 5.24 NG/ML
TRIGLYCERIDE, FASTING: 214

## 2022-07-29 ENCOUNTER — TELEPHONE (OUTPATIENT)
Dept: PRIMARY CARE CLINIC | Age: 82
End: 2022-07-29

## 2022-07-30 NOTE — TELEPHONE ENCOUNTER
Please let the patient know that blood work results showed    PSA for prostate was much more elevated when compared to previous.   I highly recommend referral to urology    Cholesterol levels were fair and would recommend to continue present medications    Magnesium level was normal    Other results were not resulted and uncertain if they were all collected and if not why only these 3 results were done    Thanks

## 2022-08-05 ENCOUNTER — TELEPHONE (OUTPATIENT)
Dept: FAMILY MEDICINE CLINIC | Age: 82
End: 2022-08-05

## 2022-08-05 NOTE — TELEPHONE ENCOUNTER
Please let the patient know that blood work results showed    Labs showed electrolyte abnormalities with decreased potassium and decrease sodium recommend follow-up with nephrology    Sugar was mildly elevated.   Hemoglobin A1c is a measure 3-month sugar control was normal at 5.5 no evidence for prediabetes or diabetes at present    Uric acid level was increased    Vitamin D level was normal but on the low end of normal    Parathyroid level was increased and may be related to kidney disease    Labs still show kidney dysfunction recommend follow-up with nephrology    Thyroid level was in normal limits    Urine analysis was normal.  No evidence for microscopic protein    PSA for prostate was increased and highly recommend referral to urology    Blood counts showed slight anemia which is decreased red blood cell count    Vitamin E59 and folic acid levels were normal but on the low end of normal and would consider vitamin B12 supplement of 1000 mcg daily    Cholesterol levels were fair and would recommend to continue present medications     Magnesium level was normal    Other electrolytes and liver functions were normal    Thanks

## 2022-08-22 ENCOUNTER — APPOINTMENT (OUTPATIENT)
Dept: CT IMAGING | Age: 82
DRG: 377 | End: 2022-08-22
Payer: MEDICARE

## 2022-08-22 ENCOUNTER — HOSPITAL ENCOUNTER (INPATIENT)
Age: 82
LOS: 7 days | Discharge: HOME | DRG: 377 | End: 2022-08-29
Attending: EMERGENCY MEDICINE | Admitting: INTERNAL MEDICINE
Payer: MEDICARE

## 2022-08-22 DIAGNOSIS — N17.9 AKI (ACUTE KIDNEY INJURY) (HCC): ICD-10-CM

## 2022-08-22 DIAGNOSIS — K86.2 CYST OF PANCREAS: ICD-10-CM

## 2022-08-22 DIAGNOSIS — E87.6 HYPOKALEMIA: ICD-10-CM

## 2022-08-22 DIAGNOSIS — F10.930 ALCOHOL WITHDRAWAL SYNDROME WITHOUT COMPLICATION (HCC): ICD-10-CM
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
## 2022-08-22 DIAGNOSIS — D32.9 MENINGIOMA (HCC): ICD-10-CM

## 2022-08-22 DIAGNOSIS — E87.20 LACTIC ACID ACIDOSIS: ICD-10-CM

## 2022-08-22 DIAGNOSIS — R79.1 SUPRATHERAPEUTIC INR: ICD-10-CM

## 2022-08-22 DIAGNOSIS — G62.9 NEUROPATHY: ICD-10-CM

## 2022-08-22 DIAGNOSIS — S22.31XA CLOSED FRACTURE OF ONE RIB OF RIGHT SIDE, INITIAL ENCOUNTER: ICD-10-CM

## 2022-08-22 PROBLEM — K92.2 GI BLEED: Status: ACTIVE | Noted: 2022-08-22

## 2022-08-22 LAB
ACETAMINOPHEN LEVEL: <5 MCG/ML (ref 10–30)
ALBUMIN SERPL-MCNC: 3.7 G/DL (ref 3.5–5.2)
ALP BLD-CCNC: 96 U/L (ref 40–129)
ALT SERPL-CCNC: 10 U/L (ref 0–40)
ANION GAP SERPL CALCULATED.3IONS-SCNC: 19 MMOL/L (ref 7–16)
ANION GAP SERPL CALCULATED.3IONS-SCNC: 31 MMOL/L (ref 7–16)
APTT: 45.6 SEC (ref 24.5–35.1)
AST SERPL-CCNC: 18 U/L (ref 0–39)
BASOPHILS ABSOLUTE: 0.02 E9/L (ref 0–0.2)
BASOPHILS RELATIVE PERCENT: 0.1 % (ref 0–2)
BILIRUB SERPL-MCNC: 1 MG/DL (ref 0–1.2)
BUN BLDV-MCNC: 25 MG/DL (ref 6–23)
BUN BLDV-MCNC: 27 MG/DL (ref 6–23)
CALCIUM SERPL-MCNC: 7.9 MG/DL (ref 8.6–10.2)
CALCIUM SERPL-MCNC: 8.9 MG/DL (ref 8.6–10.2)
CHLORIDE BLD-SCNC: 87 MMOL/L (ref 98–107)
CHLORIDE BLD-SCNC: 96 MMOL/L (ref 98–107)
CO2: 10 MMOL/L (ref 22–29)
CO2: 16 MMOL/L (ref 22–29)
CREAT SERPL-MCNC: 1.7 MG/DL (ref 0.7–1.2)
CREAT SERPL-MCNC: 2 MG/DL (ref 0.7–1.2)
EOSINOPHILS ABSOLUTE: 0.01 E9/L (ref 0.05–0.5)
EOSINOPHILS RELATIVE PERCENT: 0.1 % (ref 0–6)
ETHANOL: <10 MG/DL (ref 0–0.08)
GFR AFRICAN AMERICAN: 39
GFR AFRICAN AMERICAN: 47
GFR NON-AFRICAN AMERICAN: 32 ML/MIN/1.73
GFR NON-AFRICAN AMERICAN: 39 ML/MIN/1.73
GLUCOSE BLD-MCNC: 126 MG/DL (ref 74–99)
GLUCOSE BLD-MCNC: 240 MG/DL (ref 74–99)
HCT VFR BLD CALC: 27.4 % (ref 37–54)
HEMOCCULT STL QL: POSITIVE
HEMOGLOBIN: 9.2 G/DL (ref 12.5–16.5)
IMMATURE GRANULOCYTES #: 0.14 E9/L
IMMATURE GRANULOCYTES %: 1 % (ref 0–5)
INR BLD: 7.8
LACTIC ACID: 16.4 MMOL/L (ref 0.5–2.2)
LYMPHOCYTES ABSOLUTE: 0.69 E9/L (ref 1.5–4)
LYMPHOCYTES RELATIVE PERCENT: 4.9 % (ref 20–42)
MAGNESIUM: 2.5 MG/DL (ref 1.6–2.6)
MCH RBC QN AUTO: 31.7 PG (ref 26–35)
MCHC RBC AUTO-ENTMCNC: 33.6 % (ref 32–34.5)
MCV RBC AUTO: 94.5 FL (ref 80–99.9)
MONOCYTES ABSOLUTE: 0.67 E9/L (ref 0.1–0.95)
MONOCYTES RELATIVE PERCENT: 4.8 % (ref 2–12)
NEUTROPHILS ABSOLUTE: 12.46 E9/L (ref 1.8–7.3)
NEUTROPHILS RELATIVE PERCENT: 89.1 % (ref 43–80)
PDW BLD-RTO: 14.7 FL (ref 11.5–15)
PLATELET # BLD: 345 E9/L (ref 130–450)
PMV BLD AUTO: 9.6 FL (ref 7–12)
POTASSIUM REFLEX MAGNESIUM: 3.3 MMOL/L (ref 3.5–5)
POTASSIUM REFLEX MAGNESIUM: 3.8 MMOL/L (ref 3.5–5)
PROTHROMBIN TIME: 85.5 SEC (ref 9.3–12.4)
RBC # BLD: 2.9 E12/L (ref 3.8–5.8)
SALICYLATE, SERUM: <0.3 MG/DL (ref 0–30)
SODIUM BLD-SCNC: 128 MMOL/L (ref 132–146)
SODIUM BLD-SCNC: 131 MMOL/L (ref 132–146)
TOTAL CK: 239 U/L (ref 20–200)
TOTAL PROTEIN: 6.5 G/DL (ref 6.4–8.3)
TRICYCLIC ANTIDEPRESSANTS SCREEN SERUM: NEGATIVE NG/ML
TROPONIN, HIGH SENSITIVITY: 55 NG/L (ref 0–11)
TROPONIN, HIGH SENSITIVITY: 57 NG/L (ref 0–11)
WBC # BLD: 14 E9/L (ref 4.5–11.5)

## 2022-08-22 PROCEDURE — 74177 CT ABD & PELVIS W/CONTRAST: CPT

## 2022-08-22 PROCEDURE — 96374 THER/PROPH/DIAG INJ IV PUSH: CPT

## 2022-08-22 PROCEDURE — 99285 EMERGENCY DEPT VISIT HI MDM: CPT

## 2022-08-22 PROCEDURE — 6360000002 HC RX W HCPCS: Performed by: STUDENT IN AN ORGANIZED HEALTH CARE EDUCATION/TRAINING PROGRAM

## 2022-08-22 PROCEDURE — 85610 PROTHROMBIN TIME: CPT

## 2022-08-22 PROCEDURE — 83735 ASSAY OF MAGNESIUM: CPT

## 2022-08-22 PROCEDURE — 36415 COLL VENOUS BLD VENIPUNCTURE: CPT

## 2022-08-22 PROCEDURE — 80307 DRUG TEST PRSMV CHEM ANLYZR: CPT

## 2022-08-22 PROCEDURE — 71260 CT THORAX DX C+: CPT

## 2022-08-22 PROCEDURE — 80179 DRUG ASSAY SALICYLATE: CPT

## 2022-08-22 PROCEDURE — 82550 ASSAY OF CK (CPK): CPT

## 2022-08-22 PROCEDURE — 1200000000 HC SEMI PRIVATE

## 2022-08-22 PROCEDURE — 82077 ASSAY SPEC XCP UR&BREATH IA: CPT

## 2022-08-22 PROCEDURE — 80143 DRUG ASSAY ACETAMINOPHEN: CPT

## 2022-08-22 PROCEDURE — 80048 BASIC METABOLIC PNL TOTAL CA: CPT

## 2022-08-22 PROCEDURE — 12013 RPR F/E/E/N/L/M 2.6-5.0 CM: CPT

## 2022-08-22 PROCEDURE — 93005 ELECTROCARDIOGRAM TRACING: CPT | Performed by: STUDENT IN AN ORGANIZED HEALTH CARE EDUCATION/TRAINING PROGRAM

## 2022-08-22 PROCEDURE — 81001 URINALYSIS AUTO W/SCOPE: CPT

## 2022-08-22 PROCEDURE — 72125 CT NECK SPINE W/O DYE: CPT

## 2022-08-22 PROCEDURE — 2500000003 HC RX 250 WO HCPCS

## 2022-08-22 PROCEDURE — 6360000004 HC RX CONTRAST MEDICATION: Performed by: RADIOLOGY

## 2022-08-22 PROCEDURE — 80053 COMPREHEN METABOLIC PANEL: CPT

## 2022-08-22 PROCEDURE — 84484 ASSAY OF TROPONIN QUANT: CPT

## 2022-08-22 PROCEDURE — 70450 CT HEAD/BRAIN W/O DYE: CPT

## 2022-08-22 PROCEDURE — 85730 THROMBOPLASTIN TIME PARTIAL: CPT

## 2022-08-22 PROCEDURE — 2580000003 HC RX 258: Performed by: STUDENT IN AN ORGANIZED HEALTH CARE EDUCATION/TRAINING PROGRAM

## 2022-08-22 PROCEDURE — 85025 COMPLETE CBC W/AUTO DIFF WBC: CPT

## 2022-08-22 PROCEDURE — C9113 INJ PANTOPRAZOLE SODIUM, VIA: HCPCS | Performed by: EMERGENCY MEDICINE

## 2022-08-22 PROCEDURE — 6370000000 HC RX 637 (ALT 250 FOR IP): Performed by: STUDENT IN AN ORGANIZED HEALTH CARE EDUCATION/TRAINING PROGRAM

## 2022-08-22 PROCEDURE — 6360000002 HC RX W HCPCS: Performed by: EMERGENCY MEDICINE

## 2022-08-22 PROCEDURE — 83605 ASSAY OF LACTIC ACID: CPT

## 2022-08-22 PROCEDURE — 70486 CT MAXILLOFACIAL W/O DYE: CPT

## 2022-08-22 RX ORDER — LIDOCAINE HYDROCHLORIDE AND EPINEPHRINE 10; 10 MG/ML; UG/ML
20 INJECTION, SOLUTION INFILTRATION; PERINEURAL ONCE
Status: COMPLETED | OUTPATIENT
Start: 2022-08-22 | End: 2022-08-22

## 2022-08-22 RX ORDER — 0.9 % SODIUM CHLORIDE 0.9 %
1000 INTRAVENOUS SOLUTION INTRAVENOUS ONCE
Status: COMPLETED | OUTPATIENT
Start: 2022-08-22 | End: 2022-08-22

## 2022-08-22 RX ORDER — PANTOPRAZOLE SODIUM 40 MG/10ML
80 INJECTION, POWDER, LYOPHILIZED, FOR SOLUTION INTRAVENOUS ONCE
Status: COMPLETED | OUTPATIENT
Start: 2022-08-22 | End: 2022-08-22

## 2022-08-22 RX ORDER — SODIUM CHLORIDE 0.9 % (FLUSH) 0.9 %
10 SYRINGE (ML) INJECTION PRN
Status: DISCONTINUED | OUTPATIENT
Start: 2022-08-22 | End: 2022-08-29 | Stop reason: HOSPADM

## 2022-08-22 RX ORDER — GABAPENTIN 100 MG/1
CAPSULE ORAL
Qty: 450 CAPSULE | Refills: 0 | Status: SHIPPED | OUTPATIENT
Start: 2022-08-22 | End: 2023-02-02

## 2022-08-22 RX ORDER — LIDOCAINE HYDROCHLORIDE AND EPINEPHRINE 10; 10 MG/ML; UG/ML
INJECTION, SOLUTION INFILTRATION; PERINEURAL
Status: COMPLETED
Start: 2022-08-22 | End: 2022-08-22

## 2022-08-22 RX ADMIN — TRIMETHOBENZAMIDE HYDROCHLORIDE 200 MG: 100 INJECTION INTRAMUSCULAR at 23:14

## 2022-08-22 RX ADMIN — LIDOCAINE HYDROCHLORIDE,EPINEPHRINE BITARTRATE 20 ML: 10; .01 INJECTION, SOLUTION INFILTRATION; PERINEURAL at 18:44

## 2022-08-22 RX ADMIN — SODIUM CHLORIDE 1000 ML: 9 INJECTION, SOLUTION INTRAVENOUS at 22:17

## 2022-08-22 RX ADMIN — PHYTONADIONE 10 MG: 10 INJECTION, EMULSION INTRAMUSCULAR; INTRAVENOUS; SUBCUTANEOUS at 23:55

## 2022-08-22 RX ADMIN — IOPAMIDOL 50 ML: 755 INJECTION, SOLUTION INTRAVENOUS at 20:22

## 2022-08-22 RX ADMIN — SODIUM CHLORIDE 1000 ML: 9 INJECTION, SOLUTION INTRAVENOUS at 19:48

## 2022-08-22 RX ADMIN — PANTOPRAZOLE SODIUM 80 MG: 40 INJECTION, POWDER, FOR SOLUTION INTRAVENOUS at 22:18

## 2022-08-22 RX ADMIN — POTASSIUM BICARBONATE 40 MEQ: 782 TABLET, EFFERVESCENT ORAL at 19:42

## 2022-08-22 RX ADMIN — LIDOCAINE HYDROCHLORIDE AND EPINEPHRINE 20 ML: 10; 10 INJECTION, SOLUTION INFILTRATION; PERINEURAL at 18:44

## 2022-08-22 ASSESSMENT — ENCOUNTER SYMPTOMS
RHINORRHEA: 0
VOMITING: 0
DIARRHEA: 0
TROUBLE SWALLOWING: 0
VOICE CHANGE: 0
NAUSEA: 0
BACK PAIN: 0
SHORTNESS OF BREATH: 0
ABDOMINAL PAIN: 0
COUGH: 0

## 2022-08-22 ASSESSMENT — PAIN - FUNCTIONAL ASSESSMENT: PAIN_FUNCTIONAL_ASSESSMENT: NONE - DENIES PAIN

## 2022-08-22 NOTE — ED PROVIDER NOTES
ATTENDING PROVIDER ATTESTATION:     Yina Dumotn presented to the emergency department for evaluation of Fall [facility] yesterday, admits to etoh, laceration above left eye, on coumadin)   and was initially evaluated by the Medical Resident. See Original ED Note for H&P and ED course above. I have reviewed and discussed the case, including pertinent history (medical, surgical, family and social) and exam findings with the Medical Resident assigned to Yina Tim. I have personally performed and/or participated in the history, exam, medical decision making, and procedures and agree with all pertinent clinical information. I directly supervised the procedure performed by the resident physician. I was present for all critical portions of the procedure. Critical Care:  Please note that the withdrawal or failure to initiate urgent interventions for this patient would likely result in a life threatening deterioration or permanent disability. Accordingly this patient received 49 minutes of critical care time, excluding separately billable procedures. I, Dr. Soraya Castro MD, am the primary provider of this record. I have reviewed my findings and recommendations with the assigned Medical Resident, Yina Dumont and members of family present at the time of disposition. My findings/plan: The primary encounter diagnosis was Fall, initial encounter. Diagnoses of Supratherapeutic INR, Hypokalemia, NIKOLAI (acute kidney injury) ([address] Lactic acid acidosis, Meningioma ([address] Cyst of pancreas, Alcohol withdrawal syndrome without complication ([address] and Closed fracture of one rib of right side, initial encounter were also pertinent to this visit. Current Discharge Medication List        Soraya Castro MD    HPI:  Patient is a 24-year-old male who presents to the emergency department after calling 911 after a reported fall.   Patient has a history of alcoholism, A. fib on Coumadin who presents to the emergency department after he states that he fell 2 days ago in his house. Patient states he had difficulty getting up and moving around. Patient states he had difficulty getting up today after moving to his bed. Patient does not complain of any pain. Patient was brought in by EMS who report there was gross blood covering patient's bathroom as well as his bed, pillow, sheets, around the bedroom. Patient does not remember any seizure activity, ROS is severely limited due to patient's memory. Patient states he remembers falling but that is it. Patient states he thinks he fell \"over his feet\" in the bathroom of his house however he does not remember if he was possibly chest pain or shortness of breath prior to the fall. Unable to obtain complete history due to confusion. Review of Systems   Constitutional:  Negative for chills and fever. HENT:  Negative for congestion, rhinorrhea, trouble swallowing and voice change. Eyes:  Negative for visual disturbance. Respiratory:  Negative for cough and shortness of breath. Cardiovascular:  Negative for chest pain, palpitations and leg swelling. Gastrointestinal:  Negative for abdominal pain, diarrhea, nausea and vomiting. Endocrine: Negative for polyuria. Genitourinary:  Negative for dysuria and hematuria. Musculoskeletal:  Negative for arthralgias, back pain, myalgias and neck pain. Skin:  Positive for wound. Neurological:  Negative for dizziness, facial asymmetry, speech difficulty, light-headedness, numbness and headaches. Psychiatric/Behavioral:  Positive for confusion. The patient is not nervous/anxious. Physical Exam  Vitals and nursing note reviewed. Constitutional:       General: He is awake. He is not in acute distress. Appearance: He is overweight. He is not ill-appearing. Interventions: Cervical collar in place. HENT:      Head: Normocephalic and atraumatic.         Comments: 3 cm laceration with oozing bleeding noted to the left forehead. Head and face diffusely covered with blood. Right Ear: Tympanic membrane normal.      Left Ear: Tympanic membrane normal.      Ears:      Comments: Dried blood noted in both ear canals, tympanic membranes are intact, no hemotympanum. Mouth/Throat:      Mouth: Mucous membranes are moist.      Pharynx: Oropharynx is clear. Eyes:      Extraocular Movements: Extraocular movements intact. Pupils: Pupils are equal, round, and reactive to light. Cardiovascular:      Rate and Rhythm: Normal rate and regular rhythm. Pulses: Normal pulses. Heart sounds: Normal heart sounds. Pulmonary:      Effort: Pulmonary effort is normal.      Breath sounds: Normal breath sounds. Abdominal:      General: There is no distension. Palpations: Abdomen is soft. Tenderness: There is no abdominal tenderness. Musculoskeletal:         General: Normal range of motion. Cervical back: Normal range of motion and neck supple. Skin:     General: Skin is warm and dry. Capillary Refill: Capillary refill takes less than 2 seconds. Comments: Patient with large areas of ecchymosis noted to the right flank as well as right hip, right groin. Patient states he was lying on his right side. There is no tenderness. Neurological:      General: No focal deficit present. Mental Status: He is alert. Cranial Nerves: No cranial nerve deficit. Sensory: No sensory deficit. Motor: No weakness. Psychiatric:         Behavior: Behavior is cooperative. Laceration Repair Procedure Note    Indication: Laceration    Procedure: The patient was placed in the appropriate position and anesthesia around the laceration was obtained by infiltration using 1% Lidocaine with epinephrine. The area was then cleansed using betadine, irrigated with normal saline, and draped in a sterile fashion.  The laceration was closed with 4-0 Prolene using scattered abrasions and trauma to his head. He is awake and alert, protecting his airway. He has 5/5 strength of bilateral upper and lower extremities and normal sensation to all extremities. [JA]   1843 EKG: This EKG is signed and interpreted by me. Rate: 98  Rhythm: Irregular rhythm  Interpretation: Irregular rhythm, specific ST abnormalities, QTC is prolonged at 536, frequent PVCs  Comparison: changes compared to previous EKG   [JA]   2221 Patient is hemoccult positive. Patient had light brown stool. [JA]   6120 Patient admitted to Dr. Fabian Davis. [JV]   Tue Aug 23, 2022   0032 Patient agitated at bedside. He has tremulous. Tachycardic. Patient given 2 mg of Versed, and 130 mg of phenobarbital.  CIWA protocol initiated. [JV]      ED Course User Index  [JA] Bryan Salcido MD  [JV] Isa Camarillo MD      ED Course as of 08/23/22 2004   Mon Aug 22, 2022   [address] [facility] Patient is a 77-year-old male presenting after mechanical fall yesterday. Patient has a history of alcoholic abuse, last drink 2 days ago. States that he fell yesterday and was unable to get up. He has been laying on the ground for least 24 hours. He currently has no complaints and denies any pain. He has extensive acute and subacute bruises to his abdomen, extremities, and back. He is on Coumadin, and he states he has been taking it. He also has scattered abrasions and trauma to his head. He is awake and alert, protecting his airway. He has 5/5 strength of bilateral upper and lower extremities and normal sensation to all extremities. [JA]   1843 EKG: This EKG is signed and interpreted by me. Rate: 98  Rhythm: Irregular rhythm  Interpretation: Irregular rhythm, specific ST abnormalities, QTC is prolonged at 536, frequent PVCs  Comparison: changes compared to previous EKG   [JA]   2221 Patient is hemoccult positive. Patient had light brown stool. [JA]   8911 Patient admitted to Dr. Fabian Davis.   [JV]   Tue Aug 23, 2022   0032 Patient agitated at bedside. He has tremulous. Tachycardic. Patient given 2 mg of Versed, and 130 mg of phenobarbital.  [facility] protocol initiated. [JV]      ED Course User Index  [JA] Osman Singh MD  [JV] Samantha Andrew MD       --------------------------------------------- PAST HISTORY ---------------------------------------------  Past Medical History:  has a past medical history of Blood circulation, collateral, CAD (coronary artery disease), Chronic kidney disease, History of alcohol use, History of ascites, Hyperlipidemia, Hypertension, Hyponatremia, Left knee pain, Liver disease, Lymphedema, Neuropathy, Osteoarthritis, Psychiatric problem, Thyroid disease, Use of cane as ambulatory aid, Uses wheelchair, Wears glasses, and Wears hearing aid. Past Surgical History:  has a past surgical history that includes Appendectomy; eye surgery; Colonoscopy; Cataract removal with implant (Bilateral); Cardiac catheterization (05/2020); and Total knee arthroplasty (Left, 6/22/2020). Social History:  reports that he quit smoking about 36 years ago. His smoking use included pipe, cigars, and cigarettes. He started smoking about 57 years ago. He has a 18.75 pack-year smoking history. He has never used smokeless tobacco. He reports current alcohol use. He reports that he does not use drugs. Family History: family history includes Other in his mother. The patients home medications have been reviewed.     Allergies: Sulfa antibiotics    -------------------------------------------------- RESULTS -------------------------------------------------    Lab  Results for orders placed or performed during the hospital encounter of 08/22/22   CBC with Auto Differential   Result Value Ref Range    WBC 14.0 (H) 4.5 - 11.5 E9/L    RBC 2.90 (L) 3.80 - 5.80 E12/L    Hemoglobin 9.2 (L) 12.5 - 16.5 g/dL    Hematocrit 27.4 (L) 37.0 - 54.0 %    MCV 94.5 80.0 - 99.9 fL    MCH 31.7 26.0 - 35.0 pg    MCHC 33.6 32.0 - 34.5 %    RDW 14.7 11.5 - 15.0 fL    Platelets 178 320 - 736 E9/L    MPV 9.6 7.0 - 12.0 fL    Neutrophils % 89.1 (H) 43.0 - 80.0 %    Immature Granulocytes % 1.0 0.0 - 5.0 %    Lymphocytes % 4.9 (L) 20.0 - 42.0 %    Monocytes % 4.8 2.0 - 12.0 %    Eosinophils % 0.1 0.0 - 6.0 %    Basophils % 0.1 0.0 - 2.0 %    Neutrophils Absolute 12.46 (H) 1.80 - 7.30 E9/L    Immature Granulocytes # 0.14 E9/L    Lymphocytes Absolute 0.69 (L) 1.50 - 4.00 E9/L    Monocytes Absolute 0.67 0.10 - 0.95 E9/L    Eosinophils Absolute 0.01 (L) 0.05 - 0.50 E9/L    Basophils Absolute 0.02 0.00 - 0.20 E9/L   Comprehensive Metabolic Panel w/ Reflex to MG   Result Value Ref Range    Sodium 128 (L) 132 - 146 mmol/L    Potassium reflex Magnesium 3.3 (L) 3.5 - 5.0 mmol/L    Chloride 87 (L) 98 - 107 mmol/L    CO2 10 (L) 22 - 29 mmol/L    Anion Gap 31 (H) 7 - 16 mmol/L    Glucose 240 (H) 74 - 99 mg/dL    BUN 27 (H) 6 - 23 mg/dL    Creatinine 2.0 (H) 0.7 - 1.2 mg/dL    GFR Non-African American 32 >=60 mL/min/1.73    GFR African American 39     Calcium 8.9 8.6 - 10.2 mg/dL    Total Protein 6.5 6.4 - 8.3 g/dL    Albumin 3.7 3.5 - 5.2 g/dL    Total Bilirubin 1.0 0.0 - 1.2 mg/dL    Alkaline Phosphatase 96 40 - 129 U/L    ALT 10 0 - 40 U/L    AST 18 0 - 39 U/L   Troponin   Result Value Ref Range    Troponin, High Sensitivity 55 (H) 0 - 11 ng/L   Protime-INR   Result Value Ref Range    Protime 85.5 (H) 9.3 - 12.4 sec    INR 7.8 (HH)    APTT   Result Value Ref Range    aPTT 45.6 (H) 24.5 - 35.1 sec   Urinalysis with Microscopic   Result Value Ref Range    Color, UA Yellow Straw/Yellow    Clarity, UA Clear Clear    Glucose, Ur Negative Negative mg/dL    Bilirubin Urine Negative Negative    Ketones, Urine TRACE (A) Negative mg/dL    Specific Gravity, UA <=1.005 1.005 - 1.030    Blood, Urine TRACE-INTACT Negative    pH, UA 6.0 5.0 - 9.0    Protein, UA Negative Negative mg/dL    Urobilinogen, Urine 0.2 <2.0 E.U./dL    Nitrite, Urine Negative Negative    Leukocyte Esterase, Urine Negative Negative    Hyaline Casts, UA 0-2 0 - 2 /LPF    WBC, UA NONE 0 - 5 /HPF    RBC, UA 0-1 0 - 2 /HPF    Bacteria, UA RARE (A) None Seen /HPF   Lactic Acid   Result Value Ref Range    Lactic Acid 16.4 (HH) 0.5 - 2.2 mmol/L   Urine Drug Screen   Result Value Ref Range    Amphetamine Screen, Urine NOT DETECTED Negative <1000 ng/mL    Barbiturate Screen, Ur NOT DETECTED Negative < 200 ng/mL    Benzodiazepine Screen, Urine NOT DETECTED Negative < 200 ng/mL    Cannabinoid Scrn, Ur NOT DETECTED Negative < 50ng/mL    Cocaine Metabolite Screen, Urine NOT DETECTED Negative < 300 ng/mL    Opiate Scrn, Ur NOT DETECTED Negative < 300ng/mL    PCP Screen, Urine NOT DETECTED Negative < 25 ng/mL    Methadone Screen, Urine NOT DETECTED Negative <300 ng/mL    Oxycodone Urine NOT DETECTED Negative <100 ng/mL    FENTANYL SCREEN, URINE NOT DETECTED Negative <1 ng/mL    Drug Screen Comment: see below    Serum Drug Screen   Result Value Ref Range    Ethanol Lvl <10 mg/dL    Acetaminophen Level <5.0 (L) 10.0 - 14.3 mcg/mL    Salicylate, Serum <4.8 0.0 - 30.0 mg/dL    TCA Scrn NEGATIVE Cutoff:300 ng/mL   CK   Result Value Ref Range    Total  (H) 20 - 200 U/L   Magnesium   Result Value Ref Range    Magnesium 2.5 1.6 - 2.6 mg/dL   Troponin   Result Value Ref Range    Troponin, High Sensitivity 57 (H) 0 - 11 ng/L   Lactic Acid   Result Value Ref Range    Lactic Acid 7.0 (HH) 0.5 - 2.2 mmol/L   Basic Metabolic Panel w/ Reflex to MG   Result Value Ref Range    Sodium 131 (L) 132 - 146 mmol/L    Potassium reflex Magnesium 3.8 3.5 - 5.0 mmol/L    Chloride 96 (L) 98 - 107 mmol/L    CO2 16 (L) 22 - 29 mmol/L    Anion Gap 19 (H) 7 - 16 mmol/L    Glucose 126 (H) 74 - 99 mg/dL    BUN 25 (H) 6 - 23 mg/dL    Creatinine 1.7 (H) 0.7 - 1.2 mg/dL    GFR Non-African American 39 >=60 mL/min/1.73    GFR African American 47     Calcium 7.9 (L) 8.6 - 10.2 mg/dL   Comprehensive Metabolic Panel w/ Reflex to MG   Result Value Ref Range    Sodium 127 (L) 132 - 146 mmol/L    Potassium reflex Magnesium 4.7 3.5 - 5.0 mmol/L    Chloride 92 (L) 98 - 107 mmol/L    CO2 17 (L) 22 - 29 mmol/L    Anion Gap 18 (H) 7 - 16 mmol/L    Glucose 153 (H) 74 - 99 mg/dL    BUN 27 (H) 6 - 23 mg/dL    Creatinine 1.8 (H) 0.7 - 1.2 mg/dL    GFR Non-African American 36 >=60 mL/min/1.73    GFR African American 44     Calcium 7.9 (L) 8.6 - 10.2 mg/dL    Total Protein 5.3 (L) 6.4 - 8.3 g/dL    Albumin 2.8 (L) 3.5 - 5.2 g/dL    Total Bilirubin 1.5 (H) 0.0 - 1.2 mg/dL    Alkaline Phosphatase 76 40 - 129 U/L    ALT 10 0 - 40 U/L    AST 25 0 - 39 U/L   Lactic acid, plasma   Result Value Ref Range    Lactic Acid 6.3 (HH) 0.5 - 2.2 mmol/L   CBC auto differential   Result Value Ref Range    WBC 17.0 (H) 4.5 - 11.5 E9/L    RBC 2.23 (L) 3.80 - 5.80 E12/L    Hemoglobin 7.0 (L) 12.5 - 16.5 g/dL    Hematocrit 20.2 (L) 37.0 - 54.0 %    MCV 90.6 80.0 - 99.9 fL    MCH 31.4 26.0 - 35.0 pg    MCHC 34.7 (H) 32.0 - 34.5 %    RDW 14.8 11.5 - 15.0 fL    Platelets 022 250 - 242 E9/L    MPV 9.9 7.0 - 12.0 fL    Neutrophils % 85.3 (H) 43.0 - 80.0 %    Immature Granulocytes % 0.7 0.0 - 5.0 %    Lymphocytes % 6.0 (L) 20.0 - 42.0 %    Monocytes % 7.8 2.0 - 12.0 %    Eosinophils % 0.0 0.0 - 6.0 %    Basophils % 0.2 0.0 - 2.0 %    Neutrophils Absolute 14.51 (H) 1.80 - 7.30 E9/L    Immature Granulocytes # 0.12 E9/L    Lymphocytes Absolute 1.02 (L) 1.50 - 4.00 E9/L    Monocytes Absolute 1.32 (H) 0.10 - 0.95 E9/L    Eosinophils Absolute 0.00 (L) 0.05 - 0.50 E9/L    Basophils Absolute 0.04 0.00 - 0.20 E9/L   Hemoglobin and Hematocrit   Result Value Ref Range    Hemoglobin 7.1 (L) 12.5 - 16.5 g/dL    Hematocrit 21.1 (L) 37.0 - 54.0 %   Calcium, Ionized   Result Value Ref Range    Calcium, Ionized 1.06 (L) 1.15 - 1.33 mmol/L   POCT occult blood stool   Result Value Ref Range    OCCULT BLOOD FECAL positive    EKG 12 Lead   Result Value Ref Range    Ventricular Rate 98 BPM    Atrial Rate 288 BPM    QRS Duration 98 ms    Q-T Interval 420 ms QTc Calculation (Bazett) 536 ms    R Axis 82 degrees    T Axis 30 degrees       Radiology  CT Head WO Contrast   Final Result   No acute intracranial abnormality. 8 mm left frontal meningioma with small focus of calcification. CT Cervical Spine WO Contrast   Final Result   No acute abnormality of the cervical spine. Multilevel degenerative changes. CT CHEST W CONTRAST   Final Result   Right 7th lateral rib fracture. Cholelithiasis. CT ABDOMEN PELVIS W IV CONTRAST Additional Contrast? None   Final Result   1. Mildly displaced fracture involving right lateral 7th rib. 2. No acute process in the abdomen or pelvis. 3. Cystic lesion involving the head of the pancreas measures 1.5 x 1 cm. MRI   with without contrast recommended for further evaluation. 4. Cholelithiasis. CT FACIAL BONES WO CONTRAST   Final Result   No acute facial bone trauma. RECOMMENDATIONS:   Unavailable             EKG:  This EKG is signed and interpreted by me. Rate: 98  Rhythm: Trigeminy with unifocal PVCs  Interpretation: Heart rate 98, QRS 98, QTc 536. Comparison: changes compared to previous EKG      ------------------------- NURSING NOTES AND VITALS REVIEWED ---------------------------  Date / Time Roomed:  8/22/2022  6:22 PM  ED Bed Assignment:  [id number]    The nursing notes within the ED encounter and vital signs as below have been reviewed.    Patient Vitals for the past 24 hrs:   BP Temp Temp src Pulse Resp SpO2 Height Weight   08/23/22 0815 (!) 117/97 -- -- -- -- -- -- --   08/23/22 0743 (!) 115/104 97.5 °F (36.4 °C) Oral (!) 114 16 94 % -- --   08/23/22 0145 115/75 97 °F (36.1 °C) Temporal (!) 104 18 92 % 5' 9\" (1.753 m) 209 lb (94.8 kg)   08/23/22 0100 111/80 -- -- (!) 113 27 96 % -- --   08/23/22 0050 139/62 -- -- (!) 131 -- 92 % -- --   08/23/22 0000 (!) 144/49 97.2 °F (36.2 °C) -- (!) 127 20 98 % -- --   08/22/22 2215 -- -- -- -- 16 100 % -- --   08/22/22 2000 (!) 164/51 -- -- 97 24 -- -- --   08/22/22 1930 132/81 -- -- (!) 103 22 100 % -- --   08/22/22 1900 126/82 -- -- (!) 115 20 -- -- --   08/22/22 1830 122/74 -- -- 92 17 99 % -- --   08/22/22 1824 (!) 142/90 97 °F (36.1 °C) -- (!) 104 18 95 % -- 207 lb (93.9 kg)       Oxygen Saturation Interpretation: Normal      ------------------------------------------ PROGRESS NOTES ------------------------------------------  Re-evaluation(s):  Patients symptoms are improving  Repeat physical examination is not changed    I have spoken with the patient and discussed todays results, in addition to providing specific details for the plan of care and counseling regarding the diagnosis and prognosis. Their questions are answered at this time and they are agreeable with the plan.      --------------------------------- ADDITIONAL PROVIDER NOTES ---------------------------------  Consultations:  Spoke with [facility],  They will admit this patient. This patient has remained hemodynamically stable, remained unchanged, and been closely monitored during their ED course. Please note that the withdrawal or failure to initiate urgent interventions for this patient would likely result in a life threatening deterioration or permanent disability. Clinical Impression  1. Fall, initial encounter    2. Supratherapeutic INR    3. Hypokalemia    4. NIKOLAI (acute kidney injury) ([facility] [address]    5. Lactic acid acidosis    6. Meningioma ([facility] [address]    7. Cyst of pancreas    8. Alcohol withdrawal syndrome without complication (HCC)    9. Closed fracture of one rib of right side, initial encounter        Disposition  Patient's disposition: Admit to telemetry  Patient's condition is stable    8/22/22, 10:18 PM EDT.     This note is prepared by Sarah Eid MD -PGY- 3             Sarah Eid MD  Resident  08/23/22 1301       Pina Delgado MD  08/23/22 2006

## 2022-08-22 NOTE — Clinical Note
Discharge Plan[de-identified] Other/[facility])   Telemetry/Cardiac Monitoring Required?: Yes

## 2022-08-23 PROBLEM — S22.39XA FRACTURE OF ONE RIB: Status: ACTIVE | Noted: 2022-08-23

## 2022-08-23 PROBLEM — R79.1 SUPRATHERAPEUTIC INR: Status: ACTIVE | Noted: 2022-08-23

## 2022-08-23 LAB
ALBUMIN SERPL-MCNC: 2.8 G/DL (ref 3.5–5.2)
ALP BLD-CCNC: 76 U/L (ref 40–129)
ALT SERPL-CCNC: 10 U/L (ref 0–40)
AMPHETAMINE SCREEN, URINE: NOT DETECTED
ANION GAP SERPL CALCULATED.3IONS-SCNC: 18 MMOL/L (ref 7–16)
AST SERPL-CCNC: 25 U/L (ref 0–39)
BACTERIA: ABNORMAL /HPF
BARBITURATE SCREEN URINE: NOT DETECTED
BASOPHILS ABSOLUTE: 0.04 E9/L (ref 0–0.2)
BASOPHILS RELATIVE PERCENT: 0.2 % (ref 0–2)
BENZODIAZEPINE SCREEN, URINE: NOT DETECTED
BILIRUB SERPL-MCNC: 1.5 MG/DL (ref 0–1.2)
BILIRUBIN URINE: NEGATIVE
BLOOD, URINE: ABNORMAL
BUN BLDV-MCNC: 27 MG/DL (ref 6–23)
CALCIUM IONIZED: 1.06 MMOL/L (ref 1.15–1.33)
CALCIUM SERPL-MCNC: 7.9 MG/DL (ref 8.6–10.2)
CANNABINOID SCREEN URINE: NOT DETECTED
CHLORIDE BLD-SCNC: 92 MMOL/L (ref 98–107)
CLARITY: CLEAR
CO2: 17 MMOL/L (ref 22–29)
COCAINE METABOLITE SCREEN URINE: NOT DETECTED
COLOR: YELLOW
CREAT SERPL-MCNC: 1.8 MG/DL (ref 0.7–1.2)
EKG Q-T INTERVAL: 420 MS
EKG QRS DURATION: 98 MS
EKG QTC CALCULATION (BAZETT): 536 MS
EKG R AXIS: 82 DEGREES
EKG T AXIS: 30 DEGREES
EKG VENTRICULAR RATE: 98 BPM
EOSINOPHILS ABSOLUTE: 0 E9/L (ref 0.05–0.5)
EOSINOPHILS RELATIVE PERCENT: 0 % (ref 0–6)
FENTANYL SCREEN, URINE: NOT DETECTED
GFR AFRICAN AMERICAN: 44
GFR NON-AFRICAN AMERICAN: 36 ML/MIN/1.73
GLUCOSE BLD-MCNC: 153 MG/DL (ref 74–99)
GLUCOSE URINE: NEGATIVE MG/DL
HCT VFR BLD CALC: 20.2 % (ref 37–54)
HCT VFR BLD CALC: 21.1 % (ref 37–54)
HEMOGLOBIN: 7 G/DL (ref 12.5–16.5)
HEMOGLOBIN: 7.1 G/DL (ref 12.5–16.5)
HYALINE CASTS: ABNORMAL /LPF (ref 0–2)
IMMATURE GRANULOCYTES #: 0.12 E9/L
IMMATURE GRANULOCYTES %: 0.7 % (ref 0–5)
INR BLD: 3.1
KETONES, URINE: ABNORMAL MG/DL
LACTIC ACID: 6.3 MMOL/L (ref 0.5–2.2)
LACTIC ACID: 7 MMOL/L (ref 0.5–2.2)
LEUKOCYTE ESTERASE, URINE: NEGATIVE
LYMPHOCYTES ABSOLUTE: 1.02 E9/L (ref 1.5–4)
LYMPHOCYTES RELATIVE PERCENT: 6 % (ref 20–42)
Lab: NORMAL
MCH RBC QN AUTO: 31.4 PG (ref 26–35)
MCHC RBC AUTO-ENTMCNC: 34.7 % (ref 32–34.5)
MCV RBC AUTO: 90.6 FL (ref 80–99.9)
METHADONE SCREEN, URINE: NOT DETECTED
MONOCYTES ABSOLUTE: 1.32 E9/L (ref 0.1–0.95)
MONOCYTES RELATIVE PERCENT: 7.8 % (ref 2–12)
NEUTROPHILS ABSOLUTE: 14.51 E9/L (ref 1.8–7.3)
NEUTROPHILS RELATIVE PERCENT: 85.3 % (ref 43–80)
NITRITE, URINE: NEGATIVE
OPIATE SCREEN URINE: NOT DETECTED
OXYCODONE URINE: NOT DETECTED
PDW BLD-RTO: 14.8 FL (ref 11.5–15)
PH UA: 6 (ref 5–9)
PHENCYCLIDINE SCREEN URINE: NOT DETECTED
PLATELET # BLD: 317 E9/L (ref 130–450)
PMV BLD AUTO: 9.9 FL (ref 7–12)
POTASSIUM REFLEX MAGNESIUM: 4.7 MMOL/L (ref 3.5–5)
PROTEIN UA: NEGATIVE MG/DL
PROTHROMBIN TIME: 33.3 SEC (ref 9.3–12.4)
RBC # BLD: 2.23 E12/L (ref 3.8–5.8)
RBC UA: ABNORMAL /HPF (ref 0–2)
SODIUM BLD-SCNC: 127 MMOL/L (ref 132–146)
SPECIFIC GRAVITY UA: <=1.005 (ref 1–1.03)
TOTAL PROTEIN: 5.3 G/DL (ref 6.4–8.3)
UROBILINOGEN, URINE: 0.2 E.U./DL
WBC # BLD: 17 E9/L (ref 4.5–11.5)
WBC UA: ABNORMAL /HPF (ref 0–5)

## 2022-08-23 PROCEDURE — 97161 PT EVAL LOW COMPLEX 20 MIN: CPT

## 2022-08-23 PROCEDURE — 2580000003 HC RX 258: Performed by: STUDENT IN AN ORGANIZED HEALTH CARE EDUCATION/TRAINING PROGRAM

## 2022-08-23 PROCEDURE — 85025 COMPLETE CBC W/AUTO DIFF WBC: CPT

## 2022-08-23 PROCEDURE — 97535 SELF CARE MNGMENT TRAINING: CPT

## 2022-08-23 PROCEDURE — 82330 ASSAY OF CALCIUM: CPT

## 2022-08-23 PROCEDURE — 2580000003 HC RX 258: Performed by: PHYSICIAN ASSISTANT

## 2022-08-23 PROCEDURE — 6360000002 HC RX W HCPCS: Performed by: STUDENT IN AN ORGANIZED HEALTH CARE EDUCATION/TRAINING PROGRAM

## 2022-08-23 PROCEDURE — 6370000000 HC RX 637 (ALT 250 FOR IP): Performed by: STUDENT IN AN ORGANIZED HEALTH CARE EDUCATION/TRAINING PROGRAM

## 2022-08-23 PROCEDURE — 85610 PROTHROMBIN TIME: CPT

## 2022-08-23 PROCEDURE — 6360000002 HC RX W HCPCS: Performed by: PHYSICIAN ASSISTANT

## 2022-08-23 PROCEDURE — 36415 COLL VENOUS BLD VENIPUNCTURE: CPT

## 2022-08-23 PROCEDURE — 83605 ASSAY OF LACTIC ACID: CPT

## 2022-08-23 PROCEDURE — 85014 HEMATOCRIT: CPT

## 2022-08-23 PROCEDURE — 6360000002 HC RX W HCPCS: Performed by: INTERNAL MEDICINE

## 2022-08-23 PROCEDURE — 6370000000 HC RX 637 (ALT 250 FOR IP): Performed by: PHYSICIAN ASSISTANT

## 2022-08-23 PROCEDURE — 97165 OT EVAL LOW COMPLEX 30 MIN: CPT

## 2022-08-23 PROCEDURE — 80053 COMPREHEN METABOLIC PANEL: CPT

## 2022-08-23 PROCEDURE — 1200000000 HC SEMI PRIVATE

## 2022-08-23 PROCEDURE — 85018 HEMOGLOBIN: CPT

## 2022-08-23 PROCEDURE — 2580000003 HC RX 258: Performed by: INTERNAL MEDICINE

## 2022-08-23 PROCEDURE — 97530 THERAPEUTIC ACTIVITIES: CPT

## 2022-08-23 PROCEDURE — C9113 INJ PANTOPRAZOLE SODIUM, VIA: HCPCS | Performed by: PHYSICIAN ASSISTANT

## 2022-08-23 RX ORDER — ATORVASTATIN CALCIUM 40 MG/1
40 TABLET, FILM COATED ORAL DAILY
Status: DISCONTINUED | OUTPATIENT
Start: 2022-08-23 | End: 2022-08-29 | Stop reason: HOSPADM

## 2022-08-23 RX ORDER — PROMETHAZINE HYDROCHLORIDE 12.5 MG/1
12.5 TABLET ORAL EVERY 6 HOURS PRN
Status: DISCONTINUED | OUTPATIENT
Start: 2022-08-23 | End: 2022-08-29 | Stop reason: HOSPADM

## 2022-08-23 RX ORDER — GABAPENTIN 100 MG/1
100 CAPSULE ORAL 3 TIMES DAILY
Status: DISCONTINUED | OUTPATIENT
Start: 2022-08-24 | End: 2022-08-29 | Stop reason: HOSPADM

## 2022-08-23 RX ORDER — ACETAMINOPHEN 325 MG/1
650 TABLET ORAL EVERY 6 HOURS PRN
Status: DISCONTINUED | OUTPATIENT
Start: 2022-08-23 | End: 2022-08-29 | Stop reason: HOSPADM

## 2022-08-23 RX ORDER — SODIUM CHLORIDE 0.9 % (FLUSH) 0.9 %
5-40 SYRINGE (ML) INJECTION PRN
Status: DISCONTINUED | OUTPATIENT
Start: 2022-08-23 | End: 2022-08-29 | Stop reason: HOSPADM

## 2022-08-23 RX ORDER — MIDAZOLAM HYDROCHLORIDE 2 MG/2ML
2 INJECTION, SOLUTION INTRAMUSCULAR; INTRAVENOUS ONCE
Status: COMPLETED | OUTPATIENT
Start: 2022-08-23 | End: 2022-08-23

## 2022-08-23 RX ORDER — SODIUM CHLORIDE 9 MG/ML
25 INJECTION, SOLUTION INTRAVENOUS PRN
Status: DISCONTINUED | OUTPATIENT
Start: 2022-08-23 | End: 2022-08-29 | Stop reason: HOSPADM

## 2022-08-23 RX ORDER — METOPROLOL TARTRATE 50 MG/1
50 TABLET, FILM COATED ORAL 2 TIMES DAILY
Status: DISCONTINUED | OUTPATIENT
Start: 2022-08-23 | End: 2022-08-29 | Stop reason: HOSPADM

## 2022-08-23 RX ORDER — ACETAMINOPHEN 650 MG/1
650 SUPPOSITORY RECTAL EVERY 6 HOURS PRN
Status: DISCONTINUED | OUTPATIENT
Start: 2022-08-23 | End: 2022-08-29 | Stop reason: HOSPADM

## 2022-08-23 RX ORDER — GAUZE BANDAGE 2" X 2"
100 BANDAGE TOPICAL DAILY
Status: DISCONTINUED | OUTPATIENT
Start: 2022-08-23 | End: 2022-08-29 | Stop reason: HOSPADM

## 2022-08-23 RX ORDER — SODIUM CHLORIDE 9 MG/ML
INJECTION, SOLUTION INTRAVENOUS CONTINUOUS
Status: DISCONTINUED | OUTPATIENT
Start: 2022-08-23 | End: 2022-08-25

## 2022-08-23 RX ORDER — SODIUM CHLORIDE 0.9 % (FLUSH) 0.9 %
5-40 SYRINGE (ML) INJECTION EVERY 12 HOURS SCHEDULED
Status: DISCONTINUED | OUTPATIENT
Start: 2022-08-23 | End: 2022-08-29 | Stop reason: HOSPADM

## 2022-08-23 RX ORDER — ONDANSETRON 2 MG/ML
4 INJECTION INTRAMUSCULAR; INTRAVENOUS EVERY 6 HOURS PRN
Status: DISCONTINUED | OUTPATIENT
Start: 2022-08-23 | End: 2022-08-23

## 2022-08-23 RX ORDER — PANTOPRAZOLE SODIUM 40 MG/10ML
40 INJECTION, POWDER, LYOPHILIZED, FOR SOLUTION INTRAVENOUS DAILY
Status: DISCONTINUED | OUTPATIENT
Start: 2022-08-23 | End: 2022-08-25

## 2022-08-23 RX ORDER — SPIRONOLACTONE 25 MG/1
25 TABLET ORAL DAILY
Status: DISCONTINUED | OUTPATIENT
Start: 2022-08-23 | End: 2022-08-28

## 2022-08-23 RX ORDER — DULOXETIN HYDROCHLORIDE 20 MG/1
20 CAPSULE, DELAYED RELEASE ORAL DAILY
Status: DISCONTINUED | OUTPATIENT
Start: 2022-08-23 | End: 2022-08-29 | Stop reason: HOSPADM

## 2022-08-23 RX ORDER — PHENOBARBITAL SODIUM 65 MG/ML
130 INJECTION INTRAMUSCULAR ONCE
Status: COMPLETED | OUTPATIENT
Start: 2022-08-23 | End: 2022-08-23

## 2022-08-23 RX ORDER — POLYETHYLENE GLYCOL 3350 17 G/17G
17 POWDER, FOR SOLUTION ORAL DAILY PRN
Status: DISCONTINUED | OUTPATIENT
Start: 2022-08-23 | End: 2022-08-29 | Stop reason: HOSPADM

## 2022-08-23 RX ORDER — SODIUM CHLORIDE 9 MG/ML
1000 INJECTION, SOLUTION INTRAVENOUS CONTINUOUS
Status: DISCONTINUED | OUTPATIENT
Start: 2022-08-23 | End: 2022-08-23

## 2022-08-23 RX ORDER — SODIUM CHLORIDE 9 MG/ML
INJECTION, SOLUTION INTRAVENOUS PRN
Status: DISCONTINUED | OUTPATIENT
Start: 2022-08-23 | End: 2022-08-29 | Stop reason: HOSPADM

## 2022-08-23 RX ORDER — LIDOCAINE 4 G/G
1 PATCH TOPICAL DAILY
Status: DISCONTINUED | OUTPATIENT
Start: 2022-08-23 | End: 2022-08-29 | Stop reason: HOSPADM

## 2022-08-23 RX ORDER — LEVOTHYROXINE SODIUM 0.1 MG/1
200 TABLET ORAL DAILY
Status: DISCONTINUED | OUTPATIENT
Start: 2022-08-23 | End: 2022-08-29 | Stop reason: HOSPADM

## 2022-08-23 RX ADMIN — SODIUM CHLORIDE 1000 ML: 9 INJECTION, SOLUTION INTRAVENOUS at 00:51

## 2022-08-23 RX ADMIN — METOPROLOL TARTRATE 50 MG: 50 TABLET, FILM COATED ORAL at 01:53

## 2022-08-23 RX ADMIN — MIDAZOLAM HYDROCHLORIDE 2 MG: 2 INJECTION, SOLUTION INTRAMUSCULAR; INTRAVENOUS at 00:52

## 2022-08-23 RX ADMIN — PANTOPRAZOLE SODIUM 40 MG: 40 INJECTION, POWDER, FOR SOLUTION INTRAVENOUS at 21:18

## 2022-08-23 RX ADMIN — METOPROLOL TARTRATE 50 MG: 50 TABLET, FILM COATED ORAL at 21:17

## 2022-08-23 RX ADMIN — PIPERACILLIN AND TAZOBACTAM 4500 MG: 4; .5 INJECTION, POWDER, FOR SOLUTION INTRAVENOUS at 17:43

## 2022-08-23 RX ADMIN — THIAMINE HCL TAB 100 MG 100 MG: 100 TAB at 08:15

## 2022-08-23 RX ADMIN — ATORVASTATIN CALCIUM 40 MG: 40 TABLET, FILM COATED ORAL at 08:15

## 2022-08-23 RX ADMIN — METOPROLOL TARTRATE 50 MG: 50 TABLET, FILM COATED ORAL at 08:15

## 2022-08-23 RX ADMIN — PHENOBARBITAL SODIUM 130 MG: 65 INJECTION INTRAMUSCULAR at 00:53

## 2022-08-23 RX ADMIN — SODIUM CHLORIDE: 9 INJECTION, SOLUTION INTRAVENOUS at 08:15

## 2022-08-23 RX ADMIN — SPIRONOLACTONE 25 MG: 25 TABLET ORAL at 08:15

## 2022-08-23 RX ADMIN — LEVOTHYROXINE SODIUM 200 MCG: 0.1 TABLET ORAL at 05:59

## 2022-08-23 RX ADMIN — SODIUM CHLORIDE: 9 INJECTION, SOLUTION INTRAVENOUS at 21:23

## 2022-08-23 RX ADMIN — DULOXETINE HYDROCHLORIDE 20 MG: 20 CAPSULE, DELAYED RELEASE ORAL at 08:15

## 2022-08-23 ASSESSMENT — PAIN SCALES - GENERAL: PAINLEVEL_OUTOF10: 0

## 2022-08-23 NOTE — CARE COORDINATION
Met with patient and son, Jonathan Jones at bedside to discuss discharge plan. Patient seen by therapy and [facility] score is 14/24; patient reports being dizzy during evaluation. Patient currently not agreeable to a LISA and would like to return home. Jonathan Jones states patient is currently in and out confusion and has no one to stay with him 24/7 at home. Explained the benefits of going to rehab for a little bit and patient states he would like to think it over. Patient will need re-evaluated by PT to assist with discharge plan. If patient does well with PT, plan is home with home care and son to transport; patient agreeable to home care services being set up.      Da Ovalles, MSW, LSW [phone]

## 2022-08-23 NOTE — CONSULTS
GENERAL SURGERY  CONSULT NOTE  8/23/2022    Physician Consulted: Dr. Nicolle Reilly  Reason for Consult: GIB  Referring Physician: Dr. Arie Peterson    HPI  Kwesi Novak is a 80 y.o. male w pmh alcoholism, Afib on coumadin who presented to ED after a reported fall. EMS noted there was gross blood covering patient's bathroom and bed and bedroom. Pt has extensive bruising various places on the body, likely from supratherapeutic INR. GenSurg consulted for hemoccult positive stool. Pt admits to painless hemorrhoids that have been present for many years. Denies any hematemesis, hematochezia, N/V, involuntary weight loss. States he is currently in no pain and has no complaints. States he would like to leave this afternoon. When asked, pt states he has no interest in upper or lower endoscopy at this time. Hgb: 7.0 (9.2) - previous baseline above 10.0  INR: 7.8  Given VitK, Thiamine    Last colonoscopy 15 yrs ago at [facility], pt was told 10 yr f/u. Never had EGD before. PMH: Afib on coumadin  PSH: appy 60+years ago, heart cath prior to TKA  SocHx: no tobacco, says he drinks only on weekends mostly liquor but won't say the amount, no drugs.   FamHx: son had testicular cancer, remission / no other significant Famhx    Past Medical History:   Diagnosis Date    Blood circulation, collateral     CAD (coronary artery disease)     Chronic kidney disease     History of alcohol use     History of ascites     history of, approx 5 years ago    Hyperlipidemia     Hypertension     Hyponatremia     Left knee pain 06/2020    Liver disease     Lymphedema     lower extrem    Neuropathy     both feet    Osteoarthritis     Psychiatric problem     Thyroid disease     Use of cane as ambulatory aid     Uses wheelchair     for distances    Wears glasses     for reading    Wears hearing aid        Past Surgical History:   Procedure Laterality Date    APPENDECTOMY      CARDIAC CATHETERIZATION  05/2020    CATARACT REMOVAL WITH IMPLANT Bilateral COLONOSCOPY      EYE SURGERY      cataracts    TOTAL KNEE ARTHROPLASTY Left 6/22/2020    LEFT KNEE IVAN ROBOTIC TOTAL ARTHROPLASTY performed by Enrike Luis MD at [facility] OR       Medications Prior to Admission    Prior to Admission medications    Medication Sig Start Date End Date Taking?  Authorizing Provider   gabapentin (NEURONTIN) 100 MG capsule 200mg in am, 100mg at noon and 200mg in pm 8/22/22 2/2/23  Didier Rincon MD   DULoxetine (CYMBALTA) 20 MG extended release capsule Take 1 capsule by mouth daily 6/8/22   Didier Rincon MD   atorvastatin (LIPITOR) 40 MG tablet Take 1 tablet by mouth daily 5/11/22   Saul Hutchinson MD   warfarin (COUMADIN) 1 MG tablet Take 1 tablet by mouth daily Takes 1 mg Mondays and Fridays in addition to 5mg tab to equal 6mg 4/7/22   Didier Rincon MD   warfarin (COUMADIN) 1 MG tablet Take 1 tablet by mouth daily Takes 1 mg Mondays and Fridays in addition to 5mg tab to equal 6mg 4/7/22   Didier Rincon MD   levothyroxine (SYNTHROID) 200 MCG tablet Take 1 tablet by mouth Daily Pt take 0.5 Saturday and Abdiaziz 3/30/22   Didier Rincon MD   potassium chloride (KLOR-CON M) 20 MEQ extended release tablet Take 1 tablet by mouth 3 times daily 2/11/22   Didier Rincon MD   metOLazone (ZAROXOLYN) 2.5 MG tablet 1 tablet by mouth monday and Friday only 2/11/22   Didier Rincon MD   warfarin (COUMADIN) 5 MG tablet Take 1 tablet by mouth daily 9/20/21   Saul Hutchinson MD   spironolactone (ALDACTONE) 25 MG tablet Take 25 mg by mouth daily 9/2/20   Historical Provider, MD   magnesium oxide (MAG-OX) 400 (241.3 Mg) MG TABS tablet TAKE ONE TABLET BY MOUTH DAILY 2/10/20   Historical Provider, MD   torsemide (DEMADEX) 20 MG tablet Take 40 mg by mouth 2 times daily  6/5/19   Historical Provider, MD   vitamin B-1 (THIAMINE) 100 MG tablet Take 100 mg by mouth daily    Historical Provider, MD   metoprolol tartrate (LOPRESSOR) 50 MG tablet Take 1 tablet by mouth 2 times daily 6/20/17 Marie Sorto MD   MULTIPLE VITAMIN PO Take 1 tablet by mouth daily Last taken 2020    Historical Provider, MD       Allergies   Allergen Reactions    Sulfa Antibiotics Hives     Hives         Family History   Problem Relation Age of Onset    Other Mother         old age        Social History     Tobacco Use    Smoking status: Former     Packs/day: 0.75     Years: 25.00     Pack years: 18.75     Types: Pipe, [de-identified], Cigarettes     Start date: 10/8/1964     Quit date: 1986     Years since quittin.4    Smokeless tobacco: Never    Tobacco comments:     Cigar and Pipe smoking history only   Vaping Use    Vaping Use: Never used   Substance Use Topics    Alcohol use: Yes     Alcohol/week: 0.0 standard drinks     Comment: quit 2017; socially as of 2020 on weekends    Drug use: No         Review of Systems: pertinent ROS listed in HPI, all others negative       PHYSICAL EXAM:    Vitals:    22 0743   BP: (!) 115/104   Pulse: (!) 114   Resp: 16   Temp: 97.5 °F (36.4 °C)   SpO2: 94%       GENERAL:  NAD. A&Ox3. HEAD:  Normocephalic. 3cm laceration to left forehead, sutured  EYES:   No scleral icterus. PERRL. LUNGS:  No increased work of breathing. CARDIOVASCULAR: RR  ABDOMEN:  Soft, non-distended, non-tender. No guarding, rigidity, rebound. EXTREMITIES:   MAEx4. Atraumatic. No LE edema. SKIN:  Warm and dry  NEUROLOGIC:  GCS   RECTAL: Pt declined, saying \"I already had that done. ..  I already know I have hemorrhoids\"      ASSESSMENT/PLAN:  80 y.o. male with supratherapeutic INR, Hgb 7.0 and hemoccult+ stool without any outward signs of GIB    Repeat INR in am  Trend H/H  Monitor for signs of ongoing bleeding  No plans for endoscopy at this point in time    Plan discussed with Dr. Yesika Kirby, DO  Surgery Resident PGY-1  2022  7:59 AM

## 2022-08-23 NOTE — H&P
[facility]  History and Physical      CHIEF COMPLAINT:    Chief Complaint   Patient presents with    Nemo Prom yesterday, admits to etoh, laceration above left eye, on coumadin        Patient of Chelsea Gregory MD presents with:  GI bleed    History of Present Illness:   81-year-old male with past medical history of CAD, CKD, alcohol abuse, hyperlipidemia, hypertension, hyponatremia, liver disease, lymphedema, neuropathy, osteoarthritis, and hypothyroidism. Patient presented to the ED with complaints of a mechanical fall at home. Patient states that he had difficulty getting up and moving around then he tripped over his own 2 feet. Patient denies any syncope however he does admit to hitting his head and he is on anticoagulation. Patient's INR is supratherapeutic. CT of the head was obtained which is negative for bleed. Patient called 911 and had a EMS bring him to the ED. Patient is alert and oriented on examination. Patient denies any chest pain, shortness of breath, fever, chills, headache,, blurred vision, and abdominal pain. Patient does have a laceration over his left eyebrow. ER work-up revealed sodium level of 131, lactic acid 7.0, BUN/creatinine 25/1.7, troponin 57, H&H 20.2/7.0, WBC 17.0, INR 7.8, occult blood positive. Patient states he has been compliant with all medications and he does follow-up for his lab work for his PT/INRs. Patient is admitted for further testing and treatment. REVIEW OF SYSTEMS:  Pertinent negatives are above in HPI. 10 point ROS otherwise negative.       Past Medical History:   Diagnosis Date    Blood circulation, collateral     CAD (coronary artery disease)     Chronic kidney disease     History of alcohol use     History of ascites     history of, approx 5 years ago    Hyperlipidemia     Hypertension     Hyponatremia     Left knee pain 06/2020    Liver disease     Lymphedema     lower extrem    Neuropathy     both feet    Osteoarthritis accurate to the best of my knowledge at the time of the exam.    Allergies:    Sulfa antibiotics    Social History:    reports that he quit smoking about 36 years ago. His smoking use included pipe, cigars, and cigarettes. He started smoking about 57 years ago. He has a 18.75 pack-year smoking history. He has never used smokeless tobacco. He reports current alcohol use. He reports that he does not use drugs. Family History:   family history includes Other in his mother. PHYSICAL EXAM:    Vitals:  BP (!) 117/97   Pulse (!) 114   Temp 97.5 °F (36.4 °C) (Oral)   Resp 16   Ht 5' 9\" (1.753 m)   Wt 209 lb (94.8 kg)   SpO2 94%   BMI 30.86 kg/m²       General appearance: NAD, conversant, slight jaundice  Eyes: Sclerae anicteric, PERRLA  HEENT: AT/NC, MMM  Neck: FROM, supple, no thyromegaly  Lymph: No cervical / supraclavicular lymphadenopathy  Lungs: Clear to auscultation, WOB normal  CV: RRR, no MRGs, no lower extremity edema  Abdomen: Soft, non-tender; no masses or HSM, +BS  Extremities: FROM without synovitis. No clubbing or cyanosis of the hands. Skin: Laceration left forehead, multiple ecchymotic areas-large confluent areas of ecchymoses on back, bruising on forehead, bilateral upper extremities  Psych: Calm and cooperative. Normal judgement and insight. Normal mood and affect. Neuro: Alert and interactive, face symmetric, speech fluent. LABS:  All labs reviewed.   Of note:  CBC with Differential:    Lab Results   Component Value Date/Time    WBC 17.0 08/23/2022 04:14 AM    RBC 2.23 08/23/2022 04:14 AM    HGB 7.1 08/23/2022 09:44 AM    HCT 21.1 08/23/2022 09:44 AM     08/23/2022 04:14 AM    MCV 90.6 08/23/2022 04:14 AM    MCH 31.4 08/23/2022 04:14 AM    MCHC 34.7 08/23/2022 04:14 AM    RDW 14.8 08/23/2022 04:14 AM    LYMPHOPCT 6.0 08/23/2022 04:14 AM    MONOPCT 7.8 08/23/2022 04:14 AM    EOSPCT 1.2 05/20/2021 12:00 AM    BASOPCT 0.2 08/23/2022 04:14 AM    MONOSABS 1.32 08/23/2022 04:14 AM LYMPHSABS 1.02 08/23/2022 04:14 AM    EOSABS 0.00 08/23/2022 04:14 AM    BASOSABS 0.04 08/23/2022 04:14 AM     CMP:    Lab Results   Component Value Date/Time     08/23/2022 04:15 AM    K 4.7 08/23/2022 04:15 AM    CL 92 08/23/2022 04:15 AM    CO2 17 08/23/2022 04:15 AM    BUN 27 08/23/2022 04:15 AM    CREATININE 1.8 08/23/2022 04:15 AM    GFRAA 44 08/23/2022 04:15 AM    LABGLOM 36 08/23/2022 04:15 AM    GLUCOSE 153 08/23/2022 04:15 AM    PROT 5.3 08/23/2022 04:15 AM    LABALBU 2.8 08/23/2022 04:15 AM    CALCIUM 7.9 08/23/2022 04:15 AM    BILITOT 1.5 08/23/2022 04:15 AM    ALKPHOS 76 08/23/2022 04:15 AM    AST 25 08/23/2022 04:15 AM    ALT 10 08/23/2022 04:15 AM       Imaging:  CT head: No acute intracranial abnormality. 8 mm left frontal meningioma with small focus of calcification. CT chest: Right seventh lateral rib fracture. Cholelithiasis. EKG:  I've personally reviewed the patient's EKG:  A. fib    Telemetry:  I've personally reviewed the patient's telemetry:      ASSESSMENT/PLAN:  Principal Problem:    GI bleed  Active Problems:    Supratherapeutic INR    Fracture of one rib  Resolved Problems:    * No resolved hospital problems. *    79-year-old male with a past medical history of A. fib with chronic anticoagulation and EtOH presented to the ED with mechanical fall and is admitted to telemetry unit with    GI bleed as evidenced by positive occult blood/lactic acidosis  -Telemetry monitoring  -H&H every 6 Hours transfuse as needed to keep hemoglobin greater than 7  HGB -   -N.p.o. IV hydration  -Hold all blood thinners.  -Consult general surgery -await input, possible panendoscopy to ascertain etiology of bleed  -Medication for other comorbidities continue as appropriate with dosage adjustments as necessary.     Lactic acidosis with leukocytosis  Initiate broad-spectrum IV antibiotic therapy pending pan culture results  Discontinue if negative cultures  Aggressive IV fluid hydration    Rib fracture from fall/trauma  Pain management  ISP  Lidocaine patch    Supratherapeutic INR  Hold anticoagulation  Monitor INR-reverse if persistently elevated and declining H&H    Tachycardia  Telemetry monitoring  IV hydration  Monitor labs    DVT prophylaxis-PCD's  PT OT  Discharge planning  Case discussed with attending and agreed upon plan of care. Code status: Full  Requires inpatient level of care  MONTSE Burns CNP    12:36 PM  8/23/2022     Above note edited to reflect my thoughts     I personally saw, examined and provided care for the patient. Radiographs, labs and medication list were reviewed by me independently. The case was discussed in detail and plans for care were established. Review of Delaney WILLARD- CNP, documentation was conducted and revisions were made as appropriate directly by me. I agree with the above documented exam, problem list, and plan of care.      Briana Velarde MD  5:20 PM  8/23/2022

## 2022-08-23 NOTE — PROGRESS NOTES
Interventions to include:   * Instruction/training on adapted ADL techniques and AE recommendations to increase functional independence within precautions       * Training on energy conservation strategies, correct breathing pattern and techniques to improve independence/tolerance for self-care routine  * Functional transfer/mobility training/DME recommendations for increased independence, safety, and fall prevention  * Patient/Family education to increase follow through with safety techniques and functional independence  * Recommendation of environmental modifications for increased safety with functional transfers/mobility and ADLs  * Therapeutic exercise to improve motor endurance, ROM, and functional strength for ADLs/functional transfers  * Therapeutic activities to facilitate/challenge dynamic balance, stand tolerance for increased safety and independence with ADLs  * Therapeutic activities to facilitate gross/fine motor skills for increased independence with ADLs  * Neuro-muscular re-education: facilitation of righting/equilibrium reactions, midline orientation, scapular stability/mobility, normalization of muscle tone, and facilitation of volitional active controled movement    Recommended Adaptive Equipment:  TBD     Home Living: Pt lives alone in a 2 story home with 3 TAYLA and no hand rail. 1st floor set-up. Bathroom setup: tub/shower combo    Equipment owned: none    Prior Level of Function: Independent with ADLs , Independent with IADLs; ambulated without AD in house; SPC  prn in community   Driving: yes   Occupation: retired      Pain Level: Pt denies pain this session    Cognition: A&O: x3;  Follows 1-2 step directions   Memory:  fair   Sequencing:  fair   Problem solving:  fair   Judgement/safety:  fair     Functional Assessment:  AM-PAC Daily Activity Raw Score: 16/24   Initial Eval Status  Date: 8/23/22 Treatment Status  Date: STGs = LTGs  Time frame: 10-14 days   Feeding Independent Grooming Minimal Assist   Modified [place]    UB Dressing Minimal Assist   Modified [place]    LB Dressing Moderate Assist   Modified [place]    Bathing Moderate Assist  Modified [place]    Toileting Moderate Assist   Modified [place]    Bed Mobility  Supine to sit: Minimal Assist   Sit to supine: Minimal Assist   Supine to sit: Modified [place]   Sit to supine: Modified [place]    Functional Transfers Moderate Assist   Modified [place]    Functional Mobility Moderate Assist     Ambulated short distance with HHA  Modified [place]    Balance Sitting:     Static:  SBA    Dynamic:SBA  Standing: mod A with HHA     Activity Tolerance F-  F+   Visual/  Perceptual wfl                      Hand Dominance right   Strength ROM Additional Info:    RUE   4-/5 wfl good  and wfl FMC/dexterity noted during ADL tasks     LUE 4-/5 wfl good  and wfl FMC/dexterity noted during ADL tasks     Hearing: wfl  Sensation:wfl  Tone: wfl  Edema:none noted     Comments: Upon arrival patient supine in bed and agreeable to OT session. Therapist educated pt on role of OT. At end of session, patient seated in chair with call light and phone within reach, all lines and tubes intact. Overall patient demonstrated decreased independence and safety during completion of ADL/functional transfer/mobility tasks. Pt would benefit from continued skilled OT to increase safety and independence with completion of ADL/IADL tasks for functional independence and quality of life.     Treatment: OT treatment provided this date includes: Facilitation of bed mobility, sitting balance (impacting ADLs; addressing posture, weight shifting, dynamic reaching), functional transfers (various surfaces: bed, chair), standing tolerance tasks with HHA (addressing posture, balance and activity tolerance; impacting ADLs and functional activity) and functional ambulation tasks with HHA (including to/ from bathroom; cuing on posture, w/w management and safety) - skilled cuing on hand placement, posture, body mechanics, energy conservation techniques and safety. Therapist facilitated self-care retraining: UB/LB self-care tasks (gown, robe, socks), toileting hygiene task and grooming tasks while educating pt on modified techniques, posture, safety and energy conservation techniques. Skilled monitoring of HR, O2 sats and pts response to treatment. Rehab Potential: Good for established goals     Patient / Family Goal: return home      Patient and/or family were instructed on functional diagnosis, prognosis/goals and OT plan of care. Demonstrated fair understanding. Eval Complexity: Low    Time In: 1030  Time Out: 1055  Total Treatment Time: 10 minutes    Min Units   OT Eval Low 97165  x  1   OT Eval Medium 15609      OT Eval High 06642      OT Re-Eval [id number]       Therapeutic Ex 55377       Therapeutic Activities 80063  2     ADL/Self Care 90822  8  1   Orthotic Management 89182       Manual [id number]     Neuro Re-Ed [id number]       Non-Billable Time          Evaluation Time additionally includes thorough review of current medical information, gathering information on past medical history/social history and prior level of function, interpretation of standardized testing/informal observation of tasks, assessment of data and development of plan of care and goals.           Desiree Lane OTR/L #[id number]

## 2022-08-23 NOTE — CARE COORDINATION
Patient presented to Ed after a fall and calling 911. H/O alcoholism and atrial fib. EMS reported gross blood in bathroom and on bed linens. Lactic acid 6.3   . INR 7.8   wbc 17.0  hgb 7.1 from 9.2  Stool for OB positive. Surgery consulted. H/H every 6 hrs. NPO,IVF. IV Protonix. PT/OT ordered. Met with patient at bedside to discuss transition of care. Patient  lives alone in a 2 story home with a first floor set up. He has a walker that he hasn't used. He states he plans to use it now. His PCP is Dr. Esthela Canada and uses [facility] on [address] [facility] .  His son, Amber Thomas will transport ,if home if his discharge plan is home. Son, Amber Thomas will be at hospital later today and review lists with his father. Await therapy recommendations. Also suggested personal emergency response button for his father. CM/SW will continue to follow.

## 2022-08-23 NOTE — PROGRESS NOTES
Physical Therapy  Physical Therapy Initial Evaluation    Name: Sandip Renner  : 1940  MRN: [id number]      Date of Service: 2022    Evaluating PT:  Litzy Jesus, PT [id number]      Room #:  3844/8859-T  Diagnosis:  Cyst of pancreas [K86.2]  Hypokalemia [E87.6]  GI bleed [K92.2]  Lactic acid acidosis [E87.2]  Meningioma ([facility] [D32.9]  NIKOLAI (acute kidney injury) ([facility] [N17.9]  Supratherapeutic INR [R79.1]  Fall, initial encounter [W19. XXXA]  [facility] [T81.364]  Closed fracture of one rib of left side, initial encounter [S22.32XA]  PMHx/PSHx:     has a past medical history of Blood circulation, collateral, CAD (coronary artery disease), Chronic kidney disease, History of alcohol use, History of ascites, Hyperlipidemia, Hypertension, Hyponatremia, Left knee pain, Liver disease, Lymphedema, Neuropathy, Osteoarthritis, Psychiatric problem, Thyroid disease, Use of cane as ambulatory aid, Uses wheelchair, Wears glasses, and Wears hearing aid. has a past surgical history that includes Appendectomy; eye surgery; Colonoscopy; Cataract removal with implant (Bilateral); Cardiac catheterization (2020); and Total knee arthroplasty (Left, 2020). Procedure/Surgery:  none  Precautions:  Falls, alarm , FWB (full weight bearing)   Equipment Needs: To be determined,    SUBJECTIVE:    Patient lives alone  in a two story home resides first  with 3 steps to enter with 1Rail  Bed is on 1 floor and bath is on 1 floor. Patient ambulated independently cane for the community PTA. Equipment owned: [employer],      OBJECTIVE:   Initial Evaluation  Date: 22 Treatment Short Term/ Long Term   Goals   AM-PAC 6 Clicks      Was pt agreeable to Eval/treatment? yes     Does pt have pain? yes     Bed Mobility  Rolling: Min   Supine to sit:   Min   Sit to supine: Min   Scooting: Min   Rolling: Ind  Supine to sit:  Ind  Sit to supine: Ind  Scooting: Ind   Transfers Sit to stand: Mod  to HHA  Stand to sit: Min  Stand pivot: Mod  with fww  Sit to stand: Ind   Stand to sit: Ind   Stand pivot: Ind with fww   Ambulation    Side steps only very unsteady. Mod A. Required. 100 feet with Mod Ind     Stair negotiation: ascended and descended  NT  3 steps with Mod Ind     ROM BUE:  wfl   BLE:  wfl     Strength BUE: wfl   RLE:  Grossly 4-/5  LLE:  Grossly  4-/5  4+/5   Balance Sitting EOB:  Ind  Dynamic Standing: Mod   Sitting EOB:  Ind  Dynamic Standing:  Ind     Patient is Alert & Oriented x person, place, time, and situation and follows directions   Sensation:  Pt denies numbness and tingling to extremities  Edema:  BLE  Therapeutic Exercises:  Functional activity     Patient education  Pt educated regarding role of PT evaluation and need for OOB activity    Patient response to education:   Pt verbalized understanding Pt demonstrated skill Pt requires further education in this area   yes yes Reminders     ASSESSMENT:    Conditions Requiring Skilled Therapeutic Intervention:    [x]Decreased strength     [x]Decreased ROM  [x]Decreased functional mobility  [x]Decreased balance   [x]Decreased endurance   [x]Decreased posture  []Decreased sensation  []Decreased coordination   []Decreased vision  [x]Decreased safety awareness   []Increased pain       Comments:    RN cleared patient for participation in therapy session. Patient was seen this date for PT evaluation. . Patient was agreeable to intervention. Results of the functional assessment are noted above. Upon entering the room patient was found supine in bed. Agreeable to session. Sat EOB x 5 minutes to increase dynamic sitting balance and activity tolerance. Reporting feeling dizzy and requested return to bed. At end of session, patient in bed with  call light and phone within reach,  all lines and tubes intact, nursing notified. This patient can benefit from the continuation of skilled PT  to maximize functional level and return to PLOF. Treatment:  Patient practiced and was instructed in the following treatment:    Bed mobility training - pt given verbal and tactile cues to facilitate proper sequencing and safety during rolling and supine>sit as well as provided with physical assistance to complete task    STS and transfer training - educated on hand/foot placement, safety, and sequencing during STS and pivot transfers using assistive device  Gait training -     Pt's/ family goals      1. Home when able. Prognosis is good  for reaching above PT goals. Patient and or family understand(s) diagnosis, prognosis, and plan of care.  yes,     PHYSICAL THERAPY PLAN OF CARE:    PT POC is established based on physician order and patient diagnosis     Referring provider/PT Order:  PT Eval and Treat   08/23/22 0130  PT evaluation and treat       JAQUELINE Simmons     Diagnosis:  Cyst of pancreas [K86.2]  Hypokalemia [E87.6]  GI bleed [K92.2]  Lactic acid acidosis [E87.2]  Meningioma ([facility] [D32.9]  NIKOLAI (acute kidney injury) ([facility] [N17.9]  Supratherapeutic INR [R79.1]  Fall, initial encounter [W19. XXXA]  Alcohol withdrawal syndrome without complication ([facility]) [M00.451]  Closed fracture of one rib of left side, initial encounter [S22.32XA]  Specific instructions for next treatment:  Sit EOB, postural exercises, Transfer to bedside chair, and Increase ambulation distance  Current Treatment Recommendations:     [x] Strengthening to improve independence with functional mobility   [x] ROM to improve independence with functional mobility   [x] Balance Training to improve static/dynamic balance and to reduce fall risk  [x] Endurance Training to improve activity tolerance during functional mobility   [x] Transfer Training to improve safety and independence with all functional transfers   [x] Gait Training to improve gait mechanics, endurance and asses need for appropriate assistive device  [x] Stair Training in preparation for safe discharge home and/or into the community when appropriate  [] Positioning to prevent skin breakdown and contractures  [x] Safety and Education Training   [] Patient/Caregiver Education   [] HEP  [] Other     PT long term treatment goals are located in above grid    Frequency of treatments: 2-5x/week x 1-2 weeks. Time in  1030  Time out  1055    Total Treatment Time  10 minutes     Evaluation Time includes thorough review of current medical information, gathering information on past medical history/social history and prior level of function, completion of standardized testing/informal observation of tasks, assessment of data and education on plan of care and goals.     CPT codes:  [x] Low Complexity PT evaluation 99303  [] Moderate Complexity PT evaluation 98164  [] High Complexity PT evaluation 30233  [] PT Re-evaluation 32116  [] Gait training 35787 - minutes  [] Manual therapy 60656  minutes  [x] Therapeutic activities 44795 -10 minutes  [] Therapeutic exercises 43620 - minutes  [] Neuromuscular reeducation [address] minutes     [facility], [address] [facility]

## 2022-08-23 NOTE — PROGRESS NOTES
This patient is on medication that requires renal, weight, and/or indication dose adjustment. Date Body Weight IBW  Adjusted BW SCr  CrCl Dialysis status   8/23/2022 209 lb (94.8 kg) Ideal body weight: 70.7 kg (155 lb 13.8 oz)  Adjusted ideal body weight: 80.3 kg (177 lb 1.9 oz) Serum creatinine: 1.8 mg/dL (H) 08/23/22 0415  Estimated creatinine clearance: 38 mL/min (A) N/a       Pharmacy has dose-adjusted the following medication(s):    Date Previous Order Adjusted Order   8/23/2022 Zosyn 3.375g q8h Zosyn 4.5g q8h       These changes were made per protocol according to the [address] for Pharmacists. *Please note this dose may need readjusted if patient's condition changes. Please contact pharmacy with any questions regarding these changes.     VIRGINIA Goodson [facility]  8/23/2022  5:27 PM

## 2022-08-24 LAB
ABO/RH: NORMAL
ALBUMIN SERPL-MCNC: 3.3 G/DL (ref 3.5–5.2)
ALP BLD-CCNC: 100 U/L (ref 40–129)
ALT SERPL-CCNC: 96 U/L (ref 0–40)
ANION GAP SERPL CALCULATED.3IONS-SCNC: 13 MMOL/L (ref 7–16)
ANTIBODY SCREEN: NORMAL
APTT: 25.9 SEC (ref 24.5–35.1)
AST SERPL-CCNC: 179 U/L (ref 0–39)
BASOPHILS ABSOLUTE: 0.05 E9/L (ref 0–0.2)
BASOPHILS RELATIVE PERCENT: 0.3 % (ref 0–2)
BILIRUB SERPL-MCNC: 2.7 MG/DL (ref 0–1.2)
BLOOD BANK DISPENSE STATUS: NORMAL
BLOOD BANK PRODUCT CODE: NORMAL
BPU ID: NORMAL
BUN BLDV-MCNC: 36 MG/DL (ref 6–23)
CALCIUM SERPL-MCNC: 7.6 MG/DL (ref 8.6–10.2)
CHLORIDE BLD-SCNC: 91 MMOL/L (ref 98–107)
CO2: 20 MMOL/L (ref 22–29)
CREAT SERPL-MCNC: 2.6 MG/DL (ref 0.7–1.2)
DESCRIPTION BLOOD BANK: NORMAL
EOSINOPHILS ABSOLUTE: 0.02 E9/L (ref 0.05–0.5)
EOSINOPHILS RELATIVE PERCENT: 0.1 % (ref 0–6)
GFR AFRICAN AMERICAN: 29
GFR NON-AFRICAN AMERICAN: 24 ML/MIN/1.73
GLUCOSE BLD-MCNC: 132 MG/DL (ref 74–99)
HCT VFR BLD CALC: 19.4 % (ref 37–54)
HCT VFR BLD CALC: 22.3 % (ref 37–54)
HCT VFR BLD CALC: 23.2 % (ref 37–54)
HCT VFR BLD CALC: 24.3 % (ref 37–54)
HEMOGLOBIN: 6.6 G/DL (ref 12.5–16.5)
HEMOGLOBIN: 7.5 G/DL (ref 12.5–16.5)
HEMOGLOBIN: 7.5 G/DL (ref 12.5–16.5)
HEMOGLOBIN: 8.2 G/DL (ref 12.5–16.5)
IMMATURE GRANULOCYTES #: 0.13 E9/L
IMMATURE GRANULOCYTES %: 0.9 % (ref 0–5)
INR BLD: 2.1
LACTIC ACID: 2.2 MMOL/L (ref 0.5–2.2)
LACTIC ACID: 4.4 MMOL/L (ref 0.5–2.2)
LYMPHOCYTES ABSOLUTE: 1.2 E9/L (ref 1.5–4)
LYMPHOCYTES RELATIVE PERCENT: 8.1 % (ref 20–42)
MCH RBC QN AUTO: 30.4 PG (ref 26–35)
MCHC RBC AUTO-ENTMCNC: 33.6 % (ref 32–34.5)
MCV RBC AUTO: 90.3 FL (ref 80–99.9)
MONOCYTES ABSOLUTE: 1.04 E9/L (ref 0.1–0.95)
MONOCYTES RELATIVE PERCENT: 7 % (ref 2–12)
NEUTROPHILS ABSOLUTE: 12.34 E9/L (ref 1.8–7.3)
NEUTROPHILS RELATIVE PERCENT: 83.6 % (ref 43–80)
PDW BLD-RTO: 16.2 FL (ref 11.5–15)
PLATELET # BLD: 231 E9/L (ref 130–450)
PMV BLD AUTO: 9.9 FL (ref 7–12)
POTASSIUM REFLEX MAGNESIUM: 4.3 MMOL/L (ref 3.5–5)
PROTHROMBIN TIME: 23.1 SEC (ref 9.3–12.4)
RBC # BLD: 2.47 E12/L (ref 3.8–5.8)
SODIUM BLD-SCNC: 124 MMOL/L (ref 132–146)
TOTAL PROTEIN: 5.7 G/DL (ref 6.4–8.3)
WBC # BLD: 14.8 E9/L (ref 4.5–11.5)

## 2022-08-24 PROCEDURE — 6370000000 HC RX 637 (ALT 250 FOR IP): Performed by: FAMILY MEDICINE

## 2022-08-24 PROCEDURE — 36415 COLL VENOUS BLD VENIPUNCTURE: CPT

## 2022-08-24 PROCEDURE — P9016 RBC LEUKOCYTES REDUCED: HCPCS

## 2022-08-24 PROCEDURE — 80053 COMPREHEN METABOLIC PANEL: CPT

## 2022-08-24 PROCEDURE — 2580000003 HC RX 258: Performed by: STUDENT IN AN ORGANIZED HEALTH CARE EDUCATION/TRAINING PROGRAM

## 2022-08-24 PROCEDURE — 85018 HEMOGLOBIN: CPT

## 2022-08-24 PROCEDURE — 86900 BLOOD TYPING SEROLOGIC ABO: CPT

## 2022-08-24 PROCEDURE — 6360000002 HC RX W HCPCS: Performed by: PHYSICIAN ASSISTANT

## 2022-08-24 PROCEDURE — 2580000003 HC RX 258: Performed by: INTERNAL MEDICINE

## 2022-08-24 PROCEDURE — 85730 THROMBOPLASTIN TIME PARTIAL: CPT

## 2022-08-24 PROCEDURE — 85025 COMPLETE CBC W/AUTO DIFF WBC: CPT

## 2022-08-24 PROCEDURE — 6360000002 HC RX W HCPCS: Performed by: INTERNAL MEDICINE

## 2022-08-24 PROCEDURE — 83605 ASSAY OF LACTIC ACID: CPT

## 2022-08-24 PROCEDURE — 85610 PROTHROMBIN TIME: CPT

## 2022-08-24 PROCEDURE — 6370000000 HC RX 637 (ALT 250 FOR IP): Performed by: PHYSICIAN ASSISTANT

## 2022-08-24 PROCEDURE — 1200000000 HC SEMI PRIVATE

## 2022-08-24 PROCEDURE — 86901 BLOOD TYPING SEROLOGIC RH(D): CPT

## 2022-08-24 PROCEDURE — C9113 INJ PANTOPRAZOLE SODIUM, VIA: HCPCS | Performed by: PHYSICIAN ASSISTANT

## 2022-08-24 PROCEDURE — 86850 RBC ANTIBODY SCREEN: CPT

## 2022-08-24 PROCEDURE — 86923 COMPATIBILITY TEST ELECTRIC: CPT

## 2022-08-24 PROCEDURE — 36430 TRANSFUSION BLD/BLD COMPNT: CPT

## 2022-08-24 PROCEDURE — 97530 THERAPEUTIC ACTIVITIES: CPT

## 2022-08-24 PROCEDURE — 6370000000 HC RX 637 (ALT 250 FOR IP): Performed by: STUDENT IN AN ORGANIZED HEALTH CARE EDUCATION/TRAINING PROGRAM

## 2022-08-24 PROCEDURE — 85014 HEMATOCRIT: CPT

## 2022-08-24 RX ORDER — SODIUM CHLORIDE 9 MG/ML
INJECTION, SOLUTION INTRAVENOUS PRN
Status: DISCONTINUED | OUTPATIENT
Start: 2022-08-24 | End: 2022-08-29 | Stop reason: HOSPADM

## 2022-08-24 RX ADMIN — SPIRONOLACTONE 25 MG: 25 TABLET ORAL at 08:02

## 2022-08-24 RX ADMIN — GABAPENTIN 100 MG: 100 CAPSULE ORAL at 08:18

## 2022-08-24 RX ADMIN — Medication 10 ML: at 08:08

## 2022-08-24 RX ADMIN — Medication 10 ML: at 20:48

## 2022-08-24 RX ADMIN — PIPERACILLIN AND TAZOBACTAM 4500 MG: 4; .5 INJECTION, POWDER, FOR SOLUTION INTRAVENOUS at 17:55

## 2022-08-24 RX ADMIN — METOPROLOL TARTRATE 50 MG: 50 TABLET, FILM COATED ORAL at 20:48

## 2022-08-24 RX ADMIN — DULOXETINE HYDROCHLORIDE 20 MG: 20 CAPSULE, DELAYED RELEASE ORAL at 08:02

## 2022-08-24 RX ADMIN — GABAPENTIN 100 MG: 100 CAPSULE ORAL at 20:48

## 2022-08-24 RX ADMIN — LEVOTHYROXINE SODIUM 200 MCG: 0.1 TABLET ORAL at 06:09

## 2022-08-24 RX ADMIN — GABAPENTIN 100 MG: 100 CAPSULE ORAL at 13:48

## 2022-08-24 RX ADMIN — THIAMINE HCL TAB 100 MG 100 MG: 100 TAB at 08:02

## 2022-08-24 RX ADMIN — PIPERACILLIN AND TAZOBACTAM 4500 MG: 4; .5 INJECTION, POWDER, FOR SOLUTION INTRAVENOUS at 10:25

## 2022-08-24 RX ADMIN — PANTOPRAZOLE SODIUM 40 MG: 40 INJECTION, POWDER, FOR SOLUTION INTRAVENOUS at 08:07

## 2022-08-24 RX ADMIN — PIPERACILLIN AND TAZOBACTAM 4500 MG: 4; .5 INJECTION, POWDER, FOR SOLUTION INTRAVENOUS at 02:09

## 2022-08-24 RX ADMIN — SODIUM CHLORIDE: 9 INJECTION, SOLUTION INTRAVENOUS at 20:49

## 2022-08-24 RX ADMIN — ATORVASTATIN CALCIUM 40 MG: 40 TABLET, FILM COATED ORAL at 08:02

## 2022-08-24 ASSESSMENT — PAIN SCALES - GENERAL: PAINLEVEL_OUTOF10: 0

## 2022-08-24 NOTE — PROGRESS NOTES
GENERAL SURGERY  DAILY PROGRESS NOTE  8/24/2022    Chief Complaint   Patient presents with    [facility] yesterday, admits to etoh, laceration above left eye, on coumadin       Subjective:  Feeling fine this morning, frustrated about being in the hospital. No BM overnight. Receiving one unit PRBCs. Objective:  BP (!) 102/56   Pulse 75   Temp 97.4 °F (36.3 °C)   Resp 18   Ht 5' 9\" (1.753 m)   Wt 213 lb 6.4 oz (96.8 kg)   SpO2 99%   BMI 31.51 kg/m²     GENERAL:  Laying in bed, awake, alert, cooperative, no apparent distress  HEAD: Normocephalic, atraumatic  EYES: No sclera icterus, pupils equal  LUNGS:  No increased work of breathing  CARDIOVASCULAR:  RR  ABDOMEN:  Soft, non-tender, non-distended  EXTREMITIES: No edema or swelling  SKIN: Warm and dry. Multiple scattered large ecchymoses     Assessment/Plan:  80 y.o. male with anemia, supratherapeutic INR. Anemia likely due to acute blood loss from diffuse ecchymosis. - last INR 3.1 - could likely benefit from further reversal but will defer to primary  - monitor H/H  - if INR corrected and H/H still dropping, will consider scopes at that time   - advance diet as tolerated if hemoglobin responds appropriately to transfusion     Electronically signed by Beryl Oneill MD on 8/24/2022 at 6:44 AM    Attending Note:    Patient seen/examined 8/24/2022. I discussed case with the resident and reviewed all relevant labs/imaging. Agree with resident's assessment and plan.

## 2022-08-24 NOTE — PROGRESS NOTES
Physical Therapy  Treatment Note    Name: Cristino Springer  : 1940  MRN: [id number]      Date of Service: 2022    Evaluating PT:  Donato Rosales, PT [id number]      Room #:  3394/1742-K  Diagnosis:  Cyst of pancreas [K86.2]  Hypokalemia [E87.6]  GI bleed [K92.2]  Lactic acid acidosis [E87.2]  Meningioma ([facility] [D32.9]  NIKOLAI (acute kidney injury) ([facility] [N17.9]  Supratherapeutic INR [R79.1]  Fall, initial encounter [W19. XXXA]  Alcohol withdrawal syndrome without complication ([facility] [Z77.482]  Closed fracture of one rib of left side, initial encounter [S22.32XA]  PMHx/PSHx:     has a past medical history of Blood circulation, collateral, CAD (coronary artery disease), Chronic kidney disease, History of alcohol use, History of ascites, Hyperlipidemia, Hypertension, Hyponatremia, Left knee pain, Liver disease, Lymphedema, Neuropathy, Osteoarthritis, Psychiatric problem, Thyroid disease, Use of cane as ambulatory aid, Uses wheelchair, Wears glasses, and Wears hearing aid. has a past surgical history that includes Appendectomy; eye surgery; Colonoscopy; Cataract removal with implant (Bilateral); Cardiac catheterization (2020); and Total knee arthroplasty (Left, 2020). Procedure/Surgery:  none  Precautions:  Falls, alarm , FWB (full weight bearing)   Equipment Needs: To be determined,    SUBJECTIVE:    Patient lives alone  in a two story home resides first  with 3 steps to enter with 1Rail  Bed is on 1 floor and bath is on 1 floor. Patient ambulated independently cane for the community PTA. Equipment owned: [employer]      OBJECTIVE:   Initial Evaluation  Date: 22 Treatment Date:  22 Short Term/ Long Term   Goals   AM-PAC 6 Clicks 88/49 03/10    Was pt agreeable to Eval/treatment? yes Yes    Does pt have pain?  yes No current complaints of pain    Bed Mobility  Rolling: Min   Supine to sit:   Min   Sit to supine: Min   Scooting: Min  Rolling: NT  Supine to sit: SBA  Sit to supine: SBA  Scooting: SBA was cued for safety and sequencing. Transfers: Pt was cued for hand placement and technique during sit <> stand transfers. Pt completed multiple transfers from various surfaces (x 1 EOB, x3 chair). Ambulation: Pt was cued for safety and direction during ambulation. Monitoring of vitals throughout. PLAN:    Patient is making Good progress towards established goals. Will continue with current POC.       Time in: 1415  Time out: 1440    Total Treatment Time 25 minutes     CPT codes:  [] Gait training 53588 0 minutes  [] Manual therapy 15920 0 minutes  [x] Therapeutic activities 80148 25 minutes  [] Therapeutic exercises 42471 0 minutes  [] Neuromuscular reeducation 70076 0 minutes      Lia Krabbe, PT, DPT   [id number]      Paris Carpio, SPT

## 2022-08-24 NOTE — PROGRESS NOTES
Occupational Therapy     Date:2022  Patient Name: Brianne Hirsch  MRN: [id number]  : 1940  Room: [address] [place] [address]-A     Completed chart review & attempted to see pt with pt using [facility] with staff member present, declined need for assist at this time. Will attempt to see pt at another time. [place].  [address], [address] [place] [address]

## 2022-08-24 NOTE — PROGRESS NOTES
acidosis  -Telemetry monitoring  -H&H every 6 Hours transfuse as needed to keep hemoglobin greater than 7  HGB - 8.2 post transfusion  -Advance diet  -Hold all blood thinners.  -Consult general surgery -possible panendoscopy to ascertain etiology of bleed-I do not think he needs unless hemoglobin has stabilized  -Medication for other comorbidities continue as appropriate with dosage adjustments as necessary. Lactic acidosis with leukocytosis  Initiate broad-spectrum IV antibiotic therapy pending pan culture results  Discontinue if negative cultures  Aggressive IV fluid hydration  WBC-14.8  IV Zosyn  Overall clinically appears markedly improved     Rib fracture from fall/trauma  Pain management -well-controlled  ISP  Lidocaine patch     Supratherapeutic INR -resolved  Hold anticoagulation  Monitor INR-reverse if persistently elevated and declining H&H  INR today-2.1-no need for further reversal  Resume oral anticoagulation if patient is going to subacute rehab, he is not able to take it on his own at home due to safety issues     Tachycardia -resolved  Telemetry monitoring  IV hydration  Monitor labs    DVT Prophylaxis   PT/OT  Discharge planning-management with      MONTSE Hartley CNP  12:53 PM  8/24/2022     Above note edited to reflect my thoughts     I personally saw, examined and provided care for the patient. Radiographs, labs and medication list were reviewed by me independently. The case was discussed in detail and plans for care were established. Review of Delaney MENDEZ CNP, documentation was conducted and revisions were made as appropriate directly by me. I agree with the above documented exam, problem list, and plan of care.      Surya Robison MD  3:11 PM  8/24/2022

## 2022-08-25 LAB
ALBUMIN SERPL-MCNC: 3.1 G/DL (ref 3.5–5.2)
ALP BLD-CCNC: 86 U/L (ref 40–129)
ALT SERPL-CCNC: 94 U/L (ref 0–40)
ANION GAP SERPL CALCULATED.3IONS-SCNC: 11 MMOL/L (ref 7–16)
ANION GAP SERPL CALCULATED.3IONS-SCNC: 12 MMOL/L (ref 7–16)
ANION GAP SERPL CALCULATED.3IONS-SCNC: 13 MMOL/L (ref 7–16)
ANION GAP SERPL CALCULATED.3IONS-SCNC: 14 MMOL/L (ref 7–16)
ANION GAP SERPL CALCULATED.3IONS-SCNC: 14 MMOL/L (ref 7–16)
AST SERPL-CCNC: 123 U/L (ref 0–39)
B.E.: -6.6 MMOL/L (ref -3–3)
BASOPHILS ABSOLUTE: 0.02 E9/L (ref 0–0.2)
BASOPHILS RELATIVE PERCENT: 0.1 % (ref 0–2)
BILIRUB SERPL-MCNC: 2.4 MG/DL (ref 0–1.2)
BUN BLDV-MCNC: 34 MG/DL (ref 6–23)
BUN BLDV-MCNC: 35 MG/DL (ref 6–23)
BUN BLDV-MCNC: 37 MG/DL (ref 6–23)
BUN BLDV-MCNC: 38 MG/DL (ref 6–23)
BUN BLDV-MCNC: 39 MG/DL (ref 6–23)
CALCIUM SERPL-MCNC: 7.3 MG/DL (ref 8.6–10.2)
CALCIUM SERPL-MCNC: 7.4 MG/DL (ref 8.6–10.2)
CHLORIDE BLD-SCNC: 90 MMOL/L (ref 98–107)
CHLORIDE BLD-SCNC: 91 MMOL/L (ref 98–107)
CHLORIDE BLD-SCNC: 96 MMOL/L (ref 98–107)
CHLORIDE BLD-SCNC: 96 MMOL/L (ref 98–107)
CHLORIDE BLD-SCNC: 98 MMOL/L (ref 98–107)
CO2: 15 MMOL/L (ref 22–29)
CO2: 17 MMOL/L (ref 22–29)
CO2: 18 MMOL/L (ref 22–29)
CO2: 18 MMOL/L (ref 22–29)
CO2: 19 MMOL/L (ref 22–29)
COHB: 1 % (ref 0–1.5)
CREAT SERPL-MCNC: 2.5 MG/DL (ref 0.7–1.2)
CREAT SERPL-MCNC: 2.5 MG/DL (ref 0.7–1.2)
CREAT SERPL-MCNC: 2.6 MG/DL (ref 0.7–1.2)
CREAT SERPL-MCNC: 2.7 MG/DL (ref 0.7–1.2)
CREAT SERPL-MCNC: 2.7 MG/DL (ref 0.7–1.2)
CRITICAL: ABNORMAL
DATE ANALYZED: ABNORMAL
DATE OF COLLECTION: ABNORMAL
EOSINOPHILS ABSOLUTE: 0.03 E9/L (ref 0.05–0.5)
EOSINOPHILS RELATIVE PERCENT: 0.2 % (ref 0–6)
GFR AFRICAN AMERICAN: 28
GFR AFRICAN AMERICAN: 28
GFR AFRICAN AMERICAN: 29
GFR AFRICAN AMERICAN: 30
GFR AFRICAN AMERICAN: 30
GFR NON-AFRICAN AMERICAN: 23 ML/MIN/1.73
GFR NON-AFRICAN AMERICAN: 23 ML/MIN/1.73
GFR NON-AFRICAN AMERICAN: 24 ML/MIN/1.73
GFR NON-AFRICAN AMERICAN: 25 ML/MIN/1.73
GFR NON-AFRICAN AMERICAN: 25 ML/MIN/1.73
GLUCOSE BLD-MCNC: 112 MG/DL (ref 74–99)
GLUCOSE BLD-MCNC: 128 MG/DL (ref 74–99)
GLUCOSE BLD-MCNC: 134 MG/DL (ref 74–99)
GLUCOSE BLD-MCNC: 145 MG/DL (ref 74–99)
GLUCOSE BLD-MCNC: 148 MG/DL (ref 74–99)
HCO3: 17.4 MMOL/L (ref 22–26)
HCT VFR BLD CALC: 22.4 % (ref 37–54)
HCT VFR BLD CALC: 23.3 % (ref 37–54)
HCT VFR BLD CALC: 24.1 % (ref 37–54)
HCT VFR BLD CALC: 24.2 % (ref 37–54)
HEMOGLOBIN: 7.5 G/DL (ref 12.5–16.5)
HEMOGLOBIN: 7.7 G/DL (ref 12.5–16.5)
HEMOGLOBIN: 7.9 G/DL (ref 12.5–16.5)
HEMOGLOBIN: 8.1 G/DL (ref 12.5–16.5)
HHB: 4.3 % (ref 0–5)
IMMATURE GRANULOCYTES #: 0.11 E9/L
IMMATURE GRANULOCYTES %: 0.8 % (ref 0–5)
LAB: ABNORMAL
LACTIC ACID: 1.8 MMOL/L (ref 0.5–2.2)
LYMPHOCYTES ABSOLUTE: 1.22 E9/L (ref 1.5–4)
LYMPHOCYTES RELATIVE PERCENT: 9 % (ref 20–42)
Lab: ABNORMAL
MCH RBC QN AUTO: 30.9 PG (ref 26–35)
MCHC RBC AUTO-ENTMCNC: 33.5 % (ref 32–34.5)
MCV RBC AUTO: 92.4 FL (ref 80–99.9)
METHB: 0.4 % (ref 0–1.5)
MODE: ABNORMAL
MONOCYTES ABSOLUTE: 0.86 E9/L (ref 0.1–0.95)
MONOCYTES RELATIVE PERCENT: 6.3 % (ref 2–12)
NEUTROPHILS ABSOLUTE: 11.31 E9/L (ref 1.8–7.3)
NEUTROPHILS RELATIVE PERCENT: 83.6 % (ref 43–80)
O2 SATURATION: 96.7 % (ref 92–98.5)
O2HB: 94.3 % (ref 94–97)
OPERATOR ID: 8217
PATIENT TEMP: 37 C
PCO2: 28.8 MMHG (ref 35–45)
PDW BLD-RTO: 17.4 FL (ref 11.5–15)
PH BLOOD GAS: 7.4 (ref 7.35–7.45)
PLATELET # BLD: 228 E9/L (ref 130–450)
PMV BLD AUTO: 10.1 FL (ref 7–12)
PO2: 86.5 MMHG (ref 75–100)
POTASSIUM REFLEX MAGNESIUM: 3.9 MMOL/L (ref 3.5–5)
POTASSIUM SERPL-SCNC: 3.6 MMOL/L (ref 3.5–5)
POTASSIUM SERPL-SCNC: 3.7 MMOL/L (ref 3.5–5)
POTASSIUM SERPL-SCNC: 3.7 MMOL/L (ref 3.5–5)
POTASSIUM SERPL-SCNC: 3.9 MMOL/L (ref 3.5–5)
RBC # BLD: 2.62 E12/L (ref 3.8–5.8)
SODIUM BLD-SCNC: 121 MMOL/L (ref 132–146)
SODIUM BLD-SCNC: 123 MMOL/L (ref 132–146)
SODIUM BLD-SCNC: 125 MMOL/L (ref 132–146)
SODIUM BLD-SCNC: 126 MMOL/L (ref 132–146)
SODIUM BLD-SCNC: 127 MMOL/L (ref 132–146)
SOURCE, BLOOD GAS: ABNORMAL
THB: 8.5 G/DL (ref 11.5–16.5)
TIME ANALYZED: 1430
TOTAL PROTEIN: 5.5 G/DL (ref 6.4–8.3)
WBC # BLD: 13.6 E9/L (ref 4.5–11.5)

## 2022-08-25 PROCEDURE — 85018 HEMOGLOBIN: CPT

## 2022-08-25 PROCEDURE — 2580000003 HC RX 258: Performed by: INTERNAL MEDICINE

## 2022-08-25 PROCEDURE — 6370000000 HC RX 637 (ALT 250 FOR IP): Performed by: STUDENT IN AN ORGANIZED HEALTH CARE EDUCATION/TRAINING PROGRAM

## 2022-08-25 PROCEDURE — 36415 COLL VENOUS BLD VENIPUNCTURE: CPT

## 2022-08-25 PROCEDURE — 80053 COMPREHEN METABOLIC PANEL: CPT

## 2022-08-25 PROCEDURE — 2580000003 HC RX 258: Performed by: STUDENT IN AN ORGANIZED HEALTH CARE EDUCATION/TRAINING PROGRAM

## 2022-08-25 PROCEDURE — 6370000000 HC RX 637 (ALT 250 FOR IP): Performed by: PHYSICIAN ASSISTANT

## 2022-08-25 PROCEDURE — 1200000000 HC SEMI PRIVATE

## 2022-08-25 PROCEDURE — 83605 ASSAY OF LACTIC ACID: CPT

## 2022-08-25 PROCEDURE — 85014 HEMATOCRIT: CPT

## 2022-08-25 PROCEDURE — 80048 BASIC METABOLIC PNL TOTAL CA: CPT

## 2022-08-25 PROCEDURE — 82805 BLOOD GASES W/O2 SATURATION: CPT

## 2022-08-25 PROCEDURE — 6360000002 HC RX W HCPCS: Performed by: INTERNAL MEDICINE

## 2022-08-25 PROCEDURE — 85025 COMPLETE CBC W/AUTO DIFF WBC: CPT

## 2022-08-25 RX ORDER — PANTOPRAZOLE SODIUM 40 MG/1
40 TABLET, DELAYED RELEASE ORAL
Status: DISCONTINUED | OUTPATIENT
Start: 2022-08-25 | End: 2022-08-29 | Stop reason: HOSPADM

## 2022-08-25 RX ORDER — DEXTROSE AND SODIUM CHLORIDE 5; .9 G/100ML; G/100ML
INJECTION, SOLUTION INTRAVENOUS CONTINUOUS
Status: DISCONTINUED | OUTPATIENT
Start: 2022-08-25 | End: 2022-08-27

## 2022-08-25 RX ORDER — 3% SODIUM CHLORIDE 3 G/100ML
25 INJECTION, SOLUTION INTRAVENOUS CONTINUOUS
Status: DISPENSED | OUTPATIENT
Start: 2022-08-25 | End: 2022-08-25

## 2022-08-25 RX ADMIN — PANTOPRAZOLE SODIUM 40 MG: 40 TABLET, DELAYED RELEASE ORAL at 10:43

## 2022-08-25 RX ADMIN — PANTOPRAZOLE SODIUM 40 MG: 40 TABLET, DELAYED RELEASE ORAL at 16:56

## 2022-08-25 RX ADMIN — GABAPENTIN 100 MG: 100 CAPSULE ORAL at 10:43

## 2022-08-25 RX ADMIN — PIPERACILLIN AND TAZOBACTAM 4500 MG: 4; .5 INJECTION, POWDER, FOR SOLUTION INTRAVENOUS at 01:53

## 2022-08-25 RX ADMIN — SODIUM CHLORIDE 25 ML/HR: 3 INJECTION, SOLUTION INTRAVENOUS at 10:51

## 2022-08-25 RX ADMIN — PIPERACILLIN AND TAZOBACTAM 4500 MG: 4; .5 INJECTION, POWDER, FOR SOLUTION INTRAVENOUS at 13:57

## 2022-08-25 RX ADMIN — THIAMINE HCL TAB 100 MG 100 MG: 100 TAB at 10:43

## 2022-08-25 RX ADMIN — METOPROLOL TARTRATE 50 MG: 50 TABLET, FILM COATED ORAL at 10:43

## 2022-08-25 RX ADMIN — PIPERACILLIN AND TAZOBACTAM 4500 MG: 4; .5 INJECTION, POWDER, FOR SOLUTION INTRAVENOUS at 23:14

## 2022-08-25 RX ADMIN — DEXTROSE AND SODIUM CHLORIDE: 5; 900 INJECTION, SOLUTION INTRAVENOUS at 17:56

## 2022-08-25 RX ADMIN — DULOXETINE HYDROCHLORIDE 20 MG: 20 CAPSULE, DELAYED RELEASE ORAL at 10:43

## 2022-08-25 RX ADMIN — Medication 10 ML: at 20:40

## 2022-08-25 RX ADMIN — ATORVASTATIN CALCIUM 40 MG: 40 TABLET, FILM COATED ORAL at 10:43

## 2022-08-25 RX ADMIN — LEVOTHYROXINE SODIUM 200 MCG: 0.1 TABLET ORAL at 05:42

## 2022-08-25 ASSESSMENT — PAIN SCALES - GENERAL
PAINLEVEL_OUTOF10: 0
PAINLEVEL_OUTOF10: 0

## 2022-08-25 NOTE — PROGRESS NOTES
Patient refusing carlos cath. Stressed the importance of I&Os. Patient still refusing to have carlos placed.

## 2022-08-25 NOTE — PROGRESS NOTES
GENERAL SURGERY  DAILY PROGRESS NOTE  8/25/2022    Chief Complaint   Patient presents with    [facility] yesterday, admits to etoh, laceration above left eye, on coumadin       Subjective:  No complaints overnight. Hemoglobin stable     Objective:  BP (!) 101/52   Pulse 81   Temp 98.1 °F (36.7 °C) (Oral)   Resp 18   Ht 5' 9\" (1.753 m)   Wt 222 lb (100.7 kg)   SpO2 98%   BMI 32.78 kg/m²     GENERAL:  Laying in bed, awake, alert, cooperative, no apparent distress  HEAD: Normocephalic, atraumatic  EYES: No sclera icterus, pupils equal  LUNGS:  No increased work of breathing  CARDIOVASCULAR:  RR  ABDOMEN:  Soft, non-tender, non-distended  EXTREMITIES: No edema or swelling  SKIN: Warm and dry. Multiple scattered large ecchymoses     Assessment/Plan:  80 y.o. male with anemia, supratherapeutic INR. Anemia likely due to acute blood loss from diffuse ecchymosis.     - monitor H/H, has been stable, no further transfusions since yesterday  - no clinical evidence of GI bleed, no plans for scopes however patient also does not wish to have anyways  - continue diet   - dispo planning per Primary    Electronically signed by Cj Valentine MD on 8/25/2022 at 6:31 AM  Electronically signed by Wesley Fung DO on 8/25/2022 at 7:00 AM

## 2022-08-25 NOTE — PROGRESS NOTES
[facility]                                Progress note    Subjective: The patient is resting on exam. Easily arouse able  No acute events overnight. Denies chest pain, angina, SOB     Still remains markedly confused     Objective:    /63   Pulse 56   Temp 97.5 °F (36.4 °C) (Oral)   Resp 18   Ht 5' 9\" (1.753 m)   Wt 222 lb (100.7 kg)   SpO2 96%   BMI 32.78 kg/m²     In: 809.2 [P.O.:240; Blood:569.2]  Out: 200   In: 809.2   Out: 200 [Urine:200]    General appearance: NAD, conversant  HEENT: AT/NC, MMM  Neck: FROM, supple  Lungs: Clear to auscultation  CV: RRR, no MRGs  Vasc: Radial pulses 2+  Abdomen: Soft, non-tender; no masses or HSM  Extremities: No peripheral edema or digital cyanosis  Skin:  Laceration left forehead, multiple ecchymotic areas-large confluent areas of ecchymoses on back, bruising on forehead, bilateral upper extremities  Neuro: Alert and interactive     Recent Labs     08/23/22  0414 08/23/22  0944 08/24/22  0711 08/24/22  0936 08/25/22  0426 08/25/22  0926 08/25/22  1037   WBC 17.0*  --  14.8*  --  13.6*  --   --    HGB 7.0*   < > 7.5*   < > 8.1* 7.7* 7.5*   HCT 20.2*   < > 22.3*   < > 24.2* 23.3* 22.4*     --  231  --  228  --   --     < > = values in this interval not displayed. Recent Labs     08/24/22  0711 08/25/22  0426 08/25/22  1037   * 123* 121*   K 4.3 3.9 3.9   CL 91* 90* 91*   CO2 20* 19* 18*   BUN 36* 39* 38*   CREATININE 2.6* 2.7* 2.7*   CALCIUM 7.6* 7.4* 7.3*       Assessment:    Principal Problem:    GI bleed  Active Problems:    Supratherapeutic INR    Fracture of one rib  Resolved Problems:    * No resolved hospital problems.  *      Plan:  79-year-old male with a past medical history of A. fib with chronic anticoagulation and EtOH presented to the ED with mechanical fall and is admitted to telemetry unit with     GI bleed as evidenced by positive occult blood/lactic acidosis  -Telemetry monitoring  -H&H every 6 Hours transfuse as needed to keep hemoglobin greater than 7  HGB - 8.2 > 7.5  -Advance diet  -Hold all blood thinners.  -Consult general surgery -possible panendoscopy to ascertain etiology of bleed-I do not think he needs unless hemoglobin has stabilized  -Medication for other comorbidities continue as appropriate with dosage adjustments as necessary. Hyponatremia/NIKOLAI on CKD  Monitor labs Na+ 121  3% solution x 4 hours bmp q 4 hours  Seizure precautions  BUN/Creat:  38/2.7  Consult nephrology  IV hydration - continued   Insert carlos catherter  Strict I&O's       Lactic acidosis with leukocytosis  Initiate broad-spectrum IV antibiotic therapy pending pan culture results  Discontinue if negative cultures  Aggressive IV fluid hydration  WBC-14.8  IV Zosyn  Overall clinically appears markedly improved     Rib fracture from fall/trauma  Pain management -well-controlled  ISP  Lidocaine patch     Supratherapeutic INR -resolved  Hold anticoagulation  Monitor INR-reverse if persistently elevated and declining H&H  INR today-2.1-no need for further reversal  Resume oral anticoagulation if patient is going to subacute rehab, he is not able to take it on his own at home due to safety issues     Tachycardia -resolved  Telemetry monitoring  IV hydration  Monitor labs    DVT Prophylaxis   PT/OT  Discharge planning -  likely [facility] on discharge      MONTSE Patrick CNP  2:19 PM  8/25/2022     Above note edited to reflect my thoughts     I personally saw, examined and provided care for the patient. Radiographs, labs and medication list were reviewed by me independently. The case was discussed in detail and plans for care were established. Review of Delaney MENDEZ CNP, documentation was conducted and revisions were made as appropriate directly by me. I agree with the above documented exam, problem list, and plan of care.      Gama Acosta MD  5:37 PM  8/25/2022

## 2022-08-25 NOTE — CARE COORDINATION
Patient remains on  intermediate unit. His Hgb is 7.7. INR 2.1.  na 123. PT Am - Pac score is 17. Will need HHC at discharge and spoke with son and he will check with his wife regarding [facility] choice. Patient has a w walker in the home. Son will transport patient home with [facility] of patient's choice. Will need orders. CM/SW will continue to follow. Received call from son inquiring about what his father needs regarding HHC. I  He had multiple questions regarding  the services that he would need as he wanted to call different agencies to inquire about this. Attempted to explain to son about the services that [facility] provides. He did not seem to comprehend these  explanations   This  CM told son that PT/OT will be ordered and an agency will evaluate at their first visit and develop a plan of care regarding how many visits per week. Await his choice of [facility] agencies. CM/SW will continue to follow.

## 2022-08-25 NOTE — CARE COORDINATION
Care coordination:  reached out to sonevangelista to review hhc. After some time on the phone. Explained that attending plan is hhc for skilled nursing, ptx and otx. He has the option to refuse, he has the option to pick whatever company he chooses. He was not interested in what he felt or case management felt needed, but what attending physician felt needed. He wanted to know if he could get therapy from home [address] and skilled nursing from another. I explained, to my best knowledge, no. We will make referral to any hhc company he chooses. If he declines and then feels services are needed after discharge, he can then reach out to Dr Sharyle Pates. He can also choose to call Dr Thai Wetzel and obtain recommendation from him as well. Reviewed each service and why father may benefit from [address] , ptx and otx. Also explained he can choose only to have one or alll services, Again reviewed that he has a choice and we will assist as needed. He will check with family and friends to see who he feels is best for the above services and will let us know. So at this point, we have not yet secured a home health care company.   Will need attending to order hhc as son does not feel we should be putting in orders for hhc, this should be physician driven and ordered by physician    Orval Bel

## 2022-08-25 NOTE — CONSULTS
Wears hearing aid      Past Surgical History:        Procedure Laterality Date    APPENDECTOMY      CARDIAC CATHETERIZATION  05/2020    CATARACT REMOVAL WITH IMPLANT Bilateral     COLONOSCOPY      EYE SURGERY      cataracts    TOTAL KNEE ARTHROPLASTY Left 6/22/2020    LEFT KNEE IVAN ROBOTIC TOTAL ARTHROPLASTY performed by Homa Gil MD at [facility] OR     Current Medications:    Current Facility-Administered Medications: pantoprazole (PROTONIX) tablet 40 mg, 40 mg, Oral, BID AC  0.9 % sodium chloride infusion, , IntraVENous, PRN  atorvastatin (LIPITOR) tablet 40 mg, 40 mg, Oral, Daily  DULoxetine (CYMBALTA) extended release capsule 20 mg, 20 mg, Oral, Daily  gabapentin (NEURONTIN) capsule 100 mg, 100 mg, Oral, TID  levothyroxine (SYNTHROID) tablet 200 mcg, 200 mcg, Oral, Daily  metoprolol tartrate (LOPRESSOR) tablet 50 mg, 50 mg, Oral, BID  [Held by provider] spironolactone (ALDACTONE) tablet 25 mg, 25 mg, Oral, Daily  sodium chloride flush 0.9 % injection 5-40 mL, 5-40 mL, IntraVENous, 2 times per day  sodium chloride flush 0.9 % injection 5-40 mL, 5-40 mL, IntraVENous, PRN  0.9 % sodium chloride infusion, 25 mL, IntraVENous, PRN  promethazine (PHENERGAN) tablet 12.5 mg, 12.5 mg, Oral, Q6H PRN **OR** [DISCONTINUED] ondansetron (ZOFRAN) injection 4 mg, 4 mg, IntraVENous, Q6H PRN  polyethylene glycol (GLYCOLAX) packet 17 g, 17 g, Oral, Daily PRN  acetaminophen (TYLENOL) tablet 650 mg, 650 mg, Oral, Q6H PRN **OR** acetaminophen (TYLENOL) suppository 650 mg, 650 mg, Rectal, Q6H PRN  0.9 % sodium chloride infusion, , IntraVENous, Continuous  sodium chloride flush 0.9 % injection 5-40 mL, 5-40 mL, IntraVENous, 2 times per day  sodium chloride flush 0.9 % injection 5-40 mL, 5-40 mL, IntraVENous, PRN  0.9 % sodium chloride infusion, , IntraVENous, PRN  thiamine mononitrate tablet 100 mg, 100 mg, Oral, Daily  lidocaine 4 % external patch 1 patch, 1 patch, TransDERmal, Daily  piperacillin-tazobactam (ZOSYN) 4,500 mg in dextrose 5 % 100 mL IVPB ([id number]), 4,500 mg, IntraVENous, Q8H  sodium chloride flush 0.9 % injection 10 mL, 10 mL, IntraVENous, PRN  Allergies:  Sulfa antibiotics    Social History:    TOBACCO:   reports that he quit smoking about 36 years ago. His smoking use included pipe, cigars, and cigarettes. He started smoking about 57 years ago. He has a 18.75 pack-year smoking history. He has never used smokeless tobacco.  ETOH:   reports current alcohol use. DRUGS:   reports no history of drug use. Family History:       Problem Relation Age of Onset    Other Mother         old age        Intake/Output Summary (Last 24 hours) at 8/25/2022 1519  Last data filed at 8/25/2022 1438  Gross per 24 hour   Intake 480 ml   Output 600 ml   Net -120 ml       REVIEW OF SYSTEMS:    Review of systems not obtained due to patient factors - mental status.  Patient was very lethargic on exam.    PHYSICAL EXAM:      Vitals:    VITALS:  BP (!) 93/48   Pulse 51   Temp 97.5 °F (36.4 °C) (Oral)   Resp 18   Ht 5' 9\" (1.753 m)   Wt 222 lb (100.7 kg)   SpO2 94%   BMI 32.78 kg/m²     General appearance: Lethargic, drowsy  Lungs: Clear to auscultation  CV: RRR  Vasc: Radial pulses 2+  Abdomen: Soft, distended, obese  Extremities: No peripheral edema or digital cyanosis  Skin:  Laceration left forehead, multiple ecchymotic areas  Neuro: Lethargic, arouses to pain, confused    DATA:    CBC with Differential:    Lab Results   Component Value Date/Time    WBC 13.6 08/25/2022 04:26 AM    RBC 2.62 08/25/2022 04:26 AM    HGB 7.5 08/25/2022 10:37 AM    HCT 22.4 08/25/2022 10:37 AM     08/25/2022 04:26 AM    MCV 92.4 08/25/2022 04:26 AM    MCH 30.9 08/25/2022 04:26 AM    MCHC 33.5 08/25/2022 04:26 AM    RDW 17.4 08/25/2022 04:26 AM    LYMPHOPCT 9.0 08/25/2022 04:26 AM    MONOPCT 6.3 08/25/2022 04:26 AM    EOSPCT 1.2 05/20/2021 12:00 AM    BASOPCT 0.1 08/25/2022 04:26 AM    MONOSABS 0.86 08/25/2022 04:26 AM    LYMPHSABS 1.22 08/25/2022 04:26 AM    EOSABS 0.03 08/25/2022 04:26 AM    BASOSABS 0.02 08/25/2022 04:26 AM     CMP:    Lab Results   Component Value Date/Time     08/25/2022 10:37 AM    K 3.9 08/25/2022 10:37 AM    K 3.9 08/25/2022 04:26 AM    CL 91 08/25/2022 10:37 AM    CO2 18 08/25/2022 10:37 AM    BUN 38 08/25/2022 10:37 AM    CREATININE 2.7 08/25/2022 10:37 AM    GFRAA 28 08/25/2022 10:37 AM    LABGLOM 23 08/25/2022 10:37 AM    GLUCOSE 148 08/25/2022 10:37 AM    PROT 5.5 08/25/2022 04:26 AM    LABALBU 3.1 08/25/2022 04:26 AM    CALCIUM 7.3 08/25/2022 10:37 AM    BILITOT 2.4 08/25/2022 04:26 AM    ALKPHOS 86 08/25/2022 04:26 AM     08/25/2022 04:26 AM    ALT 94 08/25/2022 04:26 AM     Magnesium:    Lab Results   Component Value Date/Time    MG 2.5 08/22/2022 06:43 PM     Phosphorus:    Lab Results   Component Value Date/Time    PHOS 4.1 05/20/2021 12:00 AM     Radiology Review:      CT abdomen/pelvis 8/22/22   1. Mildly displaced fracture involving right lateral 7th rib. 2. No acute process in the abdomen or pelvis. 3. Cystic lesion involving the head of the pancreas measures 1.5 x 1 cm. MRI   with without contrast recommended for further evaluation. 4. Cholelithiasis. CT head 8/22/22   No acute intracranial abnormality. 8 mm left frontal meningioma with small focus of calcification. BRIEF SUMMARY OF INITIAL CONSULT:    Briefly Mr. Meir Bridges is a 80year-old male known to our office, with a past medical history of  CKD stage 3a, without proteinuria, 2/2 nephrosclerosis, baseline creatinine 1.6 mg/dL, HTN, chronic hyponatremia, cirrhosis 2/2 ETOH abuse, CAD, paroxysmal AF on warfarin, anemia, hyperuricemia, hyperlipidemia, hypothyroidism, osteoarthritis and neuropathy, who presented to the ED on August 22, 2022 via EMS after he sustained a mechanical fall at him which resulted in a head laceration.  In the ED, lab work was significant for INR 7.8, sodium level 128, potassium 3.3, chloride 87, bicarbonate 10, BUN 27, creatinine 2.0, and lactic acid 16.4. A head CT was negative for hematoma/hemorrhage, and he was admitted for further evaluation and management. Creatinine improved initially with IVF administration, suggestive of volume responsive NIKOLAI. However, on August 25, 2022, patient's creatinine level increased to 2.7 mg/dL, reason for this consult. Home medications include gabapentin, spironolactone, metolazone, torsemide, warfarin, magnesium, potassium chloride, sildafenil, metoprolol and duloxetine. IMPRESSION/RECOMMENDATIONS:      NIKOLAI stage 1 on CKD, likely ATN , ischemic 2/2 hypotension and or contrast associated NIKOLAI. Will decrease IVF to 75 cc/hr and obtain obtain urine studies. CKD Stage 3a without proteinuria 2/2 hypertensive nephrosclerosis. Baseline creatinine around 1.6 mg/dL  Hypotonic Hyponatremia, Acute on chronic, multifactorial, hemodynamically mediated 2/2 intravascular volume depletion due to diarrhea and probable poor oral intake, decreased effective circulating volume 2/2 cirrhosis and decreased GFR, NIKOLAI. Will initiate 3% NaCl, sodium 121 mmol/L at the initiation of 3% NaCl, recheck BMP at 1500 and every 4 hours, call if sodium is < 121 or  > 125, avoid over correcting. Metabolic acidosis (pH: 5.549, pCO2: 28.8) 2/2 with respiratory alkalosis likely 2/2 lactic acidosis vs. starvation ketoacidosis. Will re-check lactic acid.   Lactic acidosis, etiology unclear, cultures pending, on empiric piperacillin-tazobactam.   Normocytic, normochromic anemia, no obvious source of bleeding, CT abd/pelvis negative for RP hemorrhage   Secondary hyperparathyroidism 2/2 CKD, last PTH level 94   HTN, on metoprolol  ------------------------------------------  Alcohol abuse, on potassium, magnesium and thiamine at home  Cirrhosis, on spironolactone, torsemide and metolazone at home  Hyperuricemia  Hypothyroidism, on levothyroxine  Hyperlipidemia, on atorvastatin  Hx PAF, on warfarin and metoprolol  Hx neuropathy, on gabapentin and duloxetine - will hold gabapentin for now      Plan:    Start 3% sodium chloride at 25 cc/hour x4 hours  Monitor sodium levels closely, call if sodium is < 121 or  > 125, BMP at 1500 and every 4 hours  Place carlos catheter, Strict I&O  Obtain urine studies  Obtain lactic acid  Hold gabapentin d/t AMS/lethargy   Monitor renal function for recovery      Thank you Malcom Cooper, for allowing us to participate in the care of . Amada Purvis.

## 2022-08-26 LAB
ANION GAP SERPL CALCULATED.3IONS-SCNC: 13 MMOL/L (ref 7–16)
BUN BLDV-MCNC: 33 MG/DL (ref 6–23)
CALCIUM SERPL-MCNC: 7.3 MG/DL (ref 8.6–10.2)
CHLORIDE BLD-SCNC: 97 MMOL/L (ref 98–107)
CO2: 17 MMOL/L (ref 22–29)
CREAT SERPL-MCNC: 2.4 MG/DL (ref 0.7–1.2)
GFR AFRICAN AMERICAN: 32
GFR NON-AFRICAN AMERICAN: 26 ML/MIN/1.73
GLUCOSE BLD-MCNC: 135 MG/DL (ref 74–99)
HCT VFR BLD CALC: 22.3 % (ref 37–54)
HCT VFR BLD CALC: 22.9 % (ref 37–54)
HCT VFR BLD CALC: 23 % (ref 37–54)
HCT VFR BLD CALC: 23.3 % (ref 37–54)
HEMOGLOBIN: 7.5 G/DL (ref 12.5–16.5)
HEMOGLOBIN: 7.6 G/DL (ref 12.5–16.5)
POTASSIUM SERPL-SCNC: 3.4 MMOL/L (ref 3.5–5)
SODIUM BLD-SCNC: 127 MMOL/L (ref 132–146)

## 2022-08-26 PROCEDURE — 97530 THERAPEUTIC ACTIVITIES: CPT

## 2022-08-26 PROCEDURE — 2580000003 HC RX 258: Performed by: PHYSICIAN ASSISTANT

## 2022-08-26 PROCEDURE — 80048 BASIC METABOLIC PNL TOTAL CA: CPT

## 2022-08-26 PROCEDURE — 6360000002 HC RX W HCPCS: Performed by: INTERNAL MEDICINE

## 2022-08-26 PROCEDURE — 97535 SELF CARE MNGMENT TRAINING: CPT

## 2022-08-26 PROCEDURE — 1200000000 HC SEMI PRIVATE

## 2022-08-26 PROCEDURE — 6370000000 HC RX 637 (ALT 250 FOR IP): Performed by: STUDENT IN AN ORGANIZED HEALTH CARE EDUCATION/TRAINING PROGRAM

## 2022-08-26 PROCEDURE — 85018 HEMOGLOBIN: CPT

## 2022-08-26 PROCEDURE — 2580000003 HC RX 258: Performed by: STUDENT IN AN ORGANIZED HEALTH CARE EDUCATION/TRAINING PROGRAM

## 2022-08-26 PROCEDURE — 85014 HEMATOCRIT: CPT

## 2022-08-26 PROCEDURE — 2580000003 HC RX 258: Performed by: INTERNAL MEDICINE

## 2022-08-26 PROCEDURE — 36415 COLL VENOUS BLD VENIPUNCTURE: CPT

## 2022-08-26 PROCEDURE — 6370000000 HC RX 637 (ALT 250 FOR IP): Performed by: PHYSICIAN ASSISTANT

## 2022-08-26 RX ORDER — POTASSIUM CHLORIDE 7.45 MG/ML
10 INJECTION INTRAVENOUS
Status: COMPLETED | OUTPATIENT
Start: 2022-08-26 | End: 2022-08-26

## 2022-08-26 RX ADMIN — DULOXETINE HYDROCHLORIDE 20 MG: 20 CAPSULE, DELAYED RELEASE ORAL at 10:14

## 2022-08-26 RX ADMIN — Medication 10 ML: at 10:23

## 2022-08-26 RX ADMIN — PIPERACILLIN AND TAZOBACTAM 4500 MG: 4; .5 INJECTION, POWDER, FOR SOLUTION INTRAVENOUS at 05:30

## 2022-08-26 RX ADMIN — PANTOPRAZOLE SODIUM 40 MG: 40 TABLET, DELAYED RELEASE ORAL at 16:04

## 2022-08-26 RX ADMIN — PIPERACILLIN AND TAZOBACTAM 4500 MG: 4; .5 INJECTION, POWDER, FOR SOLUTION INTRAVENOUS at 14:24

## 2022-08-26 RX ADMIN — LEVOTHYROXINE SODIUM 200 MCG: 0.1 TABLET ORAL at 05:31

## 2022-08-26 RX ADMIN — METOPROLOL TARTRATE 50 MG: 50 TABLET, FILM COATED ORAL at 10:15

## 2022-08-26 RX ADMIN — PANTOPRAZOLE SODIUM 40 MG: 40 TABLET, DELAYED RELEASE ORAL at 05:30

## 2022-08-26 RX ADMIN — THIAMINE HCL TAB 100 MG 100 MG: 100 TAB at 10:14

## 2022-08-26 RX ADMIN — Medication 10 ML: at 10:24

## 2022-08-26 RX ADMIN — POTASSIUM CHLORIDE 10 MEQ: 7.46 INJECTION, SOLUTION INTRAVENOUS at 11:08

## 2022-08-26 RX ADMIN — POTASSIUM CHLORIDE 10 MEQ: 7.46 INJECTION, SOLUTION INTRAVENOUS at 10:17

## 2022-08-26 RX ADMIN — PIPERACILLIN AND TAZOBACTAM 4500 MG: 4; .5 INJECTION, POWDER, FOR SOLUTION INTRAVENOUS at 22:24

## 2022-08-26 RX ADMIN — ATORVASTATIN CALCIUM 40 MG: 40 TABLET, FILM COATED ORAL at 10:15

## 2022-08-26 ASSESSMENT — PAIN SCALES - GENERAL
PAINLEVEL_OUTOF10: 0
PAINLEVEL_OUTOF10: 0

## 2022-08-26 NOTE — PROGRESS NOTES
[facility]                                Progress note    Subjective: The patient is resting on exam. Easily arouseable  No acute events overnight. Denies chest pain, angina, SOB     Still remains markedly confused     Objective:    BP (!) 93/34   Pulse 62   Temp 97.6 °F (36.4 °C) (Axillary)   Resp 20   Ht 5' 9\" (1.753 m)   Wt 222 lb (100.7 kg)   SpO2 100%   BMI 32.78 kg/m²     In: 3794.2 [P.O.:480; I.V.:3314.2]  Out: 1300   In: 3794.2   Out: 1300 [Urine:1300]    General appearance: NAD, conversant  HEENT: AT/NC, MMM  Neck: FROM, supple  Lungs: Clear to auscultation  CV: RRR, no MRGs  Vasc: Radial pulses 2+  Abdomen: Soft, non-tender; no masses or HSM  Extremities: No peripheral edema or digital cyanosis  Skin:  Laceration left forehead, multiple ecchymotic areas-large confluent areas of ecchymoses on back, bruising on forehead, bilateral upper extremities  Neuro: Alert and interactive     Recent Labs     08/24/22  0711 08/24/22  0936 08/25/22  0426 08/25/22  0926 08/26/22  0135 08/26/22  0423 08/26/22  1124   WBC 14.8*  --  13.6*  --   --   --   --    HGB 7.5*   < > 8.1*   < > 7.5* 7.5* 7.6*   HCT 22.3*   < > 24.2*   < > 23.0* 22.3* 23.3*     --  228  --   --   --   --     < > = values in this interval not displayed. Recent Labs     08/25/22  1629 08/25/22  2207 08/26/22  0135   * 127* 127*   K 3.6 3.7 3.4*   CL 96* 98 97*   CO2 17* 18* 17*   BUN 35* 34* 33*   CREATININE 2.5* 2.5* 2.4*   CALCIUM 7.4* 7.4* 7.3*       Assessment:    Principal Problem:    GI bleed  Active Problems:    Supratherapeutic INR    Fracture of one rib  Resolved Problems:    * No resolved hospital problems.  *      Plan:  82-year-old male with a past medical history of A. fib with chronic anticoagulation and EtOH presented to the ED with mechanical fall and is admitted to telemetry unit with     GI bleed as evidenced by positive occult blood/lactic acidosis  -Telemetry monitoring  -H&H every Learn APRN- CNP, documentation was conducted and revisions were made as appropriate directly by me. I agree with the above documented exam, problem list, and plan of care.      Terrance Rosa MD  5:04 PM  8/26/2022

## 2022-08-26 NOTE — PROGRESS NOTES
Department of Internal Medicine  Nephrology Attending Consult Note    Events reviewed. SUBJECTIVE: We are following Mr. Rea Degree for NIKOLAI on CKD and hyponatremia. Reports feeling better, has no complaints.     PHYSICAL EXAM:      Vitals:    VITALS:  BP (!) 93/34   Pulse 62   Temp 97.6 °F (36.4 °C) (Axillary)   Resp 20   Ht 5' 9\" (1.753 m)   Wt 222 lb (100.7 kg)   SpO2 100%   BMI 32.78 kg/m²       Intake/Output Summary (Last 24 hours) at 8/26/2022 1135  Last data filed at 8/26/2022 0725  Gross per 24 hour   Intake 3554.24 ml   Output 400 ml   Net 3154.24 ml         General appearance: Lethargic, drowsy  Lungs: Clear to auscultation  CV: RRR  Vasc: Radial pulses 2+  Abdomen: Soft, distended, obese  Extremities: No peripheral edema or digital cyanosis  Skin:  Laceration left forehead, multiple ecchymotic areas  Neuro: Lethargic, arouses to pain, confused    DATA:    CBC with Differential:    Lab Results   Component Value Date/Time    WBC 13.6 08/25/2022 04:26 AM    RBC 2.62 08/25/2022 04:26 AM    HGB 7.5 08/26/2022 04:23 AM    HCT 22.3 08/26/2022 04:23 AM     08/25/2022 04:26 AM    MCV 92.4 08/25/2022 04:26 AM    MCH 30.9 08/25/2022 04:26 AM    MCHC 33.5 08/25/2022 04:26 AM    RDW 17.4 08/25/2022 04:26 AM    LYMPHOPCT 9.0 08/25/2022 04:26 AM    MONOPCT 6.3 08/25/2022 04:26 AM    EOSPCT 1.2 05/20/2021 12:00 AM    BASOPCT 0.1 08/25/2022 04:26 AM    MONOSABS 0.86 08/25/2022 04:26 AM    LYMPHSABS 1.22 08/25/2022 04:26 AM    EOSABS 0.03 08/25/2022 04:26 AM    BASOSABS 0.02 08/25/2022 04:26 AM     CMP:    Lab Results   Component Value Date/Time     08/26/2022 01:35 AM    K 3.4 08/26/2022 01:35 AM    K 3.9 08/25/2022 04:26 AM    CL 97 08/26/2022 01:35 AM    CO2 17 08/26/2022 01:35 AM    BUN 33 08/26/2022 01:35 AM    CREATININE 2.4 08/26/2022 01:35 AM    GFRAA 32 08/26/2022 01:35 AM    LABGLOM 26 08/26/2022 01:35 AM    GLUCOSE 135 08/26/2022 01:35 AM    PROT 5.5 08/25/2022 04:26 AM    LABALBU 3.1 08/25/2022 04:26 AM    CALCIUM 7.3 08/26/2022 01:35 AM    BILITOT 2.4 08/25/2022 04:26 AM    ALKPHOS 86 08/25/2022 04:26 AM     08/25/2022 04:26 AM    ALT 94 08/25/2022 04:26 AM     Magnesium:    Lab Results   Component Value Date/Time    MG 2.5 08/22/2022 06:43 PM     Phosphorus:    Lab Results   Component Value Date/Time    PHOS 4.1 05/20/2021 12:00 AM     Radiology Review:      CT abdomen/pelvis 8/22/22   1. Mildly displaced fracture involving right lateral 7th rib. 2. No acute process in the abdomen or pelvis. 3. Cystic lesion involving the head of the pancreas measures 1.5 x 1 cm. MRI   with without contrast recommended for further evaluation. 4. Cholelithiasis. CT head 8/22/22   No acute intracranial abnormality. 8 mm left frontal meningioma with small focus of calcification. BRIEF SUMMARY OF INITIAL CONSULT:    Briefly Mr. Concepcion Estrada is a 80year-old male known to our office, with a past medical history of  CKD stage 3a, without proteinuria, 2/2 nephrosclerosis, baseline creatinine 1.6 mg/dL, HTN, chronic hyponatremia, cirrhosis 2/2 ETOH abuse, CAD, paroxysmal AF on warfarin, anemia, hyperuricemia, hyperlipidemia, hypothyroidism, osteoarthritis and neuropathy, who presented to the ED on August 22, 2022 via EMS after he sustained a mechanical fall at him which resulted in a head laceration. In the ED, lab work was significant for INR 7.8, sodium level 128, potassium 3.3, chloride 87, bicarbonate 10, BUN 27, creatinine 2.0, and lactic acid 16.4. A head CT was negative for hematoma/hemorrhage, and he was admitted for further evaluation and management. Creatinine improved initially with IVF administration, suggestive of volume responsive NIKOLAI. However, on August 25, 2022, patient's creatinine level increased to 2.7 mg/dL, reason for this consult.  Home medications include gabapentin, spironolactone, metolazone, torsemide, warfarin, magnesium, potassium chloride, sildafenil, metoprolol and duloxetine. Problems resolved:  Lactic acidosis      IMPRESSION/RECOMMENDATIONS:      NIKOLAI stage 1 on CKD, likely ATN , ischemic 2/2 hypotension and or contrast associated NIKOLAI. Creatinine peaked at 2.7. Renal function slowly improving, creatinine down to 2.4 with fair urine output. CKD Stage 3a without proteinuria 2/2 hypertensive nephrosclerosis. Baseline creatinine around 1.6 mg/dL    Hypotonic Hyponatremia, acute on chronic, (121) multifactorial, hemodynamically mediated 2/2 intravascular volume depletion due to diarrhea and probable poor oral solute intake, decreased effective circulating volume -cirrhosis, and decreased GFR-NIKOLAI. Sodium level improved at adequate rate, up to 127 status post 3% sodium chloride administration. Normal pH, with metabolic acidosis (pH: 2.385, pCO2: 28.8) 2/2 uremia, lactic acidosis vs. starvation/alcoholic ketoacidosis, with respiratory alkalosis. Lactic acid levels improved.     Normocytic, normochromic anemia, no obvious source of bleeding, CT abd/pelvis negative for RP hemorrhage  Secondary hyperparathyroidism 2/2 CKD, last PTH level 94   HTN, on metoprolol  ------------------------------------------  Alcohol abuse, on potassium, magnesium and thiamine at home  Cirrhosis, on spironolactone, torsemide and metolazone at home  Hyperuricemia  Hypothyroidism, on levothyroxine  Hyperlipidemia, on atorvastatin  Hx PAF, on warfarin and metoprolol  Hx neuropathy, on gabapentin and duloxetine - will hold gabapentin for now      Plan:    Monitor sodium levels closely, call if sodium is < 127   Continue strict I's and O's every shift  Continue to hold gabapentin d/t AMS/lethargy   Continue to monitor renal function

## 2022-08-26 NOTE — PROGRESS NOTES
OCCUPATIONAL THERAPY TREATMENT NOTE    [place] [address] [employer] [place] [id number] [place] [address], Reyna Head         Date:2022  Patient Name: Gino Arthur  MRN: [id number]  : 1940  Room: [address] [place] [address]       Evaluating OT: Nasrin Adamson OTR/L #[id number]      Referring Provider: JAQUELINE Noland  Specific Provider Orders/Date: OT eval and treat 22     Diagnosis: Cyst of pancreas [K86.2]  Hypokalemia [E87.6]  GI bleed [K92.2]  Lactic acid acidosis [E87.2]  Meningioma ([facility] [address] [D32.9]  NIKOLAI (acute kidney injury) ([facility] [address] [N17.9]  Supratherapeutic INR [R79.1]  Fall, initial encounter [W19. XXXA]  Alcohol withdrawal syndrome without complication ([facility] [address] [T15.511]  Closed fracture of one rib of left side, initial encounter [S22.32XA]   Pt admitted to hospital following fall; R 7th rib fracture      Pertinent Medical History:  has a past medical history of Blood circulation, collateral, CAD (coronary artery disease), Chronic kidney disease, History of alcohol use, History of ascites, Hyperlipidemia, Hypertension, Hyponatremia, Left knee pain, Liver disease, Lymphedema, Neuropathy, Osteoarthritis, Psychiatric problem, Thyroid disease, Use of cane as ambulatory aid, Uses wheelchair, Wears glasses, and Wears hearing aid.          Precautions:  Fall Risk, R rib fracture, bed alarm     Assessment of current deficits    [x] Functional mobility          [x]ADLs           [x] Strength                  [x]Cognition    [x] Functional transfers        [x] IADLs         [x] Safety Awareness   [x]Endurance    [] Fine Coordination                        [x] Balance      [] Vision/perception   []Sensation      []Gross Motor Coordination            [] ROM           [] Delirium                   [] Motor Control      OT PLAN OF CARE   OT POC based on physician orders, patient diagnosis and results of clinical assessment     Frequency/Duration 1-3 days/wk for 2 weeks PRN   Specific OT Treatment Interventions to include:   * Instruction/training on adapted ADL techniques and AE recommendations to increase functional independence within precautions       * Training on energy conservation strategies, correct breathing pattern and techniques to improve independence/tolerance for self-care routine  * Functional transfer/mobility training/DME recommendations for increased independence, safety, and fall prevention  * Patient/Family education to increase follow through with safety techniques and functional independence  * Recommendation of environmental modifications for increased safety with functional transfers/mobility and ADLs  * Therapeutic exercise to improve motor endurance, ROM, and functional strength for ADLs/functional transfers  * Therapeutic activities to facilitate/challenge dynamic balance, stand tolerance for increased safety and independence with ADLs  * Therapeutic activities to facilitate gross/fine motor skills for increased independence with ADLs  * Neuro-muscular re-education: facilitation of righting/equilibrium reactions, midline orientation, scapular stability/mobility, normalization of muscle tone, and facilitation of volitional active controled movement     Recommended Adaptive Equipment:  TBD      Home Living: Pt lives alone in a 2 story home with 3 TALYA and no hand rail. 1st floor set-up. Bathroom setup: tub/shower combo    Equipment owned: none     Prior Level of Function: Independent with ADLs , Independent with IADLs; ambulated without AD in house; SPC  prn in community   Driving: yes   Occupation: retired       Pain Level: Pt denies pain this session     Cognition: A&O: x3;  Follows 1-2 step directions             Memory:  fair             Sequencing:  fair             Problem solving:  fair             Judgement/safety:  fair               Functional Assessment:  AM-PAC Daily Activity Raw Score: 16/24    Initial Eval Status  Date: 8/23/22 Treatment Status  Date: 8/26/22 STGs = LTGs  Time frame: 10-14 days   Feeding Independent   Supervision      Grooming Minimal Assist  MIN A seated EOB   Modified [place]    UB Dressing Minimal Assist  MIN A to [facility] gown  Modified [place]    LB Dressing Moderate Assist   MOD A to latoya pants requiring assist to thread pants over B LE, pull up around waist, v/c's for safety  Modified [place]    Bathing Moderate Assist MOD A simulated  Modified [place]    Toileting Moderate Assist  MAX A pt requiring assist with hygiene and clothing management  Modified [place]    Bed Mobility  Supine to sit: Minimal Assist   Sit to supine: Minimal Assist  MIN A supine>sit   MIN A sit>supine v/c's for technique, hand placement   Supine to sit: Modified [place]   Sit to supine: Modified [place]    Functional Transfers Moderate Assist   MOD A sit<>stand from EOB with HHa v/c's for safety  Modified [place]    Functional Mobility Moderate Assist      Ambulated short distance with HHA N/t  Modified [place]    Balance Sitting:     Static:  SBA    Dynamic:SBA  Standing: mod A with HHA Sitting:   Static: supervision   Dynamic: SBA   Standing: MIN A with HHA      Activity Tolerance F- Fair  F+   Visual/  Perceptual wfl                      Comments: Upon arrival pt supine in bed, with nursing present, agreeable to therapy session. Pt educated with regards to bed mobility, functional transfers, hand placement, LE/UE dressing techniques, bathing AE, DME, safety awareness, importance of increased activity, energy conservation techniques. At end of session pt supine in bed,  all lines and tubes intact, call light within reach. Pt has made fair  progress towards set goals.    Continue with current plan of care      Treatment Time [id number]            Treatment Time Out: 1010                Treatment Charges: Mins Units   Ther Ex  [id number]     Manual Therapy [id number]     Thera Activities [phone]   ADL/Home t [id number] [phone]   Neuro Re-ed [id number]     Group Therapy      Orthotic manage/training  [id number]     Non-Billable Time     Total Timed Treatment 45 [phone] [address]

## 2022-08-26 NOTE — PROGRESS NOTES
Messaged Nephrology per son's request to speak with them, also notified attending regarding pt.s agitation and wanting to leave AMA.

## 2022-08-27 ENCOUNTER — APPOINTMENT (OUTPATIENT)
Dept: CT IMAGING | Age: 82
DRG: 377 | End: 2022-08-27
Payer: MEDICARE

## 2022-08-27 LAB
ANION GAP SERPL CALCULATED.3IONS-SCNC: 12 MMOL/L (ref 7–16)
ANION GAP SERPL CALCULATED.3IONS-SCNC: 13 MMOL/L (ref 7–16)
BASOPHILS ABSOLUTE: 0.03 E9/L (ref 0–0.2)
BASOPHILS RELATIVE PERCENT: 0.3 % (ref 0–2)
BUN BLDV-MCNC: 25 MG/DL (ref 6–23)
BUN BLDV-MCNC: 26 MG/DL (ref 6–23)
CALCIUM SERPL-MCNC: 7.6 MG/DL (ref 8.6–10.2)
CALCIUM SERPL-MCNC: 7.7 MG/DL (ref 8.6–10.2)
CHLORIDE BLD-SCNC: 97 MMOL/L (ref 98–107)
CHLORIDE BLD-SCNC: 98 MMOL/L (ref 98–107)
CHLORIDE URINE RANDOM: <20 MMOL/L
CO2: 15 MMOL/L (ref 22–29)
CO2: 19 MMOL/L (ref 22–29)
CREAT SERPL-MCNC: 2.1 MG/DL (ref 0.7–1.2)
CREAT SERPL-MCNC: 2.2 MG/DL (ref 0.7–1.2)
CREATININE URINE: 139 MG/DL (ref 40–278)
EOSINOPHILS ABSOLUTE: 0.08 E9/L (ref 0.05–0.5)
EOSINOPHILS RELATIVE PERCENT: 0.7 % (ref 0–6)
GFR AFRICAN AMERICAN: 35
GFR AFRICAN AMERICAN: 37
GFR NON-AFRICAN AMERICAN: 29 ML/MIN/1.73
GFR NON-AFRICAN AMERICAN: 31 ML/MIN/1.73
GLUCOSE BLD-MCNC: 109 MG/DL (ref 74–99)
GLUCOSE BLD-MCNC: 132 MG/DL (ref 74–99)
HCT VFR BLD CALC: 26 % (ref 37–54)
HEMOGLOBIN: 8.5 G/DL (ref 12.5–16.5)
IMMATURE GRANULOCYTES #: 0.11 E9/L
IMMATURE GRANULOCYTES %: 1 % (ref 0–5)
LYMPHOCYTES ABSOLUTE: 1.17 E9/L (ref 1.5–4)
LYMPHOCYTES RELATIVE PERCENT: 10.3 % (ref 20–42)
MCH RBC QN AUTO: 31.5 PG (ref 26–35)
MCHC RBC AUTO-ENTMCNC: 32.7 % (ref 32–34.5)
MCV RBC AUTO: 96.3 FL (ref 80–99.9)
MONOCYTES ABSOLUTE: 0.86 E9/L (ref 0.1–0.95)
MONOCYTES RELATIVE PERCENT: 7.6 % (ref 2–12)
NEUTROPHILS ABSOLUTE: 9.07 E9/L (ref 1.8–7.3)
NEUTROPHILS RELATIVE PERCENT: 80.1 % (ref 43–80)
PDW BLD-RTO: 19.8 FL (ref 11.5–15)
PLATELET # BLD: 213 E9/L (ref 130–450)
PMV BLD AUTO: 10.5 FL (ref 7–12)
POTASSIUM SERPL-SCNC: 3.7 MMOL/L (ref 3.5–5)
POTASSIUM SERPL-SCNC: 3.7 MMOL/L (ref 3.5–5)
POTASSIUM, UR: 35.7 MMOL/L
RBC # BLD: 2.7 E12/L (ref 3.8–5.8)
SODIUM BLD-SCNC: 125 MMOL/L (ref 132–146)
SODIUM BLD-SCNC: 129 MMOL/L (ref 132–146)
SODIUM URINE: <20 MMOL/L
UREA NITROGEN, UR: 761 MG/DL (ref 800–1666)
WBC # BLD: 11.3 E9/L (ref 4.5–11.5)

## 2022-08-27 PROCEDURE — 6370000000 HC RX 637 (ALT 250 FOR IP): Performed by: STUDENT IN AN ORGANIZED HEALTH CARE EDUCATION/TRAINING PROGRAM

## 2022-08-27 PROCEDURE — 70450 CT HEAD/BRAIN W/O DYE: CPT

## 2022-08-27 PROCEDURE — 82570 ASSAY OF URINE CREATININE: CPT

## 2022-08-27 PROCEDURE — 6370000000 HC RX 637 (ALT 250 FOR IP): Performed by: INTERNAL MEDICINE

## 2022-08-27 PROCEDURE — 2580000003 HC RX 258: Performed by: STUDENT IN AN ORGANIZED HEALTH CARE EDUCATION/TRAINING PROGRAM

## 2022-08-27 PROCEDURE — 84133 ASSAY OF URINE POTASSIUM: CPT

## 2022-08-27 PROCEDURE — 82436 ASSAY OF URINE CHLORIDE: CPT

## 2022-08-27 PROCEDURE — 6370000000 HC RX 637 (ALT 250 FOR IP): Performed by: PHYSICIAN ASSISTANT

## 2022-08-27 PROCEDURE — 2580000003 HC RX 258: Performed by: INTERNAL MEDICINE

## 2022-08-27 PROCEDURE — 2580000003 HC RX 258: Performed by: PHYSICIAN ASSISTANT

## 2022-08-27 PROCEDURE — 1200000000 HC SEMI PRIVATE

## 2022-08-27 PROCEDURE — 85025 COMPLETE CBC W/AUTO DIFF WBC: CPT

## 2022-08-27 PROCEDURE — 80048 BASIC METABOLIC PNL TOTAL CA: CPT

## 2022-08-27 PROCEDURE — 36415 COLL VENOUS BLD VENIPUNCTURE: CPT

## 2022-08-27 PROCEDURE — 84300 ASSAY OF URINE SODIUM: CPT

## 2022-08-27 PROCEDURE — 2500000003 HC RX 250 WO HCPCS: Performed by: INTERNAL MEDICINE

## 2022-08-27 PROCEDURE — 6360000002 HC RX W HCPCS: Performed by: INTERNAL MEDICINE

## 2022-08-27 PROCEDURE — 84540 ASSAY OF URINE/UREA-N: CPT

## 2022-08-27 RX ORDER — SODIUM BICARBONATE 650 MG/1
650 TABLET ORAL 4 TIMES DAILY
Status: DISCONTINUED | OUTPATIENT
Start: 2022-08-27 | End: 2022-08-29 | Stop reason: HOSPADM

## 2022-08-27 RX ORDER — BUMETANIDE 0.25 MG/ML
1 INJECTION, SOLUTION INTRAMUSCULAR; INTRAVENOUS ONCE
Status: COMPLETED | OUTPATIENT
Start: 2022-08-27 | End: 2022-08-27

## 2022-08-27 RX ADMIN — SODIUM BICARBONATE 650 MG: 650 TABLET ORAL at 15:34

## 2022-08-27 RX ADMIN — SODIUM BICARBONATE 650 MG: 650 TABLET ORAL at 20:43

## 2022-08-27 RX ADMIN — BUMETANIDE 1 MG: 0.25 INJECTION INTRAMUSCULAR; INTRAVENOUS at 19:09

## 2022-08-27 RX ADMIN — Medication 10 ML: at 08:52

## 2022-08-27 RX ADMIN — ATORVASTATIN CALCIUM 40 MG: 40 TABLET, FILM COATED ORAL at 08:51

## 2022-08-27 RX ADMIN — Medication 10 ML: at 22:00

## 2022-08-27 RX ADMIN — PIPERACILLIN AND TAZOBACTAM 4500 MG: 4; .5 INJECTION, POWDER, FOR SOLUTION INTRAVENOUS at 05:55

## 2022-08-27 RX ADMIN — DULOXETINE HYDROCHLORIDE 20 MG: 20 CAPSULE, DELAYED RELEASE ORAL at 08:51

## 2022-08-27 RX ADMIN — PANTOPRAZOLE SODIUM 40 MG: 40 TABLET, DELAYED RELEASE ORAL at 05:57

## 2022-08-27 RX ADMIN — PIPERACILLIN AND TAZOBACTAM 4500 MG: 4; .5 INJECTION, POWDER, FOR SOLUTION INTRAVENOUS at 15:35

## 2022-08-27 RX ADMIN — LEVOTHYROXINE SODIUM 200 MCG: 0.1 TABLET ORAL at 05:57

## 2022-08-27 RX ADMIN — METOPROLOL TARTRATE 50 MG: 50 TABLET, FILM COATED ORAL at 08:51

## 2022-08-27 RX ADMIN — PANTOPRAZOLE SODIUM 40 MG: 40 TABLET, DELAYED RELEASE ORAL at 15:34

## 2022-08-27 RX ADMIN — THIAMINE HCL TAB 100 MG 100 MG: 100 TAB at 08:51

## 2022-08-27 ASSESSMENT — PAIN SCALES - GENERAL
PAINLEVEL_OUTOF10: 0
PAINLEVEL_OUTOF10: 0

## 2022-08-27 NOTE — PROGRESS NOTES
Pt found by staff on his knees by the bed. Per pt he attempted to use bedside commode and he didn't want to bother anyone to help him with it. He states he might have hit his head and was unsure. 3nd left toe noted to have an abrasion, cleaned with NS and 2x2. Attending notified as well as his son.

## 2022-08-27 NOTE — PROGRESS NOTES
Department of Internal Medicine  Nephrology Attending Progress Note    Events reviewed. He did have a fall today. SUBJECTIVE: We are following Mr. Aman Dooley for NIKOLAI on CKD and hyponatremia. Reports feeling better, has no complaints.     PHYSICAL EXAM:      Vitals:    VITALS:  /66   Pulse 75   Temp 98.1 °F (36.7 °C) (Oral)   Resp 18   Ht 5' 9\" (1.753 m)   Wt 222 lb (100.7 kg)   SpO2 96%   BMI 32.78 kg/m²       Intake/Output Summary (Last 24 hours) at 8/27/2022 1013  Last data filed at 8/26/2022 1155  Gross per 24 hour   Intake 0 ml   Output 900 ml   Net -900 ml           General appearance: Lethargic, drowsy  Lungs: Clear to auscultation  CV: RRR  Vasc: Radial pulses 2+  Abdomen: Soft, distended, obese  Extremities: No peripheral edema or digital cyanosis  Skin:  Laceration left forehead, multiple ecchymotic areas  Neuro: Lethargic, arouses to pain, confused    DATA:    CBC with Differential:    Lab Results   Component Value Date/Time    WBC 11.3 08/27/2022 07:14 AM    RBC 2.70 08/27/2022 07:14 AM    HGB 8.5 08/27/2022 07:14 AM    HCT 26.0 08/27/2022 07:14 AM     08/27/2022 07:14 AM    MCV 96.3 08/27/2022 07:14 AM    MCH 31.5 08/27/2022 07:14 AM    MCHC 32.7 08/27/2022 07:14 AM    RDW 19.8 08/27/2022 07:14 AM    LYMPHOPCT 10.3 08/27/2022 07:14 AM    MONOPCT 7.6 08/27/2022 07:14 AM    EOSPCT 1.2 05/20/2021 12:00 AM    BASOPCT 0.3 08/27/2022 07:14 AM    MONOSABS 0.86 08/27/2022 07:14 AM    LYMPHSABS 1.17 08/27/2022 07:14 AM    EOSABS 0.08 08/27/2022 07:14 AM    BASOSABS 0.03 08/27/2022 07:14 AM     CMP:    Lab Results   Component Value Date/Time     08/26/2022 01:35 AM    K 3.4 08/26/2022 01:35 AM    K 3.9 08/25/2022 04:26 AM    CL 97 08/26/2022 01:35 AM    CO2 17 08/26/2022 01:35 AM    BUN 33 08/26/2022 01:35 AM    CREATININE 2.4 08/26/2022 01:35 AM    GFRAA 32 08/26/2022 01:35 AM    LABGLOM 26 08/26/2022 01:35 AM    GLUCOSE 135 08/26/2022 01:35 AM    PROT 5.5 08/25/2022 04:26 AM LABALBU 3.1 08/25/2022 04:26 AM    CALCIUM 7.3 08/26/2022 01:35 AM    BILITOT 2.4 08/25/2022 04:26 AM    ALKPHOS 86 08/25/2022 04:26 AM     08/25/2022 04:26 AM    ALT 94 08/25/2022 04:26 AM     Magnesium:    Lab Results   Component Value Date/Time    MG 2.5 08/22/2022 06:43 PM     Phosphorus:    Lab Results   Component Value Date/Time    PHOS 4.1 05/20/2021 12:00 AM     Radiology Review:      CT abdomen/pelvis 8/22/22   1. Mildly displaced fracture involving right lateral 7th rib. 2. No acute process in the abdomen or pelvis. 3. Cystic lesion involving the head of the pancreas measures 1.5 x 1 cm. MRI   with without contrast recommended for further evaluation. 4. Cholelithiasis. CT head 8/22/22   No acute intracranial abnormality. 8 mm left frontal meningioma with small focus of calcification. BRIEF SUMMARY OF INITIAL CONSULT:    Briefly Mr. Toby Roberts is a 80year-old male known to our office, with a past medical history of  CKD stage 3a, without proteinuria, 2/2 nephrosclerosis, baseline creatinine 1.6 mg/dL, HTN, chronic hyponatremia, cirrhosis 2/2 ETOH abuse, CAD, paroxysmal AF on warfarin, anemia, hyperuricemia, hyperlipidemia, hypothyroidism, osteoarthritis and neuropathy, who presented to the ED on August 22, 2022 via EMS after he sustained a mechanical fall at him which resulted in a head laceration. In the ED, lab work was significant for INR 7.8, sodium level 128, potassium 3.3, chloride 87, bicarbonate 10, BUN 27, creatinine 2.0, and lactic acid 16.4. A head CT was negative for hematoma/hemorrhage, and he was admitted for further evaluation and management. Creatinine improved initially with IVF administration, suggestive of volume responsive NIKOLAI. However, on August 25, 2022, patient's creatinine level increased to 2.7 mg/dL, reason for this consult.  Home medications include gabapentin, spironolactone, metolazone, torsemide, warfarin, magnesium, potassium chloride, sildafenil, metoprolol and duloxetine. Problems resolved:  Lactic acidosis      IMPRESSION/RECOMMENDATIONS:      NIKOLAI stage 1 on CKD, likely ATN , ischemic 2/2 hypotension and or contrast associated NIKOLAI. Creatinine peaked at 2.7. Renal function slowly improving, creatinine down to 2.4 with fair urine output. CKD Stage 3a without proteinuria 2/2 hypertensive nephrosclerosis. Baseline creatinine around 1.6 mg/dL    Hypotonic Hyponatremia, acute on chronic, (121) multifactorial, hemodynamically mediated 2/2 intravascular volume depletion due to diarrhea and probable poor oral solute intake, decreased effective circulating volume -cirrhosis, and decreased GFR-NIKOLAI. Sodium level improved at adequate rate, up to 127 status post 3% sodium chloride administration. Normal pH, with metabolic acidosis (pH: 1.342, pCO2: 28.8) 2/2 uremia, lactic acidosis vs. starvation/alcoholic ketoacidosis, with respiratory alkalosis. Lactic acid levels improved.     Normocytic, normochromic anemia, no obvious source of bleeding, CT abd/pelvis negative for RP hemorrhage  Secondary hyperparathyroidism 2/2 CKD, last PTH level 94   HTN, on metoprolol  ------------------------------------------  Alcohol abuse, on potassium, magnesium and thiamine at home  Cirrhosis, on spironolactone, torsemide and metolazone at home  Hyperuricemia  Hypothyroidism, on levothyroxine  Hyperlipidemia, on atorvastatin  Hx PAF, on warfarin and metoprolol  Hx neuropathy, on gabapentin and duloxetine - will hold gabapentin for now      Plan:    Monitor sodium levels closely, call if sodium is < 127   Continue strict I&O's every shift  Discontinue IVF  Continue fluid restriction   Continue to hold gabapentin d/t AMS/lethargy   Continue to monitor renal function    Start sodium bicarbonate 650 mg po qid

## 2022-08-27 NOTE — PROGRESS NOTES
[facility]                                Progress note    Subjective: The patient is lying in bed. Patient states he had a go to the bathroom and he did not have any help therefore he attempted to go on his own and he fell unsure if he hit his head. No acute events overnight. Denies chest pain, angina, SOB   Still remains markedly confused     Objective:    BP 90/70   Pulse 88   Temp 98.2 °F (36.8 °C) (Oral)   Resp 20   Ht 5' 9\" (1.753 m)   Wt 222 lb (100.7 kg)   SpO2 97%   BMI 32.78 kg/m²     In: 1864.2 [I.V.:1864.2]  Out: 900   In: 1864.2   Out: 900 [Urine:900]    General appearance: NAD, conversant, fatigued  HEENT: AT/NC, MMM  Neck: FROM, supple  Lungs: Clear to auscultation  CV: RRR, no MRGs  Vasc: Radial pulses 2+  Abdomen: Soft, non-tender; no masses or HSM  Extremities: No peripheral edema or digital cyanosis, bilateral lower extremity edema  Skin:  Laceration left forehead, ecchymotic areas improved during hospital admission  Neuro: Alert and interactive     Recent Labs     08/25/22  0426 08/25/22  0926 08/26/22  0423 08/26/22  1124 08/27/22  0714   WBC 13.6*  --   --   --  11.3   HGB 8.1*   < > 7.5* 7.6* 8.5*   HCT 24.2*   < > 22.3* 23.3* 26.0*     --   --   --  213    < > = values in this interval not displayed. Recent Labs     08/25/22  1629 08/25/22  2207 08/26/22  0135   * 127* 127*   K 3.6 3.7 3.4*   CL 96* 98 97*   CO2 17* 18* 17*   BUN 35* 34* 33*   CREATININE 2.5* 2.5* 2.4*   CALCIUM 7.4* 7.4* 7.3*       Assessment:    Principal Problem:    GI bleed  Active Problems:    Supratherapeutic INR    Fracture of one rib  Resolved Problems:    * No resolved hospital problems.  *      Plan:  71-year-old male with a past medical history of A. fib with chronic anticoagulation and EtOH presented to the ED with mechanical fall and is admitted to telemetry unit with     GI bleed as evidenced by positive occult blood/lactic acidosis  -Telemetry monitoring  -H&H every 6 Hours transfuse as needed to keep hemoglobin greater than 7  HGB - 8.2 > 7.5>7.6>8.5-has remained stable over multiple checks-discontinue every 6 hours checks, daily is adequate  -Advance diet  -Hold all blood thinners.  -Consult general surgery -possible panendoscopy to ascertain etiology of bleed-I do not think he needs   -Medication for other comorbidities continue as appropriate with dosage adjustments as necessary. Hyponatremia/NIKOLAI on CKD-all have been stable over past several days  Monitor labs Na+ 1 fluctuating  D5 normal saline at 75 per renal  Seizure precautions  BUN/Creat:  33/2.4  Consult nephrology-appreciate input  Insert carlos catherter  Strict I&O's       Lactic acidosis with leukocytosis-resolved  Zosyn x5 days completed  Encourage p.o. intake  WBC11k   Overall clinically appears markedly improved     Rib fracture from fall/trauma  Pain management -well-controlled  ISP  Lidocaine patch     Supratherapeutic INR -resolved  Hold anticoagulation  Monitor INR-reverse if persistently elevated and declining H&H  INR -2.1-no need for further reversal  Resume oral anticoagulation if patient is going to subacute rehab, he is not able to take it on his own at home due to safety issues     Tachycardia -resolved  Telemetry monitoring  IV hydration  Monitor labs    Patient had a fall this morning unsure if he hit head. Will obtain CT head    Attempted to call son today unable to leave message.     DVT Prophylaxis   PT/OT  Discharge planning -  likely [facility] on discharge, ready for discharge from an standpoint now that all parameters have stabilized-if okay with others      MONTSE Pinto - CNP  11:40 AM  8/27/2022     Above note edited to reflect my thoughts   Awake and alert on my evaluation today  Once renal feels his sodium is appropriate, he can be discharged but will likely need rehab secondary to significant morbidity  Sodium bicarbonate initiated by ED  Discontinue Zosyn,

## 2022-08-28 LAB
ALBUMIN SERPL-MCNC: 3 G/DL (ref 3.5–5.2)
ALP BLD-CCNC: 101 U/L (ref 40–129)
ALT SERPL-CCNC: 68 U/L (ref 0–40)
ANION GAP SERPL CALCULATED.3IONS-SCNC: 13 MMOL/L (ref 7–16)
ANION GAP SERPL CALCULATED.3IONS-SCNC: 14 MMOL/L (ref 7–16)
AST SERPL-CCNC: 38 U/L (ref 0–39)
BILIRUB SERPL-MCNC: 1.4 MG/DL (ref 0–1.2)
BUN BLDV-MCNC: 23 MG/DL (ref 6–23)
BUN BLDV-MCNC: 24 MG/DL (ref 6–23)
CALCIUM SERPL-MCNC: 7.7 MG/DL (ref 8.6–10.2)
CALCIUM SERPL-MCNC: 7.9 MG/DL (ref 8.6–10.2)
CHLORIDE BLD-SCNC: 97 MMOL/L (ref 98–107)
CHLORIDE BLD-SCNC: 97 MMOL/L (ref 98–107)
CO2: 18 MMOL/L (ref 22–29)
CO2: 19 MMOL/L (ref 22–29)
CREAT SERPL-MCNC: 2 MG/DL (ref 0.7–1.2)
CREAT SERPL-MCNC: 2.1 MG/DL (ref 0.7–1.2)
GFR AFRICAN AMERICAN: 37
GFR AFRICAN AMERICAN: 39
GFR NON-AFRICAN AMERICAN: 31 ML/MIN/1.73
GFR NON-AFRICAN AMERICAN: 32 ML/MIN/1.73
GLUCOSE BLD-MCNC: 148 MG/DL (ref 74–99)
GLUCOSE BLD-MCNC: 98 MG/DL (ref 74–99)
POTASSIUM SERPL-SCNC: 2.9 MMOL/L (ref 3.5–5)
POTASSIUM SERPL-SCNC: 3.6 MMOL/L (ref 3.5–5)
SODIUM BLD-SCNC: 129 MMOL/L (ref 132–146)
SODIUM BLD-SCNC: 129 MMOL/L (ref 132–146)
TOTAL PROTEIN: 5.7 G/DL (ref 6.4–8.3)

## 2022-08-28 PROCEDURE — 2580000003 HC RX 258: Performed by: STUDENT IN AN ORGANIZED HEALTH CARE EDUCATION/TRAINING PROGRAM

## 2022-08-28 PROCEDURE — 6370000000 HC RX 637 (ALT 250 FOR IP): Performed by: STUDENT IN AN ORGANIZED HEALTH CARE EDUCATION/TRAINING PROGRAM

## 2022-08-28 PROCEDURE — 80053 COMPREHEN METABOLIC PANEL: CPT

## 2022-08-28 PROCEDURE — 2580000003 HC RX 258: Performed by: PHYSICIAN ASSISTANT

## 2022-08-28 PROCEDURE — 6370000000 HC RX 637 (ALT 250 FOR IP): Performed by: INTERNAL MEDICINE

## 2022-08-28 PROCEDURE — 80048 BASIC METABOLIC PNL TOTAL CA: CPT

## 2022-08-28 PROCEDURE — 6370000000 HC RX 637 (ALT 250 FOR IP): Performed by: PHYSICIAN ASSISTANT

## 2022-08-28 PROCEDURE — 6370000000 HC RX 637 (ALT 250 FOR IP): Performed by: FAMILY MEDICINE

## 2022-08-28 PROCEDURE — 1200000000 HC SEMI PRIVATE

## 2022-08-28 PROCEDURE — 36415 COLL VENOUS BLD VENIPUNCTURE: CPT

## 2022-08-28 RX ORDER — POTASSIUM CHLORIDE 20 MEQ/1
40 TABLET, EXTENDED RELEASE ORAL 2 TIMES DAILY WITH MEALS
Status: COMPLETED | OUTPATIENT
Start: 2022-08-28 | End: 2022-08-28

## 2022-08-28 RX ORDER — SPIRONOLACTONE 25 MG/1
25 TABLET ORAL DAILY
Status: DISCONTINUED | OUTPATIENT
Start: 2022-08-28 | End: 2022-08-29 | Stop reason: HOSPADM

## 2022-08-28 RX ORDER — TORSEMIDE 20 MG/1
20 TABLET ORAL 2 TIMES DAILY
Status: DISCONTINUED | OUTPATIENT
Start: 2022-08-28 | End: 2022-08-29 | Stop reason: HOSPADM

## 2022-08-28 RX ADMIN — SODIUM BICARBONATE 650 MG: 650 TABLET ORAL at 20:17

## 2022-08-28 RX ADMIN — LEVOTHYROXINE SODIUM 200 MCG: 0.1 TABLET ORAL at 06:16

## 2022-08-28 RX ADMIN — PANTOPRAZOLE SODIUM 40 MG: 40 TABLET, DELAYED RELEASE ORAL at 16:44

## 2022-08-28 RX ADMIN — POTASSIUM CHLORIDE 40 MEQ: 1500 TABLET, EXTENDED RELEASE ORAL at 16:44

## 2022-08-28 RX ADMIN — POTASSIUM CHLORIDE 40 MEQ: 1500 TABLET, EXTENDED RELEASE ORAL at 10:47

## 2022-08-28 RX ADMIN — SPIRONOLACTONE 25 MG: 25 TABLET ORAL at 15:28

## 2022-08-28 RX ADMIN — PANTOPRAZOLE SODIUM 40 MG: 40 TABLET, DELAYED RELEASE ORAL at 06:16

## 2022-08-28 RX ADMIN — ATORVASTATIN CALCIUM 40 MG: 40 TABLET, FILM COATED ORAL at 10:44

## 2022-08-28 RX ADMIN — Medication 10 ML: at 10:48

## 2022-08-28 RX ADMIN — Medication 10 ML: at 10:45

## 2022-08-28 RX ADMIN — THIAMINE HCL TAB 100 MG 100 MG: 100 TAB at 10:45

## 2022-08-28 RX ADMIN — TORSEMIDE 20 MG: 20 TABLET ORAL at 15:28

## 2022-08-28 RX ADMIN — METOPROLOL TARTRATE 50 MG: 50 TABLET, FILM COATED ORAL at 10:45

## 2022-08-28 RX ADMIN — METOPROLOL TARTRATE 50 MG: 50 TABLET, FILM COATED ORAL at 20:17

## 2022-08-28 RX ADMIN — SODIUM BICARBONATE 650 MG: 650 TABLET ORAL at 13:15

## 2022-08-28 RX ADMIN — TORSEMIDE 20 MG: 20 TABLET ORAL at 20:17

## 2022-08-28 RX ADMIN — DULOXETINE HYDROCHLORIDE 20 MG: 20 CAPSULE, DELAYED RELEASE ORAL at 10:44

## 2022-08-28 RX ADMIN — SODIUM BICARBONATE 650 MG: 650 TABLET ORAL at 16:44

## 2022-08-28 RX ADMIN — SODIUM BICARBONATE 650 MG: 650 TABLET ORAL at 10:44

## 2022-08-28 NOTE — PROGRESS NOTES
Department of Internal Medicine  Nephrology Progress Note    Events reviewed. SUBJECTIVE: We are following MrMarco Antonio Rome for NIKOLAI on CKD and hyponatremia. Reports feeling better, has no complaints.     PHYSICAL EXAM:      Vitals:    VITALS:  BP (!) 138/51   Pulse 74   Temp 97.7 °F (36.5 °C) (Oral)   Resp 18   Ht 5' 9\" (1.753 m)   Wt 222 lb 4.8 oz (100.8 kg)   SpO2 100%   BMI 32.83 kg/m²       Intake/Output Summary (Last 24 hours) at 8/28/2022 0919  Last data filed at 8/28/2022 0558  Gross per 24 hour   Intake 1730.19 ml   Output 925 ml   Net 805.19 ml           General appearance: Lethargic, drowsy  Lungs: Clear to auscultation  CV: RRR  Vasc: Radial pulses 2+  Abdomen: Soft, distended, obese  Extremities: No peripheral edema or digital cyanosis  Skin:  Laceration left forehead, multiple ecchymotic areas  Neuro: Lethargic, arouses to pain, confused    DATA:    CBC with Differential:    Lab Results   Component Value Date/Time    WBC 11.3 08/27/2022 07:14 AM    RBC 2.70 08/27/2022 07:14 AM    HGB 8.5 08/27/2022 07:14 AM    HCT 26.0 08/27/2022 07:14 AM     08/27/2022 07:14 AM    MCV 96.3 08/27/2022 07:14 AM    MCH 31.5 08/27/2022 07:14 AM    MCHC 32.7 08/27/2022 07:14 AM    RDW 19.8 08/27/2022 07:14 AM    LYMPHOPCT 10.3 08/27/2022 07:14 AM    MONOPCT 7.6 08/27/2022 07:14 AM    EOSPCT 1.2 05/20/2021 12:00 AM    BASOPCT 0.3 08/27/2022 07:14 AM    MONOSABS 0.86 08/27/2022 07:14 AM    LYMPHSABS 1.17 08/27/2022 07:14 AM    EOSABS 0.08 08/27/2022 07:14 AM    BASOSABS 0.03 08/27/2022 07:14 AM     CMP:    Lab Results   Component Value Date/Time     08/28/2022 04:20 AM    K 2.9 08/28/2022 04:20 AM    K 3.9 08/25/2022 04:26 AM    CL 97 08/28/2022 04:20 AM    CO2 18 08/28/2022 04:20 AM    BUN 24 08/28/2022 04:20 AM    CREATININE 2.1 08/28/2022 04:20 AM    GFRAA 37 08/28/2022 04:20 AM    LABGLOM 31 08/28/2022 04:20 AM    GLUCOSE 98 08/28/2022 04:20 AM    PROT 5.7 08/28/2022 04:20 AM    LABALBU 3.0 08/28/2022 04:20 AM    CALCIUM 7.7 08/28/2022 04:20 AM    BILITOT 1.4 08/28/2022 04:20 AM    ALKPHOS 101 08/28/2022 04:20 AM    AST 38 08/28/2022 04:20 AM    ALT 68 08/28/2022 04:20 AM     Magnesium:    Lab Results   Component Value Date/Time    MG 2.5 08/22/2022 06:43 PM     Phosphorus:    Lab Results   Component Value Date/Time    PHOS 4.1 05/20/2021 12:00 AM     Radiology Review:      CT abdomen/pelvis 8/22/22   1. Mildly displaced fracture involving right lateral 7th rib. 2. No acute process in the abdomen or pelvis. 3. Cystic lesion involving the head of the pancreas measures 1.5 x 1 cm. MRI   with without contrast recommended for further evaluation. 4. Cholelithiasis. CT head 8/22/22   No acute intracranial abnormality. 8 mm left frontal meningioma with small focus of calcification. BRIEF SUMMARY OF INITIAL CONSULT:    Briefly Mr. Concepcion Estrada is a 80year-old male known to our office, with a past medical history of  CKD stage 3a, without proteinuria, 2/2 nephrosclerosis, baseline creatinine 1.6 mg/dL, HTN, chronic hyponatremia, cirrhosis 2/2 ETOH abuse, CAD, paroxysmal AF on warfarin, anemia, hyperuricemia, hyperlipidemia, hypothyroidism, osteoarthritis and neuropathy, who presented to the ED on August 22, 2022 via EMS after he sustained a mechanical fall at him which resulted in a head laceration. In the ED, lab work was significant for INR 7.8, sodium level 128, potassium 3.3, chloride 87, bicarbonate 10, BUN 27, creatinine 2.0, and lactic acid 16.4. A head CT was negative for hematoma/hemorrhage, and he was admitted for further evaluation and management. Creatinine improved initially with IVF administration, suggestive of volume responsive NIKOLAI. However, on August 25, 2022, patient's creatinine level increased to 2.7 mg/dL, reason for this consult.  Home medications include gabapentin, spironolactone, metolazone, torsemide, warfarin, magnesium, potassium chloride, sildafenil, metoprolol and duloxetine. Problems resolved:  Lactic acidosis      IMPRESSION/RECOMMENDATIONS:      NIKOLAI stage 1 on CKD, likely ATN , ischemic 2/2 hypotension and or contrast associated NIKOLAI. Creatinine peaked at 2.7. Renal function slowly improving, creatinine down to 2.4 with fair urine output. CKD Stage 3a without proteinuria 2/2 hypertensive nephrosclerosis. Baseline creatinine around 1.6 mg/dL    Hypotonic Hyponatremia, acute on chronic, (121) multifactorial, hemodynamically mediated 2/2 intravascular volume depletion due to diarrhea and probable poor oral solute intake, decreased effective circulating volume -cirrhosis, and decreased GFR-NIKOLAI. Sodium level improved at adequate rate, up to 129     Normal pH, with metabolic acidosis (pH: 2.771, pCO2: 28.8) 2/2 uremia, lactic acidosis vs. starvation/alcoholic ketoacidosis, with respiratory alkalosis. Lactic acid levels improved.     Normocytic, normochromic anemia, no obvious source of bleeding, CT abd/pelvis negative for RP hemorrhage  Secondary hyperparathyroidism 2/2 CKD, last PTH level 94   HTN, on metoprolol  ------------------------------------------  Alcohol abuse, on potassium, magnesium and thiamine at home  Cirrhosis, on spironolactone, torsemide and metolazone at home  Hyperuricemia  Hypothyroidism, on levothyroxine  Hyperlipidemia, on atorvastatin  Hx PAF, on warfarin and metoprolol  Hx neuropathy, on gabapentin and duloxetine - will hold gabapentin for now      Plan:    Monitor sodium levels closely, call if sodium is < 127   Continue strict I&O's every shift  Continue fluid restriction   Continue to hold gabapentin d/t AMS/lethargy   Continue to monitor renal function    Continue sodium bicarbonate 650 mg po qid  Replace potassium prn  Restart torsemide and aldactone

## 2022-08-28 NOTE — PROGRESS NOTES
[facility]                                Progress note    Subjective: The patient is lying in bed. No acute events overnight. Denies chest pain, angina, SOB   Still remains markedly confused     Objective:    BP (!) 138/51   Pulse 74   Temp 97.7 °F (36.5 °C) (Oral)   Resp 18   Ht 5' 9\" (1.753 m)   Wt 222 lb 4.8 oz (100.8 kg)   SpO2 100%   BMI 32.83 kg/m²     In: 1730.2 [I.V.:1730.2]  Out: 925   In: 1730.2   Out: 925 [Urine:925]    General appearance: NAD, conversant, confused at baseline  HEENT: AT/NC, MMM  Neck: FROM, supple  Lungs: Clear to auscultation  CV: RRR, no MRGs  Vasc: Radial pulses 2+  Abdomen: Soft, non-tender; no masses or HSM  Extremities: No peripheral edema or digital cyanosis, bilateral lower extremity edema  Skin:  Laceration left forehead, ecchymotic areas improved during hospital admission  Neuro: Alert and interactive     Recent Labs     08/26/22  0423 08/26/22  1124 08/27/22  0714   WBC  --   --  11.3   HGB 7.5* 7.6* 8.5*   HCT 22.3* 23.3* 26.0*   PLT  --   --  213       Recent Labs     08/27/22  1322 08/27/22  1647 08/28/22  0420   * 125* 129*   K 3.7 3.7 2.9*   CL 98 97* 97*   CO2 19* 15* 18*   BUN 26* 25* 24*   CREATININE 2.2* 2.1* 2.1*   CALCIUM 7.7* 7.6* 7.7*       Assessment:    Principal Problem:    GI bleed  Active Problems:    Supratherapeutic INR    Fracture of one rib  Resolved Problems:    * No resolved hospital problems.  *      Plan:  82-year-old male with a past medical history of A. fib with chronic anticoagulation and EtOH presented to the ED with mechanical fall and is admitted to telemetry unit with     GI bleed as evidenced by positive occult blood/lactic acidosis  -Telemetry monitoring  -H&H every 6 Hours transfuse as needed to keep hemoglobin greater than 7  HGB - 8.2 > 7.5>7.6>8.5-has remained stable over multiple checks-discontinue every 6 hours checks, daily is adequate  -Advance diet  -Hold all blood thinners.  -Consult general surgery -possible panendoscopy to ascertain etiology of bleed-I do not think he needs   -Medication for other comorbidities continue as appropriate with dosage adjustments as necessary. Hyponatremia/NIKOLAI on CKD-all have been stable over past several days  Monitor labs Na+ 129 improving  D5 normal saline at 75 per renal  Seizure precautions  BUN/Creat:  33/2.4>24/2.1  Consult nephrology-appreciate input  Insert carlos catherter  Strict I&O's       Lactic acidosis with leukocytosis-resolved  Zosyn x5 days completed  Encourage p.o. intake  WBC11k   Overall clinically appears markedly improved     Rib fracture from fall/trauma  Pain management -well-controlled  ISP  Lidocaine patch     Supratherapeutic INR -resolved  Hold anticoagulation  Monitor INR-reverse if persistently elevated and declining H&H  INR -2.1-no need for further reversal  Resume oral anticoagulation if patient is going to subacute rehab, he is not able to take it on his own at home due to safety issues     Tachycardia -resolved  Telemetry monitoring  IV hydration  Monitor labs    DVT Prophylaxis   PT/OT  Discharge planning -  likely [facility] on discharge, ready for discharge from an standpoint now that all parameters have stabilized-if okay with others      MONTSE Moulton CNP  11:46 AM  8/28/2022     Above note edited to reflect my thoughts     I personally saw, examined and provided care for the patient. Radiographs, labs and medication list were reviewed by me independently. The case was discussed in detail and plans for care were established. Review of Delaney MENDEZ CNP, documentation was conducted and revisions were made as appropriate directly by me. I agree with the above documented exam, problem list, and plan of care.      Britta eHrnandez MD  4:52 PM  8/28/2022

## 2022-08-29 ENCOUNTER — TELEPHONE (OUTPATIENT)
Dept: FAMILY MEDICINE CLINIC | Age: 82
End: 2022-08-29

## 2022-08-29 VITALS
WEIGHT: 222.3 LBS | HEART RATE: 67 BPM | BODY MASS INDEX: 32.92 KG/M2 | DIASTOLIC BLOOD PRESSURE: 65 MMHG | TEMPERATURE: 97.2 F | HEIGHT: 69 IN | SYSTOLIC BLOOD PRESSURE: 116 MMHG | OXYGEN SATURATION: 80 % | RESPIRATION RATE: 20 BRPM

## 2022-08-29 LAB
ANION GAP SERPL CALCULATED.3IONS-SCNC: 14 MMOL/L (ref 7–16)
ANISOCYTOSIS: ABNORMAL
BASOPHILS ABSOLUTE: 0 E9/L (ref 0–0.2)
BASOPHILS RELATIVE PERCENT: 0.4 % (ref 0–2)
BUN BLDV-MCNC: 22 MG/DL (ref 6–23)
BURR CELLS: ABNORMAL
CALCIUM SERPL-MCNC: 7.9 MG/DL (ref 8.6–10.2)
CHLORIDE BLD-SCNC: 96 MMOL/L (ref 98–107)
CO2: 18 MMOL/L (ref 22–29)
CREAT SERPL-MCNC: 2.1 MG/DL (ref 0.7–1.2)
EOSINOPHILS ABSOLUTE: 0 E9/L (ref 0.05–0.5)
EOSINOPHILS RELATIVE PERCENT: 0.9 % (ref 0–6)
GFR AFRICAN AMERICAN: 37
GFR NON-AFRICAN AMERICAN: 31 ML/MIN/1.73
GLUCOSE BLD-MCNC: 108 MG/DL (ref 74–99)
HCT VFR BLD CALC: 26 % (ref 37–54)
HEMOGLOBIN: 8.5 G/DL (ref 12.5–16.5)
INR BLD: 1.4
LYMPHOCYTES ABSOLUTE: 1.16 E9/L (ref 1.5–4)
LYMPHOCYTES RELATIVE PERCENT: 8.7 % (ref 20–42)
MCH RBC QN AUTO: 31.8 PG (ref 26–35)
MCHC RBC AUTO-ENTMCNC: 32.7 % (ref 32–34.5)
MCV RBC AUTO: 97.4 FL (ref 80–99.9)
MONOCYTES ABSOLUTE: 0.64 E9/L (ref 0.1–0.95)
MONOCYTES RELATIVE PERCENT: 5.2 % (ref 2–12)
NEUTROPHILS ABSOLUTE: 11.09 E9/L (ref 1.8–7.3)
NEUTROPHILS RELATIVE PERCENT: 86.1 % (ref 43–80)
OVALOCYTES: ABNORMAL
PDW BLD-RTO: 20.6 FL (ref 11.5–15)
PLATELET # BLD: 269 E9/L (ref 130–450)
PMV BLD AUTO: 10.5 FL (ref 7–12)
POIKILOCYTES: ABNORMAL
POLYCHROMASIA: ABNORMAL
POTASSIUM SERPL-SCNC: 3.8 MMOL/L (ref 3.5–5)
PRO-BNP: ABNORMAL PG/ML (ref 0–450)
PROTHROMBIN TIME: 15.7 SEC (ref 9.3–12.4)
RBC # BLD: 2.67 E12/L (ref 3.8–5.8)
SODIUM BLD-SCNC: 128 MMOL/L (ref 132–146)
WBC # BLD: 12.9 E9/L (ref 4.5–11.5)

## 2022-08-29 PROCEDURE — 6370000000 HC RX 637 (ALT 250 FOR IP): Performed by: PHYSICIAN ASSISTANT

## 2022-08-29 PROCEDURE — 6370000000 HC RX 637 (ALT 250 FOR IP): Performed by: STUDENT IN AN ORGANIZED HEALTH CARE EDUCATION/TRAINING PROGRAM

## 2022-08-29 PROCEDURE — 80048 BASIC METABOLIC PNL TOTAL CA: CPT

## 2022-08-29 PROCEDURE — 97530 THERAPEUTIC ACTIVITIES: CPT

## 2022-08-29 PROCEDURE — 85610 PROTHROMBIN TIME: CPT

## 2022-08-29 PROCEDURE — 83880 ASSAY OF NATRIURETIC PEPTIDE: CPT

## 2022-08-29 PROCEDURE — 85025 COMPLETE CBC W/AUTO DIFF WBC: CPT

## 2022-08-29 PROCEDURE — 97535 SELF CARE MNGMENT TRAINING: CPT

## 2022-08-29 PROCEDURE — 36415 COLL VENOUS BLD VENIPUNCTURE: CPT

## 2022-08-29 PROCEDURE — 2580000003 HC RX 258: Performed by: STUDENT IN AN ORGANIZED HEALTH CARE EDUCATION/TRAINING PROGRAM

## 2022-08-29 PROCEDURE — 6370000000 HC RX 637 (ALT 250 FOR IP): Performed by: INTERNAL MEDICINE

## 2022-08-29 RX ORDER — SODIUM BICARBONATE 650 MG/1
650 TABLET ORAL 4 TIMES DAILY
Qty: 120 TABLET | Refills: 0 | Status: SHIPPED | OUTPATIENT
Start: 2022-08-29

## 2022-08-29 RX ORDER — PANTOPRAZOLE SODIUM 40 MG/1
40 TABLET, DELAYED RELEASE ORAL
Qty: 30 TABLET | Refills: 3 | Status: SHIPPED | OUTPATIENT
Start: 2022-08-29 | End: 2022-10-07

## 2022-08-29 RX ORDER — WARFARIN SODIUM 4 MG/1
4 TABLET ORAL DAILY
Qty: 30 TABLET | Refills: 3 | Status: SHIPPED | OUTPATIENT
Start: 2022-08-29

## 2022-08-29 RX ADMIN — ATORVASTATIN CALCIUM 40 MG: 40 TABLET, FILM COATED ORAL at 10:28

## 2022-08-29 RX ADMIN — SODIUM BICARBONATE 650 MG: 650 TABLET ORAL at 16:28

## 2022-08-29 RX ADMIN — TORSEMIDE 20 MG: 20 TABLET ORAL at 10:28

## 2022-08-29 RX ADMIN — Medication 10 ML: at 11:21

## 2022-08-29 RX ADMIN — PANTOPRAZOLE SODIUM 40 MG: 40 TABLET, DELAYED RELEASE ORAL at 06:18

## 2022-08-29 RX ADMIN — DULOXETINE HYDROCHLORIDE 20 MG: 20 CAPSULE, DELAYED RELEASE ORAL at 10:28

## 2022-08-29 RX ADMIN — PANTOPRAZOLE SODIUM 40 MG: 40 TABLET, DELAYED RELEASE ORAL at 16:28

## 2022-08-29 RX ADMIN — METOPROLOL TARTRATE 50 MG: 50 TABLET, FILM COATED ORAL at 10:28

## 2022-08-29 RX ADMIN — SODIUM BICARBONATE 650 MG: 650 TABLET ORAL at 10:28

## 2022-08-29 RX ADMIN — SODIUM BICARBONATE 650 MG: 650 TABLET ORAL at 13:32

## 2022-08-29 RX ADMIN — LEVOTHYROXINE SODIUM 200 MCG: 0.1 TABLET ORAL at 06:18

## 2022-08-29 RX ADMIN — THIAMINE HCL TAB 100 MG 100 MG: 100 TAB at 10:28

## 2022-08-29 RX ADMIN — SPIRONOLACTONE 25 MG: 25 TABLET ORAL at 10:28

## 2022-08-29 NOTE — CARE COORDINATION
Care Coordination: Attempted to call [facility], went to . I have message asha from [facility] to see if she can accept  and what availability is on day of discharge [phone]    Andra Centeno    Addendum: sonia is running benefits to see if they can accept    Andra Centeno    Addendum: pt is 19/24. Does not qualify for [facility] unless son would like to private pay.   Son has called into nursing and would like to speak to attending      Andra Centeno

## 2022-08-29 NOTE — DISCHARGE INSTR - DIET

## 2022-08-29 NOTE — PROGRESS NOTES
NT  Scooting: NT    Patient sitting EOB upon entry to room. In bedside chair at completion of PT session. Rolling: Ind  Supine to sit: Ind  Sit to supine: Ind  Scooting: Ind   Transfers Sit to stand: Mod  to HHA  Stand to sit: Min  Stand pivot: Mod  with fww Sit to stand: Min A  Stand to sit: Min A  Stand pivot: Min A with [employer] Sit to stand: Ind   Stand to sit: Ind   Stand pivot: Ind with fww   Ambulation    Side steps only very unsteady. Mod A. Required. 50 feet with WW with SBA No LOB slow rate. Used walker appropiately. 100 feet with Mod Ind     Stair negotiation: ascended and descended  NT NT 3 steps with Mod Ind     ROM BUE:  wfl   BLE:  wfl     Strength BUE: wfl   RLE:  Grossly 4-/5  LLE:  Grossly  4-/5  4+/5   Balance Sitting EOB:  Ind  Dynamic Standing: Mod  Sitting EOB: SBA  Dynamic Standing:  SBA with [employer] Sitting EOB:  Ind  Dynamic Standing:  Ind     Pt is A & O x: 4 grossly to person, place, time and situation. Sensation: Pt denies numbness and tingling. Edema: Unremarkable. Patient education  Pt educated on safety with functional mobility. Patient response to education:   Pt verbalized understanding Pt demonstrated skill Pt requires further education in this area   Yes  Partial  Reinforce      ASSESSMENT:    Comments:    Upon entry to room, pt EOB agreeable to PT session. Adamant about returning home this date. Functional mobility noted above. SBA with use of fww. Patient used fww in safe manner. States he will use device once home. Patient slow rate of ambulation. Pt was slightly SOB after activity. Pt O2 sat was 97% and HR was 71 bpm.   Pt educated not to get up on his own, and to use call light to request for help when wanting to return back to bed. Pt was left in chair with all needs met and call light nearby. Treatment:  Patient practiced and was instructed in the following treatment:    Therapeutic activities:  Bed mobility: Pt was cued for safety and sequencing.   Transfers: SBA required for transfers with fww for assist.   Ambulation: SBA for gait. Monitoring of vitals throughout. PLAN:    Patient is making Good progress towards established goals. Will continue with current POC.       Time in: 1430  Time out: 1445    Total Treatment Time 15 minutes     CPT codes:  [] Gait training 05368 0 minutes  [] Manual therapy 22247 0 minutes  [x] Therapeutic activities 73557 15 minutes  [] Therapeutic exercises 07444 0 minutes  [] Neuromuscular reeducation [address] 0 minutes    Donato Rosales, [address] [facility]

## 2022-08-29 NOTE — TELEPHONE ENCOUNTER
Oniel Merchant from [facility] called in stating that Lon Kapoor was discharged from [facility] and they're requesting he receives nursing and PT at home. Will you follow? Any questions or concerns, contact Alka at [phone].

## 2022-08-29 NOTE — PROGRESS NOTES
Department of Internal Medicine  Nephrology Progress Note    Events reviewed. SUBJECTIVE: We are following Mr. Gable Lombard for NIKOLAI on CKD and hyponatremia. He reports no complaints.     PHYSICAL EXAM:      Vitals:    VITALS:  /60   Pulse 66   Temp 97.4 °F (36.3 °C) (Oral)   Resp 20   Ht 5' 9\" (1.753 m)   Wt 222 lb 4.8 oz (100.8 kg)   SpO2 100%   BMI 32.83 kg/m²       Intake/Output Summary (Last 24 hours) at 8/29/2022 1202  Last data filed at 8/29/2022 0601  Gross per 24 hour   Intake --   Output 325 ml   Net -325 ml         General appearance: Alert, awake  Lungs: Clear to auscultation  CV: RRR  Vasc: Radial pulses 2+  Abdomen: Soft, distended, obese  Extremities: No peripheral edema or digital cyanosis  Skin:  Laceration left forehead, multiple ecchymotic areas    Scheduled Meds:   torsemide  20 mg Oral BID    spironolactone  25 mg Oral Daily    sodium bicarbonate  650 mg Oral 4x Daily    pantoprazole  40 mg Oral BID AC    atorvastatin  40 mg Oral Daily    DULoxetine  20 mg Oral Daily    [Held by provider] gabapentin  100 mg Oral TID    levothyroxine  200 mcg Oral Daily    metoprolol tartrate  50 mg Oral BID    sodium chloride flush  5-40 mL IntraVENous 2 times per day    sodium chloride flush  5-40 mL IntraVENous 2 times per day    thiamine  100 mg Oral Daily    lidocaine  1 patch TransDERmal Daily     Continuous Infusions:   sodium chloride      sodium chloride      sodium chloride       PRN Meds:.sodium chloride, sodium chloride flush, sodium chloride, promethazine **OR** [DISCONTINUED] ondansetron, polyethylene glycol, acetaminophen **OR** acetaminophen, sodium chloride flush, sodium chloride, sodium chloride flush    DATA:    CBC with Differential:    Lab Results   Component Value Date/Time    WBC 12.9 08/29/2022 04:08 AM    RBC 2.67 08/29/2022 04:08 AM    HGB 8.5 08/29/2022 04:08 AM    HCT 26.0 08/29/2022 04:08 AM     08/29/2022 04:08 AM    MCV 97.4 08/29/2022 04:08 AM    MCH 31.8 08/29/2022 04:08 AM    MCHC 32.7 08/29/2022 04:08 AM    RDW 20.6 08/29/2022 04:08 AM    LYMPHOPCT 8.7 08/29/2022 04:08 AM    MONOPCT 5.2 08/29/2022 04:08 AM    EOSPCT 1.2 05/20/2021 12:00 AM    BASOPCT 0.4 08/29/2022 04:08 AM    MONOSABS 0.64 08/29/2022 04:08 AM    LYMPHSABS 1.16 08/29/2022 04:08 AM    EOSABS 0.00 08/29/2022 04:08 AM    BASOSABS 0.00 08/29/2022 04:08 AM     CMP:    Lab Results   Component Value Date/Time     08/29/2022 04:08 AM    K 3.8 08/29/2022 04:08 AM    K 3.9 08/25/2022 04:26 AM    CL 96 08/29/2022 04:08 AM    CO2 18 08/29/2022 04:08 AM    BUN 22 08/29/2022 04:08 AM    CREATININE 2.1 08/29/2022 04:08 AM    GFRAA 37 08/29/2022 04:08 AM    LABGLOM 31 08/29/2022 04:08 AM    GLUCOSE 108 08/29/2022 04:08 AM    PROT 5.7 08/28/2022 04:20 AM    LABALBU 3.0 08/28/2022 04:20 AM    CALCIUM 7.9 08/29/2022 04:08 AM    BILITOT 1.4 08/28/2022 04:20 AM    ALKPHOS 101 08/28/2022 04:20 AM    AST 38 08/28/2022 04:20 AM    ALT 68 08/28/2022 04:20 AM     Magnesium:    Lab Results   Component Value Date/Time    MG 2.5 08/22/2022 06:43 PM     Phosphorus:    Lab Results   Component Value Date/Time    PHOS 4.1 05/20/2021 12:00 AM     Radiology Review:      CT abdomen/pelvis 8/22/22   1. Mildly displaced fracture involving right lateral 7th rib. 2. No acute process in the abdomen or pelvis. 3. Cystic lesion involving the head of the pancreas measures 1.5 x 1 cm. MRI   with without contrast recommended for further evaluation. 4. Cholelithiasis. CT head 8/22/22   No acute intracranial abnormality. 8 mm left frontal meningioma with small focus of calcification. BRIEF SUMMARY OF INITIAL CONSULT:    Briefly Mr. Roxanna Chavez is a 80year-old male known to our office, with a past medical history of  CKD stage 3a, without proteinuria, 2/2 nephrosclerosis, baseline creatinine 1.6 mg/dL, HTN, chronic hyponatremia, cirrhosis 2/2 ETOH abuse, CAD, paroxysmal AF on warfarin, anemia, hyperuricemia, hyperlipidemia, hypothyroidism, osteoarthritis and neuropathy, who presented to the ED on August 22, 2022 via EMS after he sustained a mechanical fall at him which resulted in a head laceration. In the ED, lab work was significant for INR 7.8, sodium level 128, potassium 3.3, chloride 87, bicarbonate 10, BUN 27, creatinine 2.0, and lactic acid 16.4. A head CT was negative for hematoma/hemorrhage, and he was admitted for further evaluation and management. Creatinine improved initially with IVF administration, suggestive of volume responsive NIKOLAI. However, on August 25, 2022, patient's creatinine level increased to 2.7 mg/dL, reason for this consult. Home medications include gabapentin, spironolactone, metolazone, torsemide, warfarin, magnesium, potassium chloride, sildafenil, metoprolol and duloxetine. Problems resolved:  Lactic acidosis      IMPRESSION/RECOMMENDATIONS:      NIKOLAI stage I on CKD, likely ATN , ischemic 2/2 hypotension and/or contrast associated NIKOLAI. Creatinine peaked at 2.7. Renal function has improved since admission and has remained stable the past 48 hours. CKD Stage IIIa without proteinuria 2/2 hypertensive nephrosclerosis. Baseline creatinine around 1.6 mg/dL    Hypotonic Hyponatremia, acute on chronic, multifactorial, hemodynamically mediated 2/2 intravascular volume depletion due to diarrhea and probable poor oral solute intake, decreased effective circulating volume (cirrhosis), and decreased GFR-NIKOLAI. Sodium level improved and remains stable. Normal pH, with metabolic acidosis (pH: 9.533, pCO2: 28.8) 2/2 uremia, lactic acidosis vs. starvation/alcoholic ketoacidosis, with respiratory alkalosis.  On sodium bicarbonate    Normocytic, normochromic anemia, no obvious source of bleeding, CT abd/pelvis negative for RP hemorrhage  Secondary hyperparathyroidism 2/2 CKD, last PTH level 94   HTN, on metoprolol  HFpEF 55-60%, pro-BNP 21,129, on torsemide and spironolactone   Cirrhosis, on spironolactone and torsemide  ------------------------------------------  Alcohol abuse, on potassium, magnesium and thiamine at home  Hyperuricemia  Hypothyroidism, on levothyroxine  Hyperlipidemia, on atorvastatin  Hx PAF, on metoprolol; warfarin on hold   Hx neuropathy, on duloxetine; gabapentin on hold      Plan:    Continue torsemide 20 mg PO BID  Continue spironolactone 25 mg PO daily   Continue sodium bicarbonate 650 mg po qid  Continue to hold gabapentin   Continue to monitor sodium level  Strict I&Os  Dry tray  Continue to monitor renal function    Continue to monitor sodium level   OK to discharge from renal point of view  Follow-up in our office in 2 weeks  BMP in 1 week    Electronically signed by Luverne Essex, APRN - CNP on 8/29/2022 at 12:18 PM

## 2022-08-29 NOTE — PROGRESS NOTES
Requirements: 1 ml/kcal  Fluid (ml/day): 6466-6067    Nutrition Diagnosis:   Inadequate oral intake related to altered GI function (2/2 ETOH cirrhosis) as evidenced by poor intake prior to admission, intake 26-50%    Nutrition Interventions:   Food and/or Nutrient Delivery: Continue Current Diet, Start Oral Nutrition Supplement  Nutrition Education/Counseling: Education not indicated  Coordination of Nutrition Care: Continue to monitor while inpatient       Goals:  Previous Goal Met: Progressing toward Goal(s)  Goals: PO intake 75% or greater, by next RD assessment       Nutrition Monitoring and Evaluation:   Behavioral-Environmental Outcomes: None Identified  Food/Nutrient Intake Outcomes: Food and Nutrient Intake, Supplement Intake  Physical Signs/Symptoms Outcomes: Biochemical Data, Chewing or Swallowing, Diarrhea, GI Status, Fluid Status or Edema, Hemodynamic Status, Meal Time Behavior, Nutrition Focused Physical Findings, Skin, Weight    Discharge Planning:     Too soon to determine     Tameka Wood RD, LD  Contact: [phone]

## 2022-08-29 NOTE — PROGRESS NOTES
Spoke with patient's son. I choiced him. [facility] chose [facility]. He also was worried about patient being in bed past few days. PT/OT reevaluated patient.

## 2022-08-29 NOTE — PLAN OF CARE
Patient's chart updated to reflect:      . - HF care plan, HF education points and HF discharge instructions.  -Orders: 2 gram sodium diet, daily weights, I/O.  -PCP and/or Cardiologist appointment to be scheduled within 7 days of hospital discharge.  -History of HF, not primary admission Dx.   Patient admitted for treatment of GI bleed as evidenced by positive occult blood/lactic acidosis  Asher Gibbons RN RN, BSN  Heart Failure Navigator

## 2022-08-29 NOTE — PROGRESS NOTES
OCCUPATIONAL THERAPY TREATMENT NOTE    [place] [address] [employer] [place] [id number] [place] [address] Danny Casas         Date:2022  Patient Name: Jarod Zapata  MRN: [id number]  : 1940  Room: [address] [place], [place] [address]       Evaluating OT: Glenis Prado OTR/L #[id number]      Referring Provider: JAQUELINE Marrero  Specific Provider Orders/Date: OT eval and treat 22     Diagnosis: Cyst of pancreas [K86.2]  Hypokalemia [E87.6]  GI bleed [K92.2]  Lactic acid acidosis [E87.2]  Meningioma ([facility] [address] [D32.9]  NIKOLAI (acute kidney injury) ([facility] [address] [N17.9]  Supratherapeutic INR [R79.1]  Fall, initial encounter [W19. XXXA]  Alcohol withdrawal syndrome without complication ([facility] [address] [U08.992]  Closed fracture of one rib of left side, initial encounter [S22.32XA]   Pt admitted to hospital following fall; R 7th rib fracture      Pertinent Medical History:  has a past medical history of Blood circulation, collateral, CAD (coronary artery disease), Chronic kidney disease, History of alcohol use, History of ascites, Hyperlipidemia, Hypertension, Hyponatremia, Left knee pain, Liver disease, Lymphedema, Neuropathy, Osteoarthritis, Psychiatric problem, Thyroid disease, Use of cane as ambulatory aid, Uses wheelchair, Wears glasses, and Wears hearing aid.          Precautions:  Fall Risk, R rib fracture, bed alarm     Assessment of current deficits    [x] Functional mobility          [x]ADLs           [x] Strength                  [x]Cognition    [x] Functional transfers        [x] IADLs         [x] Safety Awareness   [x]Endurance    [] Fine Coordination                        [x] Balance      [] Vision/perception   []Sensation      []Gross Motor Coordination            [] ROM           [] Delirium                   [] Motor Control      OT PLAN OF CARE   OT POC based on physician orders, patient diagnosis and results of clinical assessment     Frequency/Duration 1-3 days/wk for 2 weeks PRN   Specific OT Treatment Interventions to include:   * Instruction/training on adapted ADL techniques and AE recommendations to increase functional independence within precautions       * Training on energy conservation strategies, correct breathing pattern and techniques to improve independence/tolerance for self-care routine  * Functional transfer/mobility training/DME recommendations for increased independence, safety, and fall prevention  * Patient/Family education to increase follow through with safety techniques and functional independence  * Recommendation of environmental modifications for increased safety with functional transfers/mobility and ADLs  * Therapeutic exercise to improve motor endurance, ROM, and functional strength for ADLs/functional transfers  * Therapeutic activities to facilitate/challenge dynamic balance, stand tolerance for increased safety and independence with ADLs  * Therapeutic activities to facilitate gross/fine motor skills for increased independence with ADLs  * Neuro-muscular re-education: facilitation of righting/equilibrium reactions, midline orientation, scapular stability/mobility, normalization of muscle tone, and facilitation of volitional active controled movement     Recommended Adaptive Equipment:  TBD      Home Living: Pt lives alone in a 2 story home with 3 TALYA and no hand rail. 1st floor set-up. Bathroom setup: tub/shower combo    Equipment owned: none     Prior Level of Function: Independent with ADLs , Independent with IADLs; ambulated without AD in house; SPC  prn in community   Driving: yes   Occupation: retired       Pain Level: Pt denies pain this session     Cognition: A&O: x3;  Follows 1-2 step directions             Memory:  fair             Sequencing:  fair             Problem solving:  fair             Judgement/safety:  fair               Functional Assessment:  AM-PAC Daily Activity Raw Score: 16/24    Initial Eval Status  Date: 8/23/22 Treatment Status  Date: 8/29/22 STGs = LTGs  Time frame: 10-14 days   Feeding Independent  Indep     Grooming Minimal Assist  Sup    Seated grooming tasks at EOB  Modified [place]    UB Dressing Minimal Assist  Sup      Donning robe seated at EOB Modified [place]    LB Dressing Moderate Assist   SBA    Seated LB dressing (socks, shoes) Modified [place]    Bathing Moderate Assist SBA    Simulated  Modified [place]    Toileting Moderate Assist  SBA    Toileting hygiene    Modified [place]    Bed Mobility  Supine to sit: Minimal Assist   Sit to supine: Minimal Assist  SBA Supine to sit: Modified [place]   Sit to supine: Modified [place]    Functional Transfers Moderate Assist  SBA    Various surfaces; cuing on hand placement, body mechanics and safety Modified [place]    Functional Mobility Moderate Assist      Ambulated short distance with HHA SBA    Ambulated in room with w/w; mild unsteadiness noted Modified [place]    Balance Sitting:     Static:  SBA    Dynamic:SBA  Standing: mod A with HHA Sitting: sup  Standing: SBA with w/w      Activity Tolerance F- Fair  F+   Visual/  Perceptual wfl                        Comments: Upon arrival pt seated at EOB and agreeable to OT session. At end of session seated in chair with call light within reach. Treatment: Facilitation of bed mobility, sitting balance (impacting ADLs; addressing posture, weight shifting, dynamic reaching), functional transfers (various surfaces), standing tolerance tasks (addressing posture, balance and activity tolerance; impacting ADLs and functional activity) and functional ambulation tasks in room with w/w - skilled cuing on sequencing, hand placement, posture, body mechanics, energy conservation techniques and safety.   Therapist facilitated self-care retraining: UB/LB self-care tasks (robe, socks, shoes), toileting hygiene task and grooming tasks while educating pt on modified techniques, posture, safety and energy conservation techniques. Skilled monitoring of HR, O2 sats and pts response to treatment. Pt has made fair progress towards set goals. Continue with current plan of care: addressing adl retraining, transfer training and functional ambulation.        Treatment Time In:1430            Treatment Time Out: 7618              Treatment Charges: Mins Units   Ther Ex  56273     Manual Therapy 54645     Thera Activities 82256 10 1   ADL/Home Mgt 20074 15 1   Neuro Re-ed 29002     Group Therapy      Orthotic manage/training  56792     Non-Billable Time     Total Timed Treatment 25 2        Sarah Shelton OTR/L #[id number]

## 2022-08-30 ENCOUNTER — TELEPHONE (OUTPATIENT)
Dept: PRIMARY CARE CLINIC | Age: 82
End: 2022-08-30

## 2022-08-30 ENCOUNTER — CARE COORDINATION (OUTPATIENT)
Dept: CARE COORDINATION | Age: 82
End: 2022-08-30

## 2022-08-30 ENCOUNTER — TELEPHONE (OUTPATIENT)
Dept: FAMILY MEDICINE CLINIC | Age: 82
End: 2022-08-30

## 2022-08-30 NOTE — TELEPHONE ENCOUNTER
[facility] Transitions Initial Follow Up Call    Outreach made within 2 business days of discharge: Yes    Patient: Monae Clarke Patient : 1940   MRN: [id number]  Reason for Admission: GI bleed  Discharge Date: 22       Spoke with: Left message to return call back.     Discharge department/facility: [facility]    Scheduled appointment with PCP within 7-14 days    Follow Up  Future Appointments   Date Time Provider Rafi Alvarenga   2022 11:00 AM SCHEDULE, MHYX N [facility] PC [facility]   10/7/2022 11:00 AM MD JHONATHAN Martin [place], [place]

## 2022-08-30 NOTE — LETTER
9/1/2022    [address] [address]    Dear Goldy Mercer,    I enjoyed speaking with you and wanted to send some additional information. Jamison Mohs, MD and I will work together to ensure your needs are met and help you achieve your health goals. We are committed to walk with you on this journey and look forward to working with you. Please feel free to contact me with any questions or concerns. I am available by phone. You can reach me at [phone] .       In good health,     Steve Montgomery RN         CC:  Bleeding precautions and fall prevention education

## 2022-08-31 SDOH — ECONOMIC STABILITY: HOUSING INSECURITY: IN THE LAST 12 MONTHS, HOW MANY PLACES HAVE YOU LIVED?: 1

## 2022-08-31 SDOH — ECONOMIC STABILITY: FOOD INSECURITY: WITHIN THE PAST 12 MONTHS, YOU WORRIED THAT YOUR FOOD WOULD RUN OUT BEFORE YOU GOT MONEY TO BUY MORE.: NEVER TRUE

## 2022-08-31 SDOH — ECONOMIC STABILITY: TRANSPORTATION INSECURITY
IN THE PAST 12 MONTHS, HAS THE LACK OF TRANSPORTATION KEPT YOU FROM MEDICAL APPOINTMENTS OR FROM GETTING MEDICATIONS?: NO

## 2022-08-31 SDOH — ECONOMIC STABILITY: TRANSPORTATION INSECURITY
IN THE PAST 12 MONTHS, HAS LACK OF TRANSPORTATION KEPT YOU FROM MEETINGS, WORK, OR FROM GETTING THINGS NEEDED FOR DAILY LIVING?: NO

## 2022-08-31 SDOH — ECONOMIC STABILITY: FOOD INSECURITY: WITHIN THE PAST 12 MONTHS, THE FOOD YOU BOUGHT JUST DIDN'T LAST AND YOU DIDN'T HAVE MONEY TO GET MORE.: NEVER TRUE

## 2022-08-31 SDOH — ECONOMIC STABILITY: HOUSING INSECURITY
IN THE LAST 12 MONTHS, WAS THERE A TIME WHEN YOU DID NOT HAVE A STEADY PLACE TO SLEEP OR SLEPT IN A SHELTER (INCLUDING NOW)?: NO

## 2022-08-31 SDOH — ECONOMIC STABILITY: INCOME INSECURITY: IN THE LAST 12 MONTHS, WAS THERE A TIME WHEN YOU WERE NOT ABLE TO PAY THE MORTGAGE OR RENT ON TIME?: NO

## 2022-08-31 ASSESSMENT — SOCIAL DETERMINANTS OF HEALTH (SDOH): HOW HARD IS IT FOR YOU TO PAY FOR THE VERY BASICS LIKE FOOD, HOUSING, MEDICAL CARE, AND HEATING?: NOT HARD AT ALL

## 2022-08-31 NOTE — TELEPHONE ENCOUNTER
I agree with the Cr Mahajan    Since the patient was just discharged on the 29th, I will have to see how he looks when he comes into the office for the INR and would consider blood work either then or for the following week when he comes in for the hospital follow-up

## 2022-08-31 NOTE — TELEPHONE ENCOUNTER
If this lethargy continues I would recommend going back to the hospital    It is hard for me to tell you exactly when and when not to send the patient to the hospital or things to look out for as there could be numerous different things.   If he is not improving would send him back for reevaluation
Joe notified and verbalized understanding. He states the patient is not lethargic, just really tired.
Son Miguel A notified and states he just is looking for what to watch for. He knows that if it were something really wrong he would take him to the ER. States the last 24 hours patient ha probably slept a total of 18 hours.  Just unsure what to look for if he does need to take him to ER,
Son evangelista calling in he is very concerned about pt. He got home from the hospital yesterday at 6:30pm. He has been sleeping basically nonstop since. Pt states hes ok just tired from getting no sleep in the hospital. Son notices nothing else unusual. He would like to know if there is anything he should be observing for , he sees you 09/07?
Thank you for the update
Would recommend ER if concerned
82.1

## 2022-08-31 NOTE — CARE COORDINATION
Ambulatory Care Coordination Note  8/31/2022    ACC: Rogelio Campbell, RN    Summary Note: [facility] contacted Richmond Rinaldi to offer enrollment in care coordination. He is agreeable. Richmond Rinaldi lives in a private residence alone. He had been driving until his recent hospital stay. Patient's son Nalini Remy will be transporting patient to all upcoming appointments. Richmond Rinaldi is agreeable to a social work consultation to explore transportation options if he son is unable to transport him. Upon discharge from hospital, patient will have OT, PT, and skilled nursing through [facility]. The initial assessment is scheduled for today. Richmond Rinaldi is using a walker to ambulate in his home. He states he is weak since his hospital stay. He denies any falls since hospital discharge. He denies current use of ETOH. Reviewed fall precautions with Richmond Rinaldi:   1)Getting up slowly when changing positions  2)Keeping pathways clear  3)Keep house well lit  4)Ambulate with assistive device  5)Keep a phone/life alert with you when ambulating. ACM educated patient on the benefits of a life alert system with Brennan. He voiced understanding but would like to think about it. Richmond Rinaldi states he uses a pill organizer for his medications. His son fills the box weekly. He states she takes his medication \"sometimes\". He states he forgets to take them every day and is most likely to forget his afternoon and bedtime doses. Richmond Rinaldi is taking coumadin and has an appointment on 9/2 for an INR to be drawn. AC reviewed bleeding precautions with Richmond Rinaldi and he voiced understanding.         Richmond Rinaldi gives [facility] verbal permission to contact son (Joe)    Plan  Social work referral  Contact patient's son  Send bleeding precautions, fall prevention education, and welcome letter via mail      Ambulatory Care Coordination Assessment    Care Coordination Protocol  Referral from Primary Care Provider: No  Week 1 - Initial Assessment     Do you have all of your prescriptions and are they filled?: Yes  Barriers to medication adherence: Does not feel there is a need/No perceived effect, Forgets to take, Does not understand need for medication  Are you able to afford your medications?: Yes  How often do you have trouble taking your medications the way you have been told to take them?: I seldom take them as prescribed. Do you have Home O2 Therapy?: No      Ability to seek help/take action for Emergent Urgent situations i.e. fire, crime, inclement weather or health crisis. : Independent  Ability to ambulate to restroom: Independent  Ability handle personal hygeine needs (bathing/dressing/grooming): Independent  Ability to manage Medications: Dependent  Is patient able to live independently?: Yes     Current Housing: Apartment        Per the Fall Risk Screening, did the patient have 2 or more falls or 1 fall with injury in the past year?: Yes  How often do you think you are about to fall and you do NOT fall? For example, you grab something to stabilize yourself or hold onto a wall/furniture?: Occasionally  Use of a Mobility Aid: Yes (Comment: walker)  Difficulty walking/impaired gait: Yes (Comment: deconditioned)  Issues with feet or shoes like numbness, edema, shoes not fitting: No  Changes in vision, poor vision or poor lighting in environment: No  Dizziness: Yes  Other Fall Risk: Yes  What other fall risks does the patient have?: ETOH           Suggested Interventions and Community Resources  Fall Risk Prevention: Completed Medi Set or Pill Pack: Not Started   Occupational Therapy: Completed   Pharmacist: Not Started   Physical Therapy: Completed   Social Work: Completed         Set up/Review an Education Plan, Set up/Review Goals                Prior to Admission medications    Medication Sig Start Date End Date Taking?  Authorizing Provider   pantoprazole (PROTONIX) 40 MG tablet Take 1 tablet by mouth 2 times daily (before meals) 8/29/22   Daija Bailey MD   warfarin (COUMADIN) 4 MG tablet Take 1 tablet by mouth daily 8/29/22   Adalgisa Gruber MD   sodium bicarbonate 650 MG tablet Take 1 tablet by mouth 4 times daily 8/29/22   Bridger Renteria MD   gabapentin (NEURONTIN) 100 MG capsule 200mg in am, 100mg at noon and 200mg in pm 8/22/22 2/2/23  Praful Robles MD   DULoxetine (CYMBALTA) 20 MG extended release capsule Take 1 capsule by mouth daily 6/8/22   Praful Robles MD   atorvastatin (LIPITOR) 40 MG tablet Take 1 tablet by mouth daily 5/11/22   Smita Amaya MD   levothyroxine (SYNTHROID) 200 MCG tablet Take 1 tablet by mouth Daily Pt take 0.5 Saturday and Abdiaziz 3/30/22   Praful Robles MD   potassium chloride (KLOR-CON M) 20 MEQ extended release tablet Take 1 tablet by mouth 3 times daily 2/11/22   Praful Robles MD   metOLazone (ZAROXOLYN) 2.5 MG tablet 1 tablet by mouth monday and Friday only 2/11/22   Praful Robles MD   spironolactone (ALDACTONE) 25 MG tablet Take 25 mg by mouth daily 9/2/20   Historical Provider, MD   magnesium oxide (MAG-OX) 400 (241.3 Mg) MG TABS tablet TAKE ONE TABLET BY MOUTH DAILY 2/10/20   Historical Provider, MD   torsemide (DEMADEX) 20 MG tablet Take 40 mg by mouth 2 times daily  6/5/19   Historical Provider, MD   vitamin B-1 (THIAMINE) 100 MG tablet Take 100 mg by mouth daily    Historical Provider, MD   metoprolol tartrate (LOPRESSOR) 50 MG tablet Take 1 tablet by mouth 2 times daily 6/20/17   Regina Akers MD   MULTIPLE VITAMIN PO Take 1 tablet by mouth daily Last taken 06/16/2020    Historical Provider, MD       Future Appointments   Date Time Provider Rafi Alvarenga   9/2/2022 11:00 AM SCHEDULE, MHYX N [facility] PC N [facility]   9/7/2022  2:00 PM MD Cam Maharaj [facility]   10/7/2022 11:00 AM MD JHONATHAN Maharaj [facility]      and   General Assessment    Do you have any symptoms that are causing concern?: Yes  Progression since Onset: Unchanged  Reported Symptoms:  (Comment: deconditioned-PT with [place] to start)

## 2022-09-01 NOTE — DISCHARGE SUMMARY
[facility]   Discharge summary   Patient ID:  Luisa Medel  [id number]  80 y.o.  1940    Admit date: 8/22/2022    Discharge date and time: 8/29/2022    Admission Diagnoses:   Patient Active Problem List   Diagnosis    Neuropathy ([facility] [address]    Cirrhosis ([facility] [address]    Hyponatremia    Cardiomyopathy ([facility] [address]    Alcohol abuse    Delirium due to multiple etiologies    Persistent atrial fibrillation    Primary osteoarthritis of left knee    Osteoarthritis    Lymphedema    Hypertension    Hyperlipidemia    History of ascites    Fatigue    CKD (chronic kidney disease) stage 3, GFR 30-59 ml/min ([facility])    Hyperuricemia    Prostate enlargement    Impaired fasting glucose    Vitamin D deficiency    Other specified hypothyroidism    Coronary artery disease involving native coronary artery of native heart without angina pectoris    Status post total left knee replacement    GI bleed    Supratherapeutic INR    Fracture of one rib       Discharge Diagnoses:  GI Bleed    Consults: GI, nephrology, and general surgery    Procedures: None    Hospital Course: The patient is a 80 y.o. male of Niurka Cameron MD     72-year-old male with a past medical history of A. fib with chronic anticoagulation and EtOH presented to the ED with mechanical fall and is admitted to telemetry unit with     GI bleed as evidenced by positive occult blood/lactic acidosis  -Telemetry monitoring  -H&H every 6 Hours transfuse as needed to keep hemoglobin greater than 7  HGB - 8.2 > 7.5>7.6>8.5-has remained stable over multiple checks-discontinue every 6 hours checks, daily is adequate  -Advance diet  -Hold all blood thinners.  -Consult general surgery -possible panendoscopy to ascertain etiology of bleed-I do not think he needs   -Medication for other comorbidities continue as appropriate with dosage adjustments as necessary.      Hyponatremia/NIKOLAI on CKD-all have been stable over past several days  Monitor labs Na+ 129 improving  D5 normal saline at 75 per renal  Seizure precautions  BUN/Creat:  33/2.4>24/2.1  Consult nephrology-appreciate input  Insert carlos catherter  Strict I&O's        Lactic acidosis with leukocytosis-resolved  Zosyn x5 days completed  Encourage p.o. intake  WBC11k   Overall clinically appears markedly improved     Rib fracture from fall/trauma  Pain management -well-controlled  ISP  Lidocaine patch     Supratherapeutic INR -resolved  Hold anticoagulation  Monitor INR-reverse if persistently elevated and declining H&H  INR -2.1-no need for further reversal  Resume oral anticoagulation if patient is going to subacute rehab, he is not able to take it on his own at home due to safety issues     Tachycardia -resolved  Telemetry monitoring  IV hydration  Monitor labs       Recent Labs     08/29/22  0408   WBC 12.9*   HGB 8.5*   HCT 26.0*          Recent Labs     08/29/22  0408   *   K 3.8   CL 96*   CO2 18*   BUN 22   CREATININE 2.1*   CALCIUM 7.9*       CT Head WO Contrast    Result Date: 8/22/2022  EXAMINATION: CT OF THE HEAD WITHOUT CONTRAST  8/22/2022 8:13 pm TECHNIQUE: CT of the head was performed without the administration of intravenous contrast. Automated exposure control, iterative reconstruction, and/or weight based adjustment of the mA/kV was utilized to reduce the radiation dose to as low as reasonably achievable. COMPARISON: None. HISTORY: ORDERING SYSTEM PROVIDED HISTORY: Fall, on [facility] TECHNOLOGIST PROVIDED HISTORY: Reason for exam:->Fall, on [facility] Has a \"code stroke\" or \"stroke alert\" been called? ->No Decision Support Exception - unselect if not a suspected or confirmed emergency medical condition->Emergency Medical Condition (MA) What reading provider will be dictating this exam?->CRC FINDINGS: BRAIN/VENTRICLES: There is no acute intracranial hemorrhage, mass effect or midline shift. No abnormal extra-axial fluid collection. The gray-white differentiation is maintained without evidence of an acute infarct.   There is no evidence of hydrocephalus. And 8 mm possible left frontal meningioma. ORBITS: The visualized portion of the orbits demonstrate no acute abnormality. SINUSES: The visualized paranasal sinuses and mastoid air cells demonstrate no acute abnormality. SOFT TISSUES/SKULL:  No acute abnormality of the visualized skull or soft tissues. No acute intracranial abnormality. 8 mm left frontal meningioma with small focus of calcification. CT FACIAL BONES WO CONTRAST    Result Date: 8/22/2022  EXAMINATION: CT OF THE FACE WITHOUT CONTRAST  8/22/2022 8:39 pm TECHNIQUE: CT of the face was performed without the administration of intravenous contrast. Multiplanar reformatted images are provided for review. Automated exposure control, iterative reconstruction, and/or weight based adjustment of the mA/kV was utilized to reduce the radiation dose to as low as reasonably achievable. COMPARISON: None HISTORY: ORDERING SYSTEM PROVIDED HISTORY: fall TECHNOLOGIST PROVIDED HISTORY: Reason for exam:->fall Decision Support Exception - unselect if not a suspected or confirmed emergency medical condition->Emergency Medical Condition (MA) What reading provider will be dictating this exam?->CRC FINDINGS: FACIAL BONES: The frontal sinuses, orbital walls, maxilla, pterygoid plates, zygomatic arches, hard palate, nasal bones and mandible are intact. The temporomandibular joints are aligned. ORBITAL CONTENTS: The globes appear intact. The extraocular muscles, optic nerve sheath complexes and lacrimal glands appear unremarkable. No retrobulbar hematoma or mass is seen. SINUSES: Left maxillary sinus mucosal thickening. SOFT TISSUES: No superficial facial soft tissue swelling is seen. No acute facial bone trauma.  RECOMMENDATIONS: Unavailable     CT CHEST W CONTRAST    Result Date: 8/22/2022  EXAMINATION: CT OF THE CHEST WITH CONTRAST 8/22/2022 8:13 pm TECHNIQUE: CT of the chest was performed with the administration of intravenous contrast. Multiplanar reformatted images are provided for review. Automated exposure control, iterative reconstruction, and/or weight based adjustment of the mA/kV was utilized to reduce the radiation dose to as low as reasonably achievable. COMPARISON: None. HISTORY: ORDERING SYSTEM PROVIDED HISTORY: fall, trauma TECHNOLOGIST PROVIDED HISTORY: Reason for exam:->fall, trauma Decision Support Exception - unselect if not a suspected or confirmed emergency medical condition->Emergency Medical Condition (MA) What reading provider will be dictating this exam?->CRC FINDINGS: Mediastinum: The thoracic aorta, heart and pericardium are normal.  No significant mediastinal adenopathy. A Lungs/pleura: The lungs are clear. No pleural effusion or pneumothorax. Upper Abdomen: Cholelithiasis. Soft Tissues/Bones: Right 7th lateral rib fracture. Degenerative changes thoracic spine. Remote upper sternal fracture. Right 7th lateral rib fracture. Cholelithiasis. CT Cervical Spine WO Contrast    Result Date: 8/22/2022  EXAMINATION: CT OF THE CERVICAL SPINE WITHOUT CONTRAST 8/22/2022 8:13 pm TECHNIQUE: CT of the cervical spine was performed without the administration of intravenous contrast. Multiplanar reformatted images are provided for review. Automated exposure control, iterative reconstruction, and/or weight based adjustment of the mA/kV was utilized to reduce the radiation dose to as low as reasonably achievable. COMPARISON: None. HISTORY: ORDERING SYSTEM PROVIDED HISTORY: Fall TECHNOLOGIST PROVIDED HISTORY: Reason for exam:->Fall Decision Support Exception - unselect if not a suspected or confirmed emergency medical condition->Emergency Medical Condition (MA) What reading provider will be dictating this exam?->CRC FINDINGS: BONES/ALIGNMENT: There is no acute fracture or traumatic malalignment. DEGENERATIVE CHANGES: Multilevel degenerative changes. SOFT TISSUES: There is no prevertebral soft tissue swelling.      No acute abnormality of the cervical spine. Multilevel degenerative changes. CT ABDOMEN PELVIS W IV CONTRAST Additional Contrast? None    Result Date: 8/22/2022  EXAMINATION: CT OF THE ABDOMEN AND PELVIS WITH CONTRAST 8/22/2022 8:13 pm TECHNIQUE: CT of the abdomen and pelvis was performed with the administration of intravenous contrast. Multiplanar reformatted images are provided for review. Automated exposure control, iterative reconstruction, and/or weight based adjustment of the mA/kV was utilized to reduce the radiation dose to as low as reasonably achievable. COMPARISON: November 24, 2015. HISTORY: ORDERING SYSTEM PROVIDED HISTORY: fall, trauma TECHNOLOGIST PROVIDED HISTORY: Additional Contrast?->None Reason for exam:->fall, trauma What reading provider will be dictating this exam?->CRC FINDINGS: Liver is unremarkable. Multiple gallstones layer in the gallbladder. Cystic appearing lesion involving head of the pancreas measures 1.5 x 1 cm. Spleen is unremarkable. Kidneys are unremarkable. No free air or free fluid. Urinary bladder is normal in contour. There are fat containing bilateral inguinal hernias measuring up to 2.7 cm on the right and 2.2 cm on the left. No evidence of pneumonia in the lung bases. Mildly displaced fracture involving visualized right lateral 7th rib. 1. Mildly displaced fracture involving right lateral 7th rib. 2. No acute process in the abdomen or pelvis. 3. Cystic lesion involving the head of the pancreas measures 1.5 x 1 cm. MRI with without contrast recommended for further evaluation. 4. Cholelithiasis. Discharge Exam:    HEENT: NCAT,  PERRLA, No JVD  Heart:  RRR, no murmurs, gallops, or rubs.   Lungs:  CTA bilaterally, no wheeze, rales or rhonchi  Abd: bowel sounds present, nontender, nondistended, no masses  Extrem:  No clubbing, cyanosis, or edema    Disposition: home     Patient Condition at Discharge: Stable    Patient Instructions:      Medication List        START taking these medications      pantoprazole 40 MG tablet  Commonly known as: PROTONIX  Take 1 tablet by mouth 2 times daily (before meals)     sodium bicarbonate 650 MG tablet  Take 1 tablet by mouth 4 times daily            CHANGE how you take these medications      warfarin 4 MG tablet  Commonly known as: Coumadin  Take as directed. If you are unsure how to take this medication, talk to your nurse or doctor. Original instructions: Take 1 tablet by mouth daily  What changed:   medication strength  how much to take  Another medication with the same name was removed. Continue taking this medication, and follow the directions you see here.             CONTINUE taking these medications      atorvastatin 40 MG tablet  Commonly known as: LIPITOR  Take 1 tablet by mouth daily     DULoxetine 20 MG extended release capsule  Commonly known as: CYMBALTA  Take 1 capsule by mouth daily     gabapentin 100 MG capsule  Commonly known as: NEURONTIN  200mg in am, 100mg at noon and 200mg in pm     levothyroxine 200 MCG tablet  Commonly known as: SYNTHROID  Take 1 tablet by mouth Daily Pt take 0.5 Saturday and Sunday     magnesium oxide 400 (241.3 Mg) MG Tabs tablet  Commonly known as: MAG-OX     metOLazone 2.5 MG tablet  Commonly known as: ZAROXOLYN  1 tablet by mouth monday and Friday only     metoprolol tartrate 50 MG tablet  Commonly known as: LOPRESSOR  Take 1 tablet by mouth 2 times daily     MULTIPLE VITAMIN PO     potassium chloride 20 MEQ extended release tablet  Commonly known as: KLOR-CON M  Take 1 tablet by mouth 3 times daily     spironolactone 25 MG tablet  Commonly known as: ALDACTONE     torsemide 20 MG tablet  Commonly known as: DEMADEX     vitamin B-1 100 MG tablet  Commonly known as: THIAMINE               Where to Get Your Medications        These medications were sent to [address] [place] [address] [phone] Marjorie Garcia [phone]  [address], [address] [facility] [address]      Phone: [phone]   pantoprazole 40 MG tablet  sodium bicarbonate 650 MG tablet  warfarin 4 MG tablet       Activity: activity as tolerated  Diet: cardiac diet and diabetic diet    Pt has been advised to: Follow-up with Puma Marshall MD in 1 week.   Follow-up with consultants as recommended by them    Note that over 30 minutes was spent in preparing discharge papers, discussing discharge with patient, medication review, etc.    Signed:  Bandar Marin MD  8/29/2022

## 2022-09-02 ENCOUNTER — TELEPHONE (OUTPATIENT)
Dept: FAMILY MEDICINE CLINIC | Age: 82
End: 2022-09-02

## 2022-09-02 ENCOUNTER — NURSE ONLY (OUTPATIENT)
Dept: PRIMARY CARE CLINIC | Age: 82
End: 2022-09-02
Payer: MEDICARE

## 2022-09-02 DIAGNOSIS — D64.9 ANEMIA, UNSPECIFIED TYPE: Primary | ICD-10-CM

## 2022-09-02 DIAGNOSIS — I48.19 PERSISTENT ATRIAL FIBRILLATION (HCC): Primary | ICD-10-CM

## 2022-09-02 DIAGNOSIS — N18.30 STAGE 3 CHRONIC KIDNEY DISEASE, UNSPECIFIED WHETHER STAGE 3A OR 3B CKD (HCC): ICD-10-CM

## 2022-09-02 LAB
INTERNATIONAL NORMALIZATION RATIO, POC: 1.2
PROTHROMBIN TIME, POC: 14.7

## 2022-09-02 PROCEDURE — 85610 PROTHROMBIN TIME: CPT | Performed by: INTERNAL MEDICINE

## 2022-09-02 NOTE — TELEPHONE ENCOUNTER
Ruby Calero  [phone]    His is taking Ian Cecil to his INR nurse visit today in [facility]. He is asking for the following labs to be done today as well. He said these were out of normal range and need monitored. Creatine, Hemoglobin, Sodium & Potassium.

## 2022-09-02 NOTE — PROGRESS NOTES
Previous INR: 3.8    Previous dose:  was not taking medication     Current INR: 1.2    Recommendation: Restart at 4mg daily     Next INR: 1 week     Niurka Cameron MD  9/2/2022  11:43 AM

## 2022-09-02 NOTE — TELEPHONE ENCOUNTER
Orders Placed This Encounter   Procedures    Comprehensive Metabolic Panel     Standing Status:   Future     Standing Expiration Date:   9/2/2023    Magnesium     Standing Status:   Future     Standing Expiration Date:   9/2/2023    CBC with Auto Differential     Standing Status:   Future     Standing Expiration Date:   9/2/2023    Iron and TIBC     Standing Status:   Future     Standing Expiration Date:   9/2/2023     Order Specific Question:   Is Patient Fasting? Answer:   yes     Order Specific Question:   No of Hours?      Answer:   8    Ferritin     Standing Status:   Future     Standing Expiration Date:   9/2/2023    Vitamin B12 & Folate     Standing Status:   Future     Standing Expiration Date:   9/2/2023     Lab ordered

## 2022-09-05 ENCOUNTER — HOSPITAL ENCOUNTER (EMERGENCY)
Age: 82
Discharge: HOME | End: 2022-09-05
Attending: EMERGENCY MEDICINE
Payer: MEDICARE

## 2022-09-05 VITALS
DIASTOLIC BLOOD PRESSURE: 49 MMHG | SYSTOLIC BLOOD PRESSURE: 132 MMHG | RESPIRATION RATE: 18 BRPM | BODY MASS INDEX: 32.88 KG/M2 | OXYGEN SATURATION: 97 % | TEMPERATURE: 97.8 F | HEART RATE: 88 BPM | HEIGHT: 69 IN | WEIGHT: 222 LBS

## 2022-09-05 DIAGNOSIS — H01.004 BLEPHARITIS OF LEFT UPPER EYELID, UNSPECIFIED TYPE: Primary | ICD-10-CM

## 2022-09-05 DIAGNOSIS — H11.32 CONJUNCTIVAL HEMORRHAGE OF LEFT EYE: ICD-10-CM

## 2022-09-05 LAB
INR BLD: 1.4
PROTHROMBIN TIME: 15.2 SEC (ref 9.3–12.4)

## 2022-09-05 PROCEDURE — 6370000000 HC RX 637 (ALT 250 FOR IP): Performed by: EMERGENCY MEDICINE

## 2022-09-05 PROCEDURE — 85610 PROTHROMBIN TIME: CPT

## 2022-09-05 PROCEDURE — 99283 EMERGENCY DEPT VISIT LOW MDM: CPT

## 2022-09-05 RX ORDER — ERYTHROMYCIN 5 MG/G
OINTMENT OPHTHALMIC ONCE
Status: COMPLETED | OUTPATIENT
Start: 2022-09-05 | End: 2022-09-05

## 2022-09-05 RX ORDER — ERYTHROMYCIN 5 MG/G
OINTMENT OPHTHALMIC EVERY 6 HOURS
Qty: 3.5 G | Refills: 0 | Status: SHIPPED | OUTPATIENT
Start: 2022-09-05 | End: 2022-09-07

## 2022-09-05 RX ADMIN — ERYTHROMYCIN: 5 OINTMENT OPHTHALMIC at 19:45

## 2022-09-05 ASSESSMENT — ENCOUNTER SYMPTOMS
EYE REDNESS: 1
ABDOMINAL PAIN: 0
VOMITING: 0
DIARRHEA: 0
CONSTIPATION: 0
EYE DISCHARGE: 1
BLOOD IN STOOL: 0
WHEEZING: 0
SINUS PAIN: 0
EYE PAIN: 0
EYE ITCHING: 1
COLOR CHANGE: 0
RHINORRHEA: 0
SHORTNESS OF BREATH: 0
NAUSEA: 0
PHOTOPHOBIA: 0
COUGH: 0

## 2022-09-05 NOTE — ED NOTES
Department of Emergency Medicine    FIRST PROVIDER TRIAGE NOTE             Independent MLP           9/5/22  5:09 PM EDT    Date of Encounter: 9/5/22   MRN: [id number]    Vitals:    09/05/22 1708   BP: (!) 132/49   Pulse: 88   Resp: 18   Temp: 97.8 °F (36.6 °C)   SpO2: 97%   Weight: 222 lb (100.7 kg)   Height: 5' 9\" (1.753 m)      HPI: Naldo Chatman is a 80 y.o. male who presents to the ED for Red Eye (Denies injury, on thinners- Coumadin, last level \"not therapeutic\" )   Patient takes Coumadin     Last INR 9/2/2022 was 1.2    ROS: Negative for cp, sob, fever, cough, or sneezing. Physical Exam:   Gen Appearance/Constitutional: alert  HEENT: NC/NT. PERRLA,  Airway patent. Significant conjunctival injection to b/l eyes, L>R, left subconjunctival hemorrhage, no hyphema    CV: regular rate     Initial Plan of Care: All treatment areas with department are currently occupied. Plan to order/Initiate the following while awaiting opening in ED: Labs.     Initial Plan of Care: Initiate Treatment-Testing, Proceed toTreatment Area When Bed Available for ED Attending/MLP to Continue Care    Electronically signed by Jake Bermudez PA-C   DD: 9/5/22       Jake Bermudez PA-C  09/05/22 2511

## 2022-09-06 ENCOUNTER — TELEPHONE (OUTPATIENT)
Dept: FAMILY MEDICINE CLINIC | Age: 82
End: 2022-09-06

## 2022-09-06 ENCOUNTER — CARE COORDINATION (OUTPATIENT)
Dept: CARE COORDINATION | Age: 82
End: 2022-09-06

## 2022-09-06 RX ORDER — POLYMYXIN B SULFATE AND TRIMETHOPRIM 1; 10000 MG/ML; [USP'U]/ML
1 SOLUTION OPHTHALMIC EVERY 4 HOURS
Qty: 10 ML | Refills: 0 | Status: SHIPPED | OUTPATIENT
Start: 2022-09-06 | End: 2022-09-16

## 2022-09-06 SDOH — HEALTH STABILITY: PHYSICAL HEALTH: ON AVERAGE, HOW MANY DAYS PER WEEK DO YOU ENGAGE IN MODERATE TO STRENUOUS EXERCISE (LIKE A BRISK WALK)?: 0 DAYS

## 2022-09-06 SDOH — HEALTH STABILITY: PHYSICAL HEALTH: ON AVERAGE, HOW MANY MINUTES DO YOU ENGAGE IN EXERCISE AT THIS LEVEL?: 0 MIN

## 2022-09-06 ASSESSMENT — SOCIAL DETERMINANTS OF HEALTH (SDOH)
HOW OFTEN DO YOU GET TOGETHER WITH FRIENDS OR RELATIVES?: MORE THAN THREE TIMES A WEEK
IN A TYPICAL WEEK, HOW MANY TIMES DO YOU TALK ON THE PHONE WITH FAMILY, FRIENDS, OR NEIGHBORS?: MORE THAN THREE TIMES A WEEK
HOW OFTEN DO YOU ATTEND CHURCH OR RELIGIOUS SERVICES?: NEVER
HOW OFTEN DO YOU ATTENT MEETINGS OF THE CLUB OR ORGANIZATION YOU BELONG TO?: 1 TO 4 TIMES PER YEAR
DO YOU BELONG TO ANY CLUBS OR ORGANIZATIONS SUCH AS CHURCH GROUPS UNIONS, FRATERNAL OR ATHLETIC GROUPS, OR SCHOOL GROUPS?: YES

## 2022-09-06 ASSESSMENT — LIFESTYLE VARIABLES
HOW MANY STANDARD DRINKS CONTAINING ALCOHOL DO YOU HAVE ON A TYPICAL DAY: PATIENT DOES NOT DRINK
HOW OFTEN DO YOU HAVE A DRINK CONTAINING ALCOHOL: NEVER

## 2022-09-06 NOTE — CARE COORDINATION
Ambulatory Care Coordination Note  9/6/2022    ACC: Jose Osborn, OLIVERIO    Summary Note: AC contacted Geremias Berger as follow up to his emergency room visit. Geremias Berger was seen for a red and itchy left eye. He was discharged with a small tube of ointment and additional ointment was sent to his preferred pharmacy. Geremias Berger states he has not be administering the eye ointment. He states he can not tolerate putting it on his eyes. He states the only dose he has received was done in the emergency room. Geremias Berger states he has been taking his medications, however, when asked specific questions about his medications, he admits that he has not been taking them. He states he is \"lazy\" and just \"doesn't like taking pills\". [facility] educated Geremias Berger on the importance of taking his medications as prescribed and encouraged him discuss his medications with Dr. Valery Torres at [address] [place] [address] appointment. [facility] inquired if a medication reminder system would be beneficial to Geremias Berger to assist him taking his medications and he stated it would not be helpful. Geremias Berger states he has been using his walker at all times since he was discharged from the hospital and has not suffered any recent falls. He states he has a poor appetite. He is not cooking for himself and states his son brings him food. [facility] spoke with son Kassidy Spears. He states he did not  prescription because his father is unable to use the ointment. Jacey Flores is willing to assist Geremias Berger to administer eye drops if this medication form is available. Joe reports that Geremias Berger has not taken his medications since hospital discharge (with the exception of one morning). He and his father have discussed Geremias Berger taking his medications daily and per Jacey Flores his father did not follow through. [address] [place] [address] is active with nursing,  PT, and OT. Jacey Flores will be accompanying his father to his appointment with Dr. Valery Torres (9/7).   He would like to have a discussion with his father and Dr. Valery Torres regarding medications. Plan  Contact [facility] to update and ensure patient is being followed for medication compliance-ACM spoke with clinical supervisor Thuan Bravo). Patient is being followed for med compliance. ACM updated Nikita. Also, tomorrow's appointment was switched to morning so it does not overlap with his PCP appointment in the afternoon. Update Dr. Tere Connolly to follow for care coordination          Lab Results       None            Care Coordination Interventions    Referral from Primary Care Provider: No  Suggested Interventions and Community Resources  Fall Risk Prevention: Completed  Medi Set or Pill Pack: Not Started  Occupational Therapy: Completed  Pharmacist: Not Started  Physical Therapy: Completed  Social Work: Completed          Goals Addressed                   This Visit's Progress     Conditions and Symptoms   On track     I will schedule office visits, as directed by my provider. I will keep my appointment or reschedule if I have to cancel. I will notify my provider of any barriers to my plan of care. I will notify my provider of any symptoms that indicate a worsening of my condition. Barriers: lack of motivation, overwhelmed by complexity of regimen, and lack of education  Plan for overcoming my barriers: Patient education and care coordination  Confidence: 6/10  Anticipated Goal Completion Date: 11/30/2022         Reduce Falls    On track     I will reduce my risk of falls by the following: Remove rugs or use non slip rugs  Install grab bars in bathroom  Use walking aids like cane or walker  Follow through on orders for PT    Barriers: lack of motivation and lack of education  Plan for overcoming my barriers: Physical therapy, fall prevention education and care coordination  Confidence: 6/10  Anticipated Goal Completion Date: 11/31/2022              Prior to Admission medications    Medication Sig Start Date End Date Taking?  Authorizing Provider   erythromycin [facility]) 5 MG/GM ophthalmic ointment Place into both eyes every 6 hours  Patient not taking: Reported on 9/6/2022 9/5/22   Waqas Bryson DO   pantoprazole (PROTONIX) 40 MG tablet Take 1 tablet by mouth 2 times daily (before meals)  Patient not taking: Reported on 9/6/2022 8/29/22   Moo Gil MD   warfarin (COUMADIN) 4 MG tablet Take 1 tablet by mouth daily  Patient not taking: Reported on 9/6/2022 8/29/22   Moo Gil MD   sodium bicarbonate 650 MG tablet Take 1 tablet by mouth 4 times daily  Patient not taking: Reported on 9/6/2022 8/29/22   William Palacio MD   gabapentin (NEURONTIN) 100 MG capsule 200mg in am, 100mg at noon and 200mg in pm  Patient not taking: Reported on 9/6/2022 8/22/22 2/2/23  Elida Viera MD   DULoxetine (CYMBALTA) 20 MG extended release capsule Take 1 capsule by mouth daily  Patient not taking: Reported on 9/6/2022 6/8/22   Elida Viera MD   atorvastatin (LIPITOR) 40 MG tablet Take 1 tablet by mouth daily  Patient not taking: Reported on 9/6/2022 5/11/22   Ashley Bynum MD   levothyroxine (SYNTHROID) 200 MCG tablet Take 1 tablet by mouth Daily Pt take 0.5 Saturday and Sunday  Patient not taking: Reported on 9/6/2022 3/30/22   Elida Viera MD   potassium chloride (KLOR-CON M) 20 MEQ extended release tablet Take 1 tablet by mouth 3 times daily  Patient not taking: Reported on 9/6/2022 2/11/22   Elida Viera MD   metOLazone (ZAROXOLYN) 2.5 MG tablet 1 tablet by mouth monday and Friday only  Patient not taking: Reported on 9/6/2022 2/11/22   Elida Viera MD   spironolactone (ALDACTONE) 25 MG tablet Take 25 mg by mouth daily  Patient not taking: Reported on 9/6/2022 9/2/20   Historical Provider, MD   magnesium oxide (MAG-OX) 400 (241.3 Mg) MG TABS tablet TAKE ONE TABLET BY MOUTH DAILY  Patient not taking: Reported on 9/6/2022 2/10/20   Historical Provider, MD   torsemide (DEMADEX) 20 MG tablet Take 40 mg by mouth 2 times daily   Patient not taking: Reported on 9/6/2022 6/5/19

## 2022-09-06 NOTE — ED PROVIDER NOTES
HPI     Patient is a 80 y.o. male presents with a chief complaint of red eye. Patient states that starting approximately 3 days ago the patient went to the doctor stated that he was given antibiotics if his right eye got worse. Patient waited until the clinic was closed and was unable to get antibiotics. Patient then went to urgent care who sent him here because they were worried about glaucoma. Patient has had no fevers, chills, nausea, vomiting, eye pain, loss of vision,. This has been occurring for 3 days. Patient states that it gets better with nothing. Patient states that it gets worse with nothing. Review of Systems   Constitutional:  Negative for activity change, appetite change, chills and fever. HENT:  Negative for rhinorrhea and sinus pain. Eyes:  Positive for discharge, redness (Left eye) and itching. Negative for photophobia and pain. Respiratory:  Negative for cough, shortness of breath and wheezing. Cardiovascular:  Negative for chest pain. Gastrointestinal:  Negative for abdominal pain, blood in stool, constipation, diarrhea, nausea and vomiting. Endocrine: Negative for polyuria. Genitourinary:  Negative for decreased urine volume, difficulty urinating and dysuria. Skin:  Negative for color change. Neurological:  Negative for headaches. Physical Exam  Vitals and nursing note reviewed. Constitutional:       General: He is not in acute distress. Appearance: Normal appearance. He is not ill-appearing or toxic-appearing. HENT:      Head: Normocephalic and atraumatic. Right Ear: External ear normal.      Left Ear: External ear normal.      Nose: Nose normal. No congestion or rhinorrhea. Mouth/Throat:      Mouth: Mucous membranes are moist.      Pharynx: No oropharyngeal exudate or posterior oropharyngeal erythema. Eyes:      General: No scleral icterus. Right eye: No discharge. Left eye: Discharge present.      Pupils: Pupils are equal, round, and reactive to light. Cardiovascular:      Rate and Rhythm: Normal rate. Pulses: Normal pulses. Heart sounds: No murmur heard. No friction rub. No gallop. Pulmonary:      Effort: Pulmonary effort is normal. No respiratory distress. Breath sounds: No stridor. No wheezing, rhonchi or rales. Abdominal:      General: Abdomen is flat. There is no distension. Palpations: There is no mass. Tenderness: There is no abdominal tenderness. Musculoskeletal:      Cervical back: No rigidity. Right lower leg: Edema (+4) present. Left lower leg: Edema (+4) present. Skin:     General: Skin is warm. Capillary Refill: Capillary refill takes less than 2 seconds. Neurological:      Mental Status: He is alert. Mental status is at baseline. Psychiatric:         Mood and Affect: Mood normal.        Procedures     MDM  Patient had most likely was a conjunctival hemorrhage. Blooded had pulled at the bottom which is most likely due to the effect of gravity. Patient is also on warfarin which makes patient's more susceptible to subconjunctival hemorrhage. Patient has an INR of 1.4. Patient most likely has a blood far just due to the relation of the swelling around his eyelids. Erythromycin ointment will be applied 4 times daily until the symptoms resolve. Patient will also scrub and wash his eyes to clean discharge. Patient agreeable with this plan      Patient is a 80 y.o. male presenting with right eye. Patient was given return precautions. Labs were interpreted by me. Patient will follow up with their primary care provider. Patient is agreeable to this plan.  Patient has remained stable throughout their stay in the ED.          --------------------------------------------- PAST HISTORY ---------------------------------------------  Past Medical History:  has a past medical history of Blood circulation, collateral, CAD (coronary artery disease), Chronic kidney disease, History of alcohol use, History of ascites, Hyperlipidemia, Hypertension, Hyponatremia, Left knee pain, Liver disease, Lymphedema, Neuropathy, Osteoarthritis, Psychiatric problem, Thyroid disease, Use of cane as ambulatory aid, Uses wheelchair, Wears glasses, and Wears hearing aid. Past Surgical History:  has a past surgical history that includes Appendectomy; eye surgery; Colonoscopy; Cataract removal with implant (Bilateral); Cardiac catheterization (05/2020); and Total knee arthroplasty (Left, 6/22/2020). Social History:  reports that he quit smoking about 36 years ago. His smoking use included pipe, cigars, and cigarettes. He started smoking about 57 years ago. He has a 18.75 pack-year smoking history. He has never used smokeless tobacco. He reports current alcohol use. He reports that he does not use drugs. Family History: family history includes Other in his mother. The patients home medications have been reviewed. Allergies: Sulfa antibiotics    -------------------------------------------------- RESULTS -------------------------------------------------  Labs:  Results for orders placed or performed during the hospital encounter of 09/05/22   Protime-INR   Result Value Ref Range    Protime 15.2 (H) 9.3 - 12.4 sec    INR 1.4        Radiology:  No orders to display       ------------------------- NURSING NOTES AND VITALS REVIEWED ---------------------------  Date / Time Roomed:  9/5/2022  5:36 PM  ED Bed Assignment:  28/28    The nursing notes within the ED encounter and vital signs as below have been reviewed.    BP (!) 132/49   Pulse 88   Temp 97.8 °F (36.6 °C)   Resp 18   Ht 5' 9\" (1.753 m)   Wt 222 lb (100.7 kg)   SpO2 97%   BMI 32.78 kg/m²   Oxygen Saturation Interpretation: Normal      ------------------------------------------ PROGRESS NOTES ------------------------------------------  9:43 PM EDT  I have spoken with the patient and family if present and discussed todays results, in addition to providing specific details for the plan of care and counseling regarding the diagnosis and prognosis. Their questions are answered at this time and they are agreeable with the plan. I discussed at length with them reasons for immediate return here for re evaluation. They will followup with their primary care provider by calling their office as soon as possible.      --------------------------------- ADDITIONAL PROVIDER NOTES ---------------------------------  At this time the patient is without objective evidence of an acute process requiring hospitalization or inpatient management. They have remained hemodynamically stable throughout their entire ED visit and are stable for discharge with outpatient follow-up. The plan has been discussed in detail and they are aware of the specific conditions for emergent return, as well as the importance of follow-up. Discharge Medication List as of 9/5/2022  7:04 PM        START taking these medications    Details   erythromycin (ROMYCIN) 5 MG/GM ophthalmic ointment Place into both eyes every 6 hours, Both Eyes, EVERY 6 HOURS Starting Mon 9/5/2022, Disp-3.5 g, R-0, Normal             Diagnosis:  1. Blepharitis of left upper eyelid, unspecified type    2. Conjunctival hemorrhage of left eye        Disposition:  Patient's disposition: Discharge to home  Patient's condition is stable. Patient was seen and evaluated by myself and my attending No att. providers found. Assessment and Plan discussed with attending provider, please see attestation for final plan of care. This note was done using dictation software and there may be some grammatical errors associated with this.     DO Don Chan DO  Resident  09/05/22 0568

## 2022-09-06 NOTE — TELEPHONE ENCOUNTER
Requested Prescriptions     Signed Prescriptions Disp Refills    trimethoprim-polymyxin b (POLYTRIM) 98046-9.1 UNIT/ML-% ophthalmic solution 10 mL 0     Sig: Place 1 drop into both eyes every 4 hours for 10 days     Authorizing Provider: Jessie Felton to pharmacy

## 2022-09-06 NOTE — TELEPHONE ENCOUNTER
Evangelista calling in he said his dad is not being cooperative and is being difficult with trying to get the ointment in his eyes. He was given a drop at the hospital and tolerated that easier then this cream. He was wondering if you can send over a script for an eye drop to [facility] on [address]? He also wants to talk with you tomorrow with his father regarding some type of care for his dad. Thai Rodriguez says he cannot take care of him by himself any longer and requesting that when they come in for his appointment tomorrow that you can give them some options. Son evangelista requesting a call back at [phone]  to let him know about prescription.

## 2022-09-07 ENCOUNTER — CARE COORDINATION (OUTPATIENT)
Dept: CARE COORDINATION | Age: 82
End: 2022-09-07

## 2022-09-07 ENCOUNTER — OFFICE VISIT (OUTPATIENT)
Dept: FAMILY MEDICINE CLINIC | Age: 82
End: 2022-09-07
Payer: MEDICARE

## 2022-09-07 ENCOUNTER — ANTI-COAG VISIT (OUTPATIENT)
Dept: FAMILY MEDICINE CLINIC | Age: 82
End: 2022-09-07

## 2022-09-07 VITALS
WEIGHT: 237 LBS | HEIGHT: 69 IN | DIASTOLIC BLOOD PRESSURE: 50 MMHG | BODY MASS INDEX: 35.1 KG/M2 | SYSTOLIC BLOOD PRESSURE: 80 MMHG | TEMPERATURE: 97.9 F | OXYGEN SATURATION: 100 % | HEART RATE: 74 BPM | RESPIRATION RATE: 24 BRPM

## 2022-09-07 DIAGNOSIS — M1A.09X0 IDIOPATHIC CHRONIC GOUT OF MULTIPLE SITES WITHOUT TOPHUS: ICD-10-CM

## 2022-09-07 DIAGNOSIS — N18.30 STAGE 3 CHRONIC KIDNEY DISEASE, UNSPECIFIED WHETHER STAGE 3A OR 3B CKD (HCC): ICD-10-CM

## 2022-09-07 DIAGNOSIS — T14.90XA CLOSED WOUND: ICD-10-CM

## 2022-09-07 DIAGNOSIS — Z79.01 CURRENT USE OF LONG TERM ANTICOAGULATION: ICD-10-CM

## 2022-09-07 DIAGNOSIS — E87.1 HYPONATREMIA: ICD-10-CM

## 2022-09-07 DIAGNOSIS — E87.6 HYPOKALEMIA: ICD-10-CM

## 2022-09-07 DIAGNOSIS — Z48.02 ENCOUNTER FOR REMOVAL OF SUTURES: ICD-10-CM

## 2022-09-07 DIAGNOSIS — Z65.9 SOCIAL PROBLEM: ICD-10-CM

## 2022-09-07 DIAGNOSIS — K92.2 GASTROINTESTINAL HEMORRHAGE, UNSPECIFIED GASTROINTESTINAL HEMORRHAGE TYPE: ICD-10-CM

## 2022-09-07 DIAGNOSIS — I95.9 HYPOTENSION, UNSPECIFIED HYPOTENSION TYPE: ICD-10-CM

## 2022-09-07 DIAGNOSIS — Z09 HOSPITAL DISCHARGE FOLLOW-UP: ICD-10-CM

## 2022-09-07 DIAGNOSIS — D64.9 ANEMIA, UNSPECIFIED TYPE: ICD-10-CM

## 2022-09-07 DIAGNOSIS — I48.92 ATRIAL FLUTTER, UNSPECIFIED TYPE (HCC): Primary | ICD-10-CM

## 2022-09-07 DIAGNOSIS — I10 ESSENTIAL HYPERTENSION: ICD-10-CM

## 2022-09-07 LAB
ALBUMIN SERPL-MCNC: 2.8 G/DL (ref 3.5–5.2)
ALP BLD-CCNC: 148 U/L (ref 40–129)
ALT SERPL-CCNC: 14 U/L (ref 0–40)
ANION GAP SERPL CALCULATED.3IONS-SCNC: 14 MMOL/L (ref 7–16)
AST SERPL-CCNC: 21 U/L (ref 0–39)
BASOPHILS ABSOLUTE: 0.06 E9/L (ref 0–0.2)
BASOPHILS RELATIVE PERCENT: 1 % (ref 0–2)
BILIRUB SERPL-MCNC: 0.9 MG/DL (ref 0–1.2)
BUN BLDV-MCNC: 9 MG/DL (ref 6–23)
CALCIUM SERPL-MCNC: 8.3 MG/DL (ref 8.6–10.2)
CHLORIDE BLD-SCNC: 99 MMOL/L (ref 98–107)
CO2: 19 MMOL/L (ref 22–29)
CREAT SERPL-MCNC: 1.3 MG/DL (ref 0.7–1.2)
EOSINOPHILS ABSOLUTE: 0.12 E9/L (ref 0.05–0.5)
EOSINOPHILS RELATIVE PERCENT: 1.9 % (ref 0–6)
GFR AFRICAN AMERICAN: >60
GFR NON-AFRICAN AMERICAN: 53 ML/MIN/1.73
GLUCOSE BLD-MCNC: 112 MG/DL (ref 74–99)
HCT VFR BLD CALC: 29.5 % (ref 37–54)
HEMOGLOBIN: 9.1 G/DL (ref 12.5–16.5)
IMMATURE GRANULOCYTES #: 0.02 E9/L
IMMATURE GRANULOCYTES %: 0.3 % (ref 0–5)
INTERNATIONAL NORMALIZATION RATIO, POC: 1.3
IRON SATURATION: 16 % (ref 20–55)
IRON: 30 MCG/DL (ref 59–158)
LYMPHOCYTES ABSOLUTE: 1.17 E9/L (ref 1.5–4)
LYMPHOCYTES RELATIVE PERCENT: 18.8 % (ref 20–42)
MAGNESIUM: 1.7 MG/DL (ref 1.6–2.6)
MCH RBC QN AUTO: 29.3 PG (ref 26–35)
MCHC RBC AUTO-ENTMCNC: 30.8 % (ref 32–34.5)
MCV RBC AUTO: 94.9 FL (ref 80–99.9)
MONOCYTES ABSOLUTE: 0.65 E9/L (ref 0.1–0.95)
MONOCYTES RELATIVE PERCENT: 10.5 % (ref 2–12)
NEUTROPHILS ABSOLUTE: 4.19 E9/L (ref 1.8–7.3)
NEUTROPHILS RELATIVE PERCENT: 67.5 % (ref 43–80)
PDW BLD-RTO: 17.8 FL (ref 11.5–15)
PLATELET # BLD: 372 E9/L (ref 130–450)
PMV BLD AUTO: 9.7 FL (ref 7–12)
POTASSIUM SERPL-SCNC: 4.6 MMOL/L (ref 3.5–5)
PROTHROMBIN TIME, POC: 15.4
RBC # BLD: 3.11 E12/L (ref 3.8–5.8)
SODIUM BLD-SCNC: 132 MMOL/L (ref 132–146)
TOTAL IRON BINDING CAPACITY: 191 MCG/DL (ref 250–450)
TOTAL PROTEIN: 6.7 G/DL (ref 6.4–8.3)
URIC ACID, SERUM: 6.5 MG/DL (ref 3.4–7)
WBC # BLD: 6.2 E9/L (ref 4.5–11.5)

## 2022-09-07 PROCEDURE — 85610 PROTHROMBIN TIME: CPT | Performed by: INTERNAL MEDICINE

## 2022-09-07 PROCEDURE — 99214 OFFICE O/P EST MOD 30 MIN: CPT | Performed by: INTERNAL MEDICINE

## 2022-09-07 PROCEDURE — 1111F DSCHRG MED/CURRENT MED MERGE: CPT | Performed by: INTERNAL MEDICINE

## 2022-09-07 RX ORDER — CEPHALEXIN 500 MG/1
500 CAPSULE ORAL 3 TIMES DAILY
Qty: 21 CAPSULE | Refills: 0 | Status: SHIPPED | OUTPATIENT
Start: 2022-09-07 | End: 2022-09-14

## 2022-09-07 NOTE — CARE COORDINATION
[facility] called and spoke briefly with [address] [place] [address] as he was at the PCP office. Son Fabienne Kilgore was there and joined the call. States his dad lives home alone and isn't taking his medication correctly and has in home needs. He is active with [facility] for skilled nsg/pt/ot. Son states that while his Dad was in the hospital he was told that SNF was not needed for PT/OT. States his Dad isn't doing well at home though. Fabienne Kilgore states he isn't sure what options are out there for his dad and what is even needed. Fabienne Kilgore was unable to talk more or complete [facility] assessment as he was trying to get his Dad Sangeetha New into the truck to take him home from the PCP office.       [facility] will follow accordingly

## 2022-09-07 NOTE — PROGRESS NOTES
Post-Discharge Transitional Care  Follow Up      Richa Villalba   YOB: 1940    Date of Office Visit:  9/7/2022  Date of Hospital Admission: 9/5/22  Date of Hospital Discharge: 9/5/22  Risk of hospital readmission (high >=14%. Medium >=10%) :Readmission Risk Score: 19.4      Care management risk score Rising risk (score 2-5) and Complex Care (Scores >=6): No Risk Score On File     Non face to face  following discharge, date last encounter closed (first attempt may have been earlier): 08/30/2022    Call initiated 2 business days of discharge:  Yes    ASSESSMENT/PLAN:     Encounter for removal of sutures  - 2 sutures removed from forehead by MA, no complications     Hypotension, unspecified hypotension type  - uncertain as to cause   - would recommend rechecking labs   - hold metoprolol and spironolactone   - hold metolazone   - follow BP at home   - ER if not improving     Hyponatremia  - Possible multifactorial (cirrhois and meds) - diurteics, duloxetine?   - on sodium bicarbonate   - recheck labs     Anemia, unspecified type  - recheck labs     Gastrointestinal hemorrhage, unspecified gastrointestinal hemorrhage type  - no scope done   - follow up labs    Closed wound  - right hand   - add keflex   - add bactroban     Stage 3 chronic kidney disease, unspecified whether stage 3a or 3b CKD ([address]  - following with nephrology   - on spirinolactone 25mg daily   - on metalozaone 2.5mg 2x per week    - on torsemide - advised by renal to take 40mg twice a day    - on potassium supplement   - follow labs     Hypokalemia    Current use of long term anticoagulation  - on coumadin 4mg daily - has not been complaint   - inr (9/7/2022) was 1.3  - recommend to continue 4mg and recheck in 1 week     Essential hypertension  - watch diet   - monitor blood pressure at home   - on spironolactone   - on metoprolol tart     Atrial flutter, unspecified type [facility])    Medical Decision Making: moderate complexity    Return in 1 week (on 9/14/2022). Orders Placed This Encounter   Procedures    [facility] Referral to Social Work (Primary Care Only)     Referral Priority:   Routine     Referral Type:   Eval and Treat     Referral Reason:   Specialty Services Required     Requested Specialty:   Licensed Clinical      Number of Visits Requested:   1    POCT INR    AK DISCHARGE MEDS RECONCILED W/ CURRENT OUTPATIENT MED LIST     Requested Prescriptions     Signed Prescriptions Disp Refills    cephALEXin (KEFLEX) 500 MG capsule 21 capsule 0     Sig: Take 1 capsule by mouth 3 times daily for 7 days    mupirocin (BACTROBAN) 2 % ointment 1 g 0     Sig: Apply topically 3 times daily. Subjective:   HPI:  Follow up of Hospital problems/diagnosis(es):     Inpatient course: Discharge summary reviewed- see chart. Interval history/Current status:     Son present for visit. hosp (8/22) - fall, hem positive stool, supratherapeutic inr 7.8, anemia, ct head 8mm lt menigioma neg acute, ct c-spine no acute, djd, ct abdo rib fx pancreas lesion, choleth, ct chest, ct facial,  gen sug c/s, hyponatremia, elevated lft , GIB, no scopes, renal c/s sodium bicarb, start pantoprazole. Restarted coumadin, ordered sodium bicarbonate     Since discharge has not been consistently taking medications. Follow up for INR was subtherapeutic. Was seen in ER (9/22) - red eye, blepHaritis erythromycin ointment.  Was not tolerating ointment ans swriched to liquid eye drop    Needs follow up labs     Patient Active Problem List   Diagnosis    Neuropathy ([address]    Cirrhosis ([address]    Hyponatremia    Cardiomyopathy ([address]    Alcohol abuse    Delirium due to multiple etiologies    Persistent atrial fibrillation    Primary osteoarthritis of left knee    Osteoarthritis    Lymphedema    Hypertension    Hyperlipidemia    History of ascites    Fatigue    CKD (chronic kidney disease) stage 3, GFR 30-59 ml/min (HCC)    Hyperuricemia    Prostate enlargement Impaired fasting glucose    Vitamin D deficiency    Other specified hypothyroidism    Coronary artery disease involving native coronary artery of native heart without angina pectoris    Status post total left knee replacement    GI bleed    Supratherapeutic INR    Fracture of one rib       Medications listed as ordered at the time of discharge from hospital     Medication List            Accurate as of September 7, 2022  5:54 PM. If you have any questions, ask your nurse or doctor. START taking these medications      cephALEXin 500 MG capsule  Commonly known as: KEFLEX  Take 1 capsule by mouth 3 times daily for 7 days  Started by: Nicole Morrell MD     mupirocin 2 % ointment  Commonly known as: BACTROBAN  Apply topically 3 times daily.   Started by: Nicole Morrell MD            CONTINUE taking these medications      atorvastatin 40 MG tablet  Commonly known as: LIPITOR  Take 1 tablet by mouth daily     DULoxetine 20 MG extended release capsule  Commonly known as: CYMBALTA  Take 1 capsule by mouth daily     gabapentin 100 MG capsule  Commonly known as: NEURONTIN  200mg in am, 100mg at noon and 200mg in pm     levothyroxine 200 MCG tablet  Commonly known as: SYNTHROID  Take 1 tablet by mouth Daily Pt take 0.5 Saturday and Sunday     magnesium oxide 400 (241.3 Mg) MG Tabs tablet  Commonly known as: MAG-OX     metOLazone 2.5 MG tablet  Commonly known as: ZAROXOLYN  1 tablet by mouth monday and Friday only     metoprolol tartrate 50 MG tablet  Commonly known as: LOPRESSOR  Take 1 tablet by mouth 2 times daily     pantoprazole 40 MG tablet  Commonly known as: PROTONIX  Take 1 tablet by mouth 2 times daily (before meals)     potassium chloride 20 MEQ extended release tablet  Commonly known as: KLOR-CON M  Take 1 tablet by mouth 3 times daily     sodium bicarbonate 650 MG tablet  Take 1 tablet by mouth 4 times daily     spironolactone 25 MG tablet  Commonly known as: ALDACTONE     torsemide 20 MG tablet  Commonly known as: DEMADEX     trimethoprim-polymyxin b 02976-3.1 UNIT/ML-% ophthalmic solution  Commonly known as: Polytrim  Place 1 drop into both eyes every 4 hours for 10 days     vitamin B-1 100 MG tablet  Commonly known as: THIAMINE     warfarin 4 MG tablet  Commonly known as: Coumadin  Take as directed by the anticoagulation clinic. If you are unsure how to take this medication, talk to your nurse or doctor. Original instructions: Take 1 tablet by mouth daily               Where to Get Your Medications        These medications were sent to [address] [place] [address], [address] [address]      Phone: [phone]   cephALEXin 500 MG capsule  mupirocin 2 % ointment           Medications marked \"taking\" at this time  Outpatient Medications Marked as Taking for the 9/7/22 encounter (Office Visit) with Donovan Fairbanks MD   Medication Sig Dispense Refill    cephALEXin (KEFLEX) 500 MG capsule Take 1 capsule by mouth 3 times daily for 7 days 21 capsule 0    mupirocin (BACTROBAN) 2 % ointment Apply topically 3 times daily.  1 g 0    trimethoprim-polymyxin b (POLYTRIM) 94541-5.1 UNIT/ML-% ophthalmic solution Place 1 drop into both eyes every 4 hours for 10 days 10 mL 0    pantoprazole (PROTONIX) 40 MG tablet Take 1 tablet by mouth 2 times daily (before meals) 30 tablet 3    warfarin (COUMADIN) 4 MG tablet Take 1 tablet by mouth daily 30 tablet 3    sodium bicarbonate 650 MG tablet Take 1 tablet by mouth 4 times daily 120 tablet 0    gabapentin (NEURONTIN) 100 MG capsule 200mg in am, 100mg at noon and 200mg in pm 450 capsule 0    atorvastatin (LIPITOR) 40 MG tablet Take 1 tablet by mouth daily 90 tablet 3    levothyroxine (SYNTHROID) 200 MCG tablet Take 1 tablet by mouth Daily Pt take 0.5 Saturday and Sunday 90 tablet 1    potassium chloride (KLOR-CON M) 20 MEQ extended release tablet Take 1 tablet by mouth 3 times daily 270 tablet 2 adenopathy. Skin:     General: Skin is warm and dry. Neurological:      Mental Status: He is oriented to person, place, and time. Cranial Nerves: No cranial nerve deficit. Psychiatric:         Judgment: Judgment normal.           An electronic signature was used to authenticate this note.   --Sonu Griffith MD

## 2022-09-08 ENCOUNTER — TELEPHONE (OUTPATIENT)
Dept: FAMILY MEDICINE CLINIC | Age: 82
End: 2022-09-08

## 2022-09-08 LAB
FERRITIN: 320 NG/ML
FOLATE: 6.7 NG/ML (ref 4.8–24.2)
VITAMIN B-12: 1521 PG/ML (ref 211–946)

## 2022-09-08 NOTE — TELEPHONE ENCOUNTER
Left message for his son Hui Cuadra to call us back so we can let him know all of this as well in case the patient doesn't remember.

## 2022-09-08 NOTE — TELEPHONE ENCOUNTER
Please let the patient know that blood work results showed    Vitamin B12 level was pending at the time of this note    Labs showed kidney dysfunction but appeared improved when compared to previous    Labs still show anemia however it is stable when compared to previous    Iron levels were low, ferritin levels were normal. Would recommend ferrous sulfate 325 mg Monday Wednesday Friday    Protein levels were low suggestive of slight malnutrition and would recommend protein supplements such as boost or Glucerna or Ensure or other    Uric acid level was normal    Bicarbonate level was low and would recommend to restart the sodium bicarbonate medication    Other electrolytes and liver functions were normal    Thanks

## 2022-09-09 NOTE — TELEPHONE ENCOUNTER
I would say even try to do the sodium bicarb and adjust twice to 3 times a day and we can continue to follow labs and follow-up with nephrology

## 2022-09-09 NOTE — TELEPHONE ENCOUNTER
Reviewed lab results w/ son Froylan Meyers. He is working with his dad and trying to get him to taking his medications. He has only taken 5 tablets of Sodium BiCarb since coming home from the hospital. It is prescribed as 650mg qid. Froylan Meyers will try having him take 1 q am, 2 q noon, 1 pm. He will try to add in a multi vitamin w/ iron. He is not sure if his dad will drink a protein shake but he will try. Froylan Meyers admits that nutrition is an issue, TELiBrahma delgado is not eating much.

## 2022-09-14 ENCOUNTER — CARE COORDINATION (OUTPATIENT)
Dept: CARE COORDINATION | Age: 82
End: 2022-09-14

## 2022-09-14 NOTE — CARE COORDINATION
Ambulatory Care Coordination Note  9/14/2022    ACC: Jm Saunders RN    Summary Note: [facility] spoke with Gregory Carrasco. He states he is using his walker to ambulate in the home and has not experienced any falls. He states he has been taking his medications. [facility] inquired if he has taken his morning dose today and he states he has not. He reports that he took all his medications yesterday. Gregory Carrasco is scheduled on 9/16 for an INR and he states his son Violet Linder will take him to the appointment. He is agreeable for [facility] to contact his son. [facility] contacted Joe. He states his father did not take any medications on Monday (9/12) and did not take afternoon medications yesterday. Cole Sauer states his father is getting around the home a little better and doing more things. He does report that Gregory Carrasco' appetite is poor. He is planning on picking up protein shakes for his father. Cole Sauer also is to  a blood pressure cuff to begin checking Gregory Ivey blood pressures as he was hypotensive at his last appointment. Cole Sauer states he will take his father to the scheduled appointment on 9/16. Cole Sauer has spoken briefly with social work and requests to speak with  again. Plan  Update Dr. Tuyet Foreman  Update social work  Continue to follow for care coordination      Lab Results       None            Care Coordination Interventions    Referral from Primary Care Provider: No  Suggested Interventions and Community Resources  Fall Risk Prevention: Completed  Medi Set or Pill Pack: Not Started  Occupational Therapy: Completed  Pharmacist: Not Started  Physical Therapy: Completed  Social Work: Completed          Goals Addressed                   This Visit's Progress     Conditions and Symptoms   On track     I will schedule office visits, as directed by my provider. I will keep my appointment or reschedule if I have to cancel. I will notify my provider of any barriers to my plan of care.   I will notify my provider of any symptoms that indicate a worsening of my condition. Barriers: lack of motivation, overwhelmed by complexity of regimen, and lack of education  Plan for overcoming my barriers: Patient education and care coordination  Confidence: 6/10  Anticipated Goal Completion Date: 11/30/2022         Reduce Falls    On track     I will reduce my risk of falls by the following: Remove rugs or use non slip rugs  Install grab bars in bathroom  Use walking aids like cane or walker  Follow through on orders for PT    Barriers: lack of motivation and lack of education  Plan for overcoming my barriers: Physical therapy, fall prevention education and care coordination  Confidence: 6/10  Anticipated Goal Completion Date: 11/31/2022              Prior to Admission medications    Medication Sig Start Date End Date Taking? Authorizing Provider   cephALEXin (KEFLEX) 500 MG capsule Take 1 capsule by mouth 3 times daily for 7 days 9/7/22 9/14/22  Marty Rivas MD   [facility]) 2 % ointment Apply topically 3 times daily.  9/7/22 9/14/22  Marty Rivas MD   trimethoprim-polymyxin b (POLYTRIM) 17636-0.1 UNIT/ML-% ophthalmic solution Place 1 drop into both eyes every 4 hours for 10 days 9/6/22 9/16/22  Marty Rivas MD   pantoprazole (PROTONIX) 40 MG tablet Take 1 tablet by mouth 2 times daily (before meals) 8/29/22   Marigene Kehr, MD   warfarin (COUMADIN) 4 MG tablet Take 1 tablet by mouth daily 8/29/22   Marigene Kehr, MD   sodium bicarbonate 650 MG tablet Take 1 tablet by mouth 4 times daily 8/29/22   Bridger Wilson MD   gabapentin (NEURONTIN) 100 MG capsule 200mg in am, 100mg at noon and 200mg in pm 8/22/22 2/2/23  Marty Rivas MD   DULoxetine (CYMBALTA) 20 MG extended release capsule Take 1 capsule by mouth daily  Patient not taking: No sig reported 6/8/22   Marty Rivas MD   atorvastatin (LIPITOR) 40 MG tablet Take 1 tablet by mouth daily 5/11/22   Griselda Sheridan MD   levothyroxine (SYNTHROID) 200 MCG tablet Take 1 tablet by mouth Daily Pt take 0.5 Saturday and Abdiaziz 3/30/22   Niurka Cameron MD   potassium chloride (KLOR-CON M) 20 MEQ extended release tablet Take 1 tablet by mouth 3 times daily 2/11/22   Niurka Cameron MD   metOLazone (ZAROXOLYN) 2.5 MG tablet 1 tablet by mouth monday and Friday only 2/11/22   Niurka Cameron MD   spironolactone (ALDACTONE) 25 MG tablet Take 25 mg by mouth daily 9/2/20   Historical Provider, MD   magnesium oxide (MAG-OX) 400 (241.3 Mg) MG TABS tablet  2/10/20   Historical Provider, MD   torsemide (DEMADEX) 20 MG tablet Take 40 mg by mouth 2 times daily 6/5/19   Historical Provider, MD   vitamin B-1 (THIAMINE) 100 MG tablet Take 100 mg by mouth daily    Historical Provider, MD   metoprolol tartrate (LOPRESSOR) 50 MG tablet Take 1 tablet by mouth 2 times daily 6/20/17   Bethel Strange MD       Future Appointments   Date Time Provider Rafi Alvarenga   9/16/2022 11:30 AM SCHEDULE, [facility]   10/7/2022 11:00 AM MD JHONATHAN Hernadez [facility]

## 2022-09-14 NOTE — CARE COORDINATION
Initial Contact Social Work Note - Ambulatory  9/14/2022      Date of referral: 9/14/2022  Referral received from: Dick Miranda  Reason for referral: \"social problem\"    Previous SW referral: Yes  If yes, brief summary of outcome: N/A    Two Identifiers Verified: Yes    Insurance Provider: Felice San: Adult Child/Children     Status: [place], pt's son has called Ashley Murillole with the [address] and is awaiting return call    Community Providers: [facility]. Pt is receiving PT, completed OT. Today was nurse's last visit from [address] [place] [address] Needed: Bathing and Transferring, Pt uses a walker    Housing/Living Concerns or Home Modification Needs: Son completes laundry/housekeeping    Transportation Concern: Pt's son    Medication Cost Concern: No    Medication Adherence Concern: Pt's son reports pt is noncompliant with taking medication    Financial Concern(s): No    Income (only if applicable): Pension, Social Security    Ability to Read/Write: Yes    Advance Care Plan:  N/A    Other: N/A    Identified Needs:  Aide services    Social Work Plan:  [facility] made referral to [address] [place] [address] emailed pt's son Dakota Morris list of private duty caregiver agencies  Next Steps: [facility] to f/u    Method of Communication With Provider (if appropriate): Chart Routing       Goals Addressed    None     [facility] received request from Priya Nguyen to call pt's son Dakota Morris regarding referral.   [facility] made call to pt's son, Dakota Morris, introduced self/role and reviewed referral. Pt lives alone, uses walker. Pt is visited by [facility], today was his last nurse visit, is still visited by PT. Pt had OT with [facility]. Pt needs assistance with medication management, is not compliant with medication. Pt needs assistance with dressing, grooming, uses walker and needs assistance with laundry/housekeeping.  [facility] explained [facility] and son Dakota Morris is agreeable to referral. [facility] also reviewed option of hiring a private caregiver agency for aide services, Marizol Osei requested [facility] to email list of private duty caregivers to [email or web address]. Marizol Osei has spoken with Ivan Copeland to see if pt is eligible for any services through the [address], is awaiting call back. CC to f/u next week to see if Marizol Osei has spoken with Ivan Copeland. Discussed long term or SNF placement, Marizol Osei reports he does not feel pt needs placement at this time. CC to route note to PCP and f/u next week. [facility] made call to [facility], spoke with Urbano Bales, made referral.   [facility] emailed list of private duty caregiver agencies to son Marizol Osei.     Renée CARDENAS, [place]   Care Coordinator  [phone]

## 2022-09-16 ENCOUNTER — NURSE ONLY (OUTPATIENT)
Dept: PRIMARY CARE CLINIC | Age: 82
End: 2022-09-16
Payer: MEDICARE

## 2022-09-16 VITALS — SYSTOLIC BLOOD PRESSURE: 110 MMHG | DIASTOLIC BLOOD PRESSURE: 62 MMHG

## 2022-09-16 DIAGNOSIS — I48.19 PERSISTENT ATRIAL FIBRILLATION (HCC): Primary | ICD-10-CM

## 2022-09-16 LAB
INTERNATIONAL NORMALIZATION RATIO, POC: 2.3
PROTHROMBIN TIME, POC: 27.4

## 2022-09-16 PROCEDURE — 85610 PROTHROMBIN TIME: CPT | Performed by: INTERNAL MEDICINE

## 2022-09-16 NOTE — PROGRESS NOTES
Took meds Sunday, Tuesday and Thursday. Nothing today and took meds 2 days last week. Son states hes taking regular meds every other day.

## 2022-09-16 NOTE — PROGRESS NOTES
Previous INR: 1.3    Previous dose: Took meds Sunday, Tuesday and Thursday and only intermittently before that    Current INR: 2.3    Recommendation: continue at 4mg daily     Next INR: 1 week     No Fried MD  9/16/2022  12:18 PM

## 2022-09-21 ENCOUNTER — CARE COORDINATION (OUTPATIENT)
Dept: CARE COORDINATION | Age: 82
End: 2022-09-21

## 2022-09-21 NOTE — CARE COORDINATION
[facility] made call to pt's son Ajith Cobb. Ajith Cobb reports she spoke with [facility] and scheduled a phone assessment for 10/4. Joe reports he did receive list of private duty caregiver agencies, has not called any. Ajith Cobb reports he does not know if he can wait until [facility] assessment, pt is not taking his medications. Joe denied any memory issues with pt. [facility] reviewed if son does not feel pt is safe at home, and feels pt needs to be assessed and placed in facility, option to take pt to ER. Joe reports he does not feel it is an emergency. [facility] also reviewed resource of APS if son Ajith Cobb would like to call APS to make a visit. Ajith Cobb does not wish to use APS as a resource at this time. [facility] reviewed for Joe to call [facility]nursing homes to talk with admission dept and tour any facilities they are interested in. Joe reports understanding. [facility] to email Ajith Cobb a list of nursing home and half-way. [facility] to route note to ACM and PCP.      Tobias Dakin MSW, [place]   Care Coordinator  [phone]

## 2022-09-21 NOTE — CARE COORDINATION
Ambulatory Care Coordination Note  9/21/2022    ACC: Jah Licona, OLIVERIO    Summary Note:   ACM spoke with Kari Swift. He states he is using his walker to ambulate in the home and has not experienced any falls. He is still active with [facility] physical therapy. Reviewed fall precautions with Kari Swift:   1)Getting up slowly when changing positions  2)Keeping pathways clear  3)Keep house well lit  4)Ambulate with assistive device-patient states he is using walker at all times. 5)Keep a phone/life alert with you when ambulating       He states he has not been taking his medications regularly. ACM inquired if he has taken his morning dose today and he states he has not. Kari Swift states \"today is the day\" to start taking his medications as prescribed. ACM encouraged Kari Swift to follow through on taking his medications. Kari Swift is scheduled on 9/23 for an INR and he states his son Monica Garcia will take him to the appointment. ACM reviewed appointment time and date with Dr. [place] Vieyra and Kari Swift states his son will transport him to this appointment also. Plan  Check on status of patient taking medications/keeping appointments  Continue to follow for care coordination    Lab Results       None            Care Coordination Interventions    Referral from Primary Care Provider: No  Suggested Interventions and Community Resources  Fall Risk Prevention: Completed  Medi Set or Pill Pack: Not Started  Occupational Therapy: Completed  Pharmacist: Declined  Physical Therapy: Completed  Social Work: Completed          Goals Addressed    None         Prior to Admission medications    Medication Sig Start Date End Date Taking?  Authorizing Provider   pantoprazole (PROTONIX) 40 MG tablet Take 1 tablet by mouth 2 times daily (before meals) 8/29/22   Valeria Whiting MD   warfarin (COUMADIN) 4 MG tablet Take 1 tablet by mouth daily 8/29/22   Valeria Whiting MD   sodium bicarbonate 650 MG tablet Take 1 tablet by mouth 4 times daily 8/29/22 Charanjit Hill MD   gabapentin (NEURONTIN) 100 MG capsule 200mg in am, 100mg at noon and 200mg in pm 8/22/22 2/2/23  Lupe Corrales MD   DULoxetine (CYMBALTA) 20 MG extended release capsule Take 1 capsule by mouth daily  Patient not taking: No sig reported 6/8/22   Lupe Corrales MD   atorvastatin (LIPITOR) 40 MG tablet Take 1 tablet by mouth daily 5/11/22   Esther Galloway MD   levothyroxine (SYNTHROID) 200 MCG tablet Take 1 tablet by mouth Daily Pt take 0.5 Saturday and Abdiaziz 3/30/22   Lupe Corrales MD   potassium chloride (KLOR-CON M) 20 MEQ extended release tablet Take 1 tablet by mouth 3 times daily 2/11/22   Lupe Corrales MD   metOLazone (ZAROXOLYN) 2.5 MG tablet 1 tablet by mouth monday and Friday only 2/11/22   Lupe Corrales MD   spironolactone (ALDACTONE) 25 MG tablet Take 25 mg by mouth daily 9/2/20   Historical Provider, MD   magnesium oxide (MAG-OX) 400 (241.3 Mg) MG TABS tablet  2/10/20   Historical Provider, MD   torsemide (DEMADEX) 20 MG tablet Take 40 mg by mouth 2 times daily 6/5/19   Historical Provider, MD   vitamin B-1 (THIAMINE) 100 MG tablet Take 100 mg by mouth daily    Historical Provider, MD   metoprolol tartrate (LOPRESSOR) 50 MG tablet Take 1 tablet by mouth 2 times daily 6/20/17   Shilpa Barrow MD       Future Appointments   Date Time Provider Rafi Alvarenga   9/23/2022 11:30 AM SCHEDULE, [facility]   10/7/2022 11:00 AM MD JHONATHAN Zelaya [facility]

## 2022-09-22 NOTE — PROGRESS NOTES
Physician Progress Note      NAHUMFediamond Hager  [facility] #:                  [id number]  :                       1940  ADMIT DATE:       2022 6:22 PM  [address] DATE:        2022 6:25 PM  RESPONDING  PROVIDER #:        Surya Robison MD          QUERY TEXT:    Dr. Ashley Elliott,    Pt admitted with mechanical fall and has Diastolic CHF documented. If   possible, please document in progress notes and discharge summary further   specificity regarding the acuity of CHF:    The medical record reflects the following:  Risk Factors: PMH of heart failure  Clinical Indicators:   In progress notes states HFpEF 55-60%, pro-BNP   21,129  Treatment: Torsemide, Spironolactone, Close pt monitoring    Thank you,  Renata HURST, RN  Clinical Documentation Improvement  [email or web address]  Cell Phone: [phone]  Options provided:  -- Acute on Chronic Diastolic CHF/HFpEF  -- Acute Diastolic CHF/HFpEF  -- Chronic Diastolic CHF/HFpEF  -- Other - I will add my own diagnosis  -- Disagree - Not applicable / Not valid  -- Disagree - Clinically unable to determine / Unknown  -- Refer to Clinical Documentation Reviewer    PROVIDER RESPONSE TEXT:    This patient is in acute on chronic diastolic CHF/HFpEF.     Query created by: Mary Magdaleno on 2022 1:52 PM      Electronically signed by:  Surya Robison MD 2022 8:25 AM

## 2022-09-23 ENCOUNTER — NURSE ONLY (OUTPATIENT)
Dept: PRIMARY CARE CLINIC | Age: 82
End: 2022-09-23
Payer: MEDICARE

## 2022-09-23 VITALS — DIASTOLIC BLOOD PRESSURE: 62 MMHG | SYSTOLIC BLOOD PRESSURE: 100 MMHG

## 2022-09-23 DIAGNOSIS — I48.19 PERSISTENT ATRIAL FIBRILLATION (HCC): Primary | ICD-10-CM

## 2022-09-23 LAB
INTERNATIONAL NORMALIZATION RATIO, POC: 1.7
PROTHROMBIN TIME, POC: 20.6

## 2022-09-23 PROCEDURE — 85610 PROTHROMBIN TIME: CPT | Performed by: INTERNAL MEDICINE

## 2022-09-23 NOTE — PROGRESS NOTES
Previous INR: 2.3    Previous dose: Took meds Wednesday and Thursday and nothing since last week     Current INR: 1.7    Recommendation:  This is a difficult recommendation since not consistent with medication but would continue at 4mg daily     Next INR: 1 week     Niurka Cameron MD  9/23/2022  12:21 PM

## 2022-09-30 ENCOUNTER — NURSE ONLY (OUTPATIENT)
Dept: PRIMARY CARE CLINIC | Age: 82
End: 2022-09-30
Payer: MEDICARE

## 2022-09-30 DIAGNOSIS — I48.19 PERSISTENT ATRIAL FIBRILLATION (HCC): Primary | ICD-10-CM

## 2022-09-30 LAB
INTERNATIONAL NORMALIZATION RATIO, POC: 3.1
PROTHROMBIN TIME, POC: 37.1

## 2022-09-30 PROCEDURE — 85610 PROTHROMBIN TIME: CPT | Performed by: INTERNAL MEDICINE

## 2022-09-30 NOTE — PROGRESS NOTES
Previous INR: 1.7    Previous dose: may have missed 2 or 3 doses     Current INR: 3.1    Recommendation: would recommend 4mg mon/wed/fri and 2mg all other days      Next INR: 1 week     Miriam Alan MD  9/30/2022  1:07 PM

## 2022-10-03 ENCOUNTER — CARE COORDINATION (OUTPATIENT)
Dept: CARE COORDINATION | Age: 82
End: 2022-10-03

## 2022-10-03 NOTE — CARE COORDINATION
[facility] made f/u call to pt's son Adis Mario, reviewed that [facility] assessment is tomorrow. Joe reports he plans to talk with [facility] regarding options for pt. Adis Mario had no other questions or needs at this time. [facility] to f/u in a few weeks.     Lee Glass MSW, [place]   Care Coordinator  [phone]

## 2022-10-07 ENCOUNTER — CARE COORDINATION (OUTPATIENT)
Dept: CARE COORDINATION | Age: 82
End: 2022-10-07

## 2022-10-07 ENCOUNTER — OFFICE VISIT (OUTPATIENT)
Dept: PRIMARY CARE CLINIC | Age: 82
End: 2022-10-07
Payer: MEDICARE

## 2022-10-07 VITALS
SYSTOLIC BLOOD PRESSURE: 118 MMHG | WEIGHT: 200 LBS | OXYGEN SATURATION: 98 % | HEART RATE: 73 BPM | BODY MASS INDEX: 29.62 KG/M2 | HEIGHT: 69 IN | DIASTOLIC BLOOD PRESSURE: 56 MMHG | TEMPERATURE: 97.5 F

## 2022-10-07 DIAGNOSIS — I48.19 PERSISTENT ATRIAL FIBRILLATION (HCC): Primary | ICD-10-CM

## 2022-10-07 DIAGNOSIS — D64.9 ANEMIA, UNSPECIFIED TYPE: ICD-10-CM

## 2022-10-07 DIAGNOSIS — Z79.899 LONG TERM CURRENT USE OF THERAPEUTIC DRUG: ICD-10-CM

## 2022-10-07 DIAGNOSIS — N18.30 STAGE 3 CHRONIC KIDNEY DISEASE, UNSPECIFIED WHETHER STAGE 3A OR 3B CKD (HCC): ICD-10-CM

## 2022-10-07 DIAGNOSIS — E03.8 OTHER SPECIFIED HYPOTHYROIDISM: ICD-10-CM

## 2022-10-07 DIAGNOSIS — E78.00 PURE HYPERCHOLESTEROLEMIA: ICD-10-CM

## 2022-10-07 DIAGNOSIS — M1A.09X0 IDIOPATHIC CHRONIC GOUT OF MULTIPLE SITES WITHOUT TOPHUS: ICD-10-CM

## 2022-10-07 DIAGNOSIS — K70.31 ALCOHOLIC CIRRHOSIS OF LIVER WITH ASCITES (HCC): ICD-10-CM

## 2022-10-07 DIAGNOSIS — I89.0 LYMPHEDEMA: ICD-10-CM

## 2022-10-07 DIAGNOSIS — I95.89 OTHER SPECIFIED HYPOTENSION: ICD-10-CM

## 2022-10-07 DIAGNOSIS — I10 ESSENTIAL HYPERTENSION: ICD-10-CM

## 2022-10-07 DIAGNOSIS — E87.1 HYPONATREMIA: ICD-10-CM

## 2022-10-07 DIAGNOSIS — I42.0 DILATED CARDIOMYOPATHY (HCC): ICD-10-CM

## 2022-10-07 LAB
INTERNATIONAL NORMALIZATION RATIO, POC: 2
PROTHROMBIN TIME, POC: 24.1

## 2022-10-07 PROCEDURE — 99214 OFFICE O/P EST MOD 30 MIN: CPT | Performed by: INTERNAL MEDICINE

## 2022-10-07 PROCEDURE — 85610 PROTHROMBIN TIME: CPT | Performed by: INTERNAL MEDICINE

## 2022-10-07 PROCEDURE — 1123F ACP DISCUSS/DSCN MKR DOCD: CPT | Performed by: INTERNAL MEDICINE

## 2022-10-07 ASSESSMENT — ENCOUNTER SYMPTOMS
SHORTNESS OF BREATH: 0
BLOOD IN STOOL: 0
EYE PAIN: 0
CONSTIPATION: 0
RHINORRHEA: 0
CHEST TIGHTNESS: 0
VOMITING: 0
DIARRHEA: 0
NAUSEA: 0
ABDOMINAL PAIN: 0
SORE THROAT: 0
COUGH: 0

## 2022-10-07 NOTE — PROGRESS NOTES
[facility] Physicians   Internal Medicine     10/7/2022  Nevaeh Paul : 1940 Sex: male Age:81 y.o. Chief Complaint   Patient presents with    Atrial Fibrillation        HPI:   Patient presents to office for evaluation of the following medical concerns. Son present for visit     social work () - referral to [facility] for services and also emailed [employer] of private duty caregiver agencies. States - intermittently taking medications. Not eating well. Having weight loss. ct head 8mm lt menigioma neg acute, ct c-spine no acute, djd,     - Hyponatremia    - States having issues with tinnitus.     - States having issues with b/l LE edema. Improved, On diuretics last visit with nephrology per reviewed note (3/22) -hyponatremia drink electrolyte fluids instead of free water, no changes follow-up in 4 months with labs    - Having erectile dysfunction. Asking about treatment     - Stats has atrial fibrillation. On metoprolol. On coumadin. Current dose 6mg mon and 5mg all other days. INR today (10/7/2022) was 2.0. No bleeding.     - States has cardiomyopathy. States due to alcohol. Follows with cardiology. Last visit per reviewed note  () - History of cardiomyopathy chronic heart failure with preserved ejection fraction atrial fibrillation continue metoprolol 50 twice a day stop aspirin due to bruising upper taxis torsemide spironolactone per nephrology Eliquis cost prohibitive f/u 1yr     - States has CAD. States had stress test (2020) The myocardial perfusion imaging was abnormal, moderate sized reversible defect , cardiac cath (2020) - 40% proximal LAD lesion, 50% mid LAD lesion at the takeoff of a large diagonal branch, 50% proximal left circumflex artery disease. Totally occluded right coronary artery with grade 3 left-to-right collaterals. States needs aggressive risk factor management.  On metoprolol last visit per reviewed note  () - History of cardiomyopathy chronic heart failure with preserved ejection fraction atrial fibrillation continue metoprolol 50 twice a day stop aspirin due to bruising upper taxis torsemide spironolactone per nephrology Eliquis cost prohibitive f/u 1yr     - States has hypothyroid. On synthroid. Last lab (2/2022) was overreactive. 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 Still drinks on weekends. Discussed cessation. On metolazone  2.5mg - states switched to 2x per week. On torsemide 40 mg twice a day. on sprinolactone. - States has chronic kidney disease. Follows with nephrology. LAst creat was 1.58 (10/2021). Last visit with nephrology per reviewed note (3/22) -hyponatremia drink electrolyte fluids instead of free water, no changes follow-up in 4 months with labs    - States has elevated uric acid. States was on allopurinol in the past, stopped due to thought was causing hives. Last uric acid was 8.6 (2/2022). States was seen in urgent care (9/2021) wrist pain - depomedrol x1, medrol pack     States has hypertension, not checking blood pressure at home. On metoprolol. States has high cholesterol. Was on simvastatin - changed to atorvastatin. No reported side effects. States has neuropathy. Possible due to alcohol. On gabapentin, (takes bid not tid)  Added duloxtine. Stable    - States has osteoarthritis. States multiple joints. Had left knee replacement. 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 Follows with ortho. Needs physical therapy.      - low sodium - uncertain in duloxetine or diuretics     Health Maintenance   - immunizations:   Influenza Vaccination - declines  Pneumonia Vaccination - declines   Zoster/Shingles Vaccine  Tetanus Vaccination (2017)- tdap   covid (3/2/2021) #1, (3/30/2021) #2, (11/30/2021) #3  - moderna     - Screenings:   Colonoscopy   Prostate     Stress test (4/2020) - The myocardial perfusion imaging was abnormal, moderate sized reversible defect in the entire inferior wall and apex suggestive of ischemia    echo (4/20) - ef 55-60%, mod l &r atiral dilation, mild to mod MR, indeterm diastolic dysfun    cardiac cath (5/20) - IMPRESSION:  1.  40% proximal LAD lesion, 50% mid LAD lesion at the takeoff of a  large diagonal branch. 2.  50% proximal left circumflex artery disease. 3.  Totally occluded right coronary artery with grade 3 left-to-right  collaterals. Couseled on Home Safety     ROS:  Review of Systems   Constitutional:  Negative for appetite change, chills, fever and unexpected weight change. HENT:  Negative for congestion, rhinorrhea and sore throat. Eyes:  Negative for pain and visual disturbance. Respiratory:  Negative for cough, chest tightness and shortness of breath. Cardiovascular:  Negative for chest pain and palpitations. Gastrointestinal:  Negative for abdominal pain, blood in stool, constipation, diarrhea, nausea and vomiting. Genitourinary:  Negative for difficulty urinating, dysuria, hematuria, scrotal swelling, testicular pain and urgency. Musculoskeletal:  Negative for arthralgias and gait problem. Skin:  Negative for rash. Neurological:  Positive for dizziness. Negative for syncope, weakness, light-headedness and headaches. Hematological:  Negative for adenopathy. Does not bruise/bleed easily. Psychiatric/Behavioral:  Negative for suicidal ideas. The patient is not nervous/anxious.         Current Outpatient Medications:     warfarin (COUMADIN) 4 MG tablet, Take 1 tablet by mouth daily, Disp: 30 tablet, Rfl: 3    sodium bicarbonate 650 MG tablet, Take 1 tablet by mouth 4 times daily, Disp: 120 tablet, Rfl: 0    gabapentin (NEURONTIN) 100 MG capsule, 200mg in am, 100mg at noon and 200mg in pm, Disp: 450 capsule, Rfl: 0    DULoxetine (CYMBALTA) 20 MG extended release capsule, Take 1 capsule by mouth daily, Disp: 90 capsule, Rfl: 1    atorvastatin (LIPITOR) 40 MG tablet, Take 1 tablet by mouth daily, Disp: 90 tablet, Rfl: 3    levothyroxine (SYNTHROID) 200 MCG tablet, Take 1 tablet by mouth Daily Pt take 0.5 Saturday and Sunday, Disp: 90 tablet, Rfl: 1    potassium chloride (KLOR-CON M) 20 MEQ extended release tablet, Take 1 tablet by mouth 3 times daily, Disp: 270 tablet, Rfl: 2    metOLazone (ZAROXOLYN) 2.5 MG tablet, 1 tablet by mouth monday and Friday only, Disp: 90 tablet, Rfl: 1    spironolactone (ALDACTONE) 25 MG tablet, Take 25 mg by mouth daily, Disp: , Rfl:     magnesium oxide (MAG-OX) 400 (241.3 Mg) MG TABS tablet, , Disp: , Rfl:     torsemide (DEMADEX) 20 MG tablet, Take 40 mg by mouth 2 times daily, Disp: , Rfl:     vitamin B-1 (THIAMINE) 100 MG tablet, Take 100 mg by mouth daily, Disp: , Rfl:     metoprolol tartrate (LOPRESSOR) 50 MG tablet, Take 1 tablet by mouth 2 times daily, Disp: 60 tablet, Rfl: 3    Allergies   Allergen Reactions    Sulfa Antibiotics Hives     Hives         Past Medical History:   Diagnosis Date    Blood circulation, collateral     CAD (coronary artery disease)     Chronic kidney disease     History of alcohol use     History of ascites     history of, approx 5 years ago    Hyperlipidemia     Hypertension     Hyponatremia     Left knee pain 06/2020    Liver disease     Lymphedema     lower extrem    Neuropathy     both feet    Osteoarthritis     Psychiatric problem     Thyroid disease     Use of cane as ambulatory aid     Uses wheelchair     for distances    Wears glasses     for reading    Wears hearing aid        Past Surgical History:   Procedure Laterality Date    APPENDECTOMY      CARDIAC CATHETERIZATION  05/2020    CATARACT REMOVAL WITH IMPLANT Bilateral     COLONOSCOPY      EYE SURGERY      cataracts    TOTAL KNEE ARTHROPLASTY Left 6/22/2020    LEFT KNEE IVAN ROBOTIC TOTAL ARTHROPLASTY performed by Gretel Tracy MD at [facility] OR       Family History   Problem Relation Age of Onset    Other Mother         old age        Social History     Socioeconomic History Marital status: Single     Spouse name: None    Number of children: 1    Years of education: 16    Highest education level: Master's degree (e.g., MA, MS, Jordan, MEd, MSW, LUCERO)   Tobacco Use    Smoking status: Former     Packs/day: 0.75     Years: 25.00     Pack years: 18.75     Types: Pipe, Cigars, Cigarettes     Start date: 10/8/1964     Quit date: 1986     Years since quittin.5    Smokeless tobacco: Never    Tobacco comments:     Cigar and Pipe smoking history only   Vaping Use    Vaping Use: Never used   Substance and Sexual Activity    Alcohol use: Yes     Alcohol/week: 0.0 standard drinks     Comment: quit 2017; socially as of 2020 on weekends    Drug use: No     Social Determinants of Health     Financial Resource Strain: Low Risk     Difficulty of Paying Living Expenses: Not hard at all   Food Insecurity: No Food Insecurity    Worried About Running Out of Food in the Last Year: Never true    Ran Out of Food in the Last Year: Never true   Transportation Needs: No Transportation Needs    Lack of Transportation (Medical): No    Lack of Transportation (Non-Medical): No   Physical Activity: Inactive    Days of Exercise per Week: 0 days    Minutes of Exercise per Session: 0 min   Stress: No Stress Concern Present    Feeling of Stress : Only a little   Social Connections:  Moderately Isolated    Frequency of Communication with Friends and Family: More than three times a week    Frequency of Social Gatherings with Friends and Family: More than three times a week    Attends [employer] Services: Never    Active Member of Clubs or Organizations: Yes    Attends Club or Organization Meetings: 1 to 4 times per year    Marital Status:    Housing Stability: Low Risk     Unable to Pay for Housing in the Last Year: No    Number of [place] in the Last Year: 1    Unstable Housing in the Last Year: No        Vitals:    10/07/22 1121   BP: (!) 118/56   Pulse: 73   Temp: 97.5 °F (36.4 °C)   SpO2: 98% Weight: 200 lb (90.7 kg)   Height: 5' 9\" (1.753 m)       Exam:  Physical Exam  Constitutional:       Appearance: He is well-developed. HENT:      Head: Normocephalic and atraumatic. Right Ear: External ear normal.      Left Ear: External ear normal.      Nose: Rhinorrhea present. Mouth/Throat:      Pharynx: Posterior oropharyngeal erythema present. Eyes:      Pupils: Pupils are equal, round, and reactive to light. Neck:      Thyroid: No thyromegaly. Cardiovascular:      Rate and Rhythm: Normal rate. Rhythm irregular. Heart sounds: Murmur heard. Systolic murmur is present with a grade of 2/6. Pulmonary:      Effort: Pulmonary effort is normal.      Breath sounds: Normal breath sounds. No wheezing or rales. Abdominal:      General: Bowel sounds are normal.      Palpations: Abdomen is soft. Tenderness: There is no abdominal tenderness. There is no guarding or rebound. Musculoskeletal:         General: Normal range of motion. Cervical back: Normal range of motion and neck supple. Right lower leg: Edema present. Left lower leg: Edema present. Lymphadenopathy:      Cervical: No cervical adenopathy. Skin:     General: Skin is warm and dry. Neurological:      Mental Status: He is alert and oriented to person, place, and time. Cranial Nerves: No cranial nerve deficit.    Psychiatric:         Judgment: Judgment normal.       Assessment and Plan:    Diagnoses and all orders for this visit:    Hypotension, unspecified hypotension type  - uncertain as to cause   - would recommend rechecking labs   - hold metoprolol and spironolactone   - hold metolazone   - follow BP at home   - ER if not improving   - BP stable 10/7/2022    Lymphedema  - on metalozaone 2.5mg 2x per week   - on torsemide - advised by renal to take 40mg twice a day    - on spirinolactone 25mg daily   - on potassium supplement   - compression stockings if able   - recommended referral to lymphedema PT Hyponatremia  - Possible multifactorial (cirrhois and meds) - diurteics, duloxetine?   - on sodium bicarbonate   - recheck labs - ordered for next week (10/2022)      Anemia, unspecified type  - recheck labs  - ordered for next week (10/2022)      Gastrointestinal hemorrhage, unspecified gastrointestinal hemorrhage type  - no scope done   - follow up labs     Stage 3 chronic kidney disease, unspecified whether stage 3a or 3b CKD ([facility]  - following with nephrology   - on spirinolactone 25mg daily   - on metalozaone 2.5mg 2x per week    - on torsemide - advised by renal to take 40mg twice a day    - on potassium supplement   - follow labs      Hypokalemia   - ordered for next week (10/2022)      Current use of long term anticoagulation  - on coumadin 4mg daily - has not been complaint   - inr (10/7/2022) was 2.0  - recommend 2mg daily recheck in 1 week      Essential hypertension  - watch diet   - monitor blood pressure at home   - on spironolactone   - on metoprolol tart     Persistent atrial fibrillation  - uncertain as to onset  - ekg from 2017 showed flutter   - on anticoagulation   - follow with cardiolgy   - on coumadin 6mg mon and 5mg all other days  - however uncertain as to ecatly what he is taking as soon gets   - inr (10/7/2022) was 3.8  - recommend to hold x 1 then change to 5mg daily   - recheck in 1 month      Essential hypertension  - watch diet   - monitor blood pressure at home   - on spironolactone   - on metoprolol tart   - stable 10/7/2022    Pure hypercholesterolemia  - watch diet   - simvastatin switched to atorvastatin   - follow labs   - last lab (2/2022) - stable     Coronary artery disease involving native coronary artery of native heart without angina pectoris  - s/p stress and cath (2020)   - medical therapy   - now on atorvastatin   - on aspirin 81mg   - on metorolol tart 50mg twice a day   - follow with cardiology     Alcoholic cirrhosis of liver with ascites ([facility]  - discussed and advised stopping alcohol altogether   - declines referral to GI   - on spironolactone   - on torsemide   - on metolazone - states changed to 2x per week    - stopped alcohol   - check ammonia     Dilated cardiomyopathy ([facility]  - possible due to alcohol   - discussed and advised stopping alcohol altogether     CKD (chronic kidney disease) stage 3, GFR 30-59 ml/min ([facility])  - following with nephrology   - on spirinolactone 25mg daily   - on metalozaone 2.5mg 2x per week    - on torsemide - advised by renal to take 40mg twice a day    - on potassium supplement   - follow labs     Idiopathic chronic gout of multiple sites without tophus  - previous labs showed elevated uric acid - last 8.6 (2/2022)   - was on allopurinol in the past - stopped due to poss reaction   - may need to consider uloric and discuss with renal   - low purine diet handout provided   - exacerbation (9/2021) - treated with medrol for pain to wrist     Impaired fasting glucose  - watch diet   - follow A1c - last was 5.7 (2/2022)     Alcohol abuse  - discussed and advised stopping alcohol altogether     History of ascites    Neuropathy ([facility]  - due to alcohol   - on gabapentin 200mg in am, 200mg at noon and 100mg in pm   - asking for alternative - discssed duloxetine and lyrica   - on duloxetine   - stable     Other specified hypothyroidism  - on levothyroxine   - follow labs - last (2/2022) - stable  - decrease dose to 200mcg mon-fri and 100mcg sat/sun   - recheck labs     Primary osteoarthritis involving multiple joints    Erectile dysfunction, unspecified erectile dysfunction type  - trial of viagra      No follow-ups on file.     Orders Placed This Encounter   Procedures    POCT INR     Requested Prescriptions      No prescriptions requested or ordered in this encounter        Jackelyn Sharpe MD  10/7/2022  11:37 AM

## 2022-10-14 ENCOUNTER — NURSE ONLY (OUTPATIENT)
Dept: PRIMARY CARE CLINIC | Age: 82
End: 2022-10-14
Payer: COMMERCIAL

## 2022-10-14 DIAGNOSIS — I48.19 PERSISTENT ATRIAL FIBRILLATION (HCC): Primary | ICD-10-CM

## 2022-10-14 LAB
INTERNATIONAL NORMALIZATION RATIO, POC: 1.4
PROTHROMBIN TIME, POC: 16.4

## 2022-10-14 PROCEDURE — 85610 PROTHROMBIN TIME: CPT | Performed by: INTERNAL MEDICINE

## 2022-10-14 NOTE — PROGRESS NOTES
We can change to every 2 weeks and see what we can do to continue encouraged to continue to take the medication daily

## 2022-10-14 NOTE — PROGRESS NOTES
Previous INR: 2.0    Previous dose: may have missed 2 or 3 doses     Current INR: 1.4    Recommendation: 2mg daily      Next INR: 1 week     Anny Marquez MD  10/14/2022  12:19 PM

## 2022-10-14 NOTE — PROGRESS NOTES
Called and spoke to patient - he has not taken any pills since Saturday.  He is concerned that he isnt always taking the pills and is maybe only taking 6mg total for a week - is there anything we can do

## 2022-10-17 ENCOUNTER — CARE COORDINATION (OUTPATIENT)
Dept: CARE COORDINATION | Age: 82
End: 2022-10-17

## 2022-10-17 NOTE — CARE COORDINATION
Attempted to reach patient by telephone. Left HIPAA compliant message requesting a return call. Will attempt to reach patient again. [facility] contacted patient's son, Shahbaz Ma. He states his father has not taken any medications for the past nine days. He adds that his father is not bathing nor maintaining his home. In addition, patient's appetite is very poor. Shahbaz Ma has discussed assistive living with his father but he is reluctant to go. Patient is concerned about assisted living restricting his ability to do as her wishes per Shahbaz Ma.

## 2022-10-18 ENCOUNTER — CARE COORDINATION (OUTPATIENT)
Dept: CARE COORDINATION | Age: 82
End: 2022-10-18

## 2022-10-18 NOTE — CARE COORDINATION
Ambulatory Care Coordination Note  10/18/2022    ACC: Kike Frankel RN    ACM contacted Jose Martin Query. He states he has not taken his medication today because he has just woken up. He states the last time he took his medication was last night and that he started a new habit of taking his medications. He states his is bathing himself every other day and that he does eat three meals daily but does state his appetite is small. He denies any falls and states he is feeling stronger but still using his walker the majority of the time. Reviewed fall precautions with Otelia Query:   1)Getting up slowly when changing positions  2)Keeping pathways clear  3)Keep house well lit  4)Ambulate with assistive device  5)Keep a phone/life alert with you when ambulating   Patient verbalized understanding. Plan  Reports from son and Otfigueroa Query are contradictory. ACM has concerns regarding patient's self neglect. Call placed to [facility]. Message left with [employer] voice mail. Offered patient enrollment in the Remote Patient Monitoring (RPM) program for in-home monitoring: Patient is not eligible for RPM program.    Lab Results       None            Care Coordination Interventions    Referral from Primary Care Provider: No  Suggested Interventions and Community Resources  Fall Risk Prevention: Completed  Medi Set or Pill Pack: Not Started  Occupational Therapy: Completed  Pharmacist: Declined  Physical Therapy: Completed  Social Work: Completed          Goals Addressed                   This Visit's Progress     Conditions and Symptoms   On track     I will schedule office visits, as directed by my provider. I will keep my appointment or reschedule if I have to cancel. I will notify my provider of any barriers to my plan of care. I will notify my provider of any symptoms that indicate a worsening of my condition.     Barriers: lack of motivation, overwhelmed by complexity of regimen, and lack of education  Plan for overcoming my barriers: Patient education and care coordination  Confidence: 6/10  Anticipated Goal Completion Date: 11/30/2022         Reduce Falls    On track     I will reduce my risk of falls by the following: Remove rugs or use non slip rugs  Install grab bars in bathroom  Use walking aids like cane or walker  Follow through on orders for PT    Barriers: lack of motivation and lack of education  Plan for overcoming my barriers: Physical therapy, fall prevention education and care coordination  Confidence: 6/10  Anticipated Goal Completion Date: 11/31/2022              Prior to Admission medications    Medication Sig Start Date End Date Taking?  Authorizing Provider   warfarin (COUMADIN) 4 MG tablet Take 1 tablet by mouth daily 8/29/22   Iain Rutledge MD   sodium bicarbonate 650 MG tablet Take 1 tablet by mouth 4 times daily 8/29/22   Bridger Phipps MD   gabapentin (NEURONTIN) 100 MG capsule 200mg in am, 100mg at noon and 200mg in pm 8/22/22 2/2/23  Dontae Patterson MD   DULoxetine (CYMBALTA) 20 MG extended release capsule Take 1 capsule by mouth daily 6/8/22   Dontae Patterson MD   atorvastatin (LIPITOR) 40 MG tablet Take 1 tablet by mouth daily 5/11/22   Polina Wallace MD   levothyroxine (SYNTHROID) 200 MCG tablet Take 1 tablet by mouth Daily Pt take 0.5 Saturday and Abdiaziz 3/30/22   Dontae Patterson MD   potassium chloride (KLOR-CON M) 20 MEQ extended release tablet Take 1 tablet by mouth 3 times daily 2/11/22   Dontae Patterson MD   metOLazone (ZAROXOLYN) 2.5 MG tablet 1 tablet by mouth monday and Friday only 2/11/22   Dontae Patterson MD   spironolactone (ALDACTONE) 25 MG tablet Take 25 mg by mouth daily 9/2/20   Historical Provider, MD   magnesium oxide (MAG-OX) 400 (241.3 Mg) MG TABS tablet  2/10/20   Historical Provider, MD   torsemide (DEMADEX) 20 MG tablet Take 40 mg by mouth 2 times daily 6/5/19   Historical Provider, MD   vitamin B-1 (THIAMINE) 100 MG tablet Take 100 mg by mouth daily Historical Provider, MD   metoprolol tartrate (LOPRESSOR) 50 MG tablet Take 1 tablet by mouth 2 times daily 6/20/17   Abbie Fragoso MD       Future Appointments   Date Time Provider Rafi Alvarenga   10/21/2022 11:40 AM SCHEDULE[facility]   11/18/2022  2:45 PM Anny Marquez MD  [facility]

## 2022-10-18 NOTE — CARE COORDINATION
[facility] made f/u call to pt's son Gali Zamora who reports pt had assessment with [facility] and did not qualify for any services. Gali Zamora reports he was given a booklet to look into other options. Gali Zamora reports he has already met with PCP at pt's appt to discuss. Gali Zamora reports he has not looked into any [facility] or nursing homes d/t not knowing if pt would be agreeable. Joe denied any other quesitons or needs at this time. [facility] to sign off. [facility] encouraged Joe to call this SW with any SW needs. [facility] updated Michael Alfaroander.     Noelle Atkins MSCONCETTA, [facility]   Care Coordinator  [phone]

## 2022-10-21 ENCOUNTER — NURSE ONLY (OUTPATIENT)
Dept: PRIMARY CARE CLINIC | Age: 82
End: 2022-10-21
Payer: COMMERCIAL

## 2022-10-21 DIAGNOSIS — I48.19 PERSISTENT ATRIAL FIBRILLATION (HCC): Primary | ICD-10-CM

## 2022-10-21 LAB
INTERNATIONAL NORMALIZATION RATIO, POC: 1.5
PROTHROMBIN TIME, POC: 17.6

## 2022-10-21 PROCEDURE — 85610 PROTHROMBIN TIME: CPT | Performed by: INTERNAL MEDICINE

## 2022-10-21 NOTE — PROGRESS NOTES
Previous INR: 1.4    Previous dose: states took 7 mg on Sunday no other doses     Current INR: 1.5    Recommendation: 2mg daily  - states will be taking 10mg on Sunday and no other doses     Still recommend daily dosing to have more consistent blood thinning levels throughout the week    Next INR: 1 week     Michael Molina MD  10/21/2022  12:39 PM    Addendum:   Will change to  4mg m/w/f and 2mg all other days   Recheck 1 week     Electronically signed by Michael Molina MD on 10/21/2022 at 4:51 PM

## 2022-10-21 NOTE — PROGRESS NOTES
Spoke to patient son and advised of instructions. Son said that when he was here he thinks there was confusion with what  he was saying. Last week (so week of 10/10) he took no medication. This week he alternated 2 and 4 - so he took a total of 14mg this week and he did give him 2mg today. I advised that you wanted him taking 2mg daily and he said he did switch his pills out so he if he takes them he will be taking the correct dose.  He is scheduled to come back next Friday

## 2022-10-28 ENCOUNTER — NURSE ONLY (OUTPATIENT)
Dept: PRIMARY CARE CLINIC | Age: 82
End: 2022-10-28
Payer: COMMERCIAL

## 2022-10-28 DIAGNOSIS — I48.19 PERSISTENT ATRIAL FIBRILLATION (HCC): Primary | ICD-10-CM

## 2022-10-28 LAB
INTERNATIONAL NORMALIZATION RATIO, POC: 1.4
PROTHROMBIN TIME, POC: 16.3

## 2022-10-28 PROCEDURE — 85610 PROTHROMBIN TIME: CPT | Performed by: INTERNAL MEDICINE

## 2022-10-28 NOTE — PROGRESS NOTES
Previous INR: 1.5    Previous dose: 4mg m/w/f and 2mg all other days, missed wednesday     Current INR: 1.4    Recommendation: change to 4mg mon/wed/fri/sunday and 2mg all other days    Next INR: 1 week     Shawn Lopez MD  10/28/2022  11:57 AM

## 2022-11-04 ENCOUNTER — NURSE ONLY (OUTPATIENT)
Dept: PRIMARY CARE CLINIC | Age: 82
End: 2022-11-04
Payer: COMMERCIAL

## 2022-11-04 VITALS — BODY MASS INDEX: 27.76 KG/M2 | WEIGHT: 188 LBS

## 2022-11-04 DIAGNOSIS — I48.19 PERSISTENT ATRIAL FIBRILLATION (HCC): Primary | ICD-10-CM

## 2022-11-04 LAB
INTERNATIONAL NORMALIZATION RATIO, POC: 1.2
PROTHROMBIN TIME, POC: 14.4

## 2022-11-04 PROCEDURE — 93793 ANTICOAG MGMT PT WARFARIN: CPT | Performed by: INTERNAL MEDICINE

## 2022-11-04 PROCEDURE — 85610 PROTHROMBIN TIME: CPT | Performed by: INTERNAL MEDICINE

## 2022-11-04 NOTE — PROGRESS NOTES
Previous INR: 1.4    Previous dose: Missed a Monday & Saturday (6mg) and has also not taken - 4mg m/w/f and 2mg all other days    Current INR: 1.2    Recommendation: would recommend to take 4mg mon/wed/fri/sunday and 2mg all other days    Next INR: 2 week as directed by patient     Alex Beasley MD  11/4/2022  11:49 AM

## 2022-11-15 ENCOUNTER — CARE COORDINATION (OUTPATIENT)
Dept: CARE COORDINATION | Age: 82
End: 2022-11-15

## 2022-11-15 NOTE — CARE COORDINATION
Ambulatory Care Coordination Note  11/15/2022    ACC: Ashok Nissen, RN ACM contacted Ancelmo Hsieh. He states he is \"trying\" to take his medications daily. He states he took all his medications yesterday but has not taken any yet today. He is aware of his appointment on Friday for a lab draw and plans on attending. AKI also left a message with son Rubens Huggins). Will await return call. Plan  Await return call from son  Continue to follow for care coordination     Offered patient enrollment in the Remote Patient Monitoring (RPM) program for in-home monitoring: NA. Lab Results       None            Care Coordination Interventions    Referral from Primary Care Provider: No  Suggested Interventions and Community Resources  Fall Risk Prevention: Completed  Medi Set or Pill Pack: Not Started  Occupational Therapy: Completed  Pharmacist: Declined  Physical Therapy: Completed  Social Work: Completed          Goals Addressed    None         Prior to Admission medications    Medication Sig Start Date End Date Taking?  Authorizing Provider   warfarin (COUMADIN) 4 MG tablet Take 1 tablet by mouth daily 8/29/22   Tez Leahy MD   sodium bicarbonate 650 MG tablet Take 1 tablet by mouth 4 times daily 8/29/22   Bridger Maldonado MD   gabapentin (NEURONTIN) 100 MG capsule 200mg in am, 100mg at noon and 200mg in pm 8/22/22 2/2/23  Anthony Villar MD   DULoxetine (CYMBALTA) 20 MG extended release capsule Take 1 capsule by mouth daily 6/8/22   Anthony Villar MD   atorvastatin (LIPITOR) 40 MG tablet Take 1 tablet by mouth daily 5/11/22   Gaby Salazar MD   levothyroxine (SYNTHROID) 200 MCG tablet Take 1 tablet by mouth Daily Pt take 0.5 Saturday and Abdiaziz 3/30/22   Anthony Villar MD   potassium chloride (KLOR-CON M) 20 MEQ extended release tablet Take 1 tablet by mouth 3 times daily 2/11/22   Anthony Villar MD   metOLazone (ZAROXOLYN) 2.5 MG tablet 1 tablet by mouth monday and Friday only 2/11/22   Anthony Villar MD spironolactone (ALDACTONE) 25 MG tablet Take 25 mg by mouth daily 9/2/20   Historical Provider, MD   magnesium oxide (MAG-OX) 400 (241.3 Mg) MG TABS tablet  2/10/20   Historical Provider, MD   torsemide (DEMADEX) 20 MG tablet Take 40 mg by mouth 2 times daily 6/5/19   Historical Provider, MD   vitamin B-1 (THIAMINE) 100 MG tablet Take 100 mg by mouth daily    Historical Provider, MD   metoprolol tartrate (LOPRESSOR) 50 MG tablet Take 1 tablet by mouth 2 times daily 6/20/17   Quynh Henley MD       Future Appointments   Date Time Provider Rafi Alvarenga   11/18/2022  2:45 PM Faye Client, MD [facility]

## 2022-11-18 ENCOUNTER — OFFICE VISIT (OUTPATIENT)
Dept: PRIMARY CARE CLINIC | Age: 82
End: 2022-11-18
Payer: COMMERCIAL

## 2022-11-18 VITALS
HEIGHT: 69 IN | WEIGHT: 188 LBS | SYSTOLIC BLOOD PRESSURE: 92 MMHG | OXYGEN SATURATION: 97 % | DIASTOLIC BLOOD PRESSURE: 60 MMHG | TEMPERATURE: 97.2 F | HEART RATE: 82 BPM | BODY MASS INDEX: 27.85 KG/M2

## 2022-11-18 DIAGNOSIS — I42.0 DILATED CARDIOMYOPATHY (HCC): ICD-10-CM

## 2022-11-18 DIAGNOSIS — I89.0 LYMPHEDEMA: ICD-10-CM

## 2022-11-18 DIAGNOSIS — I95.89 OTHER SPECIFIED HYPOTENSION: ICD-10-CM

## 2022-11-18 DIAGNOSIS — K70.31 ALCOHOLIC CIRRHOSIS OF LIVER WITH ASCITES (HCC): ICD-10-CM

## 2022-11-18 DIAGNOSIS — E87.1 HYPONATREMIA: ICD-10-CM

## 2022-11-18 DIAGNOSIS — I10 ESSENTIAL HYPERTENSION: ICD-10-CM

## 2022-11-18 DIAGNOSIS — I95.9 HYPOTENSION, UNSPECIFIED HYPOTENSION TYPE: Primary | ICD-10-CM

## 2022-11-18 DIAGNOSIS — M1A.09X0 IDIOPATHIC CHRONIC GOUT OF MULTIPLE SITES WITHOUT TOPHUS: ICD-10-CM

## 2022-11-18 DIAGNOSIS — D64.9 ANEMIA, UNSPECIFIED TYPE: ICD-10-CM

## 2022-11-18 DIAGNOSIS — I48.19 PERSISTENT ATRIAL FIBRILLATION (HCC): ICD-10-CM

## 2022-11-18 DIAGNOSIS — N18.30 STAGE 3 CHRONIC KIDNEY DISEASE, UNSPECIFIED WHETHER STAGE 3A OR 3B CKD (HCC): ICD-10-CM

## 2022-11-18 DIAGNOSIS — E78.00 PURE HYPERCHOLESTEROLEMIA: ICD-10-CM

## 2022-11-18 LAB
INTERNATIONAL NORMALIZATION RATIO, POC: 1.3
PROTHROMBIN TIME, POC: 15.1

## 2022-11-18 PROCEDURE — 85610 PROTHROMBIN TIME: CPT | Performed by: INTERNAL MEDICINE

## 2022-11-18 PROCEDURE — 1123F ACP DISCUSS/DSCN MKR DOCD: CPT | Performed by: INTERNAL MEDICINE

## 2022-11-18 PROCEDURE — 99214 OFFICE O/P EST MOD 30 MIN: CPT | Performed by: INTERNAL MEDICINE

## 2022-11-18 PROCEDURE — 3078F DIAST BP <80 MM HG: CPT | Performed by: INTERNAL MEDICINE

## 2022-11-18 PROCEDURE — 3074F SYST BP LT 130 MM HG: CPT | Performed by: INTERNAL MEDICINE

## 2022-11-18 ASSESSMENT — ENCOUNTER SYMPTOMS
COUGH: 0
EYE PAIN: 0
NAUSEA: 0
ABDOMINAL PAIN: 0
CONSTIPATION: 0
RHINORRHEA: 0
VOMITING: 0
SHORTNESS OF BREATH: 0
DIARRHEA: 0
CHEST TIGHTNESS: 0
BLOOD IN STOOL: 0
SORE THROAT: 0

## 2022-11-18 NOTE — PROGRESS NOTES
[facility]) Physicians   Internal Medicine     2022  Christopher Reyes : 1940 Sex: male Age:82 y.o. Chief Complaint   Patient presents with    Office Visit for Anticoagulation Management        HPI:   Patient presents to office for evaluation of the following medical concerns. States - intermittently taking medications. Does not which medications is taking. Not eating well. Having weight loss. ct head 8mm lt menigioma neg acute, ct c-spine no acute, djd,     - Hyponatremia    - States having issues with tinnitus.     - States having issues with b/l LE edema. Improved, On diuretics last visit with nephrology per reviewed note (3/22) -hyponatremia drink electrolyte fluids instead of free water, no changes follow-up in 4 months with labs    - Having erectile dysfunction. Asking about treatment     - Stats has atrial fibrillation. On metoprolol. On coumadin. Last recommendation was 4mg mon/wed/fri/ and 2mg all other days INR today (2022) was 1.3. No bleeding.     - States has cardiomyopathy. States due to alcohol. Follows with cardiology. Last visit per reviewed note  () - History of cardiomyopathy chronic heart failure with preserved ejection fraction atrial fibrillation continue metoprolol 50 twice a day stop aspirin due to bruising, torsemide spironolactone per nephrology Eliquis cost prohibitive f/u 1yr     - States has CAD. States had stress test (2020) The myocardial perfusion imaging was abnormal, moderate sized reversible defect , cardiac cath (2020) - 40% proximal LAD lesion, 50% mid LAD lesion at the takeoff of a large diagonal branch, 50% proximal left circumflex artery disease. Totally occluded right coronary artery with grade 3 left-to-right collaterals. States needs aggressive risk factor management.  On metoprolol last visit per reviewed note  () - History of cardiomyopathy chronic heart failure with preserved ejection fraction atrial fibrillation continue metoprolol 50 twice a day stop aspirin due to bruising upper taxis torsemide spironolactone per nephrology Eliquis cost prohibitive f/u 1yr     - States has hypothyroid. On synthroid. Last lab (2/2022) was overreactive. 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 Still drinks on weekends. Discussed cessation. On metolazone  2.5mg - states switched to 2x per week. On torsemide 40 mg twice a day. on sprinolactone. - States has chronic kidney disease. Follows with nephrology. LAst creat was 1.58 (10/2021). Last visit with nephrology per reviewed note (3/22) -hyponatremia drink electrolyte fluids instead of free water, no changes follow-up in 4 months with labs    - States has elevated uric acid. States was on allopurinol in the past, stopped due to thought was causing hives. Last uric acid was 8.6 (2/2022). States was seen in urgent care (9/2021) wrist pain - depomedrol x1, medrol pack     States has hypertension, not checking blood pressure at home. On metoprolol. States has high cholesterol. Was on simvastatin - changed to atorvastatin. No reported side effects. States has neuropathy. Possible due to alcohol. On gabapentin, (takes bid not tid)  Added duloxtine. Stable    - States has osteoarthritis. States multiple joints. Had left knee replacement. 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 Follows with ortho. Needs physical therapy.      - low sodium - uncertain in duloxetine or diuretics     Health Maintenance   - immunizations:   Influenza Vaccination - declines  Pneumonia Vaccination - declines   Zoster/Shingles Vaccine  Tetanus Vaccination (2017)- tdap   covid (3/2/2021) #1, (3/30/2021) #2, (11/30/2021) #3  - moderna     - Screenings:   Colonoscopy   Prostate     Stress test (4/2020) - The myocardial perfusion imaging was abnormal, moderate sized reversible defect in the entire inferior wall and apex suggestive of ischemia    echo (4/20) - ef 55-60%, mod l &r atiral dilation, mild to mod MR, indeterm diastolic dysfun    cardiac cath (5/20) - IMPRESSION:  1.  40% proximal LAD lesion, 50% mid LAD lesion at the takeoff of a  large diagonal branch. 2.  50% proximal left circumflex artery disease. 3.  Totally occluded right coronary artery with grade 3 left-to-right  collaterals. Couseled on Home Safety     ROS:  Review of Systems   Constitutional:  Negative for appetite change, chills, fever and unexpected weight change. HENT:  Negative for congestion, rhinorrhea and sore throat. Eyes:  Negative for pain and visual disturbance. Respiratory:  Negative for cough, chest tightness and shortness of breath. Cardiovascular:  Negative for chest pain and palpitations. Gastrointestinal:  Negative for abdominal pain, blood in stool, constipation, diarrhea, nausea and vomiting. Genitourinary:  Negative for difficulty urinating, dysuria, hematuria, scrotal swelling, testicular pain and urgency. Musculoskeletal:  Negative for arthralgias and gait problem. Skin:  Negative for rash. Neurological:  Positive for dizziness. Negative for syncope, weakness, light-headedness and headaches. Hematological:  Negative for adenopathy. Does not bruise/bleed easily. Psychiatric/Behavioral:  Negative for suicidal ideas. The patient is not nervous/anxious.         Current Outpatient Medications:     warfarin (COUMADIN) 4 MG tablet, Take 1 tablet by mouth daily, Disp: 30 tablet, Rfl: 3    sodium bicarbonate 650 MG tablet, Take 1 tablet by mouth 4 times daily, Disp: 120 tablet, Rfl: 0    gabapentin (NEURONTIN) 100 MG capsule, 200mg in am, 100mg at noon and 200mg in pm, Disp: 450 capsule, Rfl: 0    DULoxetine (CYMBALTA) 20 MG extended release capsule, Take 1 capsule by mouth daily, Disp: 90 capsule, Rfl: 1    atorvastatin (LIPITOR) 40 MG tablet, Take 1 tablet by mouth daily, Disp: 90 tablet, Rfl: 3    levothyroxine (SYNTHROID) 200 MCG tablet, Take 1 tablet by mouth Daily Pt take 0.5 Saturday and Sunday, Disp: 90 tablet, Rfl: 1    potassium chloride (KLOR-CON M) 20 MEQ extended release tablet, Take 1 tablet by mouth 3 times daily, Disp: 270 tablet, Rfl: 2    metOLazone (ZAROXOLYN) 2.5 MG tablet, 1 tablet by mouth monday and Friday only, Disp: 90 tablet, Rfl: 1    spironolactone (ALDACTONE) 25 MG tablet, Take 25 mg by mouth daily, Disp: , Rfl:     magnesium oxide (MAG-OX) 400 (241.3 Mg) MG TABS tablet, , Disp: , Rfl:     torsemide (DEMADEX) 20 MG tablet, Take 40 mg by mouth 2 times daily, Disp: , Rfl:     vitamin B-1 (THIAMINE) 100 MG tablet, Take 100 mg by mouth daily, Disp: , Rfl:     metoprolol tartrate (LOPRESSOR) 50 MG tablet, Take 1 tablet by mouth 2 times daily, Disp: 60 tablet, Rfl: 3    Allergies   Allergen Reactions    Sulfa Antibiotics Hives     Hives         Past Medical History:   Diagnosis Date    Blood circulation, collateral     CAD (coronary artery disease)     Chronic kidney disease     History of alcohol use     History of ascites     history of, approx 5 years ago    Hyperlipidemia     Hypertension     Hyponatremia     Left knee pain 06/2020    Liver disease     Lymphedema     lower extrem    Neuropathy     both feet    Osteoarthritis     Psychiatric problem     Thyroid disease     Use of cane as ambulatory aid     Uses wheelchair     for distances    Wears glasses     for reading    Wears hearing aid        Past Surgical History:   Procedure Laterality Date    APPENDECTOMY      CARDIAC CATHETERIZATION  05/2020    CATARACT REMOVAL WITH IMPLANT Bilateral     COLONOSCOPY      EYE SURGERY      cataracts    TOTAL KNEE ARTHROPLASTY Left 6/22/2020    LEFT KNEE IVAN ROBOTIC TOTAL ARTHROPLASTY performed by Kaity Dai MD at [facility] OR       Family History   Problem Relation Age of Onset    Other Mother         old age        Social History     Socioeconomic History    Marital status: Single     Spouse name: None    Number of children: 1    Years of education: 16    Highest education level: Master's degree (e.g., MA, MS, Jordan, MEd, MSW, LUCERO)   Tobacco Use    Smoking status: Former     Packs/day: 0.75     Years: 25.00     Pack years: 18.75     Types: Pipe, Cigars, Cigarettes     Start date: 10/8/1964     Quit date: 1986     Years since quittin.6    Smokeless tobacco: Never    Tobacco comments:     Cigar and Pipe smoking history only   Vaping Use    Vaping Use: Never used   Substance and Sexual Activity    Alcohol use: Yes     Alcohol/week: 0.0 standard drinks     Comment: quit 2017; socially as of 2020 on weekends    Drug use: No     Social Determinants of Health     Financial Resource Strain: Low Risk     Difficulty of Paying Living Expenses: Not hard at all   Food Insecurity: No Food Insecurity    Worried About Running Out of Food in the Last Year: Never true    Ran Out of Food in the Last Year: Never true   Transportation Needs: No Transportation Needs    Lack of Transportation (Medical): No    Lack of Transportation (Non-Medical): No   Physical Activity: Inactive    Days of Exercise per Week: 0 days    Minutes of Exercise per Session: 0 min   Stress: No Stress Concern Present    Feeling of Stress : Only a little   Social Connections:  Moderately Isolated    Frequency of Communication with Friends and Family: More than three times a week    Frequency of Social Gatherings with Friends and Family: More than three times a week    Attends [employer] Services: Never    Active Member of Clubs or Organizations: Yes    Attends Club or Organization Meetings: 1 to 4 times per year    Marital Status:    Housing Stability: Low Risk     Unable to Pay for Housing in the Last Year: No    Number of [place] in the Last Year: 1    Unstable Housing in the Last Year: No        Vitals:    22 1428   BP: 92/60   Pulse: 82   Temp: 97.2 °F (36.2 °C)   SpO2: 97%   Weight: 188 lb (85.3 kg)   Height: 5' 9\" (1.753 m) Exam:  Physical Exam  Constitutional:       Appearance: He is well-developed. HENT:      Head: Normocephalic and atraumatic. Right Ear: External ear normal.      Left Ear: External ear normal.      Nose: Rhinorrhea present. Mouth/Throat:      Pharynx: Posterior oropharyngeal erythema present. Eyes:      Pupils: Pupils are equal, round, and reactive to light. Neck:      Thyroid: No thyromegaly. Cardiovascular:      Rate and Rhythm: Normal rate. Rhythm irregular. Heart sounds: Murmur heard. Systolic murmur is present with a grade of 2/6. Pulmonary:      Effort: Pulmonary effort is normal.      Breath sounds: Normal breath sounds. No wheezing or rales. Abdominal:      General: Bowel sounds are normal.      Palpations: Abdomen is soft. Tenderness: There is no abdominal tenderness. There is no guarding or rebound. Musculoskeletal:         General: Normal range of motion. Cervical back: Normal range of motion and neck supple. Right lower leg: Edema present. Left lower leg: Edema present. Lymphadenopathy:      Cervical: No cervical adenopathy. Skin:     General: Skin is warm and dry. Neurological:      Mental Status: He is alert and oriented to person, place, and time. Cranial Nerves: No cranial nerve deficit.    Psychiatric:         Judgment: Judgment normal.       Assessment and Plan:    Diagnoses and all orders for this visit:    Hypotension, unspecified hypotension type  - uncertain as to cause   - would recommend rechecking labs   - uncertain if taking metoprolol and spironolactone   - uncertain if taking metolazone   - follow BP at home   - ER if not improving   - BP lower in office today  11/18/2022    Lymphedema  - on metalozaone 2.5mg 2x per week   - on torsemide - advised by renal to take 40mg twice a day    - on spirinolactone 25mg daily   - on potassium supplement   - compression stockings if able   - recommended referral to lymphedema PT Hyponatremia  - Possible multifactorial (cirrhois and meds) - diurteics, duloxetine?   - on sodium bicarbonate   - recheck labs      Anemia, unspecified type  - recheck labs        Gastrointestinal hemorrhage, unspecified gastrointestinal hemorrhage type  - no scope done   - follow up labs     Stage 3 chronic kidney disease, unspecified whether stage 3a or 3b CKD ([facility]  - following with nephrology   - on spirinolactone 25mg daily   - on metalozaone 2.5mg 2x per week    - on torsemide - advised by renal to take 40mg twice a day    - on potassium supplement   - follow labs      Hypokalemia  - recheck labs      Current use of long term anticoagulation  - on coumadin 4mg mon/wed/fri/sunday and 2mg all other days - has not been complaint   - inr (11/18/2022) was 1.3  - recommend changing to 4mg daily and rechecking in 2 weeks      Essential hypertension  - watch diet   - monitor blood pressure at home   - on spironolactone - uncertain if taking   - on metoprolol tart - uncertain if taking   - lower on today's visit 11/18/2022    Persistent atrial fibrillation  - uncertain as to onset  - ekg from 2017 showed flutter   - on anticoagulation   - follow with cardiolgy   - on coumadin 4mg mon/wed/fri/sunday and 2mg all other days  - inr (11/18/2022) was 1.3  - recommend to hold x 1 then change to 5mg daily   - recheck in 1 month      Pure hypercholesterolemia  - watch diet   - simvastatin switched to atorvastatin   - follow labs   - last lab (2/2022) - stable     Coronary artery disease involving native coronary artery of native heart without angina pectoris  - s/p stress and cath (2020)   - medical therapy   - now on atorvastatin   - on aspirin 81mg   - on metorolol tart 50mg twice a day - uncertain if taking   - follow with cardiology     Alcoholic cirrhosis of liver with ascites ([facility]  - discussed and advised stopping alcohol altogether   - declines referral to GI   - on spironolactone - uncertain if taking   - on torsemide - uncertain if taking   - on metolazone - states changed to 2x per week  - uncertain if taking   - stopped alcohol   - check ammonia     Dilated cardiomyopathy ([facility]  - possible due to alcohol   - discussed and advised stopping alcohol altogether     CKD (chronic kidney disease) stage 3, GFR 30-59 ml/min ([facility])  - following with nephrology   - on spirinolactone 98YZ daily - uncertain if taking   - on metalozaone 2.5mg 2x per week  - uncertain if taking   - on torsemide - advised by renal to take 40mg twice a day  - uncertain if taking   - on potassium supplement   - follow labs     Idiopathic chronic gout of multiple sites without tophus  - previous labs showed elevated uric acid - last 8.6 (2/2022)   - was on allopurinol in the past - stopped due to poss reaction   - may need to consider uloric and discuss with renal   - low purine diet handout provided   - exacerbation (9/2021) - treated with medrol for pain to wrist     Impaired fasting glucose  - watch diet   - follow A1c - last was 5.7 (2/2022)     Alcohol abuse  - discussed and advised stopping alcohol altogether     History of ascites    Neuropathy ([facility]  - due to alcohol   - on gabapentin 200mg in am, 200mg at noon and 100mg in pm   - asking for alternative - discssed duloxetine and lyrica   - on duloxetine   - stable     Other specified hypothyroidism  - on levothyroxine   - follow labs - last (2/2022) - stable  - decrease dose to 200mcg mon-fri and 100mcg sat/sun   - recheck labs     Primary osteoarthritis involving multiple joints    Erectile dysfunction, unspecified erectile dysfunction type    Plan d/w son over the phone      Return in about 1 month (around 12/18/2022). No orders of the defined types were placed in this encounter.     Requested Prescriptions      No prescriptions requested or ordered in this encounter        Pito Vazquez MD  11/18/2022  2:56 PM

## 2022-12-02 ENCOUNTER — NURSE ONLY (OUTPATIENT)
Dept: PRIMARY CARE CLINIC | Age: 82
End: 2022-12-02

## 2022-12-02 DIAGNOSIS — I48.19 PERSISTENT ATRIAL FIBRILLATION (HCC): ICD-10-CM

## 2022-12-02 LAB
INTERNATIONAL NORMALIZATION RATIO, POC: 1.4
PROTHROMBIN TIME, POC: 16.9

## 2022-12-02 NOTE — PROGRESS NOTES
Previous INR: 1.3    Previous dose: uncertain if taking     Current INR: 1.4    Recommendation: would recommend 4mg daily     Next INR: 2 week     Kameron Neil MD  12/2/2022  11:55 AM

## 2022-12-09 ENCOUNTER — CARE COORDINATION (OUTPATIENT)
Dept: CARE COORDINATION | Age: 82
End: 2022-12-09

## 2022-12-09 NOTE — CARE COORDINATION
Ambulatory Care Coordination Note  12/9/2022    ACC: Tawanna Aaron, RN    Attempted to reach patient by telephone. Left HIPAA compliant message requesting a return call. Will attempt to reach patient again. [facility] contacted patient's son Leigh Whaley). He states his father is driving again and drove himself to last appointment with Dr. Marquez Johnson. He is also driving to the bar and has resumed drinking, although Marion does not know how many days a week Yfn Forrest goes to the bar. Yfn Forrest is taking his morning medication 6/7 days and evening medication 2-3/7 days. He rarely takes his afternoon medications. Joe attributes his taking medication more frequently to Yfn Forrest having more mobility and endurance. Marion is keeping a detailed list of the medications Yfn Forrest is taking and will send this information with Yfn Forrest to his next appointment with Dr. Marquez Johnson (12/16/22). Rebecca has had a poor appetite but is eating slightly better since being able to drive himself to shop for groceries. He has recently lost approximately 12 pounds. Plan  Check status of appetite  Continue to educate on medication compliance  Continue to follow for care coordination      Lab Results       None            Care Coordination Interventions    Referral from Primary Care Provider: No  Suggested Interventions and Community Resources  Fall Risk Prevention: Completed  Medi Set or Pill Pack: Not Started  Occupational Therapy: Completed  Pharmacist: Declined  Physical Therapy: Completed  Social Work: Completed          Goals Addressed    None         Prior to Admission medications    Medication Sig Start Date End Date Taking?  Authorizing Provider   warfarin (COUMADIN) 4 MG tablet Take 1 tablet by mouth daily 8/29/22 April MD Lavonne   sodium bicarbonate 650 MG tablet Take 1 tablet by mouth 4 times daily 8/29/22   Bridger Rehman MD   gabapentin (NEURONTIN) 100 MG capsule 200mg in am, 100mg at noon and 200mg in pm 8/22/22 2/2/23  Parminder Jarrett MD

## 2022-12-16 ENCOUNTER — OFFICE VISIT (OUTPATIENT)
Dept: PRIMARY CARE CLINIC | Age: 82
End: 2022-12-16
Payer: COMMERCIAL

## 2022-12-16 VITALS
OXYGEN SATURATION: 100 % | WEIGHT: 180 LBS | BODY MASS INDEX: 26.66 KG/M2 | TEMPERATURE: 96.8 F | DIASTOLIC BLOOD PRESSURE: 70 MMHG | HEART RATE: 73 BPM | SYSTOLIC BLOOD PRESSURE: 100 MMHG | HEIGHT: 69 IN

## 2022-12-16 DIAGNOSIS — I95.9 HYPOTENSION, UNSPECIFIED HYPOTENSION TYPE: Primary | ICD-10-CM

## 2022-12-16 DIAGNOSIS — E78.00 PURE HYPERCHOLESTEROLEMIA: ICD-10-CM

## 2022-12-16 DIAGNOSIS — E87.1 HYPONATREMIA: ICD-10-CM

## 2022-12-16 DIAGNOSIS — I42.0 DILATED CARDIOMYOPATHY (HCC): ICD-10-CM

## 2022-12-16 DIAGNOSIS — M1A.09X0 IDIOPATHIC CHRONIC GOUT OF MULTIPLE SITES WITHOUT TOPHUS: ICD-10-CM

## 2022-12-16 DIAGNOSIS — N18.30 STAGE 3 CHRONIC KIDNEY DISEASE, UNSPECIFIED WHETHER STAGE 3A OR 3B CKD (HCC): ICD-10-CM

## 2022-12-16 DIAGNOSIS — I48.19 PERSISTENT ATRIAL FIBRILLATION (HCC): ICD-10-CM

## 2022-12-16 DIAGNOSIS — I89.0 LYMPHEDEMA: ICD-10-CM

## 2022-12-16 DIAGNOSIS — R73.01 IMPAIRED FASTING GLUCOSE: ICD-10-CM

## 2022-12-16 DIAGNOSIS — D64.9 ANEMIA, UNSPECIFIED TYPE: ICD-10-CM

## 2022-12-16 DIAGNOSIS — K70.31 ALCOHOLIC CIRRHOSIS OF LIVER WITH ASCITES (HCC): ICD-10-CM

## 2022-12-16 DIAGNOSIS — I95.89 OTHER SPECIFIED HYPOTENSION: ICD-10-CM

## 2022-12-16 DIAGNOSIS — I10 ESSENTIAL HYPERTENSION: ICD-10-CM

## 2022-12-16 DIAGNOSIS — E03.8 OTHER SPECIFIED HYPOTHYROIDISM: ICD-10-CM

## 2022-12-16 LAB
INTERNATIONAL NORMALIZATION RATIO, POC: 2.3
PROTHROMBIN TIME, POC: 27.9

## 2022-12-16 PROCEDURE — 99213 OFFICE O/P EST LOW 20 MIN: CPT | Performed by: INTERNAL MEDICINE

## 2022-12-16 PROCEDURE — 3074F SYST BP LT 130 MM HG: CPT | Performed by: INTERNAL MEDICINE

## 2022-12-16 PROCEDURE — 85610 PROTHROMBIN TIME: CPT | Performed by: INTERNAL MEDICINE

## 2022-12-16 PROCEDURE — 3078F DIAST BP <80 MM HG: CPT | Performed by: INTERNAL MEDICINE

## 2022-12-16 PROCEDURE — 1123F ACP DISCUSS/DSCN MKR DOCD: CPT | Performed by: INTERNAL MEDICINE

## 2022-12-16 RX ORDER — LEVOTHYROXINE SODIUM 0.2 MG/1
TABLET ORAL
Qty: 72 TABLET | Refills: 1 | Status: SHIPPED | OUTPATIENT
Start: 2022-12-16

## 2022-12-16 RX ORDER — TORSEMIDE 20 MG/1
40 TABLET ORAL 2 TIMES DAILY
Qty: 360 TABLET | Refills: 1 | Status: SHIPPED | OUTPATIENT
Start: 2022-12-16

## 2022-12-16 RX ORDER — METOPROLOL TARTRATE 50 MG/1
50 TABLET, FILM COATED ORAL 2 TIMES DAILY
Qty: 180 TABLET | Refills: 1 | Status: SHIPPED | OUTPATIENT
Start: 2022-12-16

## 2022-12-16 ASSESSMENT — ENCOUNTER SYMPTOMS
COUGH: 0
CONSTIPATION: 0
SORE THROAT: 0
NAUSEA: 0
ABDOMINAL PAIN: 0
BLOOD IN STOOL: 0
SHORTNESS OF BREATH: 0
RHINORRHEA: 0
VOMITING: 0
EYE PAIN: 0
DIARRHEA: 0
CHEST TIGHTNESS: 0

## 2022-12-16 NOTE — PROGRESS NOTES
[facility]) Physicians   Internal Medicine     2022  Renny Quinn : 1940 Sex: male Age:82 y.o. Chief Complaint   Patient presents with    Office Visit for Anticoagulation Management    Medication Refill        HPI:   Patient presents to office for evaluation of the following medical concerns. - Still not taking all medications as prescribed - misses taking evening dose of gabapentin metoprolol potassium and torsemide essentially taking 2-3 days a week per note provided by son. Morning is more complaint    - Having weight loss. ct head 8mm lt menigioma neg acute, ct c-spine no acute, djd,     - Hyponatremia    - States having issues with tinnitus.     - States having issues with b/l LE edema. Improved, On diuretics last visit with nephrology per reviewed note (3/22) -hyponatremia drink electrolyte fluids instead of free water, no changes follow-up in 4 months with labs. Stable at present 2022    - Having erectile dysfunction. Asking about treatment     - Stats has atrial fibrillation. On metoprolol. On coumadin. Last recommendation was 4mg daily INR today 2022. No bleeding.     - States has cardiomyopathy. States due to alcohol. Follows with cardiology. Last visit per reviewed note  () - History of cardiomyopathy chronic heart failure with preserved ejection fraction atrial fibrillation continue metoprolol 50 twice a day stop aspirin due to bruising, torsemide spironolactone per nephrology Eliquis cost prohibitive f/u 1yr     - States has CAD. States had stress test (2020) The myocardial perfusion imaging was abnormal, moderate sized reversible defect , cardiac cath (2020) - 40% proximal LAD lesion, 50% mid LAD lesion at the takeoff of a large diagonal branch, 50% proximal left circumflex artery disease. Totally occluded right coronary artery with grade 3 left-to-right collaterals. States needs aggressive risk factor management.  On metoprolol last visit per reviewed note  (7/22) - History of cardiomyopathy chronic heart failure with preserved ejection fraction atrial fibrillation continue metoprolol 50 twice a day stop aspirin due to bruising upper taxis torsemide spironolactone per nephrology Eliquis cost prohibitive f/u 1yr     - States has hypothyroid. On synthroid. Last lab (2/2022) was overreactive. 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 Still drinks on weekends. Discussed cessation. On metolazone  2.5mg - states switched to 2x per week. On torsemide 40 mg twice a day. on sprinolactone. - States has chronic kidney disease. Follows with nephrology. LAst creat was 1.58 (10/2021). Last visit with nephrology per reviewed note (3/22) -hyponatremia drink electrolyte fluids instead of free water, no changes follow-up in 4 months with labs    - States has elevated uric acid. States was on allopurinol in the past, stopped due to thought was causing hives. Last uric acid was 8.6 (2/2022). States was seen in urgent care (9/2021) wrist pain - depomedrol x1, medrol pack     States has hypertension, not checking blood pressure at home. On metoprolol. On sprinolactone    States has high cholesterol. Was on simvastatin - changed to atorvastatin. No reported side effects. States has neuropathy. Possible due to alcohol. On gabapentin, (takes bid not tid)  Added duloxtine. Stable    - States has osteoarthritis. States multiple joints. Had left knee replacement. 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 Follows with ortho. Needs physical therapy.      - low sodium - uncertain in duloxetine or diuretics     Health Maintenance   - immunizations:   Influenza Vaccination - declines  Pneumonia Vaccination - declines   Zoster/Shingles Vaccine  Tetanus Vaccination (2017)- tdap   covid (3/2/2021) #1, (3/30/2021) #2, (11/30/2021) #3  - moderna     - Screenings:   Colonoscopy   Prostate     Stress test (4/2020) - The myocardial perfusion imaging was abnormal, moderate sized reversible defect in the entire inferior wall and apex suggestive of ischemia    echo (4/20) - ef 55-60%, mod l &r atiral dilation, mild to mod MR, indeterm diastolic dysfun    cardiac cath (5/20) - IMPRESSION:  1.  40% proximal LAD lesion, 50% mid LAD lesion at the takeoff of a  large diagonal branch. 2.  50% proximal left circumflex artery disease. 3.  Totally occluded right coronary artery with grade 3 left-to-right  collaterals. Couseled on Home Safety     ROS:  Review of Systems   Constitutional:  Negative for appetite change, chills, fever and unexpected weight change. HENT:  Negative for congestion, rhinorrhea and sore throat. Eyes:  Negative for pain and visual disturbance. Respiratory:  Negative for cough, chest tightness and shortness of breath. Cardiovascular:  Negative for chest pain and palpitations. Gastrointestinal:  Negative for abdominal pain, blood in stool, constipation, diarrhea, nausea and vomiting. Genitourinary:  Negative for difficulty urinating, dysuria, hematuria, scrotal swelling, testicular pain and urgency. Musculoskeletal:  Negative for arthralgias and gait problem. Skin:  Negative for rash. Neurological:  Positive for dizziness. Negative for syncope, weakness, light-headedness and headaches. Hematological:  Negative for adenopathy. Does not bruise/bleed easily. Psychiatric/Behavioral:  Negative for suicidal ideas. The patient is not nervous/anxious.         Current Outpatient Medications:     torsemide (DEMADEX) 20 MG tablet, Take 2 tablets by mouth 2 times daily, Disp: 360 tablet, Rfl: 1    metoprolol tartrate (LOPRESSOR) 50 MG tablet, Take 1 tablet by mouth 2 times daily, Disp: 180 tablet, Rfl: 1    levothyroxine (SYNTHROID) 200 MCG tablet, Take one tablet m-f  take 0.5 Saturday and Sunday, Disp: 72 tablet, Rfl: 1    warfarin (COUMADIN) 4 MG tablet, Take 1 tablet by mouth daily, Disp: 30 tablet, Rfl: 3    sodium bicarbonate 650 MG tablet, Take 1 tablet by mouth 4 times daily, Disp: 120 tablet, Rfl: 0    gabapentin (NEURONTIN) 100 MG capsule, 200mg in am, 100mg at noon and 200mg in pm, Disp: 450 capsule, Rfl: 0    DULoxetine (CYMBALTA) 20 MG extended release capsule, Take 1 capsule by mouth daily, Disp: 90 capsule, Rfl: 1    atorvastatin (LIPITOR) 40 MG tablet, Take 1 tablet by mouth daily, Disp: 90 tablet, Rfl: 3    potassium chloride (KLOR-CON M) 20 MEQ extended release tablet, Take 1 tablet by mouth 3 times daily, Disp: 270 tablet, Rfl: 2    metOLazone (ZAROXOLYN) 2.5 MG tablet, 1 tablet by mouth monday and Friday only, Disp: 90 tablet, Rfl: 1    spironolactone (ALDACTONE) 25 MG tablet, Take 25 mg by mouth daily, Disp: , Rfl:     magnesium oxide (MAG-OX) 400 (241.3 Mg) MG TABS tablet, , Disp: , Rfl:     vitamin B-1 (THIAMINE) 100 MG tablet, Take 100 mg by mouth daily, Disp: , Rfl:     Allergies   Allergen Reactions    Sulfa Antibiotics Hives     Hives         Past Medical History:   Diagnosis Date    Blood circulation, collateral     CAD (coronary artery disease)     Chronic kidney disease     History of alcohol use     History of ascites     history of, approx 5 years ago    Hyperlipidemia     Hypertension     Hyponatremia     Left knee pain 06/2020    Liver disease     Lymphedema     lower extrem    Neuropathy     both feet    Osteoarthritis     Psychiatric problem     Thyroid disease     Use of cane as ambulatory aid     Uses wheelchair     for distances    Wears glasses     for reading    Wears hearing aid        Past Surgical History:   Procedure Laterality Date    APPENDECTOMY      CARDIAC CATHETERIZATION  05/2020    CATARACT REMOVAL WITH IMPLANT Bilateral     COLONOSCOPY      EYE SURGERY      cataracts    TOTAL KNEE ARTHROPLASTY Left 6/22/2020    LEFT KNEE IVAN ROBOTIC TOTAL ARTHROPLASTY performed by Valdez Steward MD at [facility] OR       Family History   Problem Relation Age of Onset Other Mother         old age        Social History     Socioeconomic History    Marital status: Single    Number of children: 1    Years of education: 16    Highest education level: Master's degree (e.g., MA, MS, [place], MEd, MSW, LUCERO)   Tobacco Use    Smoking status: Former     Packs/day: 0.75     Years: 25.00     Pack years: 18.75     Types: Pipe, Cigars, Cigarettes     Start date: 10/8/1964     Quit date: 1986     Years since quittin.7    Smokeless tobacco: Never    Tobacco comments:     Cigar and Pipe smoking history only   Vaping Use    Vaping Use: Never used   Substance and Sexual Activity    Alcohol use: Yes     Alcohol/week: 0.0 standard drinks     Comment: quit 2017; socially as of 2020 on weekends    Drug use: No     Social Determinants of Health     Financial Resource Strain: Low Risk     Difficulty of Paying Living Expenses: Not hard at all   Food Insecurity: No Food Insecurity    Worried About Running Out of Food in the Last Year: Never true    Ran Out of Food in the Last Year: Never true   Transportation Needs: No Transportation Needs    Lack of Transportation (Medical): No    Lack of Transportation (Non-Medical): No   Physical Activity: Inactive    Days of Exercise per Week: 0 days    Minutes of Exercise per Session: 0 min   Stress: No Stress Concern Present    Feeling of Stress : Only a little   Social Connections:  Moderately Isolated    Frequency of Communication with Friends and Family: More than three times a week    Frequency of Social Gatherings with Friends and Family: More than three times a week    Attends Mandaen Services: Never    Active Member of Clubs or Organizations: Yes    Attends Club or Organization Meetings: 1 to 4 times per year    Marital Status:    Housing Stability: Low Risk     Unable to Pay for Housing in the Last Year: No    Number of [place] in the Last Year: 1    Unstable Housing in the Last Year: No        Vitals:    22 1112   BP: 100/70   Pulse: 73   Temp: 96.8 °F (36 °C)   SpO2: 100%   Weight: 180 lb (81.6 kg)   Height: 5' 9\" (1.753 m)       Exam:  Physical Exam  Constitutional:       Appearance: He is well-developed. HENT:      Head: Normocephalic and atraumatic. Right Ear: External ear normal.      Left Ear: External ear normal.      Nose: Rhinorrhea present. Mouth/Throat:      Pharynx: Posterior oropharyngeal erythema present. Eyes:      Pupils: Pupils are equal, round, and reactive to light. Neck:      Thyroid: No thyromegaly. Cardiovascular:      Rate and Rhythm: Normal rate. Rhythm irregular. Heart sounds: Murmur heard. Systolic murmur is present with a grade of 2/6. Pulmonary:      Effort: Pulmonary effort is normal.      Breath sounds: Normal breath sounds. No wheezing or rales. Abdominal:      General: Bowel sounds are normal.      Palpations: Abdomen is soft. Tenderness: There is no abdominal tenderness. There is no guarding or rebound. Musculoskeletal:         General: Normal range of motion. Cervical back: Normal range of motion and neck supple. Right lower leg: Edema present. Left lower leg: Edema present. Lymphadenopathy:      Cervical: No cervical adenopathy. Skin:     General: Skin is warm and dry. Neurological:      Mental Status: He is alert and oriented to person, place, and time. Cranial Nerves: No cranial nerve deficit.    Psychiatric:         Judgment: Judgment normal.       Assessment and Plan:    Diagnoses and all orders for this visit:    Hypotension, unspecified hypotension type  - uncertain as to cause   - on metolazone   - on metorppol 50mg twice a day   - on sprinolactone  - misses taking evening dose of  metoprolol   - follow BP at home   - ER if not improving   - BP lower in office today 12/16/2022 but stable     Lymphedema  - on metalozaone 2.5mg 2x per week   - on torsemide - advised by renal to take 40mg twice a day    - on spirinolactone 25mg daily   - on potassium supplement   - compression stockings if able   - recommended referral to lymphedema PT     Hyponatremia  - Possible multifactorial (cirrhois and meds) - diurteics, duloxetine?   - on sodium bicarbonate   - recheck labs      Anemia, unspecified type  - recheck labs        Gastrointestinal hemorrhage, unspecified gastrointestinal hemorrhage type  - no scope done   - follow up labs     Stage 3 chronic kidney disease, unspecified whether stage 3a or 3b CKD ([facility] [address]  - following with nephrology   - on spirinolactone 25mg daily   - on metalozaone 2.5mg 2x per week    - on torsemide - advised by renal to take 40mg twice a day    - on potassium supplement   - follow labs      Hypokalemia  - recheck labs   - on potassium supplement does not take 3 x per day      Current use of long term anticoagulation  - on coumadin 4mg mon/wed/fri/sunday and 2mg all other days - has not been complaint   - inr (12/16/2022) was 2.3  - on 4mg daily   - recheck inr in 1 month      Essential hypertension  - watch diet   - monitor blood pressure at home   - on spironolactone - uncertain if taking   - on metoprolol tart - uncertain if taking   - lower on today's visit 12/16/2022    Persistent atrial fibrillation  - uncertain as to onset  - ekg from 2017 showed flutter   - on anticoagulation   - follow with cardiolgy   - inr (12/16/2022)  was 2.3  - on 4mg daily   - recheck inr in 1 month     Pure hypercholesterolemia  - watch diet   - simvastatin switched to atorvastatin   - follow labs   - last lab (2/2022) - stable     Coronary artery disease involving native coronary artery of native heart without angina pectoris  - s/p stress and cath (2020)   - medical therapy   - now on atorvastatin   - on aspirin 81mg   - on metorolol tart 50mg twice a day - uncertain if taking   - follow with cardiology     Alcoholic cirrhosis of liver with ascites ([facility] [address]  - discussed and advised stopping alcohol altogether   - declines referral to GI - on spironolactone - uncertain if taking   - on torsemide - uncertain if taking   - on metolazone - states changed to 2x per week  - uncertain if taking   - stopped alcohol   - check ammonia     Dilated cardiomyopathy ([facility] [address]  - possible due to alcohol   - discussed and advised stopping alcohol altogether     CKD (chronic kidney disease) stage 3, GFR 30-59 ml/min ([facility])  - following with nephrology   - on spirinolactone 44YK daily - uncertain if taking   - on metalozaone 2.5mg 2x per week  - uncertain if taking   - on torsemide - advised by renal to take 40mg twice a day  - uncertain if taking   - on potassium supplement   - follow labs     Idiopathic chronic gout of multiple sites without tophus  - previous labs showed elevated uric acid - last 8.6 (2/2022)   - was on allopurinol in the past - stopped due to poss reaction   - may need to consider uloric and discuss with renal   - low purine diet handout provided   - exacerbation (9/2021) - treated with medrol for pain to wrist     Impaired fasting glucose  - watch diet   - follow A1c - last was 5.7 (2/2022)     Alcohol abuse  - discussed and advised stopping alcohol altogether     History of ascites    Neuropathy ([facility])  - due to alcohol   - on gabapentin 200mg in am, 200mg at noon and 100mg in pm   - asking for alternative - discssed duloxetine and lyrica   - on duloxetine   - stable     Other specified hypothyroidism  - on levothyroxine   - follow labs - last (2/2022) - stable  - decrease dose to 200mcg mon-fri and 100mcg sat/sun   - recheck labs     Primary osteoarthritis involving multiple joints    Erectile dysfunction, unspecified erectile dysfunction type    Plan d/w son over the phone      Return in about 1 month (around 1/16/2023) for check up and review. No orders of the defined types were placed in this encounter.     Requested Prescriptions     Signed Prescriptions Disp Refills    torsemide (DEMADEX) 20 MG tablet 360 tablet 1     Sig: Take 2 tablets by mouth 2 times daily    metoprolol tartrate (LOPRESSOR) 50 MG tablet 180 tablet 1     Sig: Take 1 tablet by mouth 2 times daily    levothyroxine (SYNTHROID) 200 MCG tablet 72 tablet 1     Sig: Take one tablet m-f  take 0.5 Saturday and Sunday        Dontae Patterson MD  12/16/2022  11:55 AM

## 2022-12-21 LAB
ALBUMIN SERPL-MCNC: 4 G/DL
ALP BLD-CCNC: 121 U/L
ALT SERPL-CCNC: 11 U/L
ANION GAP SERPL CALCULATED.3IONS-SCNC: NORMAL MMOL/L
AST SERPL-CCNC: 16 U/L
BASOPHILS ABSOLUTE: 0.5 /ΜL
BASOPHILS RELATIVE PERCENT: 0.5 %
BILIRUB SERPL-MCNC: 0.5 MG/DL (ref 0.1–1.4)
BILIRUBIN, URINE: NEGATIVE
BLOOD, URINE: NEGATIVE
BUN BLDV-MCNC: 33 MG/DL
CALCIUM SERPL-MCNC: 0.5 MG/DL
CHLORIDE BLD-SCNC: 84 MMOL/L
CLARITY: CLEAR
CO2: 28 MMOL/L
COLOR: COLORLESS
CREAT SERPL-MCNC: 1.35 MG/DL
EOSINOPHILS ABSOLUTE: 0.07 /ΜL
EOSINOPHILS RELATIVE PERCENT: 0.9 %
FERRITIN: 151 NG/ML (ref 18–300)
FOLATE: 2.7
GFR CALCULATED: 53
GLUCOSE BLD-MCNC: 105 MG/DL
GLUCOSE URINE: NORMAL
HCT VFR BLD CALC: 34.4 % (ref 41–53)
HEMOGLOBIN: 11.6 G/DL (ref 13.5–17.5)
KETONES, URINE: NEGATIVE
LEUKOCYTE ESTERASE, URINE: NEGATIVE
LYMPHOCYTES ABSOLUTE: 1.69 /ΜL
LYMPHOCYTES RELATIVE PERCENT: 22.4 %
MAGNESIUM: 1.8 MG/DL
MCH RBC QN AUTO: 29.7 PG
MCHC RBC AUTO-ENTMCNC: 33.7 G/DL
MCV RBC AUTO: 88 FL
MONOCYTES ABSOLUTE: 0.61 /ΜL
MONOCYTES RELATIVE PERCENT: 8.1 %
NEUTROPHILS ABSOLUTE: 5.09 /ΜL
NEUTROPHILS RELATIVE PERCENT: 67.7 %
NITRITE, URINE: NEGATIVE
PDW BLD-RTO: 17.6 %
PH UA: 7 (ref 4.5–8)
PLATELET # BLD: 332 K/ΜL
PMV BLD AUTO: 9.6 FL
POTASSIUM SERPL-SCNC: 3.6 MMOL/L
PROTEIN UA: NEGATIVE
RBC # BLD: 3.91 10^6/ΜL
SODIUM BLD-SCNC: 126 MMOL/L
SPECIFIC GRAVITY, URINE: 1.01
TOTAL PROTEIN: 7.4
TSH SERPL DL<=0.05 MIU/L-ACNC: 0.36 UIU/ML
UROBILINOGEN, URINE: NORMAL
VITAMIN B-12: 283
WBC # BLD: 7.53 10^3/ML

## 2022-12-23 ENCOUNTER — CARE COORDINATION (OUTPATIENT)
Dept: CARE COORDINATION | Age: 82
End: 2022-12-23

## 2022-12-24 DIAGNOSIS — E03.8 OTHER SPECIFIED HYPOTHYROIDISM: ICD-10-CM

## 2022-12-24 DIAGNOSIS — D64.9 ANEMIA, UNSPECIFIED TYPE: ICD-10-CM

## 2022-12-24 DIAGNOSIS — R73.01 IMPAIRED FASTING GLUCOSE: ICD-10-CM

## 2022-12-24 DIAGNOSIS — E78.00 PURE HYPERCHOLESTEROLEMIA: ICD-10-CM

## 2022-12-24 DIAGNOSIS — Z79.899 LONG TERM CURRENT USE OF THERAPEUTIC DRUG: ICD-10-CM

## 2022-12-27 ENCOUNTER — TELEPHONE (OUTPATIENT)
Dept: FAMILY MEDICINE CLINIC | Age: 82
End: 2022-12-27

## 2022-12-27 NOTE — TELEPHONE ENCOUNTER
Please let the patient know that blood work results showed    Sodium level was low. This could be result of medication, however could be related to kidney dysfunction nephrology follow-up. Would also consider adding sodium chloride tablets to medication regimen. Concern with sodium going too low would be seizures    Potassium level was also decreased which could be related to medications and/or would consider potassium supplement    Chloride level was also decreased which could be related to medications and/or kidney dysfunction    -Again would recommend nephrology evaluation!     Blood counts still were now suggestive of anemia but improved when compared to previous    Iron levels were normal but on the low end of normal and would recommend iron supplement    Storage form iron called ferritin was in the normal range    Vitamin B12 level was normal but on the low end of normal and would recommend vitamin B12 1000 mcg supplement daily    Folic acid level was low and would recommend a multivitamin daily or folic acid 1 mg daily    Sugar was elevated    Labs still showed kidney dysfunction but appeared stable when compared to previous    Urine analysis was normal    Thyroid levels were normal    Thanks

## 2022-12-30 NOTE — TELEPHONE ENCOUNTER
Regarding the iron I would continue taking current supplement as he is    Regarding why to continue these medications as I do think they are helping keep him good as he is taking them however as you can see from the blood work that there are still issues underlying and we need to continue the medical treatment to keep him stable

## 2022-12-30 NOTE — TELEPHONE ENCOUNTER
Son notified. I mentioned switching the potassium to the packets to mix with fluids, but he said he does not think that will make a difference because he could dissolve the pill into liquid, but he just doesn't want to. His son is also wondering if we can re evaluate the pills he is currently taking to see if we can get rid of some, especially since he's only taking half of them anyway. For instance, he is supposed to take Metoprolol 50mg bid, but he has only been taking it qam.  Is that okay?

## 2022-12-30 NOTE — TELEPHONE ENCOUNTER
Son notified. He said that getting his dad to take all of these individual pills is hard. He wants to know if we can find out why these are low instead of just giving him more pills? Or is there maybe one multivitamin that can cover all of these?

## 2022-12-30 NOTE — TELEPHONE ENCOUNTER
I would recommend referral to the nephrologist to help us sort out the kidney dysfunction and the electrolyte abnormalities

## 2022-12-30 NOTE — TELEPHONE ENCOUNTER
Son notified. He doesn't understanding why he has to continue to have these meds prescribed when he's not really taking them anyway and he's fine. Also, he has an appt. With Dr. Jason Du in Feb.    Patient is currently taking Iron 65mg daily. Should he be taking more if he is in fact taking this every day?

## 2022-12-30 NOTE — TELEPHONE ENCOUNTER
I would still recommend trying to take the metoprolol twice a day but if he is only taking it once a day at least he is taking something    We can review his medications again but I do not think there is anything that is on his medication list at the moment that we would consider stopping as they are maintenance for his condition

## 2023-01-03 NOTE — TELEPHONE ENCOUNTER
Joe informed, will find a multi w/ B12 [id number] and 1mg of folic acid. Will encourage father to crush potassium and take w/ liquid. Will stay on current iron dose.

## 2023-01-04 DIAGNOSIS — K70.31 ALCOHOLIC CIRRHOSIS OF LIVER WITH ASCITES (HCC): ICD-10-CM

## 2023-01-05 ENCOUNTER — CARE COORDINATION (OUTPATIENT)
Dept: CARE COORDINATION | Age: 83
End: 2023-01-05

## 2023-01-16 ENCOUNTER — CARE COORDINATION (OUTPATIENT)
Dept: CARE COORDINATION | Age: 83
End: 2023-01-16

## 2023-01-16 NOTE — LETTER
1/17/2023    [address]     I have enjoyed working with you to improve your health. I would like to continue to provide you support. However, I have been unable to reach you at, [phone]. Please let me know if I can help schedule an office visit for you or answer any questions. Arpita Franz MD is interested in your health and hopes to hear from you soon. If you would like continued access to your Nurse Care Coordinator, Grecia Roger RN, you can reach me at [phone]. I will wait to hear from you and will no longer reach out to you. Arpita Franz MD and his/her team will continue to provide care and be available for questions.       In good health,     Grecia Roger RN

## 2023-01-17 NOTE — CARE COORDINATION
Third attempt to reach patient by telephone. Left HIPAA compliant message requesting a return call. Will send unable to reach letter via mail. No further outreach planned.

## 2023-01-25 ENCOUNTER — OFFICE VISIT (OUTPATIENT)
Dept: FAMILY MEDICINE CLINIC | Age: 83
End: 2023-01-25
Payer: COMMERCIAL

## 2023-01-25 ENCOUNTER — ANTI-COAG VISIT (OUTPATIENT)
Dept: FAMILY MEDICINE CLINIC | Age: 83
End: 2023-01-25

## 2023-01-25 VITALS
HEART RATE: 49 BPM | SYSTOLIC BLOOD PRESSURE: 108 MMHG | TEMPERATURE: 97.6 F | WEIGHT: 185 LBS | BODY MASS INDEX: 27.4 KG/M2 | OXYGEN SATURATION: 100 % | DIASTOLIC BLOOD PRESSURE: 74 MMHG | RESPIRATION RATE: 20 BRPM | HEIGHT: 69 IN

## 2023-01-25 DIAGNOSIS — I10 ESSENTIAL HYPERTENSION: ICD-10-CM

## 2023-01-25 DIAGNOSIS — I95.9 HYPOTENSION, UNSPECIFIED HYPOTENSION TYPE: ICD-10-CM

## 2023-01-25 DIAGNOSIS — I48.19 PERSISTENT ATRIAL FIBRILLATION (HCC): ICD-10-CM

## 2023-01-25 DIAGNOSIS — D64.9 ANEMIA, UNSPECIFIED TYPE: ICD-10-CM

## 2023-01-25 DIAGNOSIS — D32.9 MENINGIOMA (HCC): ICD-10-CM

## 2023-01-25 DIAGNOSIS — R73.01 IMPAIRED FASTING GLUCOSE: ICD-10-CM

## 2023-01-25 DIAGNOSIS — I42.0 DILATED CARDIOMYOPATHY (HCC): ICD-10-CM

## 2023-01-25 DIAGNOSIS — I89.0 LYMPHEDEMA: ICD-10-CM

## 2023-01-25 DIAGNOSIS — I25.10 CORONARY ARTERY DISEASE INVOLVING NATIVE CORONARY ARTERY OF NATIVE HEART WITHOUT ANGINA PECTORIS: ICD-10-CM

## 2023-01-25 DIAGNOSIS — G62.9 NEUROPATHY: ICD-10-CM

## 2023-01-25 DIAGNOSIS — E03.8 OTHER SPECIFIED HYPOTHYROIDISM: ICD-10-CM

## 2023-01-25 DIAGNOSIS — Z91.14 NONCOMPLIANCE WITH MEDICATION REGIMEN: Primary | ICD-10-CM

## 2023-01-25 DIAGNOSIS — K70.31 ALCOHOLIC CIRRHOSIS OF LIVER WITH ASCITES (HCC): ICD-10-CM

## 2023-01-25 DIAGNOSIS — E87.1 HYPONATREMIA: ICD-10-CM

## 2023-01-25 DIAGNOSIS — K92.2 GASTROINTESTINAL HEMORRHAGE, UNSPECIFIED GASTROINTESTINAL HEMORRHAGE TYPE: ICD-10-CM

## 2023-01-25 DIAGNOSIS — N18.30 STAGE 3 CHRONIC KIDNEY DISEASE, UNSPECIFIED WHETHER STAGE 3A OR 3B CKD (HCC): ICD-10-CM

## 2023-01-25 DIAGNOSIS — I42.9 CARDIOMYOPATHY, UNSPECIFIED TYPE (HCC): ICD-10-CM

## 2023-01-25 DIAGNOSIS — E78.00 PURE HYPERCHOLESTEROLEMIA: ICD-10-CM

## 2023-01-25 DIAGNOSIS — E87.6 HYPOKALEMIA: ICD-10-CM

## 2023-01-25 LAB
INTERNATIONAL NORMALIZATION RATIO, POC: 1.3
PROTHROMBIN TIME, POC: 16

## 2023-01-25 PROCEDURE — 85610 PROTHROMBIN TIME: CPT | Performed by: INTERNAL MEDICINE

## 2023-01-25 PROCEDURE — 3074F SYST BP LT 130 MM HG: CPT | Performed by: INTERNAL MEDICINE

## 2023-01-25 PROCEDURE — 99213 OFFICE O/P EST LOW 20 MIN: CPT | Performed by: INTERNAL MEDICINE

## 2023-01-25 PROCEDURE — 3078F DIAST BP <80 MM HG: CPT | Performed by: INTERNAL MEDICINE

## 2023-01-25 PROCEDURE — 1123F ACP DISCUSS/DSCN MKR DOCD: CPT | Performed by: INTERNAL MEDICINE

## 2023-01-25 ASSESSMENT — ENCOUNTER SYMPTOMS
EYE PAIN: 0
VOMITING: 0
CHEST TIGHTNESS: 0
NAUSEA: 0
ABDOMINAL PAIN: 0
SORE THROAT: 0
DIARRHEA: 0
RHINORRHEA: 0
CONSTIPATION: 0
SHORTNESS OF BREATH: 0
COUGH: 0
BLOOD IN STOOL: 0

## 2023-01-25 ASSESSMENT — PATIENT HEALTH QUESTIONNAIRE - PHQ9
SUM OF ALL RESPONSES TO PHQ QUESTIONS 1-9: 0
1. LITTLE INTEREST OR PLEASURE IN DOING THINGS: 0
SUM OF ALL RESPONSES TO PHQ9 QUESTIONS 1 & 2: 0
SUM OF ALL RESPONSES TO PHQ QUESTIONS 1-9: 0
2. FEELING DOWN, DEPRESSED OR HOPELESS: 0

## 2023-01-25 NOTE — PROGRESS NOTES
[facility]) Physicians   Internal Medicine     2023  Donna Ribeiro : 1940 Sex: male Age:82 y.o. Chief Complaint   Patient presents with    Office Visit for Anticoagulation Management    Hypertension     F/u    Thyroid Problem     F/u    Cholesterol Problem     F/u        HPI:   Patient presents to office for evaluation of the following medical concerns. - Still not taking all medications as prescribed - misses taking evening doses (dose of gabapentin metoprolol potassium and torsemide)essentially taking 2-3 days a week per note provided by son. Morning is more complaint    ct head 8mm lt menigioma neg acute, ct c-spine no acute, djd,     - Hyponatremia. Last lab (2022) - was still low at 126     - States having issues with tinnitus.     - States having issues with b/l LE edema. Improved, On diuretics last visit with nephrology per reviewed note (3/22) -hyponatremia drink electrolyte fluids instead of free water, no changes follow-up in 4 months with labs. Stable at present 2023    - Having erectile dysfunction. Asking about treatment     - Stats has atrial fibrillation. On metoprolol. On coumadin. Last recommendation was 4mg daily INR today was 1.3 2023. No bleeding. Patient states has been takign medication daily, however may have missed a day or so. - States has cardiomyopathy. States due to alcohol. Follows with cardiology. Last visit per reviewed note  () - History of cardiomyopathy chronic heart failure with preserved ejection fraction atrial fibrillation continue metoprolol 50 twice a day stop aspirin due to bruising, torsemide spironolactone per nephrology Eliquis cost prohibitive f/u 1yr     - States has CAD.  States had stress test (2020) The myocardial perfusion imaging was abnormal, moderate sized reversible defect , cardiac cath (2020) - 40% proximal LAD lesion, 50% mid LAD lesion at the takeoff of a large diagonal branch, 50% proximal left circumflex artery disease. Totally occluded right coronary artery with grade 3 left-to-right collaterals. States needs aggressive risk factor management. On metoprolol last visit per reviewed note  (7/22) - History of cardiomyopathy chronic heart failure with preserved ejection fraction atrial fibrillation continue metoprolol 50 twice a day stop aspirin due to bruising upper taxis torsemide spironolactone per nephrology Eliquis cost prohibitive f/u 1yr     - States has hypothyroid. On synthroid. Last lab (2/2022) was overreactive. 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 Still drinks on weekends. Discussed cessation. On metolazone  2.5mg - states switched to 2x per week. On torsemide 40 mg twice a day. on sprinolactone. - States has chronic kidney disease. Follows with nephrology. LAst creat was 1.58 (10/2021). Last visit with nephrology per reviewed note (3/22) -hyponatremia drink electrolyte fluids instead of free water, no changes follow-up in 4 months with labs. Last alb (12/2022) - stable     - States has elevated uric acid. States was on allopurinol in the past, stopped due to thought was causing hives. Last uric acid was 8.6 (2/2022). States was seen in urgent care (9/2021) wrist pain - depomedrol x1, medrol pack     States has hypertension, not checking blood pressure at home. On metoprolol. On sprinolactone    States has high cholesterol. Was on simvastatin - changed to atorvastatin. No reported side effects. States has neuropathy. Possible due to alcohol. On gabapentin, (takes bid not tid)  Added duloxtine. Stable    - States has osteoarthritis. States multiple joints. Had left knee replacement. 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 Follows with ortho. Needs physical therapy.      - low sodium - uncertain in duloxetine or diuretics     - labs from (1/24/2023) reviewed with patient 1/25/2023    Health Maintenance   - immunizations:   Influenza Vaccination - declines  Pneumonia Vaccination - declines   Zoster/Shingles Vaccine  Tetanus Vaccination (2017)- tdap   covid (3/2/2021) #1, (3/30/2021) #2, (11/30/2021) #3  - moderna     - Screenings:   Colonoscopy   Prostate     Stress test (4/2020) - The myocardial perfusion imaging was abnormal, moderate sized reversible defect in the entire inferior wall and apex suggestive of ischemia    echo (4/20) - ef 55-60%, mod l &r atiral dilation, mild to mod MR, indeterm diastolic dysfun    cardiac cath (5/20) - IMPRESSION:  1.  40% proximal LAD lesion, 50% mid LAD lesion at the takeoff of a  large diagonal branch. 2.  50% proximal left circumflex artery disease. 3.  Totally occluded right coronary artery with grade 3 left-to-right  collaterals. Couseled on Home Safety     ROS:  Review of Systems   Constitutional:  Negative for appetite change, chills, fever and unexpected weight change. HENT:  Negative for congestion, rhinorrhea and sore throat. Eyes:  Negative for pain and visual disturbance. Respiratory:  Negative for cough, chest tightness and shortness of breath. Cardiovascular:  Negative for chest pain and palpitations. Gastrointestinal:  Negative for abdominal pain, blood in stool, constipation, diarrhea, nausea and vomiting. Genitourinary:  Negative for difficulty urinating, dysuria, hematuria, scrotal swelling, testicular pain and urgency. Musculoskeletal:  Negative for arthralgias and gait problem. Skin:  Negative for rash. Neurological:  Positive for dizziness. Negative for syncope, weakness, light-headedness and headaches. Hematological:  Negative for adenopathy. Does not bruise/bleed easily. Psychiatric/Behavioral:  Negative for suicidal ideas. The patient is not nervous/anxious.         Current Outpatient Medications:     torsemide (DEMADEX) 20 MG tablet, Take 2 tablets by mouth 2 times daily, Disp: 360 tablet, Rfl: 1    metoprolol tartrate (LOPRESSOR) 50 MG tablet, Take 1 tablet by mouth 2 times daily, Disp: 180 tablet, Rfl: 1    levothyroxine (SYNTHROID) 200 MCG tablet, Take one tablet m-f  take 0.5 Saturday and Sunday, Disp: 72 tablet, Rfl: 1    warfarin (COUMADIN) 4 MG tablet, Take 1 tablet by mouth daily, Disp: 30 tablet, Rfl: 3    sodium bicarbonate 650 MG tablet, Take 1 tablet by mouth 4 times daily, Disp: 120 tablet, Rfl: 0    gabapentin (NEURONTIN) 100 MG capsule, 200mg in am, 100mg at noon and 200mg in pm, Disp: 450 capsule, Rfl: 0    DULoxetine (CYMBALTA) 20 MG extended release capsule, Take 1 capsule by mouth daily, Disp: 90 capsule, Rfl: 1    atorvastatin (LIPITOR) 40 MG tablet, Take 1 tablet by mouth daily, Disp: 90 tablet, Rfl: 3    potassium chloride (KLOR-CON M) 20 MEQ extended release tablet, Take 1 tablet by mouth 3 times daily, Disp: 270 tablet, Rfl: 2    metOLazone (ZAROXOLYN) 2.5 MG tablet, 1 tablet by mouth monday and Friday only, Disp: 90 tablet, Rfl: 1    spironolactone (ALDACTONE) 25 MG tablet, Take 25 mg by mouth daily, Disp: , Rfl:     magnesium oxide (MAG-OX) 400 (241.3 Mg) MG TABS tablet, , Disp: , Rfl:     vitamin B-1 (THIAMINE) 100 MG tablet, Take 100 mg by mouth daily, Disp: , Rfl:     Allergies   Allergen Reactions    Sulfa Antibiotics Hives     Hives         Past Medical History:   Diagnosis Date    Blood circulation, collateral     CAD (coronary artery disease)     Chronic kidney disease     History of alcohol use     History of ascites     history of, approx 5 years ago    Hyperlipidemia     Hypertension     Hyponatremia     Left knee pain 06/2020    Liver disease     Lymphedema     lower extrem    Neuropathy     both feet    Osteoarthritis     Psychiatric problem     Thyroid disease     Use of cane as ambulatory aid     Uses wheelchair     for distances    Wears glasses     for reading    Wears hearing aid        Past Surgical History:   Procedure Laterality Date    APPENDECTOMY      CARDIAC CATHETERIZATION  05/2020 CATARACT REMOVAL WITH IMPLANT Bilateral     COLONOSCOPY      EYE SURGERY      cataracts    TOTAL KNEE ARTHROPLASTY Left 2020    LEFT KNEE IVAN ROBOTIC TOTAL ARTHROPLASTY performed by Feliz Solomon MD at [facility] OR       Family History   Problem Relation Age of Onset    Other Mother         old age        Social History     Socioeconomic History    Marital status: Single     Spouse name: None    Number of children: 1    Years of education: 16    Highest education level: Master's degree (e.g., MA, MS, [place], MEd, MSW, LUCERO)   Tobacco Use    Smoking status: Former     Packs/day: 0.75     Years: 25.00     Pack years: 18.75     Types: Pipe, Cigars, Cigarettes     Start date: 10/8/1964     Quit date: 1986     Years since quittin.8    Smokeless tobacco: Never    Tobacco comments:     Cigar and Pipe smoking history only   Vaping Use    Vaping Use: Never used   Substance and Sexual Activity    Alcohol use: Yes     Alcohol/week: 0.0 standard drinks     Comment: quit 2017; socially as of 2020 on weekends    Drug use: No     Social Determinants of Health     Financial Resource Strain: Low Risk     Difficulty of Paying Living Expenses: Not hard at all   Food Insecurity: No Food Insecurity    Worried About Running Out of Food in the Last Year: Never true    Ran Out of Food in the Last Year: Never true   Transportation Needs: No Transportation Needs    Lack of Transportation (Medical): No    Lack of Transportation (Non-Medical): No   Physical Activity: Inactive    Days of Exercise per Week: 0 days    Minutes of Exercise per Session: 0 min   Stress: No Stress Concern Present    Feeling of Stress : Only a little   Social Connections: Moderately Isolated    Frequency of Communication with Friends and Family: More than three times a week    Frequency of Social Gatherings with Friends and Family: More than three times a week    Attends Orthodox Services: Never    Active Member of Clubs or Organizations:  Yes Attends Club or Organization Meetings: 1 to 4 times per year    Marital Status:    Housing Stability: Low Risk     Unable to Pay for Housing in the Last Year: No    Number of [place] in the Last Year: 1    Unstable Housing in the Last Year: No        Vitals:    01/25/23 0923   BP: 108/74   Site: Left Upper Arm   Position: Sitting   Cuff Size: Large Adult   Pulse: (!) 49   Resp: 20   Temp: 97.6 °F (36.4 °C)   TempSrc: Temporal   SpO2: 100%   Weight: 185 lb (83.9 kg)   Height: 5' 9\" (1.753 m)       Exam:  Physical Exam  Constitutional:       Appearance: He is well-developed. HENT:      Head: Normocephalic and atraumatic. Right Ear: External ear normal.      Left Ear: External ear normal.      Nose: Rhinorrhea present. Mouth/Throat:      Pharynx: Posterior oropharyngeal erythema present. Eyes:      Pupils: Pupils are equal, round, and reactive to light. Neck:      Thyroid: No thyromegaly. Cardiovascular:      Rate and Rhythm: Normal rate. Rhythm irregular. Heart sounds: Murmur heard. Systolic murmur is present with a grade of 2/6. Pulmonary:      Effort: Pulmonary effort is normal.      Breath sounds: Normal breath sounds. No wheezing or rales. Abdominal:      General: Bowel sounds are normal.      Palpations: Abdomen is soft. Tenderness: There is no abdominal tenderness. There is no guarding or rebound. Musculoskeletal:         General: Normal range of motion. Cervical back: Normal range of motion and neck supple. Right lower leg: Edema present. Left lower leg: Edema present. Lymphadenopathy:      Cervical: No cervical adenopathy. Skin:     General: Skin is warm and dry. Neurological:      Mental Status: He is alert and oriented to person, place, and time. Cranial Nerves: No cranial nerve deficit.    Psychiatric:         Judgment: Judgment normal.       Assessment and Plan:    Diagnoses and all orders for this visit:    Noncompliance with medication regimen  - uncertain if taking medications as prescribed   - misses taking evening doses    Hypotension, unspecified hypotension type  - uncertain as to cause   - on metolazone   - on metorppol 50mg twice a day   - on sprinolactone  - misses taking evening dose of  metoprolol   - follow BP at home   - ER if not improving   - BP stable in office today 1/25/2023    Lymphedema  - on metalozaone 2.5mg 2x per week   - on torsemide - advised by renal to take 40mg twice a day    - on spirinolactone 25mg daily   - on potassium supplement   - compression stockings if able   - recommended referral to lymphedema PT     Hyponatremia  - Possible multifactorial (cirrhois and meds) - diuretics (torsemide, metolazone, spironolactone), liver diease, duloxetine?   - on sodium bicarbonate   - recheck labs   - Last lab (12/2022) - low at 126   - recommend to follow up with nephrology      Anemia, unspecified type  - recheck labs     - iron borderline (12/2022)   - last hemoglobin 11.6 (12/2022)      Gastrointestinal hemorrhage, unspecified gastrointestinal hemorrhage type  - no scope done   - follow up labs     Stage 3 chronic kidney disease, unspecified whether stage 3a or 3b CKD ([facility] [address]  - following with nephrology   - on spirinolactone 25mg daily   - on metalozaone 2.5mg 2x per week    - on torsemide - advised by renal to take 40mg twice a day    - on potassium supplement   - follow labs      Hypokalemia  - recheck labs   - on potassium supplement does not take 3 x per day      Current use of long term anticoagulation  - on coumadin 4mg mon/wed/fri/sunday and 2mg all other days - has not been complaint   - inr (1/25/2023) was 1.3  - on 4mg daily   - recheck inr in 1 week   - uncertain of compliance      Essential hypertension  - watch diet   - monitor blood pressure at home   - on spironolactone - uncertain if taking   - on metoprolol tart - uncertain if taking   - stable today's visit 1/25/2023    Persistent atrial fibrillation  - uncertain as to onset  - ekg from 2017 showed flutter   - on anticoagulation   - follow with cardiolgy   - inr (1/25/2023)  was 1.3  - on 4mg daily   - recheck inr in 1 1 week   - uncertain of compliance     Pure hypercholesterolemia  - watch diet   - simvastatin switched to atorvastatin   - follow labs     Coronary artery disease involving native coronary artery of native heart without angina pectoris  - s/p stress and cath (2020)   - medical therapy   - now on atorvastatin   - on aspirin 81mg   - on metorolol tart 50mg twice a day - uncertain if taking   - follow with cardiology     Alcoholic cirrhosis of liver with ascites ([facility] [address]  - discussed and advised stopping alcohol altogether   - declines referral to GI   - on spironolactone - uncertain if taking   - on torsemide - uncertain if taking   - on metolazone - states changed to 2x per week  - uncertain if taking   - stopped alcohol   - check ammonia     Dilated cardiomyopathy ([facility] [address]  - possible due to alcohol   - discussed and advised stopping alcohol altogether     CKD (chronic kidney disease) stage 3, GFR 30-59 ml/min ([facility] [address]  - following with nephrology   - on spirinolactone 49OY daily - uncertain if taking   - on metalozaone 2.5mg 2x per week  - uncertain if taking   - on torsemide - advised by renal to take 40mg twice a day  - uncertain if taking   - on potassium supplement   - follow labs     Idiopathic chronic gout of multiple sites without tophus  - previous labs showed elevated uric acid - last 8.6 (2/2022)   - was on allopurinol in the past - stopped due to poss reaction   - may need to consider uloric and discuss with renal   - low purine diet handout provided   - exacerbation (9/2021) - treated with medrol for pain to wrist     Impaired fasting glucose  - watch diet   - follow A1c - last was 5.7 (2/2022)     Alcohol abuse  - discussed and advised stopping alcohol altogether     History of ascites    Neuropathy ([address]  - due to alcohol   - on gabapentin 200mg in am, 200mg at noon and 100mg in pm   - asking for alternative - discssed duloxetine and lyrica   - on duloxetine   - stable     Other specified hypothyroidism  - on levothyroxine   - follow labs - last (12/2022) - stable  - decrease dose to 200mcg mon-fri and 100mcg sat/sun   - recheck labs     Primary osteoarthritis involving multiple joints    Erectile dysfunction, unspecified erectile dysfunction type     Return in about 1 month (around 2/25/2023) for check up and review. No orders of the defined types were placed in this encounter.     Requested Prescriptions      No prescriptions requested or ordered in this encounter        Kavya Cramer MD  1/25/2023  10:13 AM

## 2023-01-26 ENCOUNTER — CARE COORDINATION (OUTPATIENT)
Dept: CARE COORDINATION | Age: 83
End: 2023-01-26

## 2023-01-30 DIAGNOSIS — G62.9 NEUROPATHY: ICD-10-CM

## 2023-01-30 RX ORDER — DULOXETIN HYDROCHLORIDE 20 MG/1
20 CAPSULE, DELAYED RELEASE ORAL DAILY
Qty: 90 CAPSULE | Refills: 0 | Status: SHIPPED | OUTPATIENT
Start: 2023-01-30

## 2023-01-30 NOTE — TELEPHONE ENCOUNTER
Last Appointment:  1/25/2023  Future Appointments   Date Time Provider Rafi Alvarenga   2/3/2023 10:00 AM SCHEDULE, [facility]   3/3/2023 10:00 AM MD JHONATHAN Camejo [facility]

## 2023-01-31 RX ORDER — METOLAZONE 2.5 MG/1
TABLET ORAL
Qty: 90 TABLET | Refills: 1 | Status: SHIPPED | OUTPATIENT
Start: 2023-01-31

## 2023-01-31 NOTE — TELEPHONE ENCOUNTER
Last Appointment:  1/25/2023  Future Appointments   Date Time Provider Rafi Alvarenga   2/3/2023 10:00 AM SCHEDULE, [facility]   3/3/2023 10:00 AM MD JHONATHAN Camejo [facility]

## 2023-02-07 ENCOUNTER — TELEPHONE (OUTPATIENT)
Dept: FAMILY MEDICINE CLINIC | Age: 83
End: 2023-02-07

## 2023-02-07 RX ORDER — SPIRONOLACTONE 25 MG/1
25 TABLET ORAL DAILY
Qty: 30 TABLET | Refills: 5 | Status: SHIPPED | OUTPATIENT
Start: 2023-02-07

## 2023-02-07 NOTE — TELEPHONE ENCOUNTER
----- Message from Nubia Velasquez sent at 2/7/2023  4:05 PM EST -----  Subject: Message to Provider    QUESTIONS  Information for Provider? pt metolazone 2.5 need to be renewed for refill. , stironolactone send to [facility].   ---------------------------------------------------------------------------  --------------  Aruna Elaine INFO  [phone]; OK to leave message on voicemail  ---------------------------------------------------------------------------  --------------  SCRIPT ANSWERS  Relationship to Patient?  Self

## 2023-02-07 NOTE — TELEPHONE ENCOUNTER
Requested Prescriptions     Signed Prescriptions Disp Refills    spironolactone (ALDACTONE) 25 MG tablet 30 tablet 5     Sig: Take 1 tablet by mouth daily     Authorizing Provider: Vanna Cortes

## 2023-02-07 NOTE — TELEPHONE ENCOUNTER
I called and spoke w/ [facility], patient picked up a 90 day Rx on 2/2 of Metolazone. Dr Jeri Rodgers prescribed spironolactone 25mg qd and it was last filled 12/23      Do you want to refill spironolactone or should the patient call Dr Mcgee Every office?

## 2023-02-08 NOTE — TELEPHONE ENCOUNTER
Updated Acosta Islas, he was able to confirm that he did  Metolazone in Feb and does not need a refill at this time.

## 2023-02-15 ENCOUNTER — CARE COORDINATION (OUTPATIENT)
Dept: CARE COORDINATION | Age: 83
End: 2023-02-15

## 2023-02-15 NOTE — CARE COORDINATION
AKI attempted to contact [facility] multiple time with no success. Letter sent via mail with no response. Will close care coordination episode. No further outreach planned.

## 2023-03-03 RX ORDER — WARFARIN SODIUM 4 MG/1
4 TABLET ORAL DAILY
Qty: 30 TABLET | Refills: 3 | Status: SHIPPED | OUTPATIENT
Start: 2023-03-03

## 2023-03-03 NOTE — TELEPHONE ENCOUNTER
Requested Prescriptions     Pending Prescriptions Disp Refills   • warfarin (COUMADIN) 4 MG tablet 30 tablet 3     Sig: Take 1 tablet by mouth daily     Medication sent    Patient missed his appointment today 3/3/2023 will need to be scheduled    Thanks

## 2023-03-03 NOTE — TELEPHONE ENCOUNTER
Last Appointment:  1/25/2023  No future appointments. He has no pills left. He hasn't had it for 2 weeks.

## 2023-03-15 ENCOUNTER — OFFICE VISIT (OUTPATIENT)
Dept: PRIMARY CARE CLINIC | Age: 83
End: 2023-03-15
Payer: COMMERCIAL

## 2023-03-15 VITALS
HEIGHT: 69 IN | OXYGEN SATURATION: 100 % | SYSTOLIC BLOOD PRESSURE: 122 MMHG | HEART RATE: 57 BPM | TEMPERATURE: 97.8 F | WEIGHT: 185.13 LBS | DIASTOLIC BLOOD PRESSURE: 70 MMHG | BODY MASS INDEX: 27.42 KG/M2

## 2023-03-15 DIAGNOSIS — M1A.09X0 IDIOPATHIC CHRONIC GOUT OF MULTIPLE SITES WITHOUT TOPHUS: ICD-10-CM

## 2023-03-15 DIAGNOSIS — K70.31 ALCOHOLIC CIRRHOSIS OF LIVER WITH ASCITES (HCC): ICD-10-CM

## 2023-03-15 DIAGNOSIS — E87.1 HYPONATREMIA: ICD-10-CM

## 2023-03-15 DIAGNOSIS — E87.6 HYPOKALEMIA: ICD-10-CM

## 2023-03-15 DIAGNOSIS — E78.00 PURE HYPERCHOLESTEROLEMIA: ICD-10-CM

## 2023-03-15 DIAGNOSIS — I48.19 PERSISTENT ATRIAL FIBRILLATION (HCC): Primary | ICD-10-CM

## 2023-03-15 DIAGNOSIS — D64.9 ANEMIA, UNSPECIFIED TYPE: ICD-10-CM

## 2023-03-15 DIAGNOSIS — I25.10 CORONARY ARTERY DISEASE INVOLVING NATIVE CORONARY ARTERY OF NATIVE HEART WITHOUT ANGINA PECTORIS: ICD-10-CM

## 2023-03-15 DIAGNOSIS — I95.9 HYPOTENSION, UNSPECIFIED HYPOTENSION TYPE: ICD-10-CM

## 2023-03-15 DIAGNOSIS — R97.20 ELEVATED PSA: ICD-10-CM

## 2023-03-15 DIAGNOSIS — R73.01 IMPAIRED FASTING GLUCOSE: ICD-10-CM

## 2023-03-15 DIAGNOSIS — N18.30 STAGE 3 CHRONIC KIDNEY DISEASE, UNSPECIFIED WHETHER STAGE 3A OR 3B CKD (HCC): ICD-10-CM

## 2023-03-15 DIAGNOSIS — I42.0 DILATED CARDIOMYOPATHY (HCC): ICD-10-CM

## 2023-03-15 DIAGNOSIS — I95.89 OTHER SPECIFIED HYPOTENSION: ICD-10-CM

## 2023-03-15 DIAGNOSIS — I89.0 LYMPHEDEMA: ICD-10-CM

## 2023-03-15 DIAGNOSIS — F10.10 ALCOHOL ABUSE: ICD-10-CM

## 2023-03-15 DIAGNOSIS — I10 ESSENTIAL HYPERTENSION: ICD-10-CM

## 2023-03-15 LAB
INTERNATIONAL NORMALIZATION RATIO, POC: 1.2
PROTHROMBIN TIME, POC: 13.8

## 2023-03-15 PROCEDURE — 85610 PROTHROMBIN TIME: CPT | Performed by: INTERNAL MEDICINE

## 2023-03-15 PROCEDURE — 1123F ACP DISCUSS/DSCN MKR DOCD: CPT | Performed by: INTERNAL MEDICINE

## 2023-03-15 PROCEDURE — 3078F DIAST BP <80 MM HG: CPT | Performed by: INTERNAL MEDICINE

## 2023-03-15 PROCEDURE — 99214 OFFICE O/P EST MOD 30 MIN: CPT | Performed by: INTERNAL MEDICINE

## 2023-03-15 PROCEDURE — 3074F SYST BP LT 130 MM HG: CPT | Performed by: INTERNAL MEDICINE

## 2023-03-15 RX ORDER — WARFARIN SODIUM 5 MG/1
5 TABLET ORAL DAILY
Qty: 30 TABLET | Refills: 3 | Status: SHIPPED | OUTPATIENT
Start: 2023-03-15

## 2023-03-15 RX ORDER — LANOLIN ALCOHOL/MO/W.PET/CERES
CREAM (GRAM) TOPICAL
COMMUNITY
Start: 2023-01-21

## 2023-03-15 ASSESSMENT — ENCOUNTER SYMPTOMS
COUGH: 0
BLOOD IN STOOL: 0
CHEST TIGHTNESS: 0
SORE THROAT: 0
ABDOMINAL PAIN: 0
DIARRHEA: 0
NAUSEA: 0
CONSTIPATION: 0
EYE PAIN: 0
RHINORRHEA: 0
VOMITING: 0
SHORTNESS OF BREATH: 0

## 2023-03-15 NOTE — PROGRESS NOTES
[facility] Phys[facility]   Internal Medicine     3/15/2023  Jerod Washington : 1940 Sex: male Age:82 y.o. Chief Complaint   Patient presents with    Hypertension     Patient reports no issues today. Hyperlipidemia        HPI:   Patient presents to office for evaluation of the following medical concerns. ct head 8mm lt menigioma neg acute, ct c-spine no acute, djd,     - States has electrolytes issues, Hyponatremia. Last lab (2023) - was still low at 13, hypokalemia - (2023) 3.6 On sodium bicarbonate. On potassium,     - States having issues with tinnitus.     - States having issues with b/l LE edema. Improved, On diuretics last visit with nephrology per reviewed note (3/22) -hyponatremia drink electrolyte fluids instead of free water, no changes follow-up in 4 months with labs. Stable at present 3/15/2023    - Having erectile dysfunction. Asking about treatment     - Stats has atrial fibrillation. On metoprolol. On coumadin. Last recommendation was 4mg daily INR today was 1.2 3/15/2023. No bleeding. Patient states has been takign medication daily, however may have missed    - States has cardiomyopathy. States due to alcohol. Follows with cardiology. Last visit per reviewed note  () - History of cardiomyopathy chronic heart failure with preserved ejection fraction atrial fibrillation continue metoprolol 50 twice a day stop aspirin due to bruising, torsemide spironolactone per nephrology Eliquis cost prohibitive f/u 1yr     - States has CAD. States had stress test (2020) The myocardial perfusion imaging was abnormal, moderate sized reversible defect , cardiac cath (2020) - 40% proximal LAD lesion, 50% mid LAD lesion at the takeoff of a large diagonal branch, 50% proximal left circumflex artery disease. Totally occluded right coronary artery with grade 3 left-to-right collaterals. States needs aggressive risk factor management.  On metoprolol last visit per reviewed note  () - History of cardiomyopathy chronic heart failure with preserved ejection fraction atrial fibrillation continue metoprolol 50 twice a day stop aspirin due to bruising upper taxis torsemide spironolactone per nephrology Eliquis cost prohibitive f/u 1yr     - States has hypothyroid. On synthroid. Last lab (12/2022) was overreactive. 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 Still drinks on weekends. Discussed cessation. On metolazone  2.5mg - states switched to 2x per week. On torsemide 40 mg twice a day (probably only taking daily). on sprinolactone. - States has chronic kidney disease. Follows with nephrology. LAst creat was 1.58 (10/2021). Last visit with nephrology per reviewed note (3/22) -hyponatremia drink electrolyte fluids instead of free water, no changes follow-up in 4 months with labs. Last lab (1/2023) - stable     - States has elevated uric acid. States was on allopurinol in the past, stopped due to thought was causing hives. Last uric acid was 8.6 (2/2022). States was seen in urgent care (9/2021) wrist pain - depomedrol x1, medrol pack     States has hypertension, not checking blood pressure at home. On metoprolol. On sprinolactone    States has high cholesterol. Was on simvastatin - changed to atorvastatin. No reported side effects. States has neuropathy. Possible due to alcohol. On gabapentin, (takes bid not tid)  Added duloxtine. Stable    - States has osteoarthritis. States multiple joints. Had left knee replacement. 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 Follows with ortho. Needs physical therapy.  -     Health Maintenance   - immunizations:   Influenza Vaccination - declines  Pneumonia Vaccination - declines   Zoster/Shingles Vaccine  Tetanus Vaccination (2017)- tdap   covid (3/2/2021) #1, (3/30/2021) #2, (11/30/2021) #3  - moderna     - Screenings:   Colonoscopy   Prostate     Stress test (4/2020) - The myocardial perfusion imaging was abnormal, moderate sized reversible defect in the entire inferior wall and apex suggestive of ischemia    echo (4/20) - ef 55-60%, mod l &r atiral dilation, mild to mod MR, indeterm diastolic dysfun    cardiac cath (5/20) - IMPRESSION:  1.  40% proximal LAD lesion, 50% mid LAD lesion at the takeoff of a  large diagonal branch. 2.  50% proximal left circumflex artery disease. 3.  Totally occluded right coronary artery with grade 3 left-to-right  collaterals. Couseled on Home Safety     ROS:  Review of Systems   Constitutional:  Negative for appetite change, chills, fever and unexpected weight change. HENT:  Negative for congestion, rhinorrhea and sore throat. Eyes:  Negative for pain and visual disturbance. Respiratory:  Negative for cough, chest tightness and shortness of breath. Cardiovascular:  Negative for chest pain and palpitations. Gastrointestinal:  Negative for abdominal pain, blood in stool, constipation, diarrhea, nausea and vomiting. Genitourinary:  Negative for difficulty urinating, dysuria, hematuria, scrotal swelling, testicular pain and urgency. Musculoskeletal:  Negative for arthralgias and gait problem. Skin:  Negative for rash. Neurological:  Positive for dizziness. Negative for syncope, weakness, light-headedness and headaches. Hematological:  Negative for adenopathy. Does not bruise/bleed easily. Psychiatric/Behavioral:  Negative for suicidal ideas. The patient is not nervous/anxious.         Current Outpatient Medications:     warfarin (COUMADIN) 5 MG tablet, Take 1 tablet by mouth daily, Disp: 30 tablet, Rfl: 3    warfarin (COUMADIN) 4 MG tablet, Take 1 tablet by mouth daily, Disp: 30 tablet, Rfl: 3    spironolactone (ALDACTONE) 25 MG tablet, Take 1 tablet by mouth daily, Disp: 30 tablet, Rfl: 5    metOLazone (ZAROXOLYN) 2.5 MG tablet, 1 tablet by mouth monday and Friday only, Disp: 90 tablet, Rfl: 1    DULoxetine (CYMBALTA) 20 MG extended release capsule, Take 1 capsule by mouth daily, Disp: 90 capsule, Rfl: 0    torsemide (DEMADEX) 20 MG tablet, Take 2 tablets by mouth 2 times daily, Disp: 360 tablet, Rfl: 1    metoprolol tartrate (LOPRESSOR) 50 MG tablet, Take 1 tablet by mouth 2 times daily, Disp: 180 tablet, Rfl: 1    levothyroxine (SYNTHROID) 200 MCG tablet, Take one tablet m-f  take 0.5 Saturday and Sunday, Disp: 72 tablet, Rfl: 1    sodium bicarbonate 650 MG tablet, Take 1 tablet by mouth 4 times daily, Disp: 120 tablet, Rfl: 0    gabapentin (NEURONTIN) 100 MG capsule, 200mg in am, 100mg at noon and 200mg in pm, Disp: 450 capsule, Rfl: 0    atorvastatin (LIPITOR) 40 MG tablet, Take 1 tablet by mouth daily, Disp: 90 tablet, Rfl: 3    potassium chloride (KLOR-CON M) 20 MEQ extended release tablet, Take 1 tablet by mouth 3 times daily, Disp: 270 tablet, Rfl: 2    magnesium oxide (MAG-OX) 400 (241.3 Mg) MG TABS tablet, , Disp: , Rfl:     vitamin B-1 (THIAMINE) 100 MG tablet, Take 100 mg by mouth daily, Disp: , Rfl:     thiamine 100 MG tablet, TAKE ONE TABLET BY MOUTH once a day, Disp: , Rfl:     Allergies   Allergen Reactions    Sulfa Antibiotics Hives     Hives         Past Medical History:   Diagnosis Date    Blood circulation, collateral     CAD (coronary artery disease)     Chronic kidney disease     History of alcohol use     History of ascites     history of, approx 5 years ago    Hyperlipidemia     Hypertension     Hyponatremia     Left knee pain 06/2020    Liver disease     Lymphedema     lower extrem    Neuropathy     both feet    Osteoarthritis     Psychiatric problem     Thyroid disease     Use of cane as ambulatory aid     Uses wheelchair     for distances    Wears glasses     for reading    Wears hearing aid        Past Surgical History:   Procedure Laterality Date    APPENDECTOMY      CARDIAC CATHETERIZATION  05/2020    CATARACT REMOVAL WITH IMPLANT Bilateral     COLONOSCOPY      EYE SURGERY      cataracts    TOTAL KNEE ARTHROPLASTY Left 2020    LEFT KNEE IVAN ROBOTIC TOTAL ARTHROPLASTY performed by Valdez Steward MD at [facility]       Family History   Problem Relation Age of Onset    Other Mother         old age        Social History     Socioeconomic History    Marital status: Single     Spouse name: None    Number of children: 1    Years of education: 16    Highest education level: Master's degree (e.g., MA, MS, Jordan, MEd, MSW, LUCERO)   Tobacco Use    Smoking status: Former     Packs/day: 0.75     Years: 25.00     Pack years: 18.75     Types: Pipe, Cigars, Cigarettes     Start date: 10/8/1964     Quit date: 1986     Years since quittin.9    Smokeless tobacco: Never    Tobacco comments:     Cigar and Pipe smoking history only   Vaping Use    Vaping Use: Never used   Substance and Sexual Activity    Alcohol use: Yes     Alcohol/week: 0.0 standard drinks     Comment: quit 2017; socially as of 2020 on weekends    Drug use: No     Social Determinants of Health     Financial Resource Strain: Low Risk     Difficulty of Paying Living Expenses: Not hard at all   Food Insecurity: No Food Insecurity    Worried About Running Out of Food in the Last Year: Never true    Ran Out of Food in the Last Year: Never true   Transportation Needs: No Transportation Needs    Lack of Transportation (Medical): No    Lack of Transportation (Non-Medical): No   Physical Activity: Inactive    Days of Exercise per Week: 0 days    Minutes of Exercise per Session: 0 min   Stress: No Stress Concern Present    Feeling of Stress : Only a little   Social Connections:  Moderately Isolated    Frequency of Communication with Friends and Family: More than three times a week    Frequency of Social Gatherings with Friends and Family: More than three times a week    Attends Restoration Services: Never    Active Member of Clubs or Organizations: Yes    Attends Club or Organization Meetings: 1 to 4 times per year    Marital Status:    Housing Stability: Low Risk     Unable to Pay for Housing in the Last Year: No    Number of Places Lived in the Last Year: 1    Unstable Housing in the Last Year: No        Vitals:    03/15/23 1119   BP: 122/70   Site: Left Upper Arm   Position: Sitting   Cuff Size: Medium Adult   Pulse: 57   Temp: 97.8 °F (36.6 °C)   TempSrc: Temporal   SpO2: 100%   Weight: 185 lb 2 oz (84 kg)   Height: 5' 9\" (1.753 m)       Exam:  Physical Exam  Constitutional:       Appearance: He is well-developed. HENT:      Head: Normocephalic and atraumatic. Right Ear: External ear normal.      Left Ear: External ear normal.      Nose: Rhinorrhea present. Mouth/Throat:      Pharynx: Posterior oropharyngeal erythema present. Eyes:      Pupils: Pupils are equal, round, and reactive to light. Neck:      Thyroid: No thyromegaly. Cardiovascular:      Rate and Rhythm: Normal rate. Rhythm irregular. Heart sounds: Murmur heard. Systolic murmur is present with a grade of 2/6. Pulmonary:      Effort: Pulmonary effort is normal.      Breath sounds: Normal breath sounds. No wheezing or rales. Abdominal:      General: Bowel sounds are normal.      Palpations: Abdomen is soft. Tenderness: There is no abdominal tenderness. There is no guarding or rebound. Musculoskeletal:         General: Normal range of motion. Cervical back: Normal range of motion and neck supple. Right lower leg: Edema present. Left lower leg: Edema present. Lymphadenopathy:      Cervical: No cervical adenopathy. Skin:     General: Skin is warm and dry. Neurological:      Mental Status: He is alert and oriented to person, place, and time. Cranial Nerves: No cranial nerve deficit.    Psychiatric:         Judgment: Judgment normal.       Assessment and Plan:    Diagnoses and all orders for this visit:    Noncompliance with medication regimen  - uncertain if taking medications as prescribed   - misses taking evening doses    Hypotension, unspecified hypotension type  - uncertain as to cause   - on metolazone   - on metorppol 50mg twice a day   - on sprinolactone  - misses taking evening dose of  metoprolol   - follow BP at home   - ER if not improving   - BP stable in office today 3/15/2023    Lymphedema  - on metalozaone 2.5mg 2x per week   - on torsemide - advised by renal to take 40mg twice a day    - on spirinolactone 25mg daily   - on potassium supplement   - compression stockings if able   - recommended referral to lymphedema PT     Hyponatremia  - Possible multifactorial (cirrhois and meds) - diuretics (torsemide, metolazone, spironolactone), liver diease, duloxetine?   - on sodium bicarbonate   - recheck labs   - Last lab (1/2022) - low at 131  - recommend to follow up with nephrology      Anemia, unspecified type  - recheck labs     - iron borderline (12/2022)   - last hemoglobin 11.6 (12/2022)      Gastrointestinal hemorrhage, unspecified gastrointestinal hemorrhage type  - no scope done   - follow up labs     Stage 3 chronic kidney disease, unspecified whether stage 3a or 3b CKD (HCC)  - following with nephrology   - on spirinolactone 25mg daily   - on metalozaone 2.5mg 2x per week    - on torsemide - advised by renal to take 40mg twice a day    - on potassium supplement   - follow labs      Hypokalemia  - recheck labs   - on potassium supplement does not take 3 x per day      Current use of long term anticoagulation  - on coumadin 4mg mon/wed/fri/sunday and 2mg all other days - has not been complaint   - inr (3/15/2023) was 1.2  - on 4mg daily   - will increase to 5mg daily (3/2023)   - recheck inr in 1 month  - uncertain of compliance      Essential hypertension  - watch diet   - monitor blood pressure at home   - on spironolactone - uncertain if taking   - on metoprolol tart - uncertain if taking   - stable today's visit 3/15/2023    Persistent atrial fibrillation  - uncertain as to onset  - ekg from 2017 showed flutter   - on anticoagulation   -  follow with cardiolgy   - inr (3/15/2023)  was 1.3  - on 4mg daily   - recheck inr in 1 1 week   - uncertain of compliance     Pure hypercholesterolemia  - watch diet   - simvastatin switched to atorvastatin   - follow labs     Coronary artery disease involving native coronary artery of native heart without angina pectoris  - s/p stress and cath (2020)   - medical therapy   - now on atorvastatin   - on aspirin 81mg   - on metorolol tart 50mg twice a day - uncertain if taking   - follow with cardiology     Alcoholic cirrhosis of liver with ascites ([facility] [address]  - discussed and advised stopping alcohol altogether   - declines referral to GI   - on spironolactone - uncertain if taking   - on torsemide - uncertain if taking   - on metolazone - states changed to 2x per week  - uncertain if taking   - stopped alcohol   - check ammonia     Dilated cardiomyopathy ([facility] [address]  - possible due to alcohol   - discussed and advised stopping alcohol altogether     CKD (chronic kidney disease) stage 3, GFR 30-59 ml/min ([facility] [address]  - following with nephrology   - on spirinolactone 34YN daily - uncertain if taking   - on metalozaone 2.5mg 2x per week  - uncertain if taking   - on torsemide - advised by renal to take 40mg twice a day  - uncertain if taking   - on potassium supplement   - follow labs     Idiopathic chronic gout of multiple sites without tophus  - previous labs showed elevated uric acid - last 8.6 (2/2022)   - was on allopurinol in the past - stopped due to poss reaction   - may need to consider uloric and discuss with renal   - low purine diet handout provided   - exacerbation (9/2021) - treated with medrol for pain to wrist     Impaired fasting glucose  - watch diet   - follow A1c - last was 5.7 (2/2022)     Alcohol abuse  - discussed and advised stopping alcohol altogether     History of ascites    Neuropathy ([facility] [address]  - due to alcohol   - on gabapentin 200mg in am, 200mg at noon and 100mg in pm   - asking for alternative - discssed duloxetine and lyrica   - on duloxetine   - stable     Other specified hypothyroidism  - on levothyroxine   - follow labs - last (12/2022) - stable  - decrease dose to 200mcg mon-fri and 100mcg sat/sun   - recheck labs     Primary osteoarthritis involving multiple joints    Erectile dysfunction, unspecified erectile dysfunction type     Return in about 1 month (around 4/15/2023) for check up and review. Orders Placed This Encounter   Procedures    Comprehensive Metabolic Panel     Standing Status:   Future     Standing Expiration Date:   3/15/2024    Magnesium     Standing Status:   Future     Standing Expiration Date:   3/15/2024    Lipid, Fasting     Standing Status:   Future     Standing Expiration Date:   3/15/2024    Hemoglobin A1C     Standing Status:   Future     Standing Expiration Date:   3/15/2024    Vitamin D 25 Hydroxy     Standing Status:   Future     Standing Expiration Date:   3/15/2024    TSH     Standing Status:   Future     Standing Expiration Date:   3/15/2024    Urinalysis     Standing Status:   Future     Standing Expiration Date:   3/15/2024    CBC with Auto Differential     Standing Status:   Future     Standing Expiration Date:   3/15/2024    Iron and TIBC     Standing Status:   Future     Standing Expiration Date:   3/15/2024     Order Specific Question:   Is Patient Fasting? Answer:   yes     Order Specific Question:   No of Hours?      Answer:   8    Ferritin     Standing Status:   Future     Standing Expiration Date:   3/15/2024    Vitamin B12 & Folate     Standing Status:   Future     Standing Expiration Date:   3/15/2024    PSA, DIAGNOSTIC     Standing Status:   Future     Standing Expiration Date:   3/15/2024    Microalbumin / Creatinine Urine Ratio     Standing Status:   Future     Standing Expiration Date:   3/15/2024       Requested Prescriptions     Signed Prescriptions Disp Refills    warfarin (COUMADIN) 5 MG tablet 30 tablet 3     Sig: Take 1 tablet by mouth daily Pito Vazquez MD  3/15/2023  12:27 PM

## 2023-03-17 ENCOUNTER — TELEPHONE (OUTPATIENT)
Dept: FAMILY MEDICINE CLINIC | Age: 83
End: 2023-03-17

## 2023-03-17 NOTE — TELEPHONE ENCOUNTER
Son Joe called about new Rx that was sent for Warfarin 5mg tabs. Joe will be at Mj's house this Sunday and will increase his dose from 4mg to 5mg on Sunday. He will also talk to Mj about getting the labs drawn that you ordered.

## 2023-03-17 NOTE — TELEPHONE ENCOUNTER
Thank you for the update    Please also ask the son if he would consider looking into the cost of Eliquis or Xarelto as an alternative to Coumadin given that we are not therapeutic and still having issues with the dosing    Eliquis or Xarelto would not need to be monitored and just taken as directed    Thanks

## 2023-03-17 NOTE — TELEPHONE ENCOUNTER
Son states that he has discussed Xarelto and Eliquis in the past. States that the issue will be getting him to take the medication on a consistent basis. He will look into the cost of the medications and let us know.

## 2023-04-24 DIAGNOSIS — G62.9 NEUROPATHY: ICD-10-CM

## 2023-04-24 RX ORDER — GABAPENTIN 100 MG/1
CAPSULE ORAL
Qty: 450 CAPSULE | Refills: 0 | Status: SHIPPED | OUTPATIENT
Start: 2023-04-24 | End: 2023-11-15

## 2023-04-24 NOTE — TELEPHONE ENCOUNTER
Last Appointment:  3/15/2023  Future Appointments   Date Time Provider Rafi Alvarenga   5/10/2023  2:30 PM [facility]

## 2023-05-10 ENCOUNTER — OFFICE VISIT (OUTPATIENT)
Dept: PRIMARY CARE CLINIC | Age: 83
End: 2023-05-10
Payer: MEDICARE

## 2023-05-10 VITALS
HEIGHT: 69 IN | OXYGEN SATURATION: 99 % | DIASTOLIC BLOOD PRESSURE: 60 MMHG | HEART RATE: 64 BPM | SYSTOLIC BLOOD PRESSURE: 100 MMHG | WEIGHT: 191 LBS | BODY MASS INDEX: 28.29 KG/M2 | TEMPERATURE: 97.5 F

## 2023-05-10 DIAGNOSIS — F10.10 ALCOHOL ABUSE: ICD-10-CM

## 2023-05-10 DIAGNOSIS — E87.1 HYPONATREMIA: ICD-10-CM

## 2023-05-10 DIAGNOSIS — D64.9 ANEMIA, UNSPECIFIED TYPE: ICD-10-CM

## 2023-05-10 DIAGNOSIS — K70.31 ALCOHOLIC CIRRHOSIS OF LIVER WITH ASCITES (HCC): ICD-10-CM

## 2023-05-10 DIAGNOSIS — E87.6 HYPOKALEMIA: ICD-10-CM

## 2023-05-10 DIAGNOSIS — I48.19 PERSISTENT ATRIAL FIBRILLATION (HCC): Primary | ICD-10-CM

## 2023-05-10 DIAGNOSIS — M1A.09X0 IDIOPATHIC CHRONIC GOUT OF MULTIPLE SITES WITHOUT TOPHUS: ICD-10-CM

## 2023-05-10 DIAGNOSIS — I10 ESSENTIAL HYPERTENSION: ICD-10-CM

## 2023-05-10 DIAGNOSIS — I42.0 DILATED CARDIOMYOPATHY (HCC): ICD-10-CM

## 2023-05-10 DIAGNOSIS — R73.01 IMPAIRED FASTING GLUCOSE: ICD-10-CM

## 2023-05-10 DIAGNOSIS — I25.10 CORONARY ARTERY DISEASE INVOLVING NATIVE CORONARY ARTERY OF NATIVE HEART WITHOUT ANGINA PECTORIS: ICD-10-CM

## 2023-05-10 DIAGNOSIS — N18.30 STAGE 3 CHRONIC KIDNEY DISEASE, UNSPECIFIED WHETHER STAGE 3A OR 3B CKD (HCC): ICD-10-CM

## 2023-05-10 DIAGNOSIS — I89.0 LYMPHEDEMA: ICD-10-CM

## 2023-05-10 DIAGNOSIS — I95.9 HYPOTENSION, UNSPECIFIED HYPOTENSION TYPE: ICD-10-CM

## 2023-05-10 PROCEDURE — 3078F DIAST BP <80 MM HG: CPT | Performed by: INTERNAL MEDICINE

## 2023-05-10 PROCEDURE — 85610 PROTHROMBIN TIME: CPT | Performed by: INTERNAL MEDICINE

## 2023-05-10 PROCEDURE — 1123F ACP DISCUSS/DSCN MKR DOCD: CPT | Performed by: INTERNAL MEDICINE

## 2023-05-10 PROCEDURE — 99213 OFFICE O/P EST LOW 20 MIN: CPT | Performed by: INTERNAL MEDICINE

## 2023-05-10 PROCEDURE — 3074F SYST BP LT 130 MM HG: CPT | Performed by: INTERNAL MEDICINE

## 2023-05-10 ASSESSMENT — ENCOUNTER SYMPTOMS
SORE THROAT: 0
NAUSEA: 0
CONSTIPATION: 0
CHEST TIGHTNESS: 0
RHINORRHEA: 0
ABDOMINAL PAIN: 0
SHORTNESS OF BREATH: 0
BLOOD IN STOOL: 0
COUGH: 0
EYE PAIN: 0
VOMITING: 0
DIARRHEA: 0

## 2023-05-10 NOTE — PROGRESS NOTES
[facility]) Physicians   Internal Medicine     5/10/2023  Young Sink : 1940 Sex: male Age:82 y.o. Chief Complaint   Patient presents with    Hyperlipidemia    Hypertension        HPI:   Patient presents to office for evaluation of the following medical concerns. ct head 8mm lt menigioma neg acute, ct c-spine no acute, djd,     - States has electrolytes issues, Hyponatremia. Last lab (2023) - was still low at 13, hypokalemia - (2023) 3.6. Was to be on sodium bicarbonate. On potassium    - States having issues with tinnitus.     - States having issues with b/l LE edema. Improved, On diuretics last visit with nephrology per reviewed note (3/22) -hyponatremia drink electrolyte fluids instead of free water, no changes follow-up in 4 months with labs. Stable at present 5/10/2023    - Having erectile dysfunction. Asking about treatment     - Stats has atrial fibrillation. On metoprolol. On coumadin. Last recommendation was 5mg daily INR today was 1.2 5/10/2023. No bleeding. Patient states has been takign medication daily.     - States has cardiomyopathy. States due to alcohol. Follows with cardiology. Last visit per reviewed note  () - History of cardiomyopathy chronic heart failure with preserved ejection fraction atrial fibrillation continue metoprolol 50 twice a day stop aspirin due to bruising, torsemide spironolactone per nephrology Eliquis cost prohibitive f/u 1yr     - States has CAD. States had stress test (2020) The myocardial perfusion imaging was abnormal, moderate sized reversible defect , cardiac cath (2020) - 40% proximal LAD lesion, 50% mid LAD lesion at the takeoff of a large diagonal branch, 50% proximal left circumflex artery disease. Totally occluded right coronary artery with grade 3 left-to-right collaterals. States needs aggressive risk factor management.  On metoprolol last visit per reviewed note  () - History of cardiomyopathy chronic heart failure with

## 2023-05-15 DIAGNOSIS — G62.9 NEUROPATHY: ICD-10-CM

## 2023-05-15 RX ORDER — DULOXETIN HYDROCHLORIDE 20 MG/1
20 CAPSULE, DELAYED RELEASE ORAL DAILY
Qty: 90 CAPSULE | Refills: 0 | Status: SHIPPED | OUTPATIENT
Start: 2023-05-15

## 2023-05-16 DIAGNOSIS — R73.01 IMPAIRED FASTING GLUCOSE: ICD-10-CM

## 2023-05-17 LAB
AVERAGE GLUCOSE: 97
BASOPHILS ABSOLUTE: 0.04 /ΜL
BASOPHILS RELATIVE PERCENT: 0.8 %
EOSINOPHILS ABSOLUTE: 0.04 /ΜL
EOSINOPHILS RELATIVE PERCENT: 0.8 %
HBA1C MFR BLD: 5 %
HCT VFR BLD CALC: 38.3 % (ref 41–53)
HEMOGLOBIN: 13 G/DL (ref 13.5–17.5)
LYMPHOCYTES ABSOLUTE: 1.68 /ΜL
LYMPHOCYTES RELATIVE PERCENT: 32.8 %
MCH RBC QN AUTO: 33.8 PG
MCHC RBC AUTO-ENTMCNC: 33.9 G/DL
MCV RBC AUTO: 99.5 FL
MONOCYTES ABSOLUTE: 0.39 /ΜL
MONOCYTES RELATIVE PERCENT: 7.6 %
NEUTROPHILS ABSOLUTE: 2.96 /ΜL
NEUTROPHILS RELATIVE PERCENT: 57.8 %
PDW BLD-RTO: 13.9 %
PLATELET # BLD: 297 K/ΜL
PMV BLD AUTO: 10.3 FL
RBC # BLD: 3.85 10^6/ΜL
WBC # BLD: 5.12 10^3/ML

## 2023-05-18 LAB
ALBUMIN SERPL-MCNC: 4 G/DL
ALP BLD-CCNC: 96 U/L
ALT SERPL-CCNC: 16 U/L
ANION GAP SERPL CALCULATED.3IONS-SCNC: 16 MMOL/L
AST SERPL-CCNC: 20 U/L
BILIRUB SERPL-MCNC: 1.3 MG/DL (ref 0.1–1.4)
BUN BLDV-MCNC: 19 MG/DL
CALCIUM SERPL-MCNC: 9.2 MG/DL
CHLORIDE BLD-SCNC: 92 MMOL/L
CHOLESTEROL, FASTING: 140
CO2: 23 MMOL/L
CREAT SERPL-MCNC: 1.24 MG/DL
EGFR: 59
FERRITIN: 192 NG/ML (ref 18–300)
FOLATE: NORMAL
GLUCOSE BLD-MCNC: 126 MG/DL
HDLC SERPL-MCNC: 54 MG/DL (ref 35–70)
IRON: 168
LDL CHOLESTEROL CALCULATED: 63 MG/DL (ref 0–160)
MAGNESIUM: 1.8 MG/DL
POTASSIUM SERPL-SCNC: 3.5 MMOL/L
PROSTATE SPECIFIC ANTIGEN: 7.98 NG/ML
SODIUM BLD-SCNC: 131 MMOL/L
TOTAL IRON BINDING CAPACITY: 262
TOTAL PROTEIN: 7.3
TRIGLYCERIDE, FASTING: 113
TSH SERPL DL<=0.05 MIU/L-ACNC: 1.35 UIU/ML
VITAMIN B-12: 258
VITAMIN D 25-HYDROXY: 22.1
VITAMIN D2, 25 HYDROXY: ABNORMAL
VITAMIN D3,25 HYDROXY: ABNORMAL

## 2023-05-19 ENCOUNTER — TELEPHONE (OUTPATIENT)
Dept: PRIMARY CARE CLINIC | Age: 83
End: 2023-05-19

## 2023-05-19 DIAGNOSIS — D64.9 ANEMIA, UNSPECIFIED TYPE: ICD-10-CM

## 2023-05-19 DIAGNOSIS — I48.19 PERSISTENT ATRIAL FIBRILLATION (HCC): Primary | ICD-10-CM

## 2023-05-19 DIAGNOSIS — N18.30 STAGE 3 CHRONIC KIDNEY DISEASE, UNSPECIFIED WHETHER STAGE 3A OR 3B CKD (HCC): ICD-10-CM

## 2023-05-19 DIAGNOSIS — R97.20 ELEVATED PSA: ICD-10-CM

## 2023-05-19 DIAGNOSIS — E78.00 PURE HYPERCHOLESTEROLEMIA: ICD-10-CM

## 2023-05-19 DIAGNOSIS — I95.89 OTHER SPECIFIED HYPOTENSION: ICD-10-CM

## 2023-05-24 DIAGNOSIS — R97.20 ELEVATED PSA: Primary | ICD-10-CM

## 2023-06-21 ENCOUNTER — OFFICE VISIT (OUTPATIENT)
Dept: PRIMARY CARE CLINIC | Age: 83
End: 2023-06-21
Payer: MEDICARE

## 2023-06-21 VITALS
DIASTOLIC BLOOD PRESSURE: 60 MMHG | HEIGHT: 69 IN | SYSTOLIC BLOOD PRESSURE: 110 MMHG | WEIGHT: 188 LBS | TEMPERATURE: 98.4 F | HEART RATE: 85 BPM | BODY MASS INDEX: 27.85 KG/M2 | OXYGEN SATURATION: 97 %

## 2023-06-21 DIAGNOSIS — M1A.09X0 IDIOPATHIC CHRONIC GOUT OF MULTIPLE SITES WITHOUT TOPHUS: ICD-10-CM

## 2023-06-21 DIAGNOSIS — N18.30 STAGE 3 CHRONIC KIDNEY DISEASE, UNSPECIFIED WHETHER STAGE 3A OR 3B CKD (HCC): ICD-10-CM

## 2023-06-21 DIAGNOSIS — I10 ESSENTIAL HYPERTENSION: ICD-10-CM

## 2023-06-21 DIAGNOSIS — E87.1 HYPONATREMIA: ICD-10-CM

## 2023-06-21 DIAGNOSIS — I89.0 LYMPHEDEMA: ICD-10-CM

## 2023-06-21 DIAGNOSIS — R97.20 ELEVATED PSA: ICD-10-CM

## 2023-06-21 DIAGNOSIS — I48.19 PERSISTENT ATRIAL FIBRILLATION (HCC): ICD-10-CM

## 2023-06-21 DIAGNOSIS — E87.6 HYPOKALEMIA: ICD-10-CM

## 2023-06-21 DIAGNOSIS — R73.01 IMPAIRED FASTING GLUCOSE: ICD-10-CM

## 2023-06-21 DIAGNOSIS — I42.0 DILATED CARDIOMYOPATHY (HCC): ICD-10-CM

## 2023-06-21 DIAGNOSIS — I25.10 CORONARY ARTERY DISEASE INVOLVING NATIVE CORONARY ARTERY OF NATIVE HEART WITHOUT ANGINA PECTORIS: ICD-10-CM

## 2023-06-21 DIAGNOSIS — I95.9 HYPOTENSION, UNSPECIFIED HYPOTENSION TYPE: ICD-10-CM

## 2023-06-21 DIAGNOSIS — E78.00 PURE HYPERCHOLESTEROLEMIA: ICD-10-CM

## 2023-06-21 DIAGNOSIS — I95.89 OTHER SPECIFIED HYPOTENSION: ICD-10-CM

## 2023-06-21 DIAGNOSIS — D64.9 ANEMIA, UNSPECIFIED TYPE: ICD-10-CM

## 2023-06-21 DIAGNOSIS — Z91.199 NONCOMPLIANCE: Primary | ICD-10-CM

## 2023-06-21 DIAGNOSIS — K70.31 ALCOHOLIC CIRRHOSIS OF LIVER WITH ASCITES (HCC): ICD-10-CM

## 2023-06-21 LAB
INTERNATIONAL NORMALIZATION RATIO, POC: 2.2
PROTHROMBIN TIME, POC: 26.4

## 2023-06-21 PROCEDURE — 1123F ACP DISCUSS/DSCN MKR DOCD: CPT | Performed by: INTERNAL MEDICINE

## 2023-06-21 PROCEDURE — 99214 OFFICE O/P EST MOD 30 MIN: CPT | Performed by: INTERNAL MEDICINE

## 2023-06-21 PROCEDURE — 85610 PROTHROMBIN TIME: CPT | Performed by: INTERNAL MEDICINE

## 2023-06-21 PROCEDURE — 3078F DIAST BP <80 MM HG: CPT | Performed by: INTERNAL MEDICINE

## 2023-06-21 PROCEDURE — 3074F SYST BP LT 130 MM HG: CPT | Performed by: INTERNAL MEDICINE

## 2023-06-21 RX ORDER — SODIUM CHLORIDE 1 G/1
1 TABLET ORAL DAILY
Qty: 90 TABLET | Refills: 1 | Status: SHIPPED | OUTPATIENT
Start: 2023-06-21

## 2023-06-21 ASSESSMENT — ENCOUNTER SYMPTOMS
ABDOMINAL PAIN: 0
SHORTNESS OF BREATH: 0
CHEST TIGHTNESS: 0
RHINORRHEA: 0
NAUSEA: 0
EYE PAIN: 0
BLOOD IN STOOL: 0
COUGH: 0
DIARRHEA: 0
CONSTIPATION: 0
SORE THROAT: 0
VOMITING: 0

## 2023-06-21 NOTE — PROGRESS NOTES
[facility]) Physicians   Internal Medicine     2023  Anthony Mcdaniel : 1940 Sex: male Age:82 y.o. Chief Complaint   Patient presents with    Coagulation Disorder        HPI:   Patient presents to office for evaluation of the following medical concerns. ct head 8mm lt menigioma neg acute, ct c-spine no acute, djd,     - States has electrolytes issues, Hyponatremia. Last lab (2023) - was still low at 13, hypokalemia - (2023) 3.6. Was to be on sodium bicarbonate. On potassium, non compliant with     - States having issues with tinnitus.     - States having issues with b/l LE edema. Improved, On diuretics last visit with nephrology per reviewed note (3/22) -hyponatremia drink electrolyte fluids instead of free water, no changes follow-up in 4 months with labs. Stable at present 2023    - Having erectile dysfunction. Asking about treatment     - Stats has atrial fibrillation. On metoprolol. On coumadin. Last recommendation was 7mg mon/wed/fri and 5mg other day. INR today was 2.2 (2023) No bleeding. Patient states has been takign medication daily.     - States has cardiomyopathy. States due to alcohol. Follows with cardiology. Last visit per reviewed note  () - History of cardiomyopathy chronic heart failure with preserved ejection fraction atrial fibrillation continue metoprolol 50 twice a day stop aspirin due to bruising, torsemide spironolactone per nephrology Eliquis cost prohibitive f/u 1yr     - States has CAD. States had stress test (2020) The myocardial perfusion imaging was abnormal, moderate sized reversible defect , cardiac cath (2020) - 40% proximal LAD lesion, 50% mid LAD lesion at the takeoff of a large diagonal branch, 50% proximal left circumflex artery disease. Totally occluded right coronary artery with grade 3 left-to-right collaterals. States needs aggressive risk factor management.  On metoprolol last visit per reviewed note  () - History of

## 2023-07-12 RX ORDER — LEVOTHYROXINE SODIUM 0.2 MG/1
TABLET ORAL
Qty: 72 TABLET | Refills: 1 | Status: SHIPPED | OUTPATIENT
Start: 2023-07-12

## 2023-07-26 ENCOUNTER — OFFICE VISIT (OUTPATIENT)
Dept: PRIMARY CARE CLINIC | Age: 83
End: 2023-07-26
Payer: MEDICARE

## 2023-07-26 VITALS
SYSTOLIC BLOOD PRESSURE: 102 MMHG | HEART RATE: 56 BPM | TEMPERATURE: 97.6 F | OXYGEN SATURATION: 99 % | BODY MASS INDEX: 27.42 KG/M2 | DIASTOLIC BLOOD PRESSURE: 56 MMHG | WEIGHT: 185.13 LBS | HEIGHT: 69 IN

## 2023-07-26 VITALS — HEIGHT: 69 IN | BODY MASS INDEX: 27.42 KG/M2 | WEIGHT: 185.13 LBS

## 2023-07-26 DIAGNOSIS — I25.10 CORONARY ARTERY DISEASE INVOLVING NATIVE CORONARY ARTERY OF NATIVE HEART WITHOUT ANGINA PECTORIS: ICD-10-CM

## 2023-07-26 DIAGNOSIS — I48.19 PERSISTENT ATRIAL FIBRILLATION (HCC): ICD-10-CM

## 2023-07-26 DIAGNOSIS — M1A.09X0 IDIOPATHIC CHRONIC GOUT OF MULTIPLE SITES WITHOUT TOPHUS: ICD-10-CM

## 2023-07-26 DIAGNOSIS — E78.00 PURE HYPERCHOLESTEROLEMIA: ICD-10-CM

## 2023-07-26 DIAGNOSIS — R73.01 IMPAIRED FASTING GLUCOSE: ICD-10-CM

## 2023-07-26 DIAGNOSIS — R97.20 ELEVATED PSA: Primary | ICD-10-CM

## 2023-07-26 DIAGNOSIS — N18.30 STAGE 3 CHRONIC KIDNEY DISEASE, UNSPECIFIED WHETHER STAGE 3A OR 3B CKD (HCC): ICD-10-CM

## 2023-07-26 DIAGNOSIS — E87.1 HYPONATREMIA: ICD-10-CM

## 2023-07-26 DIAGNOSIS — D64.9 ANEMIA, UNSPECIFIED TYPE: ICD-10-CM

## 2023-07-26 DIAGNOSIS — E03.8 OTHER SPECIFIED HYPOTHYROIDISM: ICD-10-CM

## 2023-07-26 DIAGNOSIS — I95.9 HYPOTENSION, UNSPECIFIED HYPOTENSION TYPE: ICD-10-CM

## 2023-07-26 DIAGNOSIS — I42.0 DILATED CARDIOMYOPATHY (HCC): ICD-10-CM

## 2023-07-26 DIAGNOSIS — I10 ESSENTIAL HYPERTENSION: ICD-10-CM

## 2023-07-26 DIAGNOSIS — F10.10 ALCOHOL ABUSE: ICD-10-CM

## 2023-07-26 DIAGNOSIS — Z00.00 MEDICARE ANNUAL WELLNESS VISIT, SUBSEQUENT: Primary | ICD-10-CM

## 2023-07-26 DIAGNOSIS — E87.6 HYPOKALEMIA: ICD-10-CM

## 2023-07-26 DIAGNOSIS — Z91.199 NONCOMPLIANCE: ICD-10-CM

## 2023-07-26 DIAGNOSIS — K70.31 ALCOHOLIC CIRRHOSIS OF LIVER WITH ASCITES (HCC): ICD-10-CM

## 2023-07-26 DIAGNOSIS — I89.0 LYMPHEDEMA: ICD-10-CM

## 2023-07-26 LAB
INTERNATIONAL NORMALIZATION RATIO, POC: 3.8
PROTHROMBIN TIME, POC: 46

## 2023-07-26 PROCEDURE — 3074F SYST BP LT 130 MM HG: CPT | Performed by: INTERNAL MEDICINE

## 2023-07-26 PROCEDURE — 3078F DIAST BP <80 MM HG: CPT | Performed by: INTERNAL MEDICINE

## 2023-07-26 PROCEDURE — 99214 OFFICE O/P EST MOD 30 MIN: CPT | Performed by: INTERNAL MEDICINE

## 2023-07-26 PROCEDURE — 1123F ACP DISCUSS/DSCN MKR DOCD: CPT | Performed by: INTERNAL MEDICINE

## 2023-07-26 PROCEDURE — 85610 PROTHROMBIN TIME: CPT | Performed by: INTERNAL MEDICINE

## 2023-07-26 PROCEDURE — G0439 PPPS, SUBSEQ VISIT: HCPCS | Performed by: INTERNAL MEDICINE

## 2023-07-26 RX ORDER — ATORVASTATIN CALCIUM 40 MG/1
40 TABLET, FILM COATED ORAL DAILY
Qty: 90 TABLET | Refills: 3 | Status: SHIPPED | OUTPATIENT
Start: 2023-07-26

## 2023-07-26 ASSESSMENT — ENCOUNTER SYMPTOMS
CHEST TIGHTNESS: 0
CONSTIPATION: 0
RHINORRHEA: 0
COUGH: 0
EYE PAIN: 0
ABDOMINAL PAIN: 0
DIARRHEA: 0
BLOOD IN STOOL: 0
VOMITING: 0
SHORTNESS OF BREATH: 0
NAUSEA: 0
SORE THROAT: 0

## 2023-07-26 ASSESSMENT — LIFESTYLE VARIABLES
HOW OFTEN DURING THE LAST YEAR HAVE YOU FOUND THAT YOU WERE NOT ABLE TO STOP DRINKING ONCE YOU HAD STARTED: 0
HOW OFTEN DURING THE LAST YEAR HAVE YOU HAD A FEELING OF GUILT OR REMORSE AFTER DRINKING: 0
HOW OFTEN DO YOU HAVE A DRINK CONTAINING ALCOHOL: 4 OR MORE TIMES A WEEK
HAVE YOU OR SOMEONE ELSE BEEN INJURED AS A RESULT OF YOUR DRINKING: 0
HAS A RELATIVE, FRIEND, DOCTOR, OR ANOTHER HEALTH PROFESSIONAL EXPRESSED CONCERN ABOUT YOUR DRINKING OR SUGGESTED YOU CUT DOWN: 4
HOW OFTEN DURING THE LAST YEAR HAVE YOU FAILED TO DO WHAT WAS NORMALLY EXPECTED FROM YOU BECAUSE OF DRINKING: 0
HOW MANY STANDARD DRINKS CONTAINING ALCOHOL DO YOU HAVE ON A TYPICAL DAY: 3 OR 4
HOW OFTEN DURING THE LAST YEAR HAVE YOU NEEDED AN ALCOHOLIC DRINK FIRST THING IN THE MORNING TO GET YOURSELF GOING AFTER A NIGHT OF HEAVY DRINKING: 0
HOW OFTEN DURING THE LAST YEAR HAVE YOU BEEN UNABLE TO REMEMBER WHAT HAPPENED THE NIGHT BEFORE BECAUSE YOU HAD BEEN DRINKING: 0

## 2023-07-26 ASSESSMENT — PATIENT HEALTH QUESTIONNAIRE - PHQ9
SUM OF ALL RESPONSES TO PHQ QUESTIONS 1-9: 0
SUM OF ALL RESPONSES TO PHQ QUESTIONS 1-9: 0
1. LITTLE INTEREST OR PLEASURE IN DOING THINGS: 0
SUM OF ALL RESPONSES TO PHQ9 QUESTIONS 1 & 2: 0
2. FEELING DOWN, DEPRESSED OR HOPELESS: 0
SUM OF ALL RESPONSES TO PHQ QUESTIONS 1-9: 0
SUM OF ALL RESPONSES TO PHQ QUESTIONS 1-9: 0

## 2023-07-26 NOTE — PROGRESS NOTES
dysfunction type     Return in about 1 month (around 8/26/2023) for check up and review. Orders Placed This Encounter   Procedures    Comprehensive Metabolic Panel     Standing Status:   Future     Standing Expiration Date:   7/26/2024    Magnesium     Standing Status:   Future     Standing Expiration Date:   7/26/2024    Lipid, Fasting     Standing Status:   Future     Standing Expiration Date:   7/26/2024    Hemoglobin A1C     Standing Status:   Future     Standing Expiration Date:   7/26/2024    CBC with Auto Differential     Standing Status:   Future     Standing Expiration Date:   7/26/2024    TSH     Standing Status:   Future     Standing Expiration Date:   7/26/2024    PSA, Diagnostic     Standing Status:   Future     Standing Expiration Date:   7/25/2024    Vitamin B12 & Folate     Standing Status:   Future     Standing Expiration Date:   7/26/2024    Ferritin     Standing Status:   Future     Standing Expiration Date:   7/26/2024    Iron and TIBC     Standing Status:   Future     Standing Expiration Date:   7/26/2024     Order Specific Question:   Is Patient Fasting? Answer:   yes     Order Specific Question:   No of Hours?      Answer:   8    Vitamin D 25 Hydroxy     Standing Status:   Future     Standing Expiration Date:   7/26/2024     Requested Prescriptions     Signed Prescriptions Disp Refills    atorvastatin (LIPITOR) 40 MG tablet 90 tablet 3     Sig: Take 1 tablet by mouth daily        Benjamin Torres MD  7/26/2023  2:48 PM

## 2023-07-26 NOTE — PROGRESS NOTES
Medicare Annual Wellness Visit    Lizabeth Jolley is here for Medicare AWV    Assessment & Plan   Medicare annual wellness visit, subsequent      Health Maintenance   - immunizations:   Influenza Vaccination - declines  Pneumonia Vaccination - declines   Zoster/Shingles Vaccine  Tetanus Vaccination (2017)- tdap   covid (3/2/2021) #1, (3/30/2021) #2, (11/30/2021) #3  - moderna     - Screenings:   Colonoscopy   Prostate     Recommendations for Preventive Services Due: see orders and patient instructions/AVS.  Recommended screening schedule for the next 5-10 years is provided to the patient in written form: see Patient Instructions/AVS.     Return for Medicare Annual Wellness Visit in 1 year. Subjective       Patient's complete Health Risk Assessment and screening values have been reviewed and are found in Flowsheets. The following problems were reviewed today and where indicated follow up appointments were made and/or referrals ordered. Positive Risk Factor Screenings with Interventions:    Fall Risk:  Do you feel unsteady or are you worried about falling? : (!) yes  2 or more falls in past year?: (!) yes  Fall with injury in past year?: no     Interventions:    See AVS for additional education material    Cognitive: Words recalled: 1 Word Recalled   Clock Drawing Test (CDT): (!) Abnormal   Total Score: (!) 1   Total Score Interpretation: Abnormal Mini-Cog       Alcohol Screening:  Alcohol Use: Heavy Drinker    Frequency of Alcohol Consumption: 4 or more times a week    Average Number of Drinks: 3 or 4    Frequency of Binge Drinking: Monthly      AUDIT-C Score: 7   AUDIT Total Score: 11    Interpretation of AUDIT-C score:   3-7 indicates potential alcohol risk. 8 or more is associated with harmful or hazardous drinking. 15 or more in women, and 15 or more in men, is likely to indicate alcohol dependence.   Interventions:  Patient declined any further intervention or treatment               Weight and

## 2023-08-28 DIAGNOSIS — G62.9 NEUROPATHY: ICD-10-CM

## 2023-08-28 RX ORDER — DULOXETIN HYDROCHLORIDE 20 MG/1
20 CAPSULE, DELAYED RELEASE ORAL DAILY
Qty: 90 CAPSULE | Refills: 0 | Status: SHIPPED | OUTPATIENT
Start: 2023-08-28

## 2023-08-30 ENCOUNTER — OFFICE VISIT (OUTPATIENT)
Dept: PRIMARY CARE CLINIC | Age: 83
End: 2023-08-30
Payer: MEDICARE

## 2023-08-30 VITALS
TEMPERATURE: 97.6 F | HEART RATE: 83 BPM | OXYGEN SATURATION: 98 % | SYSTOLIC BLOOD PRESSURE: 102 MMHG | BODY MASS INDEX: 27.4 KG/M2 | DIASTOLIC BLOOD PRESSURE: 60 MMHG | WEIGHT: 185 LBS | HEIGHT: 69 IN

## 2023-08-30 DIAGNOSIS — R73.01 IMPAIRED FASTING GLUCOSE: ICD-10-CM

## 2023-08-30 DIAGNOSIS — R97.20 ELEVATED PSA: ICD-10-CM

## 2023-08-30 DIAGNOSIS — I95.9 HYPOTENSION, UNSPECIFIED HYPOTENSION TYPE: ICD-10-CM

## 2023-08-30 DIAGNOSIS — K70.31 ALCOHOLIC CIRRHOSIS OF LIVER WITH ASCITES (HCC): ICD-10-CM

## 2023-08-30 DIAGNOSIS — D64.9 ANEMIA, UNSPECIFIED TYPE: ICD-10-CM

## 2023-08-30 DIAGNOSIS — Z91.199 NONCOMPLIANCE: ICD-10-CM

## 2023-08-30 DIAGNOSIS — I42.0 DILATED CARDIOMYOPATHY (HCC): ICD-10-CM

## 2023-08-30 DIAGNOSIS — I25.10 CORONARY ARTERY DISEASE INVOLVING NATIVE CORONARY ARTERY OF NATIVE HEART WITHOUT ANGINA PECTORIS: ICD-10-CM

## 2023-08-30 DIAGNOSIS — E03.8 OTHER SPECIFIED HYPOTHYROIDISM: ICD-10-CM

## 2023-08-30 DIAGNOSIS — E87.1 HYPONATREMIA: ICD-10-CM

## 2023-08-30 DIAGNOSIS — E78.00 PURE HYPERCHOLESTEROLEMIA: ICD-10-CM

## 2023-08-30 DIAGNOSIS — E87.6 HYPOKALEMIA: ICD-10-CM

## 2023-08-30 DIAGNOSIS — M1A.09X0 IDIOPATHIC CHRONIC GOUT OF MULTIPLE SITES WITHOUT TOPHUS: ICD-10-CM

## 2023-08-30 DIAGNOSIS — I10 ESSENTIAL HYPERTENSION: ICD-10-CM

## 2023-08-30 DIAGNOSIS — I89.0 LYMPHEDEMA: ICD-10-CM

## 2023-08-30 DIAGNOSIS — N18.30 STAGE 3 CHRONIC KIDNEY DISEASE, UNSPECIFIED WHETHER STAGE 3A OR 3B CKD (HCC): ICD-10-CM

## 2023-08-30 DIAGNOSIS — I48.19 PERSISTENT ATRIAL FIBRILLATION (HCC): Primary | ICD-10-CM

## 2023-08-30 LAB
INTERNATIONAL NORMALIZATION RATIO, POC: 4
PROTHROMBIN TIME, POC: 48.1

## 2023-08-30 PROCEDURE — 3078F DIAST BP <80 MM HG: CPT | Performed by: INTERNAL MEDICINE

## 2023-08-30 PROCEDURE — 99213 OFFICE O/P EST LOW 20 MIN: CPT | Performed by: INTERNAL MEDICINE

## 2023-08-30 PROCEDURE — 1123F ACP DISCUSS/DSCN MKR DOCD: CPT | Performed by: INTERNAL MEDICINE

## 2023-08-30 PROCEDURE — 85610 PROTHROMBIN TIME: CPT | Performed by: INTERNAL MEDICINE

## 2023-08-30 PROCEDURE — 3074F SYST BP LT 130 MM HG: CPT | Performed by: INTERNAL MEDICINE

## 2023-08-30 ASSESSMENT — ENCOUNTER SYMPTOMS
SHORTNESS OF BREATH: 0
VOMITING: 0
NAUSEA: 0
EYE PAIN: 0
ABDOMINAL PAIN: 0
CHEST TIGHTNESS: 0
BLOOD IN STOOL: 0
RHINORRHEA: 0
CONSTIPATION: 0
SORE THROAT: 0
DIARRHEA: 0
COUGH: 0

## 2023-08-31 ENCOUNTER — TELEPHONE (OUTPATIENT)
Dept: ADMINISTRATIVE | Age: 83
End: 2023-08-31

## 2023-08-31 NOTE — TELEPHONE ENCOUNTER
Pt has referrel in [address] [facility] DX I) - Persistent atrial fibrillation ([address])  - Coronary artery disease involving native coronary artery of native heart without angina pectoris  - Dilated cardiomyopathy ([address])  LOV Shakir 7/20/22  please advise pt

## 2023-09-11 RX ORDER — SPIRONOLACTONE 25 MG/1
25 TABLET ORAL DAILY
Qty: 30 TABLET | Refills: 5 | Status: SHIPPED | OUTPATIENT
Start: 2023-09-11

## 2023-09-11 NOTE — TELEPHONE ENCOUNTER
Joe calling for a new script for Spironolactone to be sent to [facility].        Last Appointment:  8/30/2023  Future Appointments   Date Time Provider [address]   12/15/2023 11:30 AM Jadiel Ureña MD [address] [facility]

## 2023-10-06 RX ORDER — WARFARIN SODIUM 5 MG/1
5 TABLET ORAL DAILY
Qty: 30 TABLET | Refills: 3 | Status: SHIPPED | OUTPATIENT
Start: 2023-10-06

## 2023-11-15 DIAGNOSIS — G62.9 NEUROPATHY: ICD-10-CM

## 2023-11-15 RX ORDER — GABAPENTIN 100 MG/1
CAPSULE ORAL
Qty: 450 CAPSULE | Refills: 1 | Status: SHIPPED | OUTPATIENT
Start: 2023-11-15 | End: 2024-06-07

## 2023-11-15 NOTE — TELEPHONE ENCOUNTER
Refill request      Last Appointment:  8/30/2023    Future appts:  Future Appointments   Date Time Provider [address]   1/24/2024 11:45 AM Tucker Woods MD [address] [facility]

## 2023-12-05 DIAGNOSIS — G62.9 NEUROPATHY: ICD-10-CM

## 2023-12-05 RX ORDER — DULOXETIN HYDROCHLORIDE 20 MG/1
20 CAPSULE, DELAYED RELEASE ORAL DAILY
Qty: 90 CAPSULE | Refills: 0 | Status: SHIPPED | OUTPATIENT
Start: 2023-12-05

## 2023-12-05 NOTE — TELEPHONE ENCOUNTER
Refill request was approved    However concerning regarding patient and appointments.    From what I can review it appears that his last appointment was in August 2023    -Patient is on Coumadin and is suggested to have monthly INR checks as well as was to have an office appointment for check-in at least every 3 months    Please reach out to the patient to schedule the patient for follow-up appointment and INR check

## 2023-12-05 NOTE — TELEPHONE ENCOUNTER
Patient requesting refill.     Last Appointment:  8/30/2023  Future Appointments   Date Time Provider [address]   1/24/2024 11:45 AM Elsi Moe MD [facility]

## 2023-12-11 ENCOUNTER — TELEPHONE (OUTPATIENT)
Dept: FAMILY MEDICINE CLINIC | Age: 83
End: 2023-12-11

## 2023-12-11 NOTE — TELEPHONE ENCOUNTER
I spoke with the patient. He went to fill his Duloxetine and it was $174. He was able to use a coupon for this fill, but cannot afford that price in the future. Patient is scheduled to see you 12/20/23.

## 2023-12-11 NOTE — TELEPHONE ENCOUNTER
Kelvin Marie had requested a call back requesting a call back to go over his medications.  He just said it is \"more complicated\" than just a refill.     [phone]

## 2023-12-16 NOTE — CARE COORDINATION
Attempted to reach patient by telephone. Left HIPAA compliant message requesting a return call. Will attempt to reach patient again.
successful

## 2023-12-27 RX ORDER — LEVOTHYROXINE SODIUM 0.2 MG/1
TABLET ORAL
Qty: 72 TABLET | Refills: 1 | Status: SHIPPED | OUTPATIENT
Start: 2023-12-27

## 2023-12-28 RX ORDER — WARFARIN SODIUM 5 MG/1
5 TABLET ORAL DAILY
Qty: 30 TABLET | Refills: 3 | Status: SHIPPED | OUTPATIENT
Start: 2023-12-28

## 2023-12-28 NOTE — TELEPHONE ENCOUNTER
Last Appointment:  8/30/2023  Future Appointments   Date Time Provider [address]   1/24/2024 11:45 AM Paresh Wetzel MD [facility]   2/7/2024  2:30 PM MD JHNOATHAN Garcia [facility]

## 2023-12-28 NOTE — TELEPHONE ENCOUNTER
Roscoe Nicole called today. He also needs warfarin 5mg tablets, Qty 30, refilled. This also would go to [facility] on [address].

## 2023-12-29 RX ORDER — TORSEMIDE 20 MG/1
40 TABLET ORAL 2 TIMES DAILY
Qty: 360 TABLET | Refills: 1 | Status: SHIPPED | OUTPATIENT
Start: 2023-12-29

## 2023-12-29 NOTE — TELEPHONE ENCOUNTER
Patient's son requesting refill. Patient is out of medication.     Last Appointment:  8/30/2023  Future Appointments   Date Time Provider [facility] [address]   1/24/2024 11:45 AM Marta Hull MD [address] [place] [address]   2/7/2024  2:30 PM MD JHONATHAN Cifuentes [facility]

## 2024-01-24 ENCOUNTER — OFFICE VISIT (OUTPATIENT)
Dept: CARDIOLOGY CLINIC | Age: 84
End: 2024-01-24
Payer: MEDICARE

## 2024-01-24 VITALS
SYSTOLIC BLOOD PRESSURE: 102 MMHG | HEIGHT: 68 IN | HEART RATE: 59 BPM | WEIGHT: 195 LBS | RESPIRATION RATE: 16 BRPM | DIASTOLIC BLOOD PRESSURE: 60 MMHG | BODY MASS INDEX: 29.55 KG/M2

## 2024-01-24 DIAGNOSIS — R00.1 BRADYCARDIA: ICD-10-CM

## 2024-01-24 DIAGNOSIS — Z79.01 WARFARIN ANTICOAGULATION: ICD-10-CM

## 2024-01-24 DIAGNOSIS — I42.9 CARDIOMYOPATHY, UNSPECIFIED TYPE (HCC): ICD-10-CM

## 2024-01-24 DIAGNOSIS — I48.21 PERMANENT ATRIAL FIBRILLATION (HCC): Primary | ICD-10-CM

## 2024-01-24 DIAGNOSIS — I25.10 CORONARY ARTERY DISEASE INVOLVING NATIVE CORONARY ARTERY OF NATIVE HEART WITHOUT ANGINA PECTORIS: ICD-10-CM

## 2024-01-24 DIAGNOSIS — I50.32 CHRONIC DIASTOLIC CONGESTIVE HEART FAILURE (HCC): ICD-10-CM

## 2024-01-24 PROCEDURE — 3074F SYST BP LT 130 MM HG: CPT | Performed by: INTERNAL MEDICINE

## 2024-01-24 PROCEDURE — 1123F ACP DISCUSS/DSCN MKR DOCD: CPT | Performed by: INTERNAL MEDICINE

## 2024-01-24 PROCEDURE — 3078F DIAST BP <80 MM HG: CPT | Performed by: INTERNAL MEDICINE

## 2024-01-24 PROCEDURE — 99214 OFFICE O/P EST MOD 30 MIN: CPT | Performed by: INTERNAL MEDICINE

## 2024-01-24 PROCEDURE — 93000 ELECTROCARDIOGRAM COMPLETE: CPT | Performed by: INTERNAL MEDICINE

## 2024-01-24 NOTE — PROGRESS NOTES
OUTPATIENT CARDIOLOGY FOLLOW-UP    Name: Brennan Lagos    Age: 83 y.o.    Primary Care Physician: Gene Villa MD    Date of Service: 2024    Chief Complaint:   Chief Complaint   Patient presents with    Atrial Fibrillation          Interim History:   Here for follow-up.  History of permanent A. 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  Last seen 2022.    No complaints.  Ambulates with cane.  Denies chest pain, shortness breath, palpitations.  Admits to some fatigue.    Review of Systems:   Negative except as described above    Past Medical History:  Past Medical History:   Diagnosis Date    Blood circulation, collateral     CAD (coronary artery disease)     Chronic kidney disease     History of alcohol use     History of ascites     history of, approx 5 years ago    Hyperlipidemia     Hypertension     Hyponatremia     Left knee pain 2020    Liver disease     Lymphedema     lower extrem    Neuropathy     both feet    Osteoarthritis     Psychiatric problem     Thyroid disease     Use of cane as ambulatory aid     Uses wheelchair     for distances    Wears glasses     for reading    Wears hearing aid        Past Surgical History:  Past Surgical History:   Procedure Laterality Date    APPENDECTOMY      CARDIAC CATHETERIZATION  2020    CATARACT REMOVAL WITH IMPLANT Bilateral     COLONOSCOPY      EYE SURGERY      cataracts    TOTAL KNEE ARTHROPLASTY Left 2020    LEFT KNEE IVAN ROBOTIC TOTAL ARTHROPLASTY performed by Tremaine Zuluaga MD at [facility] OR       Family History:  Family History   Problem Relation Age of Onset    Other Mother         old age        Social History:  Social History     Tobacco Use    Smoking status: Former     Current packs/day: 0.00     Average packs/day: 0.7 packs/day for 25.0 years (18.7 ttl pk-yrs)     Types: Pipe, Cigars, Cigarettes     Start date: 10/8/1964     Quit date: 1986     Years since quittin.8

## 2024-02-06 RX ORDER — METOLAZONE 2.5 MG/1
TABLET ORAL
Qty: 90 TABLET | Refills: 1 | Status: SHIPPED | OUTPATIENT
Start: 2024-02-06

## 2024-02-06 NOTE — TELEPHONE ENCOUNTER
Mj calling for a new script for Metolazone sent to [facility] in [place].    I have this ready to send.       Last Appointment:  8/30/2023  Future Appointments   Date Time Provider Department Center   2/7/2024  2:30 PM Gene Villa MD [facility]

## 2024-02-07 ENCOUNTER — TELEPHONE (OUTPATIENT)
Dept: PRIMARY CARE CLINIC | Age: 84
End: 2024-02-07

## 2024-02-07 ENCOUNTER — OFFICE VISIT (OUTPATIENT)
Dept: PRIMARY CARE CLINIC | Age: 84
End: 2024-02-07
Payer: MEDICARE

## 2024-02-07 VITALS
OXYGEN SATURATION: 98 % | HEART RATE: 69 BPM | BODY MASS INDEX: 29.62 KG/M2 | TEMPERATURE: 97.1 F | DIASTOLIC BLOOD PRESSURE: 60 MMHG | SYSTOLIC BLOOD PRESSURE: 100 MMHG | WEIGHT: 200 LBS | HEIGHT: 69 IN

## 2024-02-07 DIAGNOSIS — I89.0 LYMPHEDEMA: ICD-10-CM

## 2024-02-07 DIAGNOSIS — G62.9 NEUROPATHY: ICD-10-CM

## 2024-02-07 DIAGNOSIS — E78.00 PURE HYPERCHOLESTEROLEMIA: ICD-10-CM

## 2024-02-07 DIAGNOSIS — F10.10 ALCOHOL ABUSE: ICD-10-CM

## 2024-02-07 DIAGNOSIS — R97.20 ELEVATED PSA: Primary | ICD-10-CM

## 2024-02-07 DIAGNOSIS — D32.9 MENINGIOMA (HCC): ICD-10-CM

## 2024-02-07 DIAGNOSIS — R73.01 IMPAIRED FASTING GLUCOSE: ICD-10-CM

## 2024-02-07 DIAGNOSIS — N18.30 STAGE 3 CHRONIC KIDNEY DISEASE, UNSPECIFIED WHETHER STAGE 3A OR 3B CKD (HCC): ICD-10-CM

## 2024-02-07 DIAGNOSIS — E87.1 HYPONATREMIA: ICD-10-CM

## 2024-02-07 DIAGNOSIS — I95.9 HYPOTENSION, UNSPECIFIED HYPOTENSION TYPE: ICD-10-CM

## 2024-02-07 DIAGNOSIS — E87.6 HYPOKALEMIA: ICD-10-CM

## 2024-02-07 DIAGNOSIS — K70.31 ALCOHOLIC CIRRHOSIS OF LIVER WITH ASCITES (HCC): ICD-10-CM

## 2024-02-07 DIAGNOSIS — I48.19 PERSISTENT ATRIAL FIBRILLATION (HCC): ICD-10-CM

## 2024-02-07 DIAGNOSIS — D64.9 ANEMIA, UNSPECIFIED TYPE: ICD-10-CM

## 2024-02-07 DIAGNOSIS — E03.8 OTHER SPECIFIED HYPOTHYROIDISM: ICD-10-CM

## 2024-02-07 DIAGNOSIS — I10 ESSENTIAL HYPERTENSION: ICD-10-CM

## 2024-02-07 DIAGNOSIS — I42.0 DILATED CARDIOMYOPATHY (HCC): ICD-10-CM

## 2024-02-07 DIAGNOSIS — I25.10 CORONARY ARTERY DISEASE INVOLVING NATIVE CORONARY ARTERY OF NATIVE HEART WITHOUT ANGINA PECTORIS: ICD-10-CM

## 2024-02-07 DIAGNOSIS — Z91.199 NONCOMPLIANCE: ICD-10-CM

## 2024-02-07 LAB
INTERNATIONAL NORMALIZATION RATIO, POC: 1.1
PROTHROMBIN TIME, POC: 12.9

## 2024-02-07 PROCEDURE — 3074F SYST BP LT 130 MM HG: CPT | Performed by: INTERNAL MEDICINE

## 2024-02-07 PROCEDURE — 99214 OFFICE O/P EST MOD 30 MIN: CPT | Performed by: INTERNAL MEDICINE

## 2024-02-07 PROCEDURE — 85610 PROTHROMBIN TIME: CPT | Performed by: INTERNAL MEDICINE

## 2024-02-07 PROCEDURE — 3078F DIAST BP <80 MM HG: CPT | Performed by: INTERNAL MEDICINE

## 2024-02-07 PROCEDURE — 1123F ACP DISCUSS/DSCN MKR DOCD: CPT | Performed by: INTERNAL MEDICINE

## 2024-02-07 SDOH — ECONOMIC STABILITY: FOOD INSECURITY: WITHIN THE PAST 12 MONTHS, YOU WORRIED THAT YOUR FOOD WOULD RUN OUT BEFORE YOU GOT MONEY TO BUY MORE.: NEVER TRUE

## 2024-02-07 SDOH — ECONOMIC STABILITY: INCOME INSECURITY: HOW HARD IS IT FOR YOU TO PAY FOR THE VERY BASICS LIKE FOOD, HOUSING, MEDICAL CARE, AND HEATING?: NOT HARD AT ALL

## 2024-02-07 SDOH — ECONOMIC STABILITY: FOOD INSECURITY: WITHIN THE PAST 12 MONTHS, THE FOOD YOU BOUGHT JUST DIDN'T LAST AND YOU DIDN'T HAVE MONEY TO GET MORE.: NEVER TRUE

## 2024-02-07 ASSESSMENT — PATIENT HEALTH QUESTIONNAIRE - PHQ9
SUM OF ALL RESPONSES TO PHQ QUESTIONS 1-9: 0
1. LITTLE INTEREST OR PLEASURE IN DOING THINGS: 0
SUM OF ALL RESPONSES TO PHQ QUESTIONS 1-9: 0
2. FEELING DOWN, DEPRESSED OR HOPELESS: 0
SUM OF ALL RESPONSES TO PHQ QUESTIONS 1-9: 0
SUM OF ALL RESPONSES TO PHQ QUESTIONS 1-9: 0

## 2024-02-07 ASSESSMENT — ENCOUNTER SYMPTOMS
COUGH: 0
CONSTIPATION: 0
VOMITING: 0
BLOOD IN STOOL: 0
NAUSEA: 0
SHORTNESS OF BREATH: 0
DIARRHEA: 0
RHINORRHEA: 0
EYE PAIN: 0
ABDOMINAL PAIN: 0
SORE THROAT: 0
CHEST TIGHTNESS: 0

## 2024-02-07 NOTE — TELEPHONE ENCOUNTER
Patient states he will wait to discuss knee pain at next office visit, informed to go to urgent care, ED or call to schedule earlier office visit if condition persists/worsens.

## 2024-02-07 NOTE — PROGRESS NOTES
[facility]   Internal Medicine     2024  Brennan Lagos : 1940 Sex: male Age:83 y.o.    Chief Complaint   Patient presents with    Knee Pain     Bilateral knee pain with walking.  Pain is described as aching in nature.  Pain started months ago.         HPI:   Patient presents to office for evaluation of the following medical concerns.    - Has not been seen in office since 2023 - has not had inr checked for warfarin.     ct head 8mm lt menigioma neg acute, ct c-spine no acute, djd,     - PSA was elevated. Advised referral to urology, patient did not schedule     - States has electrolytes issues, Hyponatremia. Last lab (2023) - was still low at 13, hypokalemia - (2023) 3.6. Was to be on sodium bicarbonate. On potassium, non compliant with     - States having issues with tinnitus.     - States having issues with b/l LE edema. Improved, On diuretics last visit with nephrology per reviewed note (3/22) -hyponatremia drink electrolyte fluids instead of free water, no changes follow-up in 4 months with labs. On metolazone  2.5mg - states switched to 2x per week. On torsemide 40 mg twice a day (probably only taking daily). on sprinolactone. States stopped potassium.     - Stats has atrial fibrillation.On metoprolol. On coumadin. Last recommendation was 7mg mon/wed/fri and 5mg other day. States currently taking 5mg mon-fri, nothing sat/sun INR today was 4.0 (2024) No bleeding. Patient states has been taking medication daily.     - States has cardiomyopathy. States due to alcohol. Follows with cardiology. Last visit per reviewed note  () - History of cardiomyopathy chronic heart failure with preserved ejection fraction atrial fibrillation continue metoprolol 50 twice a day stop aspirin due to bruising, torsemide spironolactone per nephrology Eliquis cost prohibitive f/u 1yr     - States has CAD. States had stress test (2020) The myocardial perfusion imaging was abnormal, moderate

## 2024-02-07 NOTE — TELEPHONE ENCOUNTER
Left message for patient to return call.  Per Dr. Villa, knee pain was not discussed due to other issues.

## 2024-02-09 ENCOUNTER — TELEPHONE (OUTPATIENT)
Dept: PRIMARY CARE CLINIC | Age: 84
End: 2024-02-09

## 2024-02-09 NOTE — TELEPHONE ENCOUNTER
I would recommend social work consultation  I would recommend psychiatry evaluation    I am concerned the patient is not taking his medications as his INR was at a level that we would expect on somebody who was not taking medications    Would be happy to have another visit here sooner to review all these things and to discuss medications.  There is significant risk with all of these medications and alcohol

## 2024-02-09 NOTE — TELEPHONE ENCOUNTER
Pt son advised. Pt son states he will call back after he speaks to his father tonight. Pt son going to call back and get back to us to get the social work and psychiatry evaluation.

## 2024-02-09 NOTE — TELEPHONE ENCOUNTER
Pt son calling asking about MRI order. Pt son also concerned about cognitive issues with the pt as he is starting to have issues with forgetting where he is in a sentence. Pt son also concerned about pt's health as the pt is not seeming to take care of himself properly any more. Pt son is also concerned about depression as pt does not want to leave the house any more, does not want to shower every day, does not want to change his clothes every day. Pt seems to understand some things but also does not seem to care about much anymore and pt son is becoming concerned. Pt son asking what you recommend. Pt son states pt also refuses to stop drinking. Pt son also states that the pt has complained about getting headaches more recently on \"the side of where the tumor is.\" Pt son asking about possibly adding medication for the pt to help him with these issues if you recommend. Pt son states he manages the medications so he can have the medication for the pt.

## 2024-02-16 DIAGNOSIS — R73.01 IMPAIRED FASTING GLUCOSE: ICD-10-CM

## 2024-02-16 DIAGNOSIS — N18.30 STAGE 3 CHRONIC KIDNEY DISEASE, UNSPECIFIED WHETHER STAGE 3A OR 3B CKD (HCC): ICD-10-CM

## 2024-02-16 DIAGNOSIS — E03.8 OTHER SPECIFIED HYPOTHYROIDISM: ICD-10-CM

## 2024-02-16 DIAGNOSIS — E78.00 PURE HYPERCHOLESTEROLEMIA: ICD-10-CM

## 2024-02-16 DIAGNOSIS — R97.20 ELEVATED PSA: ICD-10-CM

## 2024-02-16 DIAGNOSIS — D64.9 ANEMIA, UNSPECIFIED TYPE: ICD-10-CM

## 2024-02-16 LAB
ABSOLUTE IMMATURE GRANULOCYTE: <0.03 K/UL (ref 0–0.58)
ALBUMIN SERPL-MCNC: 4.3 G/DL (ref 3.5–5.2)
ALP BLD-CCNC: 91 U/L (ref 40–129)
ALT SERPL-CCNC: 10 U/L (ref 0–40)
ANION GAP SERPL CALCULATED.3IONS-SCNC: 17 MMOL/L (ref 7–16)
AST SERPL-CCNC: 17 U/L (ref 0–39)
BASOPHILS ABSOLUTE: 0.04 K/UL (ref 0–0.2)
BASOPHILS RELATIVE PERCENT: 1 % (ref 0–2)
BILIRUB SERPL-MCNC: 0.7 MG/DL (ref 0–1.2)
BILIRUBIN URINE: NEGATIVE
BUN BLDV-MCNC: 32 MG/DL (ref 6–23)
CALCIUM SERPL-MCNC: 9.8 MG/DL (ref 8.6–10.2)
CHLORIDE BLD-SCNC: 80 MMOL/L (ref 98–107)
CHOLESTEROL, FASTING: 154 MG/DL
CO2: 29 MMOL/L (ref 22–29)
COLOR: YELLOW
CREAT SERPL-MCNC: 1.6 MG/DL (ref 0.7–1.2)
CREATININE URINE: 80.8 MG/DL (ref 40–278)
EOSINOPHILS ABSOLUTE: 0.05 K/UL (ref 0.05–0.5)
EOSINOPHILS RELATIVE PERCENT: 1 % (ref 0–6)
FERRITIN: 331 NG/ML
FOLATE: 3 NG/ML (ref 4.8–24.2)
GFR SERPL CREATININE-BSD FRML MDRD: 44 ML/MIN/1.73M2
GLUCOSE BLD-MCNC: 105 MG/DL (ref 74–99)
GLUCOSE URINE: NEGATIVE MG/DL
HBA1C MFR BLD: 5.3 % (ref 4–5.6)
HCT VFR BLD CALC: 37.2 % (ref 37–54)
HDLC SERPL-MCNC: 57 MG/DL
HEMOGLOBIN: 13 G/DL (ref 12.5–16.5)
IMMATURE GRANULOCYTES: 0 % (ref 0–5)
IRON % SATURATION: 26 % (ref 20–55)
IRON: 84 UG/DL (ref 59–158)
KETONES, URINE: NEGATIVE MG/DL
LDL CHOLESTEROL: 70 MG/DL
LEUKOCYTE ESTERASE, URINE: NEGATIVE
LYMPHOCYTES ABSOLUTE: 1.77 K/UL (ref 1.5–4)
LYMPHOCYTES RELATIVE PERCENT: 30 % (ref 20–42)
MAGNESIUM: 1.9 MG/DL (ref 1.6–2.6)
MCH RBC QN AUTO: 33.1 PG (ref 26–35)
MCHC RBC AUTO-ENTMCNC: 34.9 G/DL (ref 32–34.5)
MCV RBC AUTO: 94.7 FL (ref 80–99.9)
MICROALBUMIN/CREAT 24H UR: 19 MG/L (ref 0–19)
MICROALBUMIN/CREAT UR-RTO: 23 MCG/MG CREAT (ref 0–30)
MONOCYTES ABSOLUTE: 0.54 K/UL (ref 0.1–0.95)
MONOCYTES RELATIVE PERCENT: 9 % (ref 2–12)
NEUTROPHILS ABSOLUTE: 3.56 K/UL (ref 1.8–7.3)
NEUTROPHILS RELATIVE PERCENT: 60 % (ref 43–80)
NITRITE, URINE: NEGATIVE
PDW BLD-RTO: 13.6 % (ref 11.5–15)
PH UA: 7 (ref 5–9)
PLATELET # BLD: 352 K/UL (ref 130–450)
PMV BLD AUTO: 9.8 FL (ref 7–12)
POTASSIUM SERPL-SCNC: 3.4 MMOL/L (ref 3.5–5)
PROSTATE SPECIFIC ANTIGEN: 14.25 NG/ML (ref 0–4)
PROTEIN UA: NEGATIVE MG/DL
RBC # BLD: 3.93 M/UL (ref 3.8–5.8)
RBC UA: NORMAL /HPF
SODIUM BLD-SCNC: 126 MMOL/L (ref 132–146)
SPECIFIC GRAVITY UA: 1.01 (ref 1–1.03)
TOTAL IRON BINDING CAPACITY: 319 UG/DL (ref 250–450)
TOTAL PROTEIN: 8.2 G/DL (ref 6.4–8.3)
TRIGLYCERIDE, FASTING: 134 MG/DL
TSH SERPL DL<=0.05 MIU/L-ACNC: 3.09 UIU/ML (ref 0.27–4.2)
TURBIDITY: CLEAR
URINE HGB: ABNORMAL
UROBILINOGEN, URINE: 0.2 EU/DL (ref 0–1)
VITAMIN B-12: 427 PG/ML (ref 211–946)
VITAMIN D 25-HYDROXY: 32 NG/ML (ref 30–100)
VLDLC SERPL CALC-MCNC: 27 MG/DL
WBC # BLD: 6 K/UL (ref 4.5–11.5)
WBC UA: NORMAL /HPF

## 2024-02-18 ENCOUNTER — TELEPHONE (OUTPATIENT)
Dept: FAMILY MEDICINE CLINIC | Age: 84
End: 2024-02-18

## 2024-02-18 NOTE — TELEPHONE ENCOUNTER
Please let the patient know that blood work results showed    Labs showed kidney dysfunction that was slightly worse when compared to previous    Labs showed sodium level to be decreased I am uncertain as to the exact cause but could be related to medications such as water pills, or could be related to alcohol use or other    Labs showed decreased potassium level which could be related to medications as well    PSA for prostate was much more elevated when compared to previous and highly recommend evaluation by urology    Urine analysis showed microscopic blood but otherwise was negative with no evidence of microscopic protein.  Given the blood in the urine and elevated PSA would highly recommend urology evaluation    Vitamin B12 level was normal    Folic acid level however was low and would recommend folic acid 1 mg daily supplement or multivitamin with folic acid daily    Sugar was mildly elevated.  Hemoglobin A1c is a measure 3-month sugar control was normal at 5.3.  No evidence for prediabetes or diabetes at present    Iron levels including storage form ferritin were normal    Thyroid levels were normal    Cholesterol levels were good    Vitamin D levels were normal but on the low end of normal    Blood counts were normal    Other electrolytes and liver functions were normal    Given the abnormalities would recommend rescheduling appointment sooner with bringing in all medications that the patient is taking for review, given the electrolyte abnormalities I would consider nephrology evaluation    Thanks

## 2024-02-22 ENCOUNTER — TELEPHONE (OUTPATIENT)
Dept: PRIMARY CARE CLINIC | Age: 84
End: 2024-02-22

## 2024-02-22 DIAGNOSIS — I10 ESSENTIAL HYPERTENSION: Primary | ICD-10-CM

## 2024-02-22 NOTE — TELEPHONE ENCOUNTER
Patient is scheduled for MRI tomorrow. Blood work is too low to inject for MRI based on his previous labs. Asking if you can place order in Epic for bun/creatinine to have drawn tomorrow prior to MRI.

## 2024-02-23 ENCOUNTER — TELEPHONE (OUTPATIENT)
Dept: PRIMARY CARE CLINIC | Age: 84
End: 2024-02-23

## 2024-02-23 ENCOUNTER — HOSPITAL ENCOUNTER (OUTPATIENT)
Age: 84
Discharge: HOME | End: 2024-02-23
Attending: INTERNAL MEDICINE
Payer: MEDICARE

## 2024-02-23 ENCOUNTER — HOSPITAL ENCOUNTER (OUTPATIENT)
Dept: MRI IMAGING | Age: 84
End: 2024-02-23
Attending: INTERNAL MEDICINE
Payer: MEDICARE

## 2024-02-23 DIAGNOSIS — D32.9 MENINGIOMA (HCC): ICD-10-CM

## 2024-02-23 DIAGNOSIS — I10 ESSENTIAL HYPERTENSION: ICD-10-CM

## 2024-02-23 LAB
BUN SERPL-MCNC: 41 MG/DL (ref 6–23)
CREAT SERPL-MCNC: 1.4 MG/DL (ref 0.7–1.2)
GFR SERPL CREATININE-BSD FRML MDRD: 50 ML/MIN/1.73M2

## 2024-02-23 PROCEDURE — 82565 ASSAY OF CREATININE: CPT

## 2024-02-23 PROCEDURE — 84520 ASSAY OF UREA NITROGEN: CPT

## 2024-02-23 PROCEDURE — A9579 GAD-BASE MR CONTRAST NOS,1ML: HCPCS | Performed by: RADIOLOGY

## 2024-02-23 PROCEDURE — 70553 MRI BRAIN STEM W/O & W/DYE: CPT

## 2024-02-23 PROCEDURE — 6360000004 HC RX CONTRAST MEDICATION: Performed by: RADIOLOGY

## 2024-02-23 RX ADMIN — GADOTERIDOL 17 ML: 279.3 INJECTION, SOLUTION INTRAVENOUS at 14:07

## 2024-02-26 ENCOUNTER — TELEPHONE (OUTPATIENT)
Dept: CARDIOLOGY CLINIC | Age: 84
End: 2024-02-26

## 2024-02-26 ENCOUNTER — TELEPHONE (OUTPATIENT)
Dept: FAMILY MEDICINE CLINIC | Age: 84
End: 2024-02-26

## 2024-02-26 NOTE — TELEPHONE ENCOUNTER
Please let the patient know that MRI of the brain per radiology report suggested    No evidence for meningioma was seen on current study    No evidence for acute stroke no abnormal fluid collections or masses    Noted diffuse chronic small vessel ischemic changes may be related to blood pressure heart issues chronically over time    Thanks

## 2024-02-26 NOTE — TELEPHONE ENCOUNTER
Patient's son Joe called in to find out  the dose of Metoprolol the patient should be taking. Patient son's states his father has on been taking 50 mg of Metoprolol daily for over a year. Joe states the patient would not take his evening dose when he was suppose to be taking metoprolol 50 mg BID. So since it was decreased on 1/24/24 to Metoprolol 25 mg BID. Patient will only take morning dose please advise.

## 2024-02-27 ENCOUNTER — TELEPHONE (OUTPATIENT)
Dept: PRIMARY CARE CLINIC | Age: 84
End: 2024-02-27

## 2024-02-27 NOTE — TELEPHONE ENCOUNTER
----- Message from Kelsy Mendez sent at 2/26/2024  4:14 PM EST -----  Subject: Results Request    QUESTIONS  Results: FRANK DANIELS; Ordered by: Gene Villa   Date Performed: 2024-02-23  ---------------------------------------------------------------------------  --------------  CALL BACK INFO    [phone]; OK to leave message on voicemail  ---------------------------------------------------------------------------  --------------

## 2024-02-28 NOTE — TELEPHONE ENCOUNTER
Change to metoprolol succinate 25 mg once daily which is a long-acting medication.  (Tartrate is short acting and should be twice daily.)

## 2024-02-29 RX ORDER — METOPROLOL SUCCINATE 25 MG/1
25 TABLET, EXTENDED RELEASE ORAL DAILY
Qty: 90 TABLET | Refills: 3 | Status: SHIPPED | OUTPATIENT
Start: 2024-02-29

## 2024-02-29 RX ORDER — METOPROLOL SUCCINATE 25 MG/1
25 TABLET, EXTENDED RELEASE ORAL DAILY
COMMUNITY
End: 2024-02-29 | Stop reason: SDUPTHER

## 2024-02-29 NOTE — TELEPHONE ENCOUNTER
Chart reflects medication change from Metoprolol Tartrate to Metoprolol Succinate 25 mg daily.Script e-scribed. Patient's son Joe notified and will D/C  the Metoprolol Tartrated and Start the Metoprolol Succinate per Dr. Schilling's recommendations.

## 2024-03-01 ENCOUNTER — TELEPHONE (OUTPATIENT)
Dept: FAMILY MEDICINE CLINIC | Age: 84
End: 2024-03-01

## 2024-03-01 NOTE — TELEPHONE ENCOUNTER
Patient's son called. He is confused on the MRI result. He was being told by his father that they found an 8mm tumor but then he was told by someone at the office that nothing was found? Please clarify.

## 2024-03-02 NOTE — TELEPHONE ENCOUNTER
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  According to the report no meningioma or masses were identified    There was no acute process and only showing mild diffuse cerebral volume loss and chronic ischemic changes related most likely to heart and blood pressure over time    I am uncertain as to the discrepancy of the CAT scans done in 2022 versus the MRI done now however no evidence for meningioma or other masses identified currently

## 2024-03-04 NOTE — TELEPHONE ENCOUNTER
Nothing specific on MRI that would have caused forgetfulness    It could be a combination of age possibly early onset mild cognitive impairment versus dementia versus chronic alcohol use

## 2024-03-04 NOTE — TELEPHONE ENCOUNTER
Advised Joe of Dr Miller's response that nothing specific on the Mri, so this would be related to his age.    He stated that he understands and was thinking that would be the case as well.

## 2024-03-04 NOTE — TELEPHONE ENCOUNTER
Patient son was informed. He wants to know is there something causing his forgetfulness or is it just from him getting old and just being forgetful? Is dementia on its way?

## 2024-03-06 ENCOUNTER — TELEPHONE (OUTPATIENT)
Dept: PRIMARY CARE CLINIC | Age: 84
End: 2024-03-06

## 2024-03-06 DIAGNOSIS — F10.10 ALCOHOL ABUSE: ICD-10-CM

## 2024-03-06 DIAGNOSIS — I48.19 PERSISTENT ATRIAL FIBRILLATION (HCC): ICD-10-CM

## 2024-03-06 DIAGNOSIS — I95.9 HYPOTENSION, UNSPECIFIED HYPOTENSION TYPE: ICD-10-CM

## 2024-03-06 DIAGNOSIS — G62.9 NEUROPATHY: ICD-10-CM

## 2024-03-06 DIAGNOSIS — Z91.199 NONCOMPLIANCE: Primary | ICD-10-CM

## 2024-03-06 DIAGNOSIS — N18.30 STAGE 3 CHRONIC KIDNEY DISEASE, UNSPECIFIED WHETHER STAGE 3A OR 3B CKD (HCC): ICD-10-CM

## 2024-03-06 NOTE — TELEPHONE ENCOUNTER
Sending a phone message as patient was a no-show for his appointment today!    At today's visit patient was to have had a follow-up INR for anticoagulation management and we were to discuss his medications as we are not concerned that he may not be taking his medications appropriately    Within this last year the patient has had multiple cancellations and no-shows for his appointment.     I am overall concerned with the compliance on medication as he is on certain medications that can have numerous complications and side effects    At current visit given that he is having issues with taking his Coumadin I was going to recommend Eliquis and was going to recommend him applying for patient assistance through the [facility] patient assistance program this however would require him to provide some documentation on income to meet eligibility requirements    I am also recommending a social work consultation given that he may be having issues taking care of himself at home    Given that he might be having issues coming to his office appointments I would also recommend considering home health care such as [facility] home physicians or visiting physicians    Thanks

## 2024-03-07 NOTE — TELEPHONE ENCOUNTER
Orders Placed This Encounter   Procedures    [facility] Referral to Social Work (Primary Care Only)     Referral Priority:   Routine     Referral Type:   Eval and Treat     Referral Reason:   Specialty Services Required     Requested Specialty:   Licensed Clinical      Number of Visits Requested:   1     Referral to social work placed as above    Please pass the information for [facility] patient assistance program through the son to call to initiate the process for Eliquis    thanks

## 2024-03-07 NOTE — TELEPHONE ENCOUNTER
Joe advised of referral to social work and  [facility] Prescription Assistance Program  [phone] and he states he will contact.

## 2024-03-07 NOTE — TELEPHONE ENCOUNTER
Advised Joe ([phone] best number to reach him) [phone] (work).      He states that patient is taking medication only once a day- between 3:00 and 7:00 PM with gabapentin extra dose daily.  Son states he is paying for patient's medications.  Joe states that he is in favor of change to Eliquis concerned of the cost, as it was $600.00 per month vs. $4.00 for Coumadin, however would like to proceed with patient assistance through [employer].      Joe agrees to social work consultation for patient but concerned that patient will not want to incur the cost.  Joe also agrees that it would be helpful to have home health care but is concerned that patient will not appreciate the intrusion but would like to try.      Please advise and place orders/referrals.

## 2024-03-08 ENCOUNTER — CARE COORDINATION (OUTPATIENT)
Dept: CARE COORDINATION | Age: 84
End: 2024-03-08

## 2024-03-08 NOTE — CARE COORDINATION
SWCC made call to pt to initiate SW referral, unable to reach, left message requesting call back to this CC with contact info provided.    Kristi Seymour MSW, LSW   Care Coordinator  [phone]

## 2024-03-12 ENCOUNTER — CARE COORDINATION (OUTPATIENT)
Dept: CARE COORDINATION | Age: 84
End: 2024-03-12

## 2024-03-12 NOTE — CARE COORDINATION
Initial Contact Social Work Note - Ambulatory  3/12/2024      Date of referral: 3/12/2024  Referral received from: PCP  Reason for referral: no shows, medication compliance, issues taking care of self at home, home health care? Visiting physicians?    Previous SW referral: No  If yes, brief summary of outcome: N/A    Two Identifiers Verified: Yes    Insurance Provider: [facility] Medicare    Support System:  Adult Child/Children     Status: Yes, not connected, provided son with [facility] number    Community Providers: No    ADL Assistance Needed:  pt is independent of ADLs but does have neuropathy issues    Housing/Living Concerns or Home Modification Needs: Yes, son has tried to get someone to clean the home. Son does pt's laundry. Son reports poor hygiene    Transportation Concern: Pt drives    Medication Cost Concern: No    Medication Adherence Concern: Son manages pt's medications, but pt does not take them on time, misses on average one day a week    Financial Concern(s): No    Income (only if applicable): pension, 401K, Social Security    Ability to Read/Write: Yes    Advance Care Plan:  N/A    Other: N/A    Identified Needs:  [facility] referral  [facility] services    Social Work Plan:  Made referral with Elaina [facility]HOU  Provided son with [facility] phone number  Next Steps: SWCC  Method of Communication With Provider (if appropriate): Chart Routing       Goals Addressed    None      SWCC made call to pt, unable to reach, left message requesting call back.  Made call to pt's son Joe, complete SW assessment. Joe reports he does not think pt would want to talk with this CC but son is interested in resources for pt. Reviewed [facility] and son Joe would like referral for services. Joe reports pt is not agreeable to much help but may be if Joe explains it to pt. Encouraged Joe to [facility]  what they will be asking pt during assessment so Joe can review with pt before assessment visit

## 2024-03-13 ENCOUNTER — OFFICE VISIT (OUTPATIENT)
Dept: PRIMARY CARE CLINIC | Age: 84
End: 2024-03-13
Payer: MEDICARE

## 2024-03-13 VITALS
HEART RATE: 67 BPM | OXYGEN SATURATION: 97 % | BODY MASS INDEX: 29.03 KG/M2 | HEIGHT: 69 IN | SYSTOLIC BLOOD PRESSURE: 112 MMHG | DIASTOLIC BLOOD PRESSURE: 60 MMHG | WEIGHT: 196 LBS | TEMPERATURE: 97.4 F

## 2024-03-13 DIAGNOSIS — F10.10 ALCOHOL ABUSE: ICD-10-CM

## 2024-03-13 DIAGNOSIS — R97.20 ELEVATED PSA: ICD-10-CM

## 2024-03-13 DIAGNOSIS — E78.00 PURE HYPERCHOLESTEROLEMIA: ICD-10-CM

## 2024-03-13 DIAGNOSIS — Z91.199 NONCOMPLIANCE: ICD-10-CM

## 2024-03-13 DIAGNOSIS — I89.0 LYMPHEDEMA: ICD-10-CM

## 2024-03-13 DIAGNOSIS — E87.6 HYPOKALEMIA: ICD-10-CM

## 2024-03-13 DIAGNOSIS — I48.19 PERSISTENT ATRIAL FIBRILLATION (HCC): Primary | ICD-10-CM

## 2024-03-13 DIAGNOSIS — E87.1 HYPONATREMIA: ICD-10-CM

## 2024-03-13 DIAGNOSIS — D64.9 ANEMIA, UNSPECIFIED TYPE: ICD-10-CM

## 2024-03-13 DIAGNOSIS — I95.9 HYPOTENSION, UNSPECIFIED HYPOTENSION TYPE: ICD-10-CM

## 2024-03-13 DIAGNOSIS — G62.9 NEUROPATHY: ICD-10-CM

## 2024-03-13 DIAGNOSIS — I25.10 CORONARY ARTERY DISEASE INVOLVING NATIVE CORONARY ARTERY OF NATIVE HEART WITHOUT ANGINA PECTORIS: ICD-10-CM

## 2024-03-13 DIAGNOSIS — I42.0 DILATED CARDIOMYOPATHY (HCC): ICD-10-CM

## 2024-03-13 DIAGNOSIS — I10 ESSENTIAL HYPERTENSION: ICD-10-CM

## 2024-03-13 DIAGNOSIS — N18.30 STAGE 3 CHRONIC KIDNEY DISEASE, UNSPECIFIED WHETHER STAGE 3A OR 3B CKD (HCC): ICD-10-CM

## 2024-03-13 DIAGNOSIS — K70.31 ALCOHOLIC CIRRHOSIS OF LIVER WITH ASCITES (HCC): ICD-10-CM

## 2024-03-13 DIAGNOSIS — R73.01 IMPAIRED FASTING GLUCOSE: ICD-10-CM

## 2024-03-13 DIAGNOSIS — E03.8 OTHER SPECIFIED HYPOTHYROIDISM: ICD-10-CM

## 2024-03-13 LAB
INTERNATIONAL NORMALIZATION RATIO, POC: 2.1
PROTHROMBIN TIME, POC: 25.2

## 2024-03-13 PROCEDURE — 3078F DIAST BP <80 MM HG: CPT | Performed by: INTERNAL MEDICINE

## 2024-03-13 PROCEDURE — 85610 PROTHROMBIN TIME: CPT | Performed by: INTERNAL MEDICINE

## 2024-03-13 PROCEDURE — 99214 OFFICE O/P EST MOD 30 MIN: CPT | Performed by: INTERNAL MEDICINE

## 2024-03-13 PROCEDURE — 3074F SYST BP LT 130 MM HG: CPT | Performed by: INTERNAL MEDICINE

## 2024-03-13 PROCEDURE — 1123F ACP DISCUSS/DSCN MKR DOCD: CPT | Performed by: INTERNAL MEDICINE

## 2024-03-13 RX ORDER — DULOXETIN HYDROCHLORIDE 20 MG/1
20 CAPSULE, DELAYED RELEASE ORAL DAILY
Qty: 90 CAPSULE | Refills: 0 | Status: SHIPPED | OUTPATIENT
Start: 2024-03-13

## 2024-03-13 RX ORDER — FOLIC ACID 1 MG/1
1 TABLET ORAL DAILY
Qty: 30 TABLET | Refills: 5 | Status: SHIPPED | OUTPATIENT
Start: 2024-03-13

## 2024-03-13 RX ORDER — POTASSIUM CHLORIDE 1.5 G/1.58G
20 POWDER, FOR SOLUTION ORAL DAILY
Qty: 30 EACH | Refills: 5 | Status: SHIPPED | OUTPATIENT
Start: 2024-03-13

## 2024-03-13 RX ORDER — SODIUM CHLORIDE 1 G/1
1 TABLET ORAL 2 TIMES DAILY
Qty: 90 TABLET | Refills: 3 | Status: SHIPPED | OUTPATIENT
Start: 2024-03-13

## 2024-03-13 ASSESSMENT — ENCOUNTER SYMPTOMS
RHINORRHEA: 0
NAUSEA: 0
CONSTIPATION: 0
COUGH: 0
SORE THROAT: 0
ABDOMINAL PAIN: 0
CHEST TIGHTNESS: 0
EYE PAIN: 0
VOMITING: 0
SHORTNESS OF BREATH: 0
DIARRHEA: 0
BLOOD IN STOOL: 0

## 2024-03-13 NOTE — PROGRESS NOTES
Number of Visits Requested:   1     Requested Prescriptions     Signed Prescriptions Disp Refills    DULoxetine (CYMBALTA) 20 MG extended release capsule 90 capsule 0     Sig: Take 1 capsule by mouth daily    folic acid (FOLVITE) 1 MG tablet 30 tablet 5     Sig: Take 1 tablet by mouth daily    sodium chloride 1 g tablet 90 tablet 3     Sig: Take 1 tablet by mouth in the morning and at bedtime    potassium chloride (KLOR-CON) 20 MEQ packet 30 each 5     Sig: Take 20 mEq by mouth daily        Gene Villa MD  3/13/2024  3:05 PM

## 2024-03-15 ENCOUNTER — TELEPHONE (OUTPATIENT)
Dept: FAMILY MEDICINE CLINIC | Age: 84
End: 2024-03-15

## 2024-03-15 NOTE — TELEPHONE ENCOUNTER
Patient's son, Joe, wanted to update you that patient has not been taking his potassium or sodium chloride.  Joe reports that patient will only take medications once a day, except for his gabapentin.  He states that he may be able to get patient to take sodium chloride once a day but probably not 2 tabs once a day.  Joe stated that patient will not take potassium chloride because the tablet is too large.  Informed Joe that you placed an order for the packets.  Joe stated the packets are expensive but will inform office if there is an issue with the cost.  I advised Joe that potassium tablet could be broken in half and put in applesauce or pudding.  Joe stated he was not sure if his dad would attempt medication with applesauce/pudding.

## 2024-03-20 ENCOUNTER — CARE COORDINATION (OUTPATIENT)
Dept: CARE COORDINATION | Age: 84
End: 2024-03-20

## 2024-03-20 NOTE — CARE COORDINATION
[facility] made f/u call to pt's son Joe. Joe reports he spoke with [facility] regarding referral for services for pt and scheduled assessment for pt next month with [facility]. Joe reports he explained to [facility] that pt will not willingly give financial info to them. [facility] explained that this info will be required  by [facility] in order for them to see if pt qualifies for any services. No other questions or needs reported. [facility] to sign off and route note to PCP but encouraged Joe to call this CC with any SW needs that may arise.    Kristi Seymour MSW, LSW   Care Coordinator  [phone]

## 2024-03-25 RX ORDER — SPIRONOLACTONE 25 MG/1
25 TABLET ORAL DAILY
Qty: 30 TABLET | Refills: 5 | Status: SHIPPED | OUTPATIENT
Start: 2024-03-25

## 2024-04-02 ENCOUNTER — TELEPHONE (OUTPATIENT)
Dept: FAMILY MEDICINE CLINIC | Age: 84
End: 2024-04-02

## 2024-04-02 NOTE — TELEPHONE ENCOUNTER
Brennan calling to tell you that he is experiencing a lot of pain in both of his legs. It is a shooting pain up his legs to the hips.    Both of his knees are hurting him as well.  He did not fall or can recall doing anything to hurt his legs.     He is taking the Gabapentin, but it only helps a little bit.     Please advise.

## 2024-04-02 NOTE — TELEPHONE ENCOUNTER
I would recommend urgent care evaluation or scheduling an appointment could consider going to orthopedic walk-in in [place]

## 2024-04-12 ENCOUNTER — OFFICE VISIT (OUTPATIENT)
Dept: PRIMARY CARE CLINIC | Age: 84
End: 2024-04-12
Payer: MEDICARE

## 2024-04-12 VITALS
OXYGEN SATURATION: 95 % | TEMPERATURE: 98 F | DIASTOLIC BLOOD PRESSURE: 60 MMHG | WEIGHT: 199 LBS | HEART RATE: 86 BPM | BODY MASS INDEX: 29.47 KG/M2 | HEIGHT: 69 IN | SYSTOLIC BLOOD PRESSURE: 108 MMHG

## 2024-04-12 DIAGNOSIS — Z79.01 CURRENT USE OF LONG TERM ANTICOAGULATION: ICD-10-CM

## 2024-04-12 DIAGNOSIS — E87.1 HYPONATREMIA: ICD-10-CM

## 2024-04-12 DIAGNOSIS — Z91.199 NONCOMPLIANCE: ICD-10-CM

## 2024-04-12 DIAGNOSIS — R73.01 IMPAIRED FASTING GLUCOSE: ICD-10-CM

## 2024-04-12 DIAGNOSIS — E03.8 OTHER SPECIFIED HYPOTHYROIDISM: ICD-10-CM

## 2024-04-12 DIAGNOSIS — I10 ESSENTIAL HYPERTENSION: ICD-10-CM

## 2024-04-12 DIAGNOSIS — K70.31 ALCOHOLIC CIRRHOSIS OF LIVER WITH ASCITES (HCC): ICD-10-CM

## 2024-04-12 DIAGNOSIS — G62.9 NEUROPATHY: ICD-10-CM

## 2024-04-12 DIAGNOSIS — I48.19 PERSISTENT ATRIAL FIBRILLATION (HCC): ICD-10-CM

## 2024-04-12 DIAGNOSIS — R97.20 ELEVATED PSA: Primary | ICD-10-CM

## 2024-04-12 DIAGNOSIS — I25.10 CORONARY ARTERY DISEASE INVOLVING NATIVE CORONARY ARTERY OF NATIVE HEART WITHOUT ANGINA PECTORIS: ICD-10-CM

## 2024-04-12 DIAGNOSIS — F10.10 ALCOHOL ABUSE: ICD-10-CM

## 2024-04-12 DIAGNOSIS — E87.6 HYPOKALEMIA: ICD-10-CM

## 2024-04-12 DIAGNOSIS — N18.32 STAGE 3B CHRONIC KIDNEY DISEASE (HCC): ICD-10-CM

## 2024-04-12 DIAGNOSIS — I89.0 LYMPHEDEMA: ICD-10-CM

## 2024-04-12 DIAGNOSIS — I95.9 HYPOTENSION, UNSPECIFIED HYPOTENSION TYPE: ICD-10-CM

## 2024-04-12 DIAGNOSIS — E78.00 PURE HYPERCHOLESTEROLEMIA: ICD-10-CM

## 2024-04-12 DIAGNOSIS — D64.9 ANEMIA, UNSPECIFIED TYPE: ICD-10-CM

## 2024-04-12 LAB
INTERNATIONAL NORMALIZATION RATIO, POC: 2
PROTHROMBIN TIME, POC: 23.8

## 2024-04-12 PROCEDURE — 3078F DIAST BP <80 MM HG: CPT | Performed by: INTERNAL MEDICINE

## 2024-04-12 PROCEDURE — 1123F ACP DISCUSS/DSCN MKR DOCD: CPT | Performed by: INTERNAL MEDICINE

## 2024-04-12 PROCEDURE — 3074F SYST BP LT 130 MM HG: CPT | Performed by: INTERNAL MEDICINE

## 2024-04-12 PROCEDURE — 85610 PROTHROMBIN TIME: CPT | Performed by: INTERNAL MEDICINE

## 2024-04-12 PROCEDURE — 99213 OFFICE O/P EST LOW 20 MIN: CPT | Performed by: INTERNAL MEDICINE

## 2024-04-12 ASSESSMENT — ENCOUNTER SYMPTOMS
ABDOMINAL PAIN: 0
COUGH: 0
EYE PAIN: 0
SHORTNESS OF BREATH: 0
VOMITING: 0
SORE THROAT: 0
CHEST TIGHTNESS: 0
RHINORRHEA: 0
BLOOD IN STOOL: 0
DIARRHEA: 0
NAUSEA: 0
CONSTIPATION: 0

## 2024-04-12 NOTE — PROGRESS NOTES
taking   - still alcohol   - check ammonia     Dilated cardiomyopathy (HCC)  - possible due to alcohol   - discussed and advised stopping alcohol altogether     Idiopathic chronic gout of multiple sites without tophus  - previous labs showed elevated uric acid - last 8.6 (2/2022)   - was on allopurinol in the past - stopped due to poss reaction   - may need to consider uloric and discuss with renal   - low purine diet handout provided   - exacerbation (9/2021) - treated with medrol for pain to wrist     Impaired fasting glucose  - watch diet   - follow A1c - last was 5.3 (2/2024)     Alcohol abuse  - discussed and advised stopping alcohol altogether   - states still drinking 4/12/2024    History of ascites    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      Return in about 1 month (around 5/12/2024) for check up and review.    No orders of the defined types were placed in this encounter.    Requested Prescriptions      No prescriptions requested or ordered in this encounter        Gene Villa MD  4/12/2024  12:13 PM

## 2024-05-15 ENCOUNTER — OFFICE VISIT (OUTPATIENT)
Dept: PRIMARY CARE CLINIC | Age: 84
End: 2024-05-15
Payer: MEDICARE

## 2024-05-15 ENCOUNTER — TELEPHONE (OUTPATIENT)
Dept: PRIMARY CARE CLINIC | Age: 84
End: 2024-05-15

## 2024-05-15 VITALS
DIASTOLIC BLOOD PRESSURE: 60 MMHG | HEART RATE: 69 BPM | BODY MASS INDEX: 28.88 KG/M2 | SYSTOLIC BLOOD PRESSURE: 124 MMHG | BODY MASS INDEX: 28.88 KG/M2 | DIASTOLIC BLOOD PRESSURE: 60 MMHG | WEIGHT: 195 LBS | WEIGHT: 195 LBS | OXYGEN SATURATION: 97 % | OXYGEN SATURATION: 97 % | HEIGHT: 69 IN | HEIGHT: 69 IN | HEART RATE: 69 BPM | TEMPERATURE: 97.8 F | TEMPERATURE: 97.8 F | SYSTOLIC BLOOD PRESSURE: 124 MMHG

## 2024-05-15 DIAGNOSIS — M25.561 CHRONIC PAIN OF BOTH KNEES: ICD-10-CM

## 2024-05-15 DIAGNOSIS — Z91.199 NONCOMPLIANCE: ICD-10-CM

## 2024-05-15 DIAGNOSIS — E87.6 HYPOKALEMIA: ICD-10-CM

## 2024-05-15 DIAGNOSIS — I89.0 LYMPHEDEMA: ICD-10-CM

## 2024-05-15 DIAGNOSIS — I95.9 HYPOTENSION, UNSPECIFIED HYPOTENSION TYPE: ICD-10-CM

## 2024-05-15 DIAGNOSIS — F10.10 ALCOHOL ABUSE: ICD-10-CM

## 2024-05-15 DIAGNOSIS — G89.29 CHRONIC PAIN OF BOTH KNEES: Primary | ICD-10-CM

## 2024-05-15 DIAGNOSIS — M25.562 CHRONIC PAIN OF BOTH KNEES: ICD-10-CM

## 2024-05-15 DIAGNOSIS — D68.9 COAGULATION DISORDER (HCC): Primary | ICD-10-CM

## 2024-05-15 DIAGNOSIS — K70.31 ALCOHOLIC CIRRHOSIS OF LIVER WITH ASCITES (HCC): ICD-10-CM

## 2024-05-15 DIAGNOSIS — M25.561 CHRONIC PAIN OF BOTH KNEES: Primary | ICD-10-CM

## 2024-05-15 DIAGNOSIS — G62.9 NEUROPATHY: ICD-10-CM

## 2024-05-15 DIAGNOSIS — R97.20 ELEVATED PSA: ICD-10-CM

## 2024-05-15 DIAGNOSIS — M25.562 CHRONIC PAIN OF BOTH KNEES: Primary | ICD-10-CM

## 2024-05-15 DIAGNOSIS — I48.19 PERSISTENT ATRIAL FIBRILLATION (HCC): ICD-10-CM

## 2024-05-15 DIAGNOSIS — Z00.00 MEDICARE ANNUAL WELLNESS VISIT, SUBSEQUENT: Primary | ICD-10-CM

## 2024-05-15 DIAGNOSIS — R73.01 IMPAIRED FASTING GLUCOSE: ICD-10-CM

## 2024-05-15 DIAGNOSIS — E87.1 HYPONATREMIA: ICD-10-CM

## 2024-05-15 DIAGNOSIS — E78.00 PURE HYPERCHOLESTEROLEMIA: ICD-10-CM

## 2024-05-15 DIAGNOSIS — D64.9 ANEMIA, UNSPECIFIED TYPE: ICD-10-CM

## 2024-05-15 DIAGNOSIS — I10 ESSENTIAL HYPERTENSION: ICD-10-CM

## 2024-05-15 DIAGNOSIS — E03.8 OTHER SPECIFIED HYPOTHYROIDISM: ICD-10-CM

## 2024-05-15 DIAGNOSIS — N18.32 STAGE 3B CHRONIC KIDNEY DISEASE (HCC): ICD-10-CM

## 2024-05-15 DIAGNOSIS — I25.10 CORONARY ARTERY DISEASE INVOLVING NATIVE CORONARY ARTERY OF NATIVE HEART WITHOUT ANGINA PECTORIS: ICD-10-CM

## 2024-05-15 DIAGNOSIS — G89.29 CHRONIC PAIN OF BOTH KNEES: ICD-10-CM

## 2024-05-15 LAB
INTERNATIONAL NORMALIZATION RATIO, POC: 2
PROTHROMBIN TIME, POC: 23.8

## 2024-05-15 PROCEDURE — G0439 PPPS, SUBSEQ VISIT: HCPCS | Performed by: INTERNAL MEDICINE

## 2024-05-15 PROCEDURE — 1123F ACP DISCUSS/DSCN MKR DOCD: CPT | Performed by: INTERNAL MEDICINE

## 2024-05-15 PROCEDURE — 99214 OFFICE O/P EST MOD 30 MIN: CPT | Performed by: INTERNAL MEDICINE

## 2024-05-15 PROCEDURE — 85610 PROTHROMBIN TIME: CPT | Performed by: INTERNAL MEDICINE

## 2024-05-15 PROCEDURE — 3078F DIAST BP <80 MM HG: CPT | Performed by: INTERNAL MEDICINE

## 2024-05-15 PROCEDURE — 3074F SYST BP LT 130 MM HG: CPT | Performed by: INTERNAL MEDICINE

## 2024-05-15 ASSESSMENT — PATIENT HEALTH QUESTIONNAIRE - PHQ9
SUM OF ALL RESPONSES TO PHQ QUESTIONS 1-9: 0
SUM OF ALL RESPONSES TO PHQ QUESTIONS 1-9: 0
2. FEELING DOWN, DEPRESSED OR HOPELESS: NOT AT ALL
SUM OF ALL RESPONSES TO PHQ9 QUESTIONS 1 & 2: 0
SUM OF ALL RESPONSES TO PHQ QUESTIONS 1-9: 0
1. LITTLE INTEREST OR PLEASURE IN DOING THINGS: NOT AT ALL
SUM OF ALL RESPONSES TO PHQ QUESTIONS 1-9: 0

## 2024-05-15 ASSESSMENT — ENCOUNTER SYMPTOMS
VOMITING: 0
CONSTIPATION: 0
NAUSEA: 0
SORE THROAT: 0
DIARRHEA: 0
SHORTNESS OF BREATH: 0
EYE PAIN: 0
COUGH: 0
BLOOD IN STOOL: 0
ABDOMINAL PAIN: 0
RHINORRHEA: 0
CHEST TIGHTNESS: 0

## 2024-05-15 ASSESSMENT — LIFESTYLE VARIABLES
HOW MANY STANDARD DRINKS CONTAINING ALCOHOL DO YOU HAVE ON A TYPICAL DAY: 3 OR 4
HOW OFTEN DURING THE LAST YEAR HAVE YOU FOUND THAT YOU WERE NOT ABLE TO STOP DRINKING ONCE YOU HAD STARTED: NEVER
HOW OFTEN DURING THE LAST YEAR HAVE YOU NEEDED AN ALCOHOLIC DRINK FIRST THING IN THE MORNING TO GET YOURSELF GOING AFTER A NIGHT OF HEAVY DRINKING: NEVER
HOW OFTEN DO YOU HAVE A DRINK CONTAINING ALCOHOL: 4 OR MORE TIMES A WEEK
HAS A RELATIVE, FRIEND, DOCTOR, OR ANOTHER HEALTH PROFESSIONAL EXPRESSED CONCERN ABOUT YOUR DRINKING OR SUGGESTED YOU CUT DOWN: YES, DURING THE PAST YEAR
HOW OFTEN DURING THE LAST YEAR HAVE YOU FAILED TO DO WHAT WAS NORMALLY EXPECTED FROM YOU BECAUSE OF DRINKING: NEVER
HOW OFTEN DURING THE LAST YEAR HAVE YOU BEEN UNABLE TO REMEMBER WHAT HAPPENED THE NIGHT BEFORE BECAUSE YOU HAD BEEN DRINKING: NEVER
HAVE YOU OR SOMEONE ELSE BEEN INJURED AS A RESULT OF YOUR DRINKING: NO
HOW OFTEN DURING THE LAST YEAR HAVE YOU HAD A FEELING OF GUILT OR REMORSE AFTER DRINKING: NEVER

## 2024-05-15 NOTE — PROGRESS NOTES
[facility]   Internal Medicine     5/15/2024  Brennan Lagos : 1940 Sex: male Age:83 y.o.    Chief Complaint   Patient presents with    Coagulation Disorder    Discuss Medications        HPI:   Patient presents to office for evaluation of the following medical concerns.    - labs from 2024 showed low sodium, potassium, chloride worsening kidney function     - ct head 8mm lt menigioma neg acute, ct c-spine no acute, djd. MRI brain () - No meningioma identified. No acute intracranial process identified. Mild diffuse cerebral volume loss and severe chronic small vessel ischemic changes.    - PSA was elevated. Advised referral to urology, patient did not schedule. Last PSA (3/2024) - 14.25    - States has electrolytes issues, Hyponatremia. Last lab (2023) - was still low at 13, hypokalemia - (2023) 3.6. Was to be on sodium bicarbonate. On potassium, non compliant with medications     - States having issues with tinnitus.     - States having issues with b/l LE edema. Improved, On diuretics last visit with nephrology per reviewed note (3/22) -hyponatremia drink electrolyte fluids instead of free water, no changes follow-up in 4 months with labs. Off metolazone  2.5mg. On torsemide 40 mg twice a day (probably only taking daily). on sprinolactone. States stopped potassium.     - Stats has atrial fibrillation.On metoprolol. On coumadin. Last recommendation was 7mg mon/fri and 5mg other day.  INR today was 2.0 (5/15/2024) No bleeding. Patient states has been taking medication daily. Notif () - eliquis patient assistance denied     - States has cardiomyopathy. States due to alcohol. Follows with cardiology. Last visit per reviewed note  () - History of cardiomyopathy chronic heart failure with preserved ejection fraction atrial fibrillation continue metoprolol 50 twice a day stop aspirin due to bruising, torsemide spironolactone per nephrology Eliquis cost prohibitive f/u 1yr     -

## 2024-05-15 NOTE — PROGRESS NOTES
Medicare Annual Wellness Visit    Brennan Lagos is here for Medicare AWV    Assessment & Plan   Medicare annual wellness visit, subsequent      Health Maintenance   - immunizations:   Influenza Vaccination - declines  Pneumonia Vaccination - declines   Zoster/Shingles Vaccine - declines   Tetanus Vaccination (2017)- tdap   covid (3/2/2021) #1, (3/30/2021) #2, (11/30/2021) #3  - moderna     - Screenings:   Colonoscopy   Prostate     Stress test (4/2020) - The myocardial perfusion imaging was abnormal, moderate sized reversible defect in the entire inferior wall and apex suggestive of ischemia    echo (4/20) - ef 55-60%, mod l &r atiral dilation, mild to mod MR, indeterm diastolic dysfun    cardiac cath (5/20) - IMPRESSION:  1.  40% proximal LAD lesion, 50% mid LAD lesion at the takeoff of a  large diagonal branch.  2.  50% proximal left circumflex artery disease.  3.  Totally occluded right coronary artery with grade 3 left-to-right  collaterals.    Recommendations for Preventive Services Due: see orders and patient instructions/AVS.  Recommended screening schedule for the next 5-10 years is provided to the patient in written form: see Patient Instructions/AVS.     Return for Medicare Annual Wellness Visit in 1 year.       Subjective     Patient's complete Health Risk Assessment and screening values have been reviewed and are found in Flowsheets. The following problems were reviewed today and where indicated follow up appointments were made and/or referrals ordered.    Positive Risk Factor Screenings with Interventions:    Fall Risk:  Do you feel unsteady or are you worried about falling? : (!) yes  2 or more falls in past year?: (!) yes  Fall with injury in past year?: (!) yes     Interventions:    Reviewed medications, home hazards, visual acuity, and co-morbidities that can increase risk for falls  Patient declines any further evaluation or treatment      Alcohol Screening:  Alcohol Use: Heavy Drinker

## 2024-05-15 NOTE — PATIENT INSTRUCTIONS
you're as safe as possible and able to care for yourself as well as you can. You may want to bring a caregiver, friend, or family member to your checkup. They can help you talk to your doctor.  Follow-up care is a key part of your treatment and safety. Be sure to make and go to all appointments, and call your doctor if you are having problems. It's also a good idea to know your test results and keep a list of the medicines you take.  Current as of: October 24, 2023               Content Version: 14.0  © 2006-2024 [email or web address].   Care instructions adapted under license by "[employer]. If you have questions about a medical condition or this instruction, always ask your healthcare professional. [employer] disclaims any warranty or liability for your use of this information.           Eating Healthy Foods: Care Instructions  With every meal, you can make healthy food choices. Try to eat a variety of fruits, vegetables, whole grains, lean proteins, and low-fat dairy products. This can help you get the right balance of nutrients, including vitamins and minerals. Small changes add up over time. You can start by adding one healthy food to your meals each day.    Try to make half your plate fruits and vegetables, one-fourth whole grains, and one-fourth lean proteins. Try including dairy with your meals.   Eat more fruits and vegetables. Try to have them with most meals and snacks.   Foods for healthy eating    Fruits    These can be fresh, frozen, canned, or dried.  Try to choose whole fruit rather than fruit juice.  Eat a variety of colors.    Vegetables    These can be fresh, frozen, canned, or dried.  Beans, peas, and lentils count too.    Whole grains    Choose whole-grain breads, cereals, and noodles.  Try brown rice.    Lean proteins    These can include lean meat, poultry, fish, and eggs.  You can also have tofu, beans, peas, lentils, nuts, and seeds.    Dairy    Try milk, yogurt, and

## 2024-05-16 NOTE — TELEPHONE ENCOUNTER
Orders Placed This Encounter   Procedures    Bran Rogers MD, Orthopaedics, [facility]     Referral Priority:   Routine     Referral Type:   Eval and Treat     Referral Reason:   Specialty Services Required     Referred to Provider:   Gene Perez MD     Requested Specialty:   Orthopedic Surgery     Number of Visits Requested:   1     Order placed for referral to [facility] since Dr. Zuluaga is no longer practicing locally    Letter written for handicap placard will be mailed to patient    Thanks

## 2024-05-16 NOTE — TELEPHONE ENCOUNTER
Patient's son advised, he is requesting orthopaedic evaluation-does he need new referral?  Patient was seen by Dr. Zuluaga in past.  He is also requesting a handicap placard to be mailed to patient's home.  Please advise

## 2024-05-16 NOTE — TELEPHONE ENCOUNTER
Please let the patient know that x-ray of the knees per radiology report suggested    X-ray of the right knee showed osteoarthritis and moderate joint space narrowing with small joint effusion    X-ray of the left knee showed no bony abnormalities and no prosthetic joint replacement complications    Would consider orthopedic evaluation regarding the right knee issues specifically considering joint injections to help with discomfort    Thanks

## 2024-06-19 ENCOUNTER — OFFICE VISIT (OUTPATIENT)
Dept: PRIMARY CARE CLINIC | Age: 84
End: 2024-06-19

## 2024-06-19 VITALS
SYSTOLIC BLOOD PRESSURE: 120 MMHG | TEMPERATURE: 97.9 F | OXYGEN SATURATION: 97 % | DIASTOLIC BLOOD PRESSURE: 78 MMHG | WEIGHT: 206 LBS | HEIGHT: 69 IN | BODY MASS INDEX: 30.51 KG/M2 | HEART RATE: 46 BPM

## 2024-06-19 DIAGNOSIS — E87.6 HYPOKALEMIA: ICD-10-CM

## 2024-06-19 DIAGNOSIS — N18.32 STAGE 3B CHRONIC KIDNEY DISEASE (HCC): ICD-10-CM

## 2024-06-19 DIAGNOSIS — I25.10 CORONARY ARTERY DISEASE INVOLVING NATIVE CORONARY ARTERY OF NATIVE HEART WITHOUT ANGINA PECTORIS: ICD-10-CM

## 2024-06-19 DIAGNOSIS — I95.9 HYPOTENSION, UNSPECIFIED HYPOTENSION TYPE: ICD-10-CM

## 2024-06-19 DIAGNOSIS — I89.0 LYMPHEDEMA: ICD-10-CM

## 2024-06-19 DIAGNOSIS — I48.19 PERSISTENT ATRIAL FIBRILLATION (HCC): ICD-10-CM

## 2024-06-19 DIAGNOSIS — M25.561 CHRONIC PAIN OF BOTH KNEES: ICD-10-CM

## 2024-06-19 DIAGNOSIS — I10 ESSENTIAL HYPERTENSION: ICD-10-CM

## 2024-06-19 DIAGNOSIS — E87.1 HYPONATREMIA: ICD-10-CM

## 2024-06-19 DIAGNOSIS — D64.9 ANEMIA, UNSPECIFIED TYPE: ICD-10-CM

## 2024-06-19 DIAGNOSIS — F10.10 ALCOHOL ABUSE: ICD-10-CM

## 2024-06-19 DIAGNOSIS — R97.20 ELEVATED PSA: ICD-10-CM

## 2024-06-19 DIAGNOSIS — R73.01 IMPAIRED FASTING GLUCOSE: ICD-10-CM

## 2024-06-19 DIAGNOSIS — E78.00 PURE HYPERCHOLESTEROLEMIA: ICD-10-CM

## 2024-06-19 DIAGNOSIS — K70.31 ALCOHOLIC CIRRHOSIS OF LIVER WITH ASCITES (HCC): ICD-10-CM

## 2024-06-19 DIAGNOSIS — G62.9 NEUROPATHY: ICD-10-CM

## 2024-06-19 DIAGNOSIS — G89.29 CHRONIC PAIN OF BOTH KNEES: ICD-10-CM

## 2024-06-19 DIAGNOSIS — M25.562 CHRONIC PAIN OF BOTH KNEES: ICD-10-CM

## 2024-06-19 DIAGNOSIS — Z91.199 NONCOMPLIANCE: ICD-10-CM

## 2024-06-19 DIAGNOSIS — E03.8 OTHER SPECIFIED HYPOTHYROIDISM: ICD-10-CM

## 2024-06-19 DIAGNOSIS — D68.9 COAGULATION DISORDER (HCC): Primary | ICD-10-CM

## 2024-06-19 LAB
INTERNATIONAL NORMALIZATION RATIO, POC: 1.8
PROTHROMBIN TIME, POC: 21

## 2024-06-19 ASSESSMENT — ENCOUNTER SYMPTOMS
ABDOMINAL PAIN: 0
CHEST TIGHTNESS: 0
VOMITING: 0
NAUSEA: 0
SHORTNESS OF BREATH: 0
DIARRHEA: 0
COUGH: 0
BLOOD IN STOOL: 0
CONSTIPATION: 0
RHINORRHEA: 0
EYE PAIN: 0
SORE THROAT: 0

## 2024-06-19 NOTE — PROGRESS NOTES
[facility]   Internal Medicine     2024  Brennan Lagos : 1940 Sex: male Age:83 y.o.    Chief Complaint   Patient presents with    Coagulation Disorder        HPI:   Patient presents to office for evaluation of the following medical concerns    - ct head 8mm lt menigioma neg acute, ct c-spine no acute, djd. MRI brain () - No meningioma identified. No acute intracranial process identified. Mild diffuse cerebral volume loss and severe chronic small vessel ischemic changes.    - PSA was elevated. Advised referral to urology, patient did not schedule. Last PSA (3/2024) - 14.25    - States has electrolytes issues, Hyponatremia. Last lab (2023) - was still low at 13, hypokalemia - (2023) 3.6. Was to be on sodium bicarbonate. On potassium, non compliant with medications   - labs from 2024 showed low sodium, potassium, chloride worsening kidney function     - States having issues with tinnitus.     - States having issues with b/l LE edema. Improved, On diuretics last visit with nephrology per reviewed note (3/22) -hyponatremia drink electrolyte fluids instead of free water, no changes follow-up in 4 months with labs. Off metolazone  2.5mg. On torsemide 40 mg twice a day (probably only taking daily). on sprinolactone. States stopped potassium.     - Stats has atrial fibrillation.On metoprolol. On coumadin. Last recommendation was 7mg mon/fri and 5mg other day.  INR today was 1.8 (2024) No bleeding. Patient states has been taking medication daily. Notif () - eliquis patient assistance denied     - States has cardiomyopathy. States due to alcohol. Follows with cardiology. Last visit per reviewed note  () - History of cardiomyopathy chronic heart failure with preserved ejection fraction atrial fibrillation continue metoprolol 50 twice a day stop aspirin due to bruising, torsemide spironolactone per nephrology Eliquis cost prohibitive f/u 1yr     - States has CAD. States had

## 2024-08-06 DIAGNOSIS — G62.9 NEUROPATHY: ICD-10-CM

## 2024-08-06 RX ORDER — DULOXETIN HYDROCHLORIDE 20 MG/1
20 CAPSULE, DELAYED RELEASE ORAL DAILY
Qty: 90 CAPSULE | Refills: 0 | Status: SHIPPED | OUTPATIENT
Start: 2024-08-06

## 2024-08-12 RX ORDER — LEVOTHYROXINE SODIUM 0.2 MG/1
TABLET ORAL
Qty: 72 TABLET | Refills: 1 | Status: SHIPPED | OUTPATIENT
Start: 2024-08-12

## 2024-08-12 NOTE — TELEPHONE ENCOUNTER
Brennan calling for a new script for Levothyoxine sent to [facility] in [place].    I have this ready to send.       Last Appointment:  6/19/2024  No future appointments.

## 2024-08-12 NOTE — TELEPHONE ENCOUNTER
Requested Prescriptions     Pending Prescriptions Disp Refills    levothyroxine (SYNTHROID) 200 MCG tablet 72 tablet 1     Sig: Take one tablet m-f  take 0.5 Saturday and Sunday     Medication sent to pharmacy however patient needs a follow-up appointment as he was a no-show to his previous appointment and is supposed to be on Coumadin and needs this monitored

## 2024-09-09 DIAGNOSIS — G62.9 NEUROPATHY: ICD-10-CM

## 2024-09-09 RX ORDER — GABAPENTIN 100 MG/1
CAPSULE ORAL
Qty: 450 CAPSULE | Refills: 1 | Status: SHIPPED | OUTPATIENT
Start: 2024-09-09 | End: 2025-04-14

## 2024-09-18 ENCOUNTER — TELEPHONE (OUTPATIENT)
Dept: FAMILY MEDICINE CLINIC | Age: 84
End: 2024-09-18

## 2024-09-18 ENCOUNTER — CLINICAL DOCUMENTATION (OUTPATIENT)
Dept: FAMILY MEDICINE CLINIC | Age: 84
End: 2024-09-18

## 2024-09-18 DIAGNOSIS — I48.19 PERSISTENT ATRIAL FIBRILLATION (HCC): Primary | ICD-10-CM

## 2024-09-30 DIAGNOSIS — E78.00 PURE HYPERCHOLESTEROLEMIA: ICD-10-CM

## 2024-09-30 RX ORDER — ATORVASTATIN CALCIUM 40 MG/1
40 TABLET, FILM COATED ORAL DAILY
Qty: 100 TABLET | Refills: 3 | Status: SHIPPED | OUTPATIENT
Start: 2024-09-30

## 2024-09-30 NOTE — TELEPHONE ENCOUNTER
Gene Villa MD, patient out of refills and patient eligible for up to 100-day supply, if appropriate.     Prescription(s) pended for your signature/modification as appropriate for the following medication(s):  Atorvastatin 40 mg daily    Next refill due: 24    Last Visit: 24  Next Visit: 10/2/24    Thank you,  Shilpa Mondragon, PharmD, [facility]  Population Health Pharmacist  [facility] Clinical Pharmacy  Department, toll free: [phone], option 1   ===============================================================================    POPULATION [facility] CLINICAL PHARMACY: ADHERENCE REVIEW  Identified care gap per [place]: fills at [facility]: Statin adherence      [id number] - [place], [place] - [facility] [phone] -  [phone]  [address] [place] [address]  Phone: [phone] Fax: [phone]      Patient also appears to be prescribed: Statin     Current Outpatient Medications   Medication Instructions    atorvastatin (LIPITOR) 40 mg, Oral, DAILY    DULoxetine (CYMBALTA) 20 mg, Oral, DAILY    folic acid (FOLVITE) 1 mg, Oral, DAILY    gabapentin (NEURONTIN) 100 MG capsule Take 200mg in am, 100mg at noon and 200mg in pm    levothyroxine (SYNTHROID) 200 MCG tablet Take one tablet m-f  take 0.5 Saturday and     metoprolol succinate (TOPROL XL) 25 mg, Oral, DAILY    potassium chloride (KLOR-CON) 20 MEQ packet 20 mEq, Oral, DAILY    sodium chloride 1 g, Oral, 2 times daily    spironolactone (ALDACTONE) 25 mg, Oral, DAILY    thiamine 100 MG tablet TAKE ONE TABLET BY MOUTH once a day    torsemide (DEMADEX) 40 mg, Oral, 2 TIMES DAILY    warfarin (COUMADIN) 4 mg, Oral, DAILY    warfarin (COUMADIN) 5 mg, Oral, DAILY         ASSESSMENT    STATIN ADHERENCE  Insurance Records claims through 24  YTD PDC = 76.6%; Potential Fail Date: 10/5/24:   Atorvastatin 40 mg last filled for 90 days supply.  Next refill due: 24  0 refill(s) remaining  Rx  on 24    Per

## 2024-10-02 ENCOUNTER — OFFICE VISIT (OUTPATIENT)
Dept: PRIMARY CARE CLINIC | Age: 84
End: 2024-10-02
Payer: MEDICARE

## 2024-10-02 VITALS
DIASTOLIC BLOOD PRESSURE: 60 MMHG | WEIGHT: 196 LBS | OXYGEN SATURATION: 98 % | HEART RATE: 60 BPM | HEIGHT: 69 IN | SYSTOLIC BLOOD PRESSURE: 112 MMHG | TEMPERATURE: 97 F | BODY MASS INDEX: 29.03 KG/M2

## 2024-10-02 DIAGNOSIS — Z91.199 NONCOMPLIANCE: ICD-10-CM

## 2024-10-02 DIAGNOSIS — I95.9 HYPOTENSION, UNSPECIFIED HYPOTENSION TYPE: ICD-10-CM

## 2024-10-02 DIAGNOSIS — I48.19 PERSISTENT ATRIAL FIBRILLATION (HCC): ICD-10-CM

## 2024-10-02 DIAGNOSIS — L03.115 CELLULITIS OF FOOT, RIGHT: ICD-10-CM

## 2024-10-02 DIAGNOSIS — N18.32 STAGE 3B CHRONIC KIDNEY DISEASE (HCC): ICD-10-CM

## 2024-10-02 DIAGNOSIS — R97.20 ELEVATED PSA: ICD-10-CM

## 2024-10-02 DIAGNOSIS — Z79.01 ANTICOAGULATED WITH WARFARIN: Primary | ICD-10-CM

## 2024-10-02 DIAGNOSIS — D68.9 COAGULATION DISORDER (HCC): ICD-10-CM

## 2024-10-02 DIAGNOSIS — E87.1 HYPONATREMIA: ICD-10-CM

## 2024-10-02 DIAGNOSIS — E87.6 HYPOKALEMIA: ICD-10-CM

## 2024-10-02 LAB
INTERNATIONAL NORMALIZATION RATIO, POC: 3.5
PROTHROMBIN TIME, POC: 42.1

## 2024-10-02 PROCEDURE — 85610 PROTHROMBIN TIME: CPT | Performed by: INTERNAL MEDICINE

## 2024-10-02 PROCEDURE — 3074F SYST BP LT 130 MM HG: CPT | Performed by: INTERNAL MEDICINE

## 2024-10-02 PROCEDURE — 3078F DIAST BP <80 MM HG: CPT | Performed by: INTERNAL MEDICINE

## 2024-10-02 PROCEDURE — 1123F ACP DISCUSS/DSCN MKR DOCD: CPT | Performed by: INTERNAL MEDICINE

## 2024-10-02 PROCEDURE — 99214 OFFICE O/P EST MOD 30 MIN: CPT | Performed by: INTERNAL MEDICINE

## 2024-10-02 RX ORDER — CEPHALEXIN 500 MG/1
500 CAPSULE ORAL 4 TIMES DAILY
Qty: 28 CAPSULE | Refills: 0 | Status: SHIPPED | OUTPATIENT
Start: 2024-10-02 | End: 2024-10-09

## 2024-10-02 ASSESSMENT — ENCOUNTER SYMPTOMS
EYE PAIN: 0
CONSTIPATION: 0
ABDOMINAL PAIN: 0
DIARRHEA: 0
CHEST TIGHTNESS: 0
SORE THROAT: 0
RHINORRHEA: 0
SHORTNESS OF BREATH: 0
BLOOD IN STOOL: 0
VOMITING: 0
COUGH: 0
NAUSEA: 0

## 2024-10-02 NOTE — PROGRESS NOTES
[facility]   Internal Medicine     10/2/2024  Brennan Lagos : 1940 Sex: male Age:83 y.o.    Chief Complaint   Patient presents with    Coagulation Disorder        HPI:   Patient presents to office for evaluation of the following medical concerns    Son present for visit     - ct head 8mm lt menigioma neg acute, ct c-spine no acute, djd. MRI brain () - No meningioma identified. No acute intracranial process identified. Mild diffuse cerebral volume loss and severe chronic small vessel ischemic changes.    - PSA was elevated. Advised referral to urology, patient did not schedule. Last PSA (2024) - 14.25    - States has electrolytes issues, Hyponatremia. Last lab (2023) - was still low at 13, hypokalemia - (2023) 3.6. Was to be on sodium bicarbonate. On potassium, non compliant with medications   - labs from 2024 showed low sodium, potassium, chloride worsening kidney function   - Cancelled     - States having issues with tinnitus.     - States having issues with b/l LE edema. Improved, On diuretics last visit with nephrology per reviewed note (3/22) -hyponatremia drink electrolyte fluids instead of free water, no changes follow-up in 4 months with labs. Off metolazone  2.5mg. On torsemide 40 mg twice a day (probably only taking daily). on sprinolactone. States stopped potassium.     - Stats has atrial fibrillation.On metoprolol. On coumadin. Last recommendation was 7mg mon/fri and 5mg other day.  INR today was 3.5 (10/2/2024) No bleeding. Patient states has been taking medication daily. Notif () - eliquis patient assistance denied     - States has cardiomyopathy. States due to alcohol. Follows with cardiology. Last visit per reviewed note  () - History of cardiomyopathy chronic heart failure with preserved ejection fraction atrial fibrillation continue metoprolol 50 twice a day stop aspirin due to bruising, torsemide spironolactone per nephrology Eliquis cost prohibitive

## 2024-10-02 NOTE — TELEPHONE ENCOUNTER
Left message for patient regarding refill for .  Will also send a MyChart message.    Shilpa Mondragon, PharmD, [facility]  Population Health Pharmacist  [facility] Clinical Pharmacy  Department, toll free: [phone], option 1      For Pharmacy Admin Tracking Only  Program: [employer]  CPA in place:  No  Recommendation Provided To: Provider: 1 via Note to Provider  Intervention Detail: Adherence Monitorin and New Rx: 1, reason: Improve Adherence  Intervention Accepted By: Provider: 1  Gap Closed?: No   Time Spent (min): 30

## 2024-10-03 ENCOUNTER — OFFICE VISIT (OUTPATIENT)
Dept: PODIATRY | Age: 84
End: 2024-10-03

## 2024-10-03 VITALS — TEMPERATURE: 97.5 F | WEIGHT: 196 LBS | BODY MASS INDEX: 28.93 KG/M2

## 2024-10-03 DIAGNOSIS — L97.512 SKIN ULCER OF TOE OF RIGHT FOOT WITH FAT LAYER EXPOSED (HCC): Primary | ICD-10-CM

## 2024-10-03 NOTE — PROGRESS NOTES
History and Physical/Consultation  Podiatry    Referring Physician : Gene Villa MD  Brennan Lagos  MEDICAL RECORD NUMBER:  [id number]  AGE: 83 y.o.   GENDER: male  : 1940  EPISODE DATE:  10/3/2024  Subjective:     Chief Complaint   Patient presents with    Cellulitis    Wound Check    Foot Ulcer    Referral - General    New Patient     Referred by Gene Villa MD  Last visit 10/2/2024  Right first digit red swollen draining  Pts son brought him into office picked up ATB today          HISTORY of PRESENT ILLNESS HPI     Brennan Lagos is a 83 y.o. male who presents today for wound/ulcer evaluation.   History of Wound Context:  The patient has had a wound of right great toe which was first noted approximately 2 weeks.  This has been treated no. On their initial visit to the wound healing center, 10/03/24, the patient has noted that the wound has not been improving. The patient has not had wounds similar to this in the past.        Pt is currently on abx.      Wound/Ulcer Pain Timing/Severity: intermittent  Quality of pain: sharp  Severity:  1 / 10   Modifying Factors: None  Associated Signs/Symptoms: none    Ulcer Identification:  Ulcer Type: non-healing/non-surgical  Contributing Factors: chronic pressure    Diabetic/Pressure/Non Pressure Ulcers onl y:  Ulcer: Pressure ulcer, Stage 2    If patient has diabetic lower extremity wounds  Morse Classification of diabetic lower extremity wounds:    Grade Description   []  0 No open wound   [x]  1 Superficial ulcer involving the full skin thickness   []  2 Deep ulcer involves ligament, tendon, joint capsule, or fascia  No bone involvement or abscess presence   []  3 Deep Ulcer with abcess formation and/or osteomyelitis   []  4 Localized gangrene   []  5 Extensive gangrene of the foot     Wound: Blister           PAST MEDICAL HISTORY      Diagnosis Date    Blood circulation, collateral     CAD (coronary artery disease)     Chronic kidney disease

## 2024-10-06 LAB
CULTURE: ABNORMAL
CULTURE: ABNORMAL
DIRECT EXAM: ABNORMAL
SPECIMEN DESCRIPTION: ABNORMAL

## 2024-10-14 RX ORDER — WARFARIN SODIUM 5 MG/1
5 TABLET ORAL DAILY
Qty: 30 TABLET | Refills: 3 | Status: SHIPPED | OUTPATIENT
Start: 2024-10-14

## 2024-10-26 ENCOUNTER — OFFICE VISIT (OUTPATIENT)
Dept: PODIATRY | Age: 84
End: 2024-10-26

## 2024-10-26 VITALS — WEIGHT: 196 LBS | BODY MASS INDEX: 28.93 KG/M2 | TEMPERATURE: 98 F

## 2024-10-26 DIAGNOSIS — L97.512 SKIN ULCER OF TOE OF RIGHT FOOT WITH FAT LAYER EXPOSED (HCC): Primary | ICD-10-CM

## 2024-10-26 NOTE — PROGRESS NOTES
History and Physical/Consultation  Podiatry    Referring Physician : Gene Villa MD  Brennan Lagos  MEDICAL RECORD NUMBER:  [id number]  AGE: 84 y.o.   GENDER: male  : 1940  EPISODE DATE:  10/26/2024  Subjective:     Chief Complaint   Patient presents with    Foot Ulcer     RIGHT FOOT ULCER NURSING IS COMING TO THE HOUSE TOE LOOKS GREAT NO ISSUES NOTED  PT STOPPED WEARING POST OP SHOE IN REG SHOE TODAY          HISTORY of PRESENT ILLNESS HPI     Brennan Lagos is a 84 y.o. male who presents today for wound/ulcer evaluation.   History of Wound Context:  The patient has had a wound of right great toe which was first noted approximately 2 weeks.  This has been treated no. On their initial visit to the wound healing center, 10/26/24, the patient has noted that the wound has not been improving. The patient has not had wounds similar to this in the past.        Pt is currently off abx.      Wound/Ulcer Pain Timing/Severity: intermittent  Quality of pain: sharp  Severity:  1 / 10   Modifying Factors: None  Associated Signs/Symptoms: none    Ulcer Identification:  Ulcer Type: non-healing/non-surgical  Contributing Factors: chronic pressure    Diabetic/Pressure/Non Pressure Ulcers onl y:  Ulcer: Pressure ulcer, Stage 2    If patient has diabetic lower extremity wounds  Morse Classification of diabetic lower extremity wounds:    Grade Description   []  0 No open wound   [x]  1 Superficial ulcer involving the full skin thickness   []  2 Deep ulcer involves ligament, tendon, joint capsule, or fascia  No bone involvement or abscess presence   []  3 Deep Ulcer with abcess formation and/or osteomyelitis   []  4 Localized gangrene   []  5 Extensive gangrene of the foot     Wound: Blister           PAST MEDICAL HISTORY      Diagnosis Date    Blood circulation, collateral     CAD (coronary artery disease)     Chronic kidney disease     History of alcohol use     History of ascites     history of, approx 5 years

## 2024-11-06 ENCOUNTER — OFFICE VISIT (OUTPATIENT)
Dept: PRIMARY CARE CLINIC | Age: 84
End: 2024-11-06

## 2024-11-06 VITALS
OXYGEN SATURATION: 96 % | HEIGHT: 69 IN | SYSTOLIC BLOOD PRESSURE: 114 MMHG | DIASTOLIC BLOOD PRESSURE: 54 MMHG | HEART RATE: 72 BPM | RESPIRATION RATE: 18 BRPM | WEIGHT: 213 LBS | BODY MASS INDEX: 31.55 KG/M2 | TEMPERATURE: 97.8 F

## 2024-11-06 DIAGNOSIS — R97.20 ELEVATED PSA: ICD-10-CM

## 2024-11-06 DIAGNOSIS — E87.1 HYPONATREMIA: ICD-10-CM

## 2024-11-06 DIAGNOSIS — I48.19 PERSISTENT ATRIAL FIBRILLATION (HCC): ICD-10-CM

## 2024-11-06 DIAGNOSIS — Z79.01 ANTICOAGULATED WITH WARFARIN: ICD-10-CM

## 2024-11-06 DIAGNOSIS — E78.00 PURE HYPERCHOLESTEROLEMIA: ICD-10-CM

## 2024-11-06 DIAGNOSIS — I95.9 HYPOTENSION, UNSPECIFIED HYPOTENSION TYPE: ICD-10-CM

## 2024-11-06 DIAGNOSIS — N18.32 STAGE 3B CHRONIC KIDNEY DISEASE (HCC): ICD-10-CM

## 2024-11-06 DIAGNOSIS — E87.6 HYPOKALEMIA: ICD-10-CM

## 2024-11-06 DIAGNOSIS — D68.9 COAGULATION DISORDER (HCC): Primary | ICD-10-CM

## 2024-11-06 DIAGNOSIS — Z91.199 NONCOMPLIANCE: ICD-10-CM

## 2024-11-06 LAB
INTERNATIONAL NORMALIZATION RATIO, POC: 1.1
PROTHROMBIN TIME, POC: 13.1

## 2024-11-06 ASSESSMENT — ENCOUNTER SYMPTOMS
CONSTIPATION: 0
SORE THROAT: 0
ABDOMINAL PAIN: 0
RHINORRHEA: 0
COUGH: 0
VOMITING: 0
BLOOD IN STOOL: 0
NAUSEA: 0
EYE PAIN: 0
DIARRHEA: 0
SHORTNESS OF BREATH: 0
CHEST TIGHTNESS: 0

## 2024-11-06 NOTE — PROGRESS NOTES
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)  - discussed and advised stopping alcohol altogether   - declines referral to GI   - on spironolactone - uncertain if taking   - on torsemide - uncertain if taking   - on metolazone - states changed to 2x per week  - uncertain if taking   - still alcohol   - check ammonia     Dilated cardiomyopathy (HCC)  - possible due to alcohol   - discussed and advised stopping alcohol altogether     Idiopathic chronic gout of multiple sites without tophus  - previous labs showed elevated uric acid - last 8.6 (2/2022)   - was on allopurinol in the past - stopped due to poss reaction   - may need to consider uloric and discuss with renal   - low purine diet handout provided   - exacerbation (9/2021) - treated with medrol for pain to wrist     Impaired fasting glucose  - watch diet   - follow A1c - last was 5.3 (2/2024)     Alcohol abuse  - discussed and advised stopping alcohol altogether   - states still drinking 11/6/2024    History of ascites    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      No follow-ups on

## 2024-12-20 ENCOUNTER — HOSPITAL ENCOUNTER (EMERGENCY)
Age: 84
Discharge: HOME | End: 2024-12-21
Attending: EMERGENCY MEDICINE
Payer: MEDICARE

## 2024-12-20 ENCOUNTER — APPOINTMENT (OUTPATIENT)
Dept: CT IMAGING | Age: 84
End: 2024-12-20
Payer: MEDICARE

## 2024-12-20 VITALS
RESPIRATION RATE: 18 BRPM | OXYGEN SATURATION: 99 % | HEART RATE: 77 BPM | SYSTOLIC BLOOD PRESSURE: 128 MMHG | DIASTOLIC BLOOD PRESSURE: 71 MMHG | TEMPERATURE: 97.6 F

## 2024-12-20 DIAGNOSIS — S20.212A CONTUSION OF LEFT CHEST WALL, INITIAL ENCOUNTER: Primary | ICD-10-CM

## 2024-12-20 PROCEDURE — 70450 CT HEAD/BRAIN W/O DYE: CPT

## 2024-12-20 PROCEDURE — 71250 CT THORAX DX C-: CPT

## 2024-12-20 PROCEDURE — 99284 EMERGENCY DEPT VISIT MOD MDM: CPT

## 2024-12-20 PROCEDURE — 72125 CT NECK SPINE W/O DYE: CPT

## 2024-12-20 PROCEDURE — 6370000000 HC RX 637 (ALT 250 FOR IP): Performed by: EMERGENCY MEDICINE

## 2024-12-20 RX ORDER — OXYCODONE AND ACETAMINOPHEN 5; 325 MG/1; MG/1
2 TABLET ORAL ONCE
Status: COMPLETED | OUTPATIENT
Start: 2024-12-20 | End: 2024-12-20

## 2024-12-20 RX ADMIN — OXYCODONE HYDROCHLORIDE AND ACETAMINOPHEN 2 TABLET: 5; 325 TABLET ORAL at 22:15

## 2024-12-20 ASSESSMENT — PAIN SCALES - GENERAL: PAINLEVEL_OUTOF10: 8

## 2024-12-20 ASSESSMENT — PAIN DESCRIPTION - LOCATION: LOCATION: RIB CAGE

## 2024-12-21 RX ORDER — HYDROCODONE BITARTRATE AND ACETAMINOPHEN 5; 325 MG/1; MG/1
1-2 TABLET ORAL EVERY 6 HOURS PRN
Qty: 10 TABLET | Refills: 0 | Status: SHIPPED | OUTPATIENT
Start: 2024-12-21 | End: 2024-12-24

## 2024-12-21 NOTE — ED PROVIDER NOTES
spine  Extremities: Moves all extremities x 4. Warm and well perfused  Skin: warm and dry without rash  Neurologic: GCS 15, no focal acute neurological deficit  Psych: Normal Affect      ------------------------------ ED COURSE/MEDICAL DECISION MAKING----------------------  Medications   oxyCODONE-acetaminophen (PERCOCET) 5-325 MG per tablet 2 tablet (2 tablets Oral Given 12/20/24 7261)            Medical Decision Making:    No evidence of acute injury on CT scan specifically with regard to bones or pulmonary parenchyma.  Patient we discharged home with prescription for analgesia.  He will be given incentive spirometry instructed on how to use.  Diagnosis of chest wall contusion.  Patient advised to return to the emergency department should symptoms worsen. Advised to contact primary care physician to secure follow-up appointment within the next 1-2 days.        --------------------------------- IMPRESSION AND DISPOSITION ---------------------------------    IMPRESSION  1. Contusion of left chest wall, initial encounter        DISPOSITION  Disposition: Discharge to home  Patient condition is good        Alex Camacho,   12/21/24 0008

## 2024-12-21 NOTE — ED NOTES
Department of Emergency Medicine  FIRST PROVIDER TRIAGE NOTE             Independent MLP           12/20/24  8:49 PM EST    Date of Encounter: 12/20/24   MRN: [id number]      HPI: Brennan Lagos is a 84 y.o. male who presents to the ED for Fall (2 nights ago, sob since, hit head, doesn't think he lost consciousness, + thinners)       ROS: Negative for cp or sob.    PE: Gen Appearance/Constitutional: alert  HEENT: NC/NT. PERRLA,  Airway patent.    Provider-Patient relationship only established for Provider In Triage (PIT)  Full assessment, HPI and examination not performed, therefore, it is not yet possible to state whether or not an emergency medical condition exists   Focused assessment only during triage. This is not a comprehensive evaluation due to no physical ER space, staff shortage and high numbers of boarding patients in the ER.       Initial Plan of Care: All treatment areas with department are currently occupied. Plan to order/Initiate the following while awaiting opening in ED.  Initiate Treatment-Testing, Proceed toTreatment Area When Bed Available for ED Attending/MLP to Continue Care    Electronically signed by MONTSE Soto CNP   DD: 12/20/24      Eitan Cabrera APRN - CNP  12/20/24 2049       Eitan Cabrera APRN - CNP  12/20/24 2050

## 2025-01-02 ENCOUNTER — NURSE ONLY (OUTPATIENT)
Dept: FAMILY MEDICINE CLINIC | Age: 85
End: 2025-01-02

## 2025-01-02 ENCOUNTER — ANTI-COAG VISIT (OUTPATIENT)
Dept: FAMILY MEDICINE CLINIC | Age: 85
End: 2025-01-02

## 2025-01-02 DIAGNOSIS — I48.19 PERSISTENT ATRIAL FIBRILLATION (HCC): ICD-10-CM

## 2025-01-02 DIAGNOSIS — I48.19 PERSISTENT ATRIAL FIBRILLATION (HCC): Primary | ICD-10-CM

## 2025-01-02 LAB
INTERNATIONAL NORMALIZATION RATIO, POC: 1.1
PROTHROMBIN TIME, POC: 12.9

## 2025-01-02 NOTE — PROGRESS NOTES
Previous INR:  1.1    Previous dose 7mg Wednesday and 5mg all others     Current INR: 1.1    Recommendation: stop coumadin, start eliquis 2.5mg twice a day     Next INR: none -would recommend follow-up appointment in 1 month for reassessment    Gene Villa MD  1/2/2025  4:50 PM

## 2025-03-06 DIAGNOSIS — G62.9 NEUROPATHY: ICD-10-CM

## 2025-03-06 RX ORDER — TORSEMIDE 20 MG/1
40 TABLET ORAL 2 TIMES DAILY
Qty: 360 TABLET | Refills: 1 | Status: SHIPPED | OUTPATIENT
Start: 2025-03-06

## 2025-03-06 RX ORDER — FOLIC ACID 1 MG/1
1 TABLET ORAL DAILY
Qty: 30 TABLET | Refills: 5 | Status: SHIPPED | OUTPATIENT
Start: 2025-03-06

## 2025-03-06 RX ORDER — DULOXETIN HYDROCHLORIDE 20 MG/1
20 CAPSULE, DELAYED RELEASE ORAL DAILY
Qty: 90 CAPSULE | Refills: 0 | Status: SHIPPED | OUTPATIENT
Start: 2025-03-06

## 2025-03-06 RX ORDER — TORSEMIDE 20 MG/1
40 TABLET ORAL 2 TIMES DAILY
Qty: 360 TABLET | Refills: 1 | Status: CANCELLED | OUTPATIENT
Start: 2025-03-06

## 2025-03-06 NOTE — TELEPHONE ENCOUNTER
Patient comment: He was supposed to call and refill this last week and we are now out.     Name of Medication(s) Requested:  Requested Prescriptions     Pending Prescriptions Disp Refills    torsemide (DEMADEX) 20 MG tablet 360 tablet 1     Sig: Take 2 tablets by mouth 2 times daily       Medication is on current medication list Yes    Dosage and directions were verified? Yes    Quantity verified: 90 day supply     Pharmacy Verified?  Yes    Last Appointment:  11/6/2024    Future appts:  Future Appointments   Date Time Provider Department Center   3/19/2025  3:30 PM Jorgito Schilling MD [facility]   4/30/2025  4:00 PM Gene Villa MD [facility]        (If no appt send self scheduling link. .REFILLAPPT)  Scheduling request sent?     [] Yes  [x] No    Does patient need updated?  [] Yes  [x] No

## 2025-03-17 RX ORDER — SPIRONOLACTONE 25 MG/1
25 TABLET ORAL DAILY
Qty: 90 TABLET | Refills: 1 | Status: SHIPPED | OUTPATIENT
Start: 2025-03-17

## 2025-03-19 ENCOUNTER — OFFICE VISIT (OUTPATIENT)
Dept: CARDIOLOGY CLINIC | Age: 85
End: 2025-03-19
Payer: MEDICARE

## 2025-03-19 VITALS
RESPIRATION RATE: 16 BRPM | HEIGHT: 69 IN | TEMPERATURE: 97.3 F | WEIGHT: 200 LBS | DIASTOLIC BLOOD PRESSURE: 58 MMHG | HEART RATE: 81 BPM | BODY MASS INDEX: 29.62 KG/M2 | SYSTOLIC BLOOD PRESSURE: 122 MMHG | OXYGEN SATURATION: 95 %

## 2025-03-19 DIAGNOSIS — E87.6 HYPOKALEMIA: ICD-10-CM

## 2025-03-19 DIAGNOSIS — I50.42 CHRONIC COMBINED SYSTOLIC AND DIASTOLIC CONGESTIVE HEART FAILURE (HCC): ICD-10-CM

## 2025-03-19 DIAGNOSIS — I34.0 NONRHEUMATIC MITRAL VALVE REGURGITATION: ICD-10-CM

## 2025-03-19 DIAGNOSIS — N18.9 CHRONIC KIDNEY DISEASE, UNSPECIFIED CKD STAGE: ICD-10-CM

## 2025-03-19 DIAGNOSIS — I48.21 PERMANENT ATRIAL FIBRILLATION (HCC): Primary | ICD-10-CM

## 2025-03-19 DIAGNOSIS — Z79.01 ON APIXABAN THERAPY: ICD-10-CM

## 2025-03-19 DIAGNOSIS — E87.1 HYPONATREMIA: ICD-10-CM

## 2025-03-19 PROCEDURE — 1159F MED LIST DOCD IN RCRD: CPT | Performed by: INTERNAL MEDICINE

## 2025-03-19 PROCEDURE — 93000 ELECTROCARDIOGRAM COMPLETE: CPT | Performed by: INTERNAL MEDICINE

## 2025-03-19 PROCEDURE — 3078F DIAST BP <80 MM HG: CPT | Performed by: INTERNAL MEDICINE

## 2025-03-19 PROCEDURE — 3074F SYST BP LT 130 MM HG: CPT | Performed by: INTERNAL MEDICINE

## 2025-03-19 PROCEDURE — 1123F ACP DISCUSS/DSCN MKR DOCD: CPT | Performed by: INTERNAL MEDICINE

## 2025-03-19 PROCEDURE — 99214 OFFICE O/P EST MOD 30 MIN: CPT | Performed by: INTERNAL MEDICINE

## 2025-03-19 NOTE — PROGRESS NOTES
OUTPATIENT CARDIOLOGY FOLLOW-UP    Name: Brennan Lagos    Age: 84 y.o.    Primary Care Physician: Gene Villa MD    Date of Service: 3/19/2025    Chief Complaint:   Chief Complaint   Patient presents with    Follow-up     Interim History:   Here for follow-up.  History of permanent A. 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    No complaints.  Ambulates with walker at home.  Denies chest pain, shortness breath, palpitations.  No blood work in over a year.  Recently switched from warfarin to apixaban.    Here with his son.    Review of Systems:   Negative except as described above    Past Medical History:  Past Medical History:   Diagnosis Date    Blood circulation, collateral     CAD (coronary artery disease)     Chronic kidney disease     History of alcohol use     History of ascites     history of, approx 5 years ago    Hyperlipidemia     Hypertension     Hyponatremia     Left knee pain 06/2020    Liver disease     Lymphedema     lower extrem    Neuropathy     both feet    Osteoarthritis     Psychiatric problem     Thyroid disease     Use of cane as ambulatory aid     Uses wheelchair     for distances    Wears glasses     for reading    Wears hearing aid        Past Surgical History:  Past Surgical History:   Procedure Laterality Date    APPENDECTOMY      CARDIAC CATHETERIZATION  05/2020    CATARACT REMOVAL WITH IMPLANT Bilateral     COLONOSCOPY      EYE SURGERY      cataracts    TOTAL KNEE ARTHROPLASTY Left 6/22/2020    LEFT KNEE IVAN ROBOTIC TOTAL ARTHROPLASTY performed by Tremaine Zuluaga MD at [facility] OR       Family History:  Family History   Problem Relation Age of Onset    Other Mother         old age        Social History:  Social History     Tobacco Use    Smoking status: Former     Current packs/day: 0.00     Average packs/day: 0.7 packs/day for 25.0 years (18.7 ttl pk-yrs)     Types: Pipe, Cigars, Cigarettes     Start date: 10/8/1962

## 2025-03-20 ENCOUNTER — TELEPHONE (OUTPATIENT)
Dept: CARDIOLOGY | Age: 85
End: 2025-03-20

## 2025-03-20 NOTE — TELEPHONE ENCOUNTER
Called pt and left message to schedule echo.     Electronically signed by Gloria Patel on 3/20/2025 at 10:31 AM    
Pt is functionally independent and not in need of skilled PT, will no longer follow

## 2025-04-04 ENCOUNTER — PATIENT MESSAGE (OUTPATIENT)
Dept: FAMILY MEDICINE CLINIC | Age: 85
End: 2025-04-04

## 2025-04-16 NOTE — TELEPHONE ENCOUNTER
LMOM for patient to contact office  
Patient advised, appointment scheduled for 03/06 to discuss medication and INR  
Please let the patient know that blood work done for upcoming MRI for kidney function showed the BUN and creatinine levels to be elevated suggesting chronic kidney disease but overall appears stable    I have received medication list provided    I would recommend scheduling sooner appointment to review this medication list to try to adjust to try to improve compliance    Thanks  
Thank you for the update!  
No. KRYSTIN screening performed.  STOP BANG Legend: 0-2 = LOW Risk; 3-4 = INTERMEDIATE Risk; 5-8 = HIGH Risk

## 2025-04-30 ENCOUNTER — HOSPITAL ENCOUNTER (OUTPATIENT)
Dept: CARDIOLOGY | Age: 85
Discharge: HOME | End: 2025-05-02
Attending: INTERNAL MEDICINE
Payer: MEDICARE

## 2025-04-30 VITALS
DIASTOLIC BLOOD PRESSURE: 58 MMHG | HEIGHT: 68 IN | BODY MASS INDEX: 30.31 KG/M2 | SYSTOLIC BLOOD PRESSURE: 122 MMHG | WEIGHT: 200 LBS

## 2025-04-30 DIAGNOSIS — I50.42 CHRONIC COMBINED SYSTOLIC AND DIASTOLIC CONGESTIVE HEART FAILURE (HCC): ICD-10-CM

## 2025-04-30 DIAGNOSIS — I34.0 NONRHEUMATIC MITRAL VALVE REGURGITATION: ICD-10-CM

## 2025-04-30 PROCEDURE — 93306 TTE W/DOPPLER COMPLETE: CPT

## 2025-05-02 LAB
ECHO AO ASC DIAM: 2.8 CM
ECHO AO ASCENDING AORTA INDEX: 1.37 CM/M2
ECHO AV AREA PEAK VELOCITY: 1 CM2
ECHO AV AREA VTI: 1.2 CM2
ECHO AV AREA/BSA PEAK VELOCITY: 0.5 CM2/M2
ECHO AV AREA/BSA VTI: 0.6 CM2/M2
ECHO AV CUSP MM: 1.4 CM
ECHO AV MEAN GRADIENT: 9 MMHG
ECHO AV MEAN VELOCITY: 1.4 M/S
ECHO AV PEAK GRADIENT: 16 MMHG
ECHO AV PEAK VELOCITY: 2 M/S
ECHO AV VELOCITY RATIO: 0.4
ECHO AV VTI: 38.9 CM
ECHO BSA: 2.09 M2
ECHO EST RA PRESSURE: 3 MMHG
ECHO LA DIAMETER INDEX: 2.65 CM/M2
ECHO LA DIAMETER: 5.4 CM
ECHO LA VOL A-L A2C: 100 ML (ref 18–58)
ECHO LA VOL A-L A4C: 65 ML (ref 18–58)
ECHO LA VOL MOD A2C: 93 ML (ref 18–58)
ECHO LA VOL MOD A4C: 61 ML (ref 18–58)
ECHO LA VOLUME AREA LENGTH: 85 ML
ECHO LA VOLUME INDEX A-L A2C: 49 ML/M2 (ref 16–34)
ECHO LA VOLUME INDEX A-L A4C: 32 ML/M2 (ref 16–34)
ECHO LA VOLUME INDEX AREA LENGTH: 42 ML/M2 (ref 16–34)
ECHO LA VOLUME INDEX MOD A2C: 46 ML/M2 (ref 16–34)
ECHO LA VOLUME INDEX MOD A4C: 30 ML/M2 (ref 16–34)
ECHO LV EF PHYSICIAN: 60 %
ECHO LV EJECTION FRACTION A2C: 45 %
ECHO LV EJECTION FRACTION A4C: 44 %
ECHO LV FRACTIONAL SHORTENING: 23 % (ref 28–44)
ECHO LV INTERNAL DIMENSION DIASTOLE INDEX: 2.16 CM/M2
ECHO LV INTERNAL DIMENSION DIASTOLIC: 4.4 CM (ref 4.2–5.9)
ECHO LV INTERNAL DIMENSION SYSTOLIC INDEX: 1.67 CM/M2
ECHO LV INTERNAL DIMENSION SYSTOLIC: 3.4 CM
ECHO LV IVSD: 1.3 CM (ref 0.6–1)
ECHO LV IVSS: 1.5 CM
ECHO LV MASS 2D: 203 G (ref 88–224)
ECHO LV MASS INDEX 2D: 99.5 G/M2 (ref 49–115)
ECHO LV POSTERIOR WALL DIASTOLIC: 1.2 CM (ref 0.6–1)
ECHO LV POSTERIOR WALL SYSTOLIC: 1.5 CM
ECHO LV RELATIVE WALL THICKNESS RATIO: 0.55
ECHO LVOT AREA: 2.5 CM2
ECHO LVOT AV VTI INDEX: 0.46
ECHO LVOT DIAM: 1.8 CM
ECHO LVOT MEAN GRADIENT: 1 MMHG
ECHO LVOT PEAK GRADIENT: 2 MMHG
ECHO LVOT PEAK VELOCITY: 0.8 M/S
ECHO LVOT STROKE VOLUME INDEX: 22.2 ML/M2
ECHO LVOT SV: 45.3 ML
ECHO LVOT VTI: 17.8 CM
ECHO MV AREA PHT: 2.7 CM2
ECHO MV AREA VTI: 1.7 CM2
ECHO MV E DECELERATION TIME (DT): 234 MS
ECHO MV LVOT VTI INDEX: 1.46
ECHO MV MAX VELOCITY: 1.2 M/S
ECHO MV MEAN GRADIENT: 2 MMHG
ECHO MV MEAN VELOCITY: 0.7 M/S
ECHO MV PEAK GRADIENT: 6 MMHG
ECHO MV PRESSURE HALF TIME (PHT): 80.2 MS
ECHO MV VTI: 25.9 CM
ECHO PV MAX VELOCITY: 0.7 M/S
ECHO PV MEAN GRADIENT: 1 MMHG
ECHO PV MEAN VELOCITY: 0.5 M/S
ECHO PV PEAK GRADIENT: 2 MMHG
ECHO PV VTI: 14.1 CM
ECHO RIGHT VENTRICULAR SYSTOLIC PRESSURE (RVSP): 28 MMHG
ECHO RV INTERNAL DIMENSION: 3.2 CM
ECHO RV TAPSE: 1.9 CM (ref 1.7–?)
ECHO TV REGURGITANT MAX VELOCITY: 2.52 M/S
ECHO TV REGURGITANT PEAK GRADIENT: 25 MMHG

## 2025-05-07 ENCOUNTER — RESULTS FOLLOW-UP (OUTPATIENT)
Dept: CARDIOLOGY | Age: 85
End: 2025-05-07

## 2025-05-07 NOTE — RESULT ENCOUNTER NOTE
Echo look good overall showed normal pumping function of the heart.  There is mild to moderate narrowing of the aortic valve.  Mild leakage of the mitral valve. Neither of which should be causing any symptoms or problem at this point.  Nothing we need to do about that now just keep an eye on it.  We can repeat an echo in a year or 2    He is way overdue for blood work, this was discussed in the office, needs at very minimum a BMP to check his kidney function and electrolytes.  Again he is on multiple medications that can affect kidney function and electrolytes.  There is an extensive panel of blood work ordered by Dr. Rhodes last year that was never done.    Notify if new issues, otherwise follow-up as scheduled.

## 2025-05-08 NOTE — TELEPHONE ENCOUNTER
Patient notified of Echo results per Dr. Schilling and will need repeat echo in 2 yrs  per  Dr. Schilling. Patient notified he need a BMP. Patient states he is going to Dr. Rhodes soon and will send Dr. Schilling the lab results. Patient will notify office of any new symptoms and will follow up as scheduled.

## 2025-06-03 DIAGNOSIS — G62.9 NEUROPATHY: ICD-10-CM

## 2025-06-03 RX ORDER — GABAPENTIN 100 MG/1
CAPSULE ORAL
Qty: 450 CAPSULE | Refills: 0 | Status: SHIPPED | OUTPATIENT
Start: 2025-06-03 | End: 2026-01-06

## 2025-06-03 NOTE — TELEPHONE ENCOUNTER
Spoke with Joe.  He is back on Coumadin as of 2 weeks ago, however, he doesn't think he took any of his pills last week.  He is also not sure if he is taking them this week either and won't know until he goes to his house this weekend.  He states that micro managing his meds just doesn't work and it's basically like dealing with a toddler.   He also doesn't think it's necessary to do INR checks for him because he doesn't take his medication consistently anyway.  Joe is aware that he will have to hold the Coumadin 5 days prior to procedure.  Sommer at Dr. Linton's office has been notified as well.

## 2025-06-03 NOTE — TELEPHONE ENCOUNTER
Requested Prescriptions     Signed Prescriptions Disp Refills    gabapentin (NEURONTIN) 100 MG capsule 450 capsule 0     Sig: Take 200mg in am, 100mg at noon and 200mg in pm     Authorizing Provider: EVELYNE RIOS     Medication sent to pharmacy    In regards to the Eliquis there were a few correspondences earlier that stated that the patient was no longer going to take the Eliquis and wanted to go back to Coumadin.  Advised the protocol to come off of Eliquis however patient has not come to office to have an INR checked    I am uncertain as to which medication the patient is currently taking (Eliquis or Coumadin)    If he is on Eliquis he would only need to hold 1 to 2 days before procedure and then to start soon as allowed by surgery    If he is on Coumadin he will need to hold the medication at least 5 days before

## 2025-06-03 NOTE — TELEPHONE ENCOUNTER
Patient is having a full dental extraction August 19th.  Dr Linton needs to know if he allowed to go off his blood thinner and if so, when would he hold?  Please call their office and talk to Fritz

## 2025-06-03 NOTE — TELEPHONE ENCOUNTER
Unfortunately if you are prescribing Coumadin it we are obligated to check INRs during at least some frequencies to ensure that the levels are appropriate.     If you are not going to take the medication as prescribed I am not sure the exact benefit of taking the medication.     If there would be better compliance I would recommend considering visiting physicians where they can come to the house and check levels    Otherwise I do not feel comfortable with the safety of just randomly taking uncertain doses of Coumadin both from too little dosage and to higher dosage

## 2025-07-01 ENCOUNTER — TELEPHONE (OUTPATIENT)
Dept: FAMILY MEDICINE CLINIC | Age: 85
End: 2025-07-01

## 2025-07-01 ENCOUNTER — PATIENT MESSAGE (OUTPATIENT)
Dept: FAMILY MEDICINE CLINIC | Age: 85
End: 2025-07-01

## 2025-07-01 ENCOUNTER — OFFICE VISIT (OUTPATIENT)
Dept: FAMILY MEDICINE CLINIC | Age: 85
End: 2025-07-01
Payer: MEDICARE

## 2025-07-01 VITALS
HEIGHT: 68 IN | HEART RATE: 83 BPM | SYSTOLIC BLOOD PRESSURE: 118 MMHG | WEIGHT: 200 LBS | RESPIRATION RATE: 16 BRPM | BODY MASS INDEX: 30.31 KG/M2 | DIASTOLIC BLOOD PRESSURE: 60 MMHG | TEMPERATURE: 97.3 F | OXYGEN SATURATION: 98 %

## 2025-07-01 DIAGNOSIS — R21 RASH/SKIN ERUPTION: Primary | ICD-10-CM

## 2025-07-01 PROCEDURE — 3078F DIAST BP <80 MM HG: CPT

## 2025-07-01 PROCEDURE — 1123F ACP DISCUSS/DSCN MKR DOCD: CPT

## 2025-07-01 PROCEDURE — 1159F MED LIST DOCD IN RCRD: CPT

## 2025-07-01 PROCEDURE — 1160F RVW MEDS BY RX/DR IN RCRD: CPT

## 2025-07-01 PROCEDURE — 3074F SYST BP LT 130 MM HG: CPT

## 2025-07-01 PROCEDURE — 99213 OFFICE O/P EST LOW 20 MIN: CPT

## 2025-07-01 PROCEDURE — 96372 THER/PROPH/DIAG INJ SC/IM: CPT

## 2025-07-01 RX ORDER — TRIAMCINOLONE ACETONIDE 40 MG/ML
40 INJECTION, SUSPENSION INTRA-ARTICULAR; INTRAMUSCULAR ONCE
Status: COMPLETED | OUTPATIENT
Start: 2025-07-01 | End: 2025-07-01

## 2025-07-01 RX ORDER — PREDNISONE 20 MG/1
TABLET ORAL
Qty: 18 TABLET | Refills: 0 | Status: SHIPPED | OUTPATIENT
Start: 2025-07-01

## 2025-07-01 RX ORDER — CLOBETASOL PROPIONATE 0.5 MG/G
OINTMENT TOPICAL
Qty: 30 G | Refills: 0 | Status: SHIPPED | OUTPATIENT
Start: 2025-07-01

## 2025-07-01 RX ADMIN — TRIAMCINOLONE ACETONIDE 40 MG: 40 INJECTION, SUSPENSION INTRA-ARTICULAR; INTRAMUSCULAR at 16:39

## 2025-07-01 NOTE — TELEPHONE ENCOUNTER
Joe calling about the itchy rash Brennan has all over his back.    He said that he took pictures of it and sent it to you through his My Chart.      Are you able to address this based on the pictures, or does he need to be seen?

## 2025-07-01 NOTE — PROGRESS NOTES
unless otherwise noted.  No results found.    Medical Decision Making   MDM:   Kenalog 40 mg IM injection given today in the office to help with symptoms. Script for Prednisone, to start tomorrow, and Clobetasol cream prescribed, side effects and administration discussed. The patient expressed understanding. The patient can follow up with PCP if needed. Red flag symptoms reviewed. ED if symptoms worsen or change. The patient is agreeable with plan of care and all questions were answered at this time.    Assessment / Plan   Impression(s):  Brennan was seen today for rash.    Diagnoses and all orders for this visit:    Rash/skin eruption  -     predniSONE (DELTASONE) 20 MG tablet; Sig.: Take 60mg  Po qd x 3 days, then 40mg po qd x3 days, then 20mg po qd x 3 days. QS x 9 days  -     triamcinolone acetonide (KENALOG-40) injection 40 mg  -     clobetasol (TEMOVATE) 0.05 % ointment; Apply a small amount to the affected area  topically every 12 hours as needed    Discharged home.  Patient condition is good    New Medications     New Prescriptions    CLOBETASOL (TEMOVATE) 0.05 % OINTMENT    Apply a small amount to the affected area  topically every 12 hours as needed    PREDNISONE (DELTASONE) 20 MG TABLET    Sig.: Take 60mg  Po qd x 3 days, then 40mg po qd x3 days, then 20mg po qd x 3 days. QS x 9 days       Electronically signed by MONTSE Peraza CNP   DD: 7/1/25    The patient (or guardian, if applicable) and other individuals in attendance with the patient were advised that Artificial Intelligence will be utilized during this visit to record, process the conversation to generate a clinical note, and support improvement of the AI technology. The patient (or guardian, if applicable) and other individuals in attendance at the appointment consented to the use of AI, including the recording.      Contains texts from [employer] (if applicable)

## 2025-07-14 ENCOUNTER — TELEPHONE (OUTPATIENT)
Dept: FAMILY MEDICINE CLINIC | Age: 85
End: 2025-07-14

## 2025-07-14 NOTE — TELEPHONE ENCOUNTER
Patient stated he visited [facility] and was given a medication to stop the itching.  After looking in his medication list, I determined it is most likely prednisone. I asked him if he is using the clobetasol, he said he never got it. Advised him to call the pharmacy. He will call back with any other concerns

## 2025-07-16 ENCOUNTER — OFFICE VISIT (OUTPATIENT)
Dept: PRIMARY CARE CLINIC | Age: 85
End: 2025-07-16
Payer: MEDICARE

## 2025-07-16 VITALS
OXYGEN SATURATION: 98 % | DIASTOLIC BLOOD PRESSURE: 62 MMHG | TEMPERATURE: 98 F | SYSTOLIC BLOOD PRESSURE: 100 MMHG | WEIGHT: 188 LBS | HEART RATE: 67 BPM | BODY MASS INDEX: 28.59 KG/M2 | RESPIRATION RATE: 18 BRPM

## 2025-07-16 DIAGNOSIS — I10 ESSENTIAL HYPERTENSION: ICD-10-CM

## 2025-07-16 DIAGNOSIS — Z79.01 ANTICOAGULATED WITH WARFARIN: ICD-10-CM

## 2025-07-16 DIAGNOSIS — M25.561 CHRONIC PAIN OF BOTH KNEES: ICD-10-CM

## 2025-07-16 DIAGNOSIS — F10.10 ALCOHOL ABUSE: ICD-10-CM

## 2025-07-16 DIAGNOSIS — M25.562 CHRONIC PAIN OF BOTH KNEES: ICD-10-CM

## 2025-07-16 DIAGNOSIS — R97.20 ELEVATED PSA: ICD-10-CM

## 2025-07-16 DIAGNOSIS — I89.0 LYMPHEDEMA: ICD-10-CM

## 2025-07-16 DIAGNOSIS — K70.31 ALCOHOLIC CIRRHOSIS OF LIVER WITH ASCITES (HCC): ICD-10-CM

## 2025-07-16 DIAGNOSIS — G89.29 CHRONIC PAIN OF BOTH KNEES: ICD-10-CM

## 2025-07-16 DIAGNOSIS — E87.1 HYPONATREMIA: ICD-10-CM

## 2025-07-16 DIAGNOSIS — E78.00 PURE HYPERCHOLESTEROLEMIA: ICD-10-CM

## 2025-07-16 DIAGNOSIS — N18.30 STAGE 3 CHRONIC KIDNEY DISEASE, UNSPECIFIED WHETHER STAGE 3A OR 3B CKD (HCC): ICD-10-CM

## 2025-07-16 DIAGNOSIS — D64.9 ANEMIA, UNSPECIFIED TYPE: ICD-10-CM

## 2025-07-16 DIAGNOSIS — I25.10 CORONARY ARTERY DISEASE INVOLVING NATIVE CORONARY ARTERY OF NATIVE HEART WITHOUT ANGINA PECTORIS: ICD-10-CM

## 2025-07-16 DIAGNOSIS — R73.01 IMPAIRED FASTING GLUCOSE: ICD-10-CM

## 2025-07-16 DIAGNOSIS — E87.6 HYPOKALEMIA: ICD-10-CM

## 2025-07-16 DIAGNOSIS — Z91.199 NONCOMPLIANCE: ICD-10-CM

## 2025-07-16 DIAGNOSIS — E03.8 OTHER SPECIFIED HYPOTHYROIDISM: ICD-10-CM

## 2025-07-16 DIAGNOSIS — R21 RASH: Primary | ICD-10-CM

## 2025-07-16 DIAGNOSIS — I48.19 PERSISTENT ATRIAL FIBRILLATION (HCC): ICD-10-CM

## 2025-07-16 DIAGNOSIS — N18.32 STAGE 3B CHRONIC KIDNEY DISEASE (HCC): ICD-10-CM

## 2025-07-16 DIAGNOSIS — I95.9 HYPOTENSION, UNSPECIFIED HYPOTENSION TYPE: ICD-10-CM

## 2025-07-16 LAB
INTERNATIONAL NORMALIZATION RATIO, POC: 1.1
PROTHROMBIN TIME, POC: 13.2

## 2025-07-16 PROCEDURE — 1159F MED LIST DOCD IN RCRD: CPT | Performed by: INTERNAL MEDICINE

## 2025-07-16 PROCEDURE — 3078F DIAST BP <80 MM HG: CPT | Performed by: INTERNAL MEDICINE

## 2025-07-16 PROCEDURE — 1160F RVW MEDS BY RX/DR IN RCRD: CPT | Performed by: INTERNAL MEDICINE

## 2025-07-16 PROCEDURE — 85610 PROTHROMBIN TIME: CPT | Performed by: INTERNAL MEDICINE

## 2025-07-16 PROCEDURE — 1123F ACP DISCUSS/DSCN MKR DOCD: CPT | Performed by: INTERNAL MEDICINE

## 2025-07-16 PROCEDURE — 99214 OFFICE O/P EST MOD 30 MIN: CPT | Performed by: INTERNAL MEDICINE

## 2025-07-16 PROCEDURE — 3074F SYST BP LT 130 MM HG: CPT | Performed by: INTERNAL MEDICINE

## 2025-07-16 RX ORDER — PREDNISONE 10 MG/1
TABLET ORAL
Qty: 30 TABLET | Refills: 0 | Status: SHIPPED | OUTPATIENT
Start: 2025-07-16

## 2025-07-16 RX ORDER — WARFARIN SODIUM 5 MG/1
5 TABLET ORAL DAILY
COMMUNITY
Start: 2025-07-05

## 2025-07-16 RX ORDER — HYDROPHOR 42 G/100G
OINTMENT TOPICAL
Qty: 100 G | Refills: 1 | Status: SHIPPED | OUTPATIENT
Start: 2025-07-16

## 2025-07-16 RX ORDER — FERROUS SULFATE 325(65) MG
325 TABLET ORAL
COMMUNITY

## 2025-07-16 RX ORDER — ASPIRIN 81 MG/1
81 TABLET ORAL DAILY
COMMUNITY

## 2025-07-16 SDOH — ECONOMIC STABILITY: FOOD INSECURITY: WITHIN THE PAST 12 MONTHS, THE FOOD YOU BOUGHT JUST DIDN'T LAST AND YOU DIDN'T HAVE MONEY TO GET MORE.: NEVER TRUE

## 2025-07-16 SDOH — ECONOMIC STABILITY: FOOD INSECURITY: WITHIN THE PAST 12 MONTHS, YOU WORRIED THAT YOUR FOOD WOULD RUN OUT BEFORE YOU GOT MONEY TO BUY MORE.: NEVER TRUE

## 2025-07-16 ASSESSMENT — ENCOUNTER SYMPTOMS
RHINORRHEA: 0
ABDOMINAL PAIN: 0
CONSTIPATION: 0
SORE THROAT: 0
EYE PAIN: 0
SHORTNESS OF BREATH: 0
DIARRHEA: 0
CHEST TIGHTNESS: 0
VOMITING: 0
NAUSEA: 0
BLOOD IN STOOL: 0
COUGH: 0

## 2025-07-16 ASSESSMENT — PATIENT HEALTH QUESTIONNAIRE - PHQ9
SUM OF ALL RESPONSES TO PHQ QUESTIONS 1-9: 0
1. LITTLE INTEREST OR PLEASURE IN DOING THINGS: NOT AT ALL
2. FEELING DOWN, DEPRESSED OR HOPELESS: NOT AT ALL

## 2025-07-16 NOTE — PROGRESS NOTES
[facility]   Internal Medicine     2025  Brennan Lagos : 1940 Sex: male Age:84 y.o.    Chief Complaint   Patient presents with    Atrial Fibrillation    Office Visit for Anticoagulation Management        HPI:   Patient presents to office for evaluation of the following medical concerns    Son present for visit     -  was seen in urgent care for rash.  was given kenalog IM, prednisone taper and clobetasol.   - improved     ER () -fall 2 days previous tripping over her walker difficulty breathing due to pain with deep breath on left side (Ct chest) remote right 12th rib fracture remote left 10th rib fracture remote upper sternal fracture cholelithiasis, (ct head) no acute intracranial hemorrhage masses micro angiopathy chronic left maxillary sinus (ct c-spine) no acute abnormality multilevel spondylosis recommending incentive spirometry diagnosis chest wall contusion Percocet for pain at discharge    Pod (10/24) -cellulitis foot ulcer blister right great toe started on Keflex culture sent, would cx MSSA. States has improved     - ct head 8mm lt menigioma neg acute, ct c-spine no acute, djd. MRI brain () - No meningioma identified. No acute intracranial process identified. Mild diffuse cerebral volume loss and severe chronic small vessel ischemic changes.    - PSA was elevated. Advised referral to urology, patient did not schedule. Last PSA (2024) - 14.25    - States has electrolytes issues, Hyponatremia. Last lab (2023) - was still low at 13, hypokalemia - (2023) 3.6. Was to be on sodium bicarbonate. On potassium, non compliant with medications   - labs from 2024 showed low sodium, potassium, chloride worsening kidney function   - Cancelled     - States having issues with tinnitus.     - States having issues with b/l LE edema. Improved, On diuretics last visit with nephrology per reviewed note (3/22) -hyponatremia drink electrolyte fluids instead of free

## 2025-08-05 ENCOUNTER — HOSPITAL ENCOUNTER (INPATIENT)
Age: 85
LOS: 7 days | Discharge: NURSING, REHAB OR LONG-TERM CARE | DRG: 643 | End: 2025-08-12
Attending: EMERGENCY MEDICINE | Admitting: STUDENT IN AN ORGANIZED HEALTH CARE EDUCATION/TRAINING PROGRAM
Payer: MEDICARE

## 2025-08-05 ENCOUNTER — APPOINTMENT (OUTPATIENT)
Dept: CT IMAGING | Age: 85
DRG: 643 | End: 2025-08-05
Payer: MEDICARE

## 2025-08-05 ENCOUNTER — APPOINTMENT (OUTPATIENT)
Dept: GENERAL RADIOLOGY | Age: 85
DRG: 643 | End: 2025-08-05
Payer: MEDICARE

## 2025-08-05 DIAGNOSIS — I89.0 LYMPHEDEMA: ICD-10-CM

## 2025-08-05 DIAGNOSIS — E87.6 HYPOKALEMIA: ICD-10-CM

## 2025-08-05 DIAGNOSIS — E87.1 HYPONATREMIA: Primary | ICD-10-CM

## 2025-08-05 DIAGNOSIS — F32.A FATIGUE DUE TO DEPRESSION: ICD-10-CM
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
## 2025-08-05 DIAGNOSIS — I73.9 PERIPHERAL VASCULAR DISEASE: ICD-10-CM

## 2025-08-05 DIAGNOSIS — N17.9 AKI (ACUTE KIDNEY INJURY): ICD-10-CM

## 2025-08-05 DIAGNOSIS — R53.83 FATIGUE DUE TO DEPRESSION: ICD-10-CM

## 2025-08-05 DIAGNOSIS — S09.90XA CLOSED HEAD INJURY, INITIAL ENCOUNTER: ICD-10-CM

## 2025-08-05 DIAGNOSIS — S40.022A ARM CONTUSION, LEFT, INITIAL ENCOUNTER: ICD-10-CM

## 2025-08-05 LAB
ALBUMIN SERPL-MCNC: 3.6 G/DL (ref 3.5–5.2)
ALP SERPL-CCNC: 94 U/L (ref 40–129)
ALT SERPL-CCNC: 16 U/L (ref 0–50)
ANION GAP SERPL CALCULATED.3IONS-SCNC: 23 MMOL/L (ref 7–16)
AST SERPL-CCNC: 41 U/L (ref 0–50)
BACTERIA URNS QL MICRO: ABNORMAL
BASOPHILS # BLD: 0.01 K/UL (ref 0–0.2)
BASOPHILS NFR BLD: 0 % (ref 0–2)
BILIRUB SERPL-MCNC: 2.5 MG/DL (ref 0–1.2)
BILIRUB UR QL STRIP: NEGATIVE
BUN SERPL-MCNC: 39 MG/DL (ref 8–23)
CALCIUM SERPL-MCNC: 9 MG/DL (ref 8.8–10.2)
CASTS #/AREA URNS LPF: ABNORMAL /LPF
CHLORIDE SERPL-SCNC: 78 MMOL/L (ref 98–107)
CLARITY UR: CLEAR
CO2 SERPL-SCNC: 22 MMOL/L (ref 22–29)
COLOR UR: YELLOW
CREAT SERPL-MCNC: 2 MG/DL (ref 0.7–1.2)
EOSINOPHIL # BLD: 0 K/UL (ref 0.05–0.5)
EOSINOPHILS RELATIVE PERCENT: 0 % (ref 0–6)
EPI CELLS #/AREA URNS HPF: ABNORMAL /HPF
ERYTHROCYTE [DISTWIDTH] IN BLOOD BY AUTOMATED COUNT: 12.8 % (ref 11.5–15)
GFR, ESTIMATED: 32 ML/MIN/1.73M2
GLUCOSE SERPL-MCNC: 140 MG/DL (ref 74–99)
GLUCOSE UR STRIP-MCNC: NEGATIVE MG/DL
HCT VFR BLD AUTO: 31.7 % (ref 37–54)
HGB BLD-MCNC: 11.9 G/DL (ref 12.5–16.5)
HGB UR QL STRIP.AUTO: ABNORMAL
IMM GRANULOCYTES # BLD AUTO: 0.11 K/UL (ref 0–0.58)
IMM GRANULOCYTES NFR BLD: 1 % (ref 0–5)
INR PPP: 1.7
KETONES UR STRIP-MCNC: NEGATIVE MG/DL
LACTATE BLDV-SCNC: 6 MMOL/L (ref 0.5–2.2)
LEUKOCYTE ESTERASE UR QL STRIP: NEGATIVE
LYMPHOCYTES NFR BLD: 0.68 K/UL (ref 1.5–4)
LYMPHOCYTES RELATIVE PERCENT: 6 % (ref 20–42)
MAGNESIUM SERPL-MCNC: 1.8 MG/DL (ref 1.6–2.4)
MCH RBC QN AUTO: 33.5 PG (ref 26–35)
MCHC RBC AUTO-ENTMCNC: 37.5 G/DL (ref 32–34.5)
MCV RBC AUTO: 89.3 FL (ref 80–99.9)
MONOCYTES NFR BLD: 0.81 K/UL (ref 0.1–0.95)
MONOCYTES NFR BLD: 7 % (ref 2–12)
NEUTROPHILS NFR BLD: 86 % (ref 43–80)
NEUTS SEG NFR BLD: 9.92 K/UL (ref 1.8–7.3)
NITRITE UR QL STRIP: NEGATIVE
PH UR STRIP: 6 [PH] (ref 5–8)
PLATELET # BLD AUTO: 318 K/UL (ref 130–450)
PMV BLD AUTO: 9.5 FL (ref 7–12)
POTASSIUM SERPL-SCNC: 2.6 MMOL/L (ref 3.5–5.1)
PROT SERPL-MCNC: 6.9 G/DL (ref 6.4–8.3)
PROT UR STRIP-MCNC: 30 MG/DL
PROTHROMBIN TIME: 18.3 SEC (ref 9.3–12.4)
RBC # BLD AUTO: 3.55 M/UL (ref 3.8–5.8)
RBC #/AREA URNS HPF: ABNORMAL /HPF
SODIUM SERPL-SCNC: 123 MMOL/L (ref 136–145)
SP GR UR STRIP: 1.01 (ref 1–1.03)
UROBILINOGEN UR STRIP-ACNC: 0.2 EU/DL (ref 0–1)
WBC #/AREA URNS HPF: ABNORMAL /HPF
WBC OTHER # BLD: 11.5 K/UL (ref 4.5–11.5)

## 2025-08-05 PROCEDURE — 85610 PROTHROMBIN TIME: CPT

## 2025-08-05 PROCEDURE — 87086 URINE CULTURE/COLONY COUNT: CPT

## 2025-08-05 PROCEDURE — 6360000002 HC RX W HCPCS: Performed by: EMERGENCY MEDICINE

## 2025-08-05 PROCEDURE — 2580000003 HC RX 258: Performed by: EMERGENCY MEDICINE

## 2025-08-05 PROCEDURE — 70450 CT HEAD/BRAIN W/O DYE: CPT

## 2025-08-05 PROCEDURE — 71045 X-RAY EXAM CHEST 1 VIEW: CPT

## 2025-08-05 PROCEDURE — 83735 ASSAY OF MAGNESIUM: CPT

## 2025-08-05 PROCEDURE — 83605 ASSAY OF LACTIC ACID: CPT

## 2025-08-05 PROCEDURE — 73080 X-RAY EXAM OF ELBOW: CPT

## 2025-08-05 PROCEDURE — 99285 EMERGENCY DEPT VISIT HI MDM: CPT

## 2025-08-05 PROCEDURE — 80053 COMPREHEN METABOLIC PANEL: CPT

## 2025-08-05 PROCEDURE — 93005 ELECTROCARDIOGRAM TRACING: CPT | Performed by: EMERGENCY MEDICINE

## 2025-08-05 PROCEDURE — HZ2ZZZZ DETOXIFICATION SERVICES FOR SUBSTANCE ABUSE TREATMENT: ICD-10-PCS | Performed by: STUDENT IN AN ORGANIZED HEALTH CARE EDUCATION/TRAINING PROGRAM

## 2025-08-05 PROCEDURE — 85025 COMPLETE CBC W/AUTO DIFF WBC: CPT

## 2025-08-05 PROCEDURE — 99223 1ST HOSP IP/OBS HIGH 75: CPT | Performed by: NURSE PRACTITIONER

## 2025-08-05 PROCEDURE — 96374 THER/PROPH/DIAG INJ IV PUSH: CPT

## 2025-08-05 PROCEDURE — 81001 URINALYSIS AUTO W/SCOPE: CPT

## 2025-08-05 PROCEDURE — 2060000000 HC ICU INTERMEDIATE R&B

## 2025-08-05 PROCEDURE — 73090 X-RAY EXAM OF FOREARM: CPT

## 2025-08-05 PROCEDURE — 72170 X-RAY EXAM OF PELVIS: CPT

## 2025-08-05 PROCEDURE — 72125 CT NECK SPINE W/O DYE: CPT

## 2025-08-05 RX ORDER — SODIUM CHLORIDE 0.9 % (FLUSH) 0.9 %
5-40 SYRINGE (ML) INJECTION EVERY 12 HOURS SCHEDULED
Status: DISCONTINUED | OUTPATIENT
Start: 2025-08-06 | End: 2025-08-12 | Stop reason: HOSPADM

## 2025-08-05 RX ORDER — 0.9 % SODIUM CHLORIDE 0.9 %
1000 INTRAVENOUS SOLUTION INTRAVENOUS ONCE
Status: COMPLETED | OUTPATIENT
Start: 2025-08-05 | End: 2025-08-06

## 2025-08-05 RX ORDER — ASPIRIN 81 MG/1
81 TABLET ORAL DAILY
Status: DISCONTINUED | OUTPATIENT
Start: 2025-08-06 | End: 2025-08-12 | Stop reason: HOSPADM

## 2025-08-05 RX ORDER — SODIUM CHLORIDE 0.9 % (FLUSH) 0.9 %
5-40 SYRINGE (ML) INJECTION PRN
Status: DISCONTINUED | OUTPATIENT
Start: 2025-08-05 | End: 2025-08-12 | Stop reason: HOSPADM

## 2025-08-05 RX ORDER — PROCHLORPERAZINE MALEATE 10 MG
10 TABLET ORAL EVERY 8 HOURS PRN
Status: DISCONTINUED | OUTPATIENT
Start: 2025-08-05 | End: 2025-08-12 | Stop reason: HOSPADM

## 2025-08-05 RX ORDER — LORAZEPAM 1 MG/1
3 TABLET ORAL
Status: DISCONTINUED | OUTPATIENT
Start: 2025-08-05 | End: 2025-08-12 | Stop reason: HOSPADM

## 2025-08-05 RX ORDER — POTASSIUM CHLORIDE 7.45 MG/ML
10 INJECTION INTRAVENOUS ONCE
Status: COMPLETED | OUTPATIENT
Start: 2025-08-05 | End: 2025-08-05

## 2025-08-05 RX ORDER — SODIUM CHLORIDE 0.9 % (FLUSH) 0.9 %
5-40 SYRINGE (ML) INJECTION EVERY 12 HOURS SCHEDULED
Status: DISCONTINUED | OUTPATIENT
Start: 2025-08-05 | End: 2025-08-08 | Stop reason: SDUPTHER

## 2025-08-05 RX ORDER — ENOXAPARIN SODIUM 100 MG/ML
40 INJECTION SUBCUTANEOUS DAILY
Status: DISCONTINUED | OUTPATIENT
Start: 2025-08-06 | End: 2025-08-05 | Stop reason: ALTCHOICE

## 2025-08-05 RX ORDER — POLYETHYLENE GLYCOL 3350 17 G/17G
17 POWDER, FOR SOLUTION ORAL DAILY PRN
Status: DISCONTINUED | OUTPATIENT
Start: 2025-08-05 | End: 2025-08-12 | Stop reason: HOSPADM

## 2025-08-05 RX ORDER — LORAZEPAM 1 MG/1
2 TABLET ORAL
Status: DISCONTINUED | OUTPATIENT
Start: 2025-08-05 | End: 2025-08-12 | Stop reason: HOSPADM

## 2025-08-05 RX ORDER — POTASSIUM CHLORIDE 1500 MG/1
40 TABLET, EXTENDED RELEASE ORAL PRN
Status: DISCONTINUED | OUTPATIENT
Start: 2025-08-05 | End: 2025-08-12 | Stop reason: HOSPADM

## 2025-08-05 RX ORDER — TORSEMIDE 20 MG/1
40 TABLET ORAL 2 TIMES DAILY
Status: DISCONTINUED | OUTPATIENT
Start: 2025-08-06 | End: 2025-08-12 | Stop reason: HOSPADM

## 2025-08-05 RX ORDER — ACETAMINOPHEN 650 MG/1
650 SUPPOSITORY RECTAL EVERY 6 HOURS PRN
Status: DISCONTINUED | OUTPATIENT
Start: 2025-08-05 | End: 2025-08-12 | Stop reason: HOSPADM

## 2025-08-05 RX ORDER — SODIUM CHLORIDE 9 MG/ML
INJECTION, SOLUTION INTRAVENOUS PRN
Status: DISCONTINUED | OUTPATIENT
Start: 2025-08-05 | End: 2025-08-05 | Stop reason: SDUPTHER

## 2025-08-05 RX ORDER — ONDANSETRON 4 MG/1
4 TABLET, ORALLY DISINTEGRATING ORAL EVERY 8 HOURS PRN
Status: DISCONTINUED | OUTPATIENT
Start: 2025-08-05 | End: 2025-08-05

## 2025-08-05 RX ORDER — LEVOTHYROXINE SODIUM 100 UG/1
200 TABLET ORAL DAILY
Status: DISCONTINUED | OUTPATIENT
Start: 2025-08-06 | End: 2025-08-12 | Stop reason: HOSPADM

## 2025-08-05 RX ORDER — ACETAMINOPHEN 325 MG/1
650 TABLET ORAL EVERY 6 HOURS PRN
Status: DISCONTINUED | OUTPATIENT
Start: 2025-08-05 | End: 2025-08-12 | Stop reason: HOSPADM

## 2025-08-05 RX ORDER — DULOXETIN HYDROCHLORIDE 20 MG/1
20 CAPSULE, DELAYED RELEASE ORAL DAILY
Status: DISCONTINUED | OUTPATIENT
Start: 2025-08-06 | End: 2025-08-12 | Stop reason: HOSPADM

## 2025-08-05 RX ORDER — POTASSIUM CHLORIDE 7.45 MG/ML
10 INJECTION INTRAVENOUS PRN
Status: DISCONTINUED | OUTPATIENT
Start: 2025-08-05 | End: 2025-08-12 | Stop reason: HOSPADM

## 2025-08-05 RX ORDER — POTASSIUM CHLORIDE 7.45 MG/ML
10 INJECTION INTRAVENOUS ONCE
Status: COMPLETED | OUTPATIENT
Start: 2025-08-05 | End: 2025-08-06

## 2025-08-05 RX ORDER — FOLIC ACID 1 MG/1
1 TABLET ORAL DAILY
Status: DISCONTINUED | OUTPATIENT
Start: 2025-08-06 | End: 2025-08-12 | Stop reason: HOSPADM

## 2025-08-05 RX ORDER — PROCHLORPERAZINE EDISYLATE 5 MG/ML
10 INJECTION INTRAMUSCULAR; INTRAVENOUS EVERY 6 HOURS PRN
Status: DISCONTINUED | OUTPATIENT
Start: 2025-08-05 | End: 2025-08-12 | Stop reason: HOSPADM

## 2025-08-05 RX ORDER — ENOXAPARIN SODIUM 100 MG/ML
40 INJECTION SUBCUTANEOUS DAILY
Status: DISCONTINUED | OUTPATIENT
Start: 2025-08-06 | End: 2025-08-05 | Stop reason: CLARIF

## 2025-08-05 RX ORDER — LORAZEPAM 1 MG/1
4 TABLET ORAL
Status: DISCONTINUED | OUTPATIENT
Start: 2025-08-05 | End: 2025-08-12 | Stop reason: HOSPADM

## 2025-08-05 RX ORDER — LORAZEPAM 1 MG/1
1 TABLET ORAL
Status: DISCONTINUED | OUTPATIENT
Start: 2025-08-05 | End: 2025-08-12 | Stop reason: HOSPADM

## 2025-08-05 RX ORDER — METOPROLOL SUCCINATE 25 MG/1
25 TABLET, EXTENDED RELEASE ORAL DAILY
Status: DISCONTINUED | OUTPATIENT
Start: 2025-08-06 | End: 2025-08-12 | Stop reason: HOSPADM

## 2025-08-05 RX ORDER — LANOLIN ALCOHOL/MO/W.PET/CERES
100 CREAM (GRAM) TOPICAL DAILY
Status: DISCONTINUED | OUTPATIENT
Start: 2025-08-06 | End: 2025-08-12 | Stop reason: HOSPADM

## 2025-08-05 RX ORDER — ONDANSETRON 2 MG/ML
4 INJECTION INTRAMUSCULAR; INTRAVENOUS EVERY 6 HOURS PRN
Status: DISCONTINUED | OUTPATIENT
Start: 2025-08-05 | End: 2025-08-05

## 2025-08-05 RX ORDER — WARFARIN SODIUM 5 MG/1
5 TABLET ORAL
Status: DISCONTINUED | OUTPATIENT
Start: 2025-08-05 | End: 2025-08-06

## 2025-08-05 RX ORDER — SODIUM CHLORIDE 9 MG/ML
INJECTION, SOLUTION INTRAVENOUS CONTINUOUS
Status: DISCONTINUED | OUTPATIENT
Start: 2025-08-05 | End: 2025-08-08 | Stop reason: SDUPTHER

## 2025-08-05 RX ORDER — SODIUM CHLORIDE 9 MG/ML
INJECTION, SOLUTION INTRAVENOUS PRN
Status: DISCONTINUED | OUTPATIENT
Start: 2025-08-05 | End: 2025-08-12 | Stop reason: HOSPADM

## 2025-08-05 RX ORDER — ATORVASTATIN CALCIUM 40 MG/1
40 TABLET, FILM COATED ORAL DAILY
Status: DISCONTINUED | OUTPATIENT
Start: 2025-08-06 | End: 2025-08-12 | Stop reason: HOSPADM

## 2025-08-05 RX ORDER — MAGNESIUM SULFATE IN WATER 40 MG/ML
2000 INJECTION, SOLUTION INTRAVENOUS PRN
Status: DISCONTINUED | OUTPATIENT
Start: 2025-08-05 | End: 2025-08-12 | Stop reason: HOSPADM

## 2025-08-05 RX ADMIN — POTASSIUM CHLORIDE 10 MEQ: 7.46 INJECTION, SOLUTION INTRAVENOUS at 23:28

## 2025-08-05 RX ADMIN — SODIUM CHLORIDE: 0.9 INJECTION, SOLUTION INTRAVENOUS at 23:42

## 2025-08-05 RX ADMIN — SODIUM CHLORIDE 1000 ML: 0.9 INJECTION, SOLUTION INTRAVENOUS at 22:58

## 2025-08-05 RX ADMIN — VANCOMYCIN HYDROCHLORIDE 2000 MG: 10 INJECTION, POWDER, LYOPHILIZED, FOR SOLUTION INTRAVENOUS at 22:21

## 2025-08-05 RX ADMIN — POTASSIUM CHLORIDE 10 MEQ: 7.46 INJECTION, SOLUTION INTRAVENOUS at 22:21

## 2025-08-05 RX ADMIN — SODIUM CHLORIDE 1000 ML: 0.9 INJECTION, SOLUTION INTRAVENOUS at 20:55

## 2025-08-05 RX ADMIN — POTASSIUM CHLORIDE 10 MEQ: 7.46 INJECTION, SOLUTION INTRAVENOUS at 20:58

## 2025-08-05 ASSESSMENT — PAIN - FUNCTIONAL ASSESSMENT: PAIN_FUNCTIONAL_ASSESSMENT: 0-10

## 2025-08-05 ASSESSMENT — LIFESTYLE VARIABLES
HOW MANY STANDARD DRINKS CONTAINING ALCOHOL DO YOU HAVE ON A TYPICAL DAY: 3 OR 4
HOW OFTEN DO YOU HAVE A DRINK CONTAINING ALCOHOL: 4 OR MORE TIMES A WEEK

## 2025-08-05 ASSESSMENT — PAIN DESCRIPTION - LOCATION: LOCATION: NECK

## 2025-08-05 ASSESSMENT — PAIN SCALES - GENERAL: PAINLEVEL_OUTOF10: 4

## 2025-08-06 ENCOUNTER — APPOINTMENT (OUTPATIENT)
Dept: GENERAL RADIOLOGY | Age: 85
DRG: 643 | End: 2025-08-06
Payer: MEDICARE

## 2025-08-06 LAB
AMPHET UR QL SCN: NEGATIVE
ANION GAP SERPL CALCULATED.3IONS-SCNC: 18 MMOL/L (ref 7–16)
BARBITURATES UR QL SCN: NEGATIVE
BASOPHILS # BLD: 0.01 K/UL (ref 0–0.2)
BASOPHILS NFR BLD: 0 % (ref 0–2)
BENZODIAZ UR QL: NEGATIVE
BUN SERPL-MCNC: 33 MG/DL (ref 8–23)
BUPRENORPHINE UR QL: NEGATIVE
CALCIUM SERPL-MCNC: 8.5 MG/DL (ref 8.8–10.2)
CANNABINOIDS UR QL SCN: NEGATIVE
CHLORIDE SERPL-SCNC: 86 MMOL/L (ref 98–107)
CHLORIDE UR-SCNC: <20 MMOL/L
CO2 SERPL-SCNC: 21 MMOL/L (ref 22–29)
COCAINE UR QL SCN: NEGATIVE
CREAT SERPL-MCNC: 1.8 MG/DL (ref 0.7–1.2)
CREAT UR-MCNC: 117 MG/DL (ref 40–278)
EOSINOPHIL # BLD: 0.02 K/UL (ref 0.05–0.5)
EOSINOPHILS RELATIVE PERCENT: 0 % (ref 0–6)
ERYTHROCYTE [DISTWIDTH] IN BLOOD BY AUTOMATED COUNT: 12.9 % (ref 11.5–15)
FENTANYL UR QL: NEGATIVE
GFR, ESTIMATED: 37 ML/MIN/1.73M2
GLUCOSE SERPL-MCNC: 148 MG/DL (ref 74–99)
HCT VFR BLD AUTO: 29.3 % (ref 37–54)
HGB BLD-MCNC: 10.7 G/DL (ref 12.5–16.5)
IMM GRANULOCYTES # BLD AUTO: 0.09 K/UL (ref 0–0.58)
IMM GRANULOCYTES NFR BLD: 1 % (ref 0–5)
INR PPP: 1.5
INR PPP: 1.5
LACTATE BLDV-SCNC: 2.5 MMOL/L (ref 0.5–2.2)
LYMPHOCYTES NFR BLD: 0.66 K/UL (ref 1.5–4)
LYMPHOCYTES RELATIVE PERCENT: 7 % (ref 20–42)
MAGNESIUM SERPL-MCNC: 1.8 MG/DL (ref 1.6–2.4)
MCH RBC QN AUTO: 33.3 PG (ref 26–35)
MCHC RBC AUTO-ENTMCNC: 36.5 G/DL (ref 32–34.5)
MCV RBC AUTO: 91.3 FL (ref 80–99.9)
METHADONE UR QL: NEGATIVE
MICROORGANISM SPEC CULT: NO GROWTH
MONOCYTES NFR BLD: 0.37 K/UL (ref 0.1–0.95)
MONOCYTES NFR BLD: 4 % (ref 2–12)
NEUTROPHILS NFR BLD: 89 % (ref 43–80)
NEUTS SEG NFR BLD: 8.78 K/UL (ref 1.8–7.3)
OPIATES UR QL SCN: NEGATIVE
OSMOLALITY UR: 458 MOSM/KG (ref 300–900)
OXYCODONE UR QL SCN: NEGATIVE
PCP UR QL SCN: NEGATIVE
PLATELET # BLD AUTO: 259 K/UL (ref 130–450)
PMV BLD AUTO: 9.7 FL (ref 7–12)
POTASSIUM SERPL-SCNC: 2.4 MMOL/L (ref 3.5–5.1)
POTASSIUM SERPL-SCNC: 3.2 MMOL/L (ref 3.5–5.1)
POTASSIUM, UR: 48.5 MMOL/L
PROTHROMBIN TIME: 16 SEC (ref 9.3–12.4)
PROTHROMBIN TIME: 16 SEC (ref 9.3–12.4)
RBC # BLD AUTO: 3.21 M/UL (ref 3.8–5.8)
SERVICE CMNT-IMP: NORMAL
SODIUM SERPL-SCNC: 126 MMOL/L (ref 136–145)
SODIUM UR-SCNC: 29 MMOL/L
SPECIMEN DESCRIPTION: NORMAL
TEST INFORMATION: NORMAL
VANCOMYCIN SERPL-MCNC: 20.4 UG/ML (ref 5–40)
WBC OTHER # BLD: 9.9 K/UL (ref 4.5–11.5)

## 2025-08-06 PROCEDURE — 82570 ASSAY OF URINE CREATININE: CPT

## 2025-08-06 PROCEDURE — 2580000003 HC RX 258: Performed by: NURSE PRACTITIONER

## 2025-08-06 PROCEDURE — 82436 ASSAY OF URINE CHLORIDE: CPT

## 2025-08-06 PROCEDURE — 85610 PROTHROMBIN TIME: CPT

## 2025-08-06 PROCEDURE — 6370000000 HC RX 637 (ALT 250 FOR IP): Performed by: NURSE PRACTITIONER

## 2025-08-06 PROCEDURE — 87040 BLOOD CULTURE FOR BACTERIA: CPT

## 2025-08-06 PROCEDURE — 84132 ASSAY OF SERUM POTASSIUM: CPT

## 2025-08-06 PROCEDURE — 97165 OT EVAL LOW COMPLEX 30 MIN: CPT

## 2025-08-06 PROCEDURE — 80307 DRUG TEST PRSMV CHEM ANLYZR: CPT

## 2025-08-06 PROCEDURE — 2580000003 HC RX 258: Performed by: EMERGENCY MEDICINE

## 2025-08-06 PROCEDURE — 80048 BASIC METABOLIC PNL TOTAL CA: CPT

## 2025-08-06 PROCEDURE — 83605 ASSAY OF LACTIC ACID: CPT

## 2025-08-06 PROCEDURE — 6360000002 HC RX W HCPCS: Performed by: NURSE PRACTITIONER

## 2025-08-06 PROCEDURE — 83935 ASSAY OF URINE OSMOLALITY: CPT

## 2025-08-06 PROCEDURE — 2060000000 HC ICU INTERMEDIATE R&B

## 2025-08-06 PROCEDURE — 6360000002 HC RX W HCPCS: Performed by: EMERGENCY MEDICINE

## 2025-08-06 PROCEDURE — 97161 PT EVAL LOW COMPLEX 20 MIN: CPT

## 2025-08-06 PROCEDURE — 83735 ASSAY OF MAGNESIUM: CPT

## 2025-08-06 PROCEDURE — 85025 COMPLETE CBC W/AUTO DIFF WBC: CPT

## 2025-08-06 PROCEDURE — 6360000002 HC RX W HCPCS: Performed by: STUDENT IN AN ORGANIZED HEALTH CARE EDUCATION/TRAINING PROGRAM

## 2025-08-06 PROCEDURE — 87077 CULTURE AEROBIC IDENTIFY: CPT

## 2025-08-06 PROCEDURE — 84133 ASSAY OF URINE POTASSIUM: CPT

## 2025-08-06 PROCEDURE — 87205 SMEAR GRAM STAIN: CPT

## 2025-08-06 PROCEDURE — 6370000000 HC RX 637 (ALT 250 FOR IP): Performed by: STUDENT IN AN ORGANIZED HEALTH CARE EDUCATION/TRAINING PROGRAM

## 2025-08-06 PROCEDURE — 80202 ASSAY OF VANCOMYCIN: CPT

## 2025-08-06 PROCEDURE — 73620 X-RAY EXAM OF FOOT: CPT

## 2025-08-06 PROCEDURE — 99232 SBSQ HOSP IP/OBS MODERATE 35: CPT | Performed by: STUDENT IN AN ORGANIZED HEALTH CARE EDUCATION/TRAINING PROGRAM

## 2025-08-06 PROCEDURE — 84300 ASSAY OF URINE SODIUM: CPT

## 2025-08-06 PROCEDURE — 87070 CULTURE OTHR SPECIMN AEROBIC: CPT

## 2025-08-06 PROCEDURE — 36415 COLL VENOUS BLD VENIPUNCTURE: CPT

## 2025-08-06 PROCEDURE — 51798 US URINE CAPACITY MEASURE: CPT

## 2025-08-06 RX ORDER — MAGNESIUM SULFATE IN WATER 40 MG/ML
2000 INJECTION, SOLUTION INTRAVENOUS ONCE
Status: COMPLETED | OUTPATIENT
Start: 2025-08-06 | End: 2025-08-06

## 2025-08-06 RX ORDER — POTASSIUM CHLORIDE 7.45 MG/ML
10 INJECTION INTRAVENOUS
Status: COMPLETED | OUTPATIENT
Start: 2025-08-06 | End: 2025-08-06

## 2025-08-06 RX ORDER — MAGNESIUM SULFATE 1 G/100ML
1000 INJECTION INTRAVENOUS ONCE
Status: DISCONTINUED | OUTPATIENT
Start: 2025-08-06 | End: 2025-08-06

## 2025-08-06 RX ORDER — POTASSIUM CHLORIDE 1500 MG/1
40 TABLET, EXTENDED RELEASE ORAL
Status: DISCONTINUED | OUTPATIENT
Start: 2025-08-06 | End: 2025-08-06

## 2025-08-06 RX ADMIN — DULOXETINE 20 MG: 20 CAPSULE, DELAYED RELEASE ORAL at 08:16

## 2025-08-06 RX ADMIN — SODIUM CHLORIDE: 0.9 INJECTION, SOLUTION INTRAVENOUS at 08:12

## 2025-08-06 RX ADMIN — POTASSIUM CHLORIDE 10 MEQ: 7.46 INJECTION, SOLUTION INTRAVENOUS at 01:54

## 2025-08-06 RX ADMIN — POTASSIUM CHLORIDE 10 MEQ: 7.46 INJECTION, SOLUTION INTRAVENOUS at 18:29

## 2025-08-06 RX ADMIN — FOLIC ACID 1 MG: 1 TABLET ORAL at 08:16

## 2025-08-06 RX ADMIN — METOPROLOL SUCCINATE 25 MG: 25 TABLET, EXTENDED RELEASE ORAL at 08:16

## 2025-08-06 RX ADMIN — POTASSIUM CHLORIDE 10 MEQ: 7.46 INJECTION, SOLUTION INTRAVENOUS at 23:16

## 2025-08-06 RX ADMIN — LEVOTHYROXINE SODIUM 200 MCG: 0.1 TABLET ORAL at 05:42

## 2025-08-06 RX ADMIN — POTASSIUM CHLORIDE 10 MEQ: 7.46 INJECTION, SOLUTION INTRAVENOUS at 15:56

## 2025-08-06 RX ADMIN — POTASSIUM CHLORIDE 10 MEQ: 7.46 INJECTION, SOLUTION INTRAVENOUS at 16:41

## 2025-08-06 RX ADMIN — ATORVASTATIN CALCIUM 40 MG: 40 TABLET, FILM COATED ORAL at 08:16

## 2025-08-06 RX ADMIN — PIPERACILLIN AND TAZOBACTAM 4500 MG: 4; .5 INJECTION, POWDER, LYOPHILIZED, FOR SOLUTION INTRAVENOUS at 08:15

## 2025-08-06 RX ADMIN — POTASSIUM CHLORIDE 10 MEQ: 7.46 INJECTION, SOLUTION INTRAVENOUS at 17:40

## 2025-08-06 RX ADMIN — POTASSIUM BICARBONATE 40 MEQ: 782 TABLET, EFFERVESCENT ORAL at 14:04

## 2025-08-06 RX ADMIN — ASPIRIN 81 MG: 81 TABLET, COATED ORAL at 08:16

## 2025-08-06 RX ADMIN — Medication 100 MG: at 08:16

## 2025-08-06 RX ADMIN — POTASSIUM BICARBONATE 40 MEQ: 782 TABLET, EFFERVESCENT ORAL at 11:15

## 2025-08-06 RX ADMIN — MAGNESIUM SULFATE HEPTAHYDRATE 2000 MG: 2 INJECTION, SOLUTION INTRAVENOUS at 11:38

## 2025-08-06 RX ADMIN — POTASSIUM CHLORIDE 10 MEQ: 7.46 INJECTION, SOLUTION INTRAVENOUS at 22:19

## 2025-08-06 RX ADMIN — POTASSIUM CHLORIDE 10 MEQ: 7.46 INJECTION, SOLUTION INTRAVENOUS at 00:38

## 2025-08-07 ENCOUNTER — APPOINTMENT (OUTPATIENT)
Dept: ULTRASOUND IMAGING | Age: 85
DRG: 643 | End: 2025-08-07
Payer: MEDICARE

## 2025-08-07 LAB
ANION GAP SERPL CALCULATED.3IONS-SCNC: 14 MMOL/L (ref 7–16)
BUN SERPL-MCNC: 25 MG/DL (ref 8–23)
CALCIUM SERPL-MCNC: 7.7 MG/DL (ref 8.8–10.2)
CHLORIDE SERPL-SCNC: 94 MMOL/L (ref 98–107)
CO2 SERPL-SCNC: 20 MMOL/L (ref 22–29)
CREAT SERPL-MCNC: 1.5 MG/DL (ref 0.7–1.2)
EKG ATRIAL RATE: 70 BPM
EKG Q-T INTERVAL: 504 MS
EKG QRS DURATION: 94 MS
EKG QTC CALCULATION (BAZETT): 581 MS
EKG R AXIS: 62 DEGREES
EKG T AXIS: 82 DEGREES
EKG VENTRICULAR RATE: 80 BPM
GFR, ESTIMATED: 46 ML/MIN/1.73M2
GLUCOSE SERPL-MCNC: 102 MG/DL (ref 74–99)
INR PPP: 1.2
MAGNESIUM SERPL-MCNC: 2.2 MG/DL (ref 1.6–2.4)
POTASSIUM SERPL-SCNC: 3.1 MMOL/L (ref 3.5–5.1)
PROTHROMBIN TIME: 13.2 SEC (ref 9.3–12.4)
SODIUM SERPL-SCNC: 127 MMOL/L (ref 136–145)

## 2025-08-07 PROCEDURE — 36415 COLL VENOUS BLD VENIPUNCTURE: CPT

## 2025-08-07 PROCEDURE — 2060000000 HC ICU INTERMEDIATE R&B

## 2025-08-07 PROCEDURE — 6370000000 HC RX 637 (ALT 250 FOR IP): Performed by: NURSE PRACTITIONER

## 2025-08-07 PROCEDURE — 80048 BASIC METABOLIC PNL TOTAL CA: CPT

## 2025-08-07 PROCEDURE — 6360000002 HC RX W HCPCS: Performed by: NURSE PRACTITIONER

## 2025-08-07 PROCEDURE — 93010 ELECTROCARDIOGRAM REPORT: CPT | Performed by: INTERNAL MEDICINE

## 2025-08-07 PROCEDURE — 85610 PROTHROMBIN TIME: CPT

## 2025-08-07 PROCEDURE — 2580000003 HC RX 258: Performed by: EMERGENCY MEDICINE

## 2025-08-07 PROCEDURE — 99232 SBSQ HOSP IP/OBS MODERATE 35: CPT | Performed by: STUDENT IN AN ORGANIZED HEALTH CARE EDUCATION/TRAINING PROGRAM

## 2025-08-07 PROCEDURE — 83735 ASSAY OF MAGNESIUM: CPT

## 2025-08-07 RX ORDER — POTASSIUM CHLORIDE 7.45 MG/ML
10 INJECTION INTRAVENOUS
Status: COMPLETED | OUTPATIENT
Start: 2025-08-07 | End: 2025-08-07

## 2025-08-07 RX ORDER — MUPIROCIN 2 %
OINTMENT (GRAM) TOPICAL 2 TIMES DAILY
Status: DISCONTINUED | OUTPATIENT
Start: 2025-08-07 | End: 2025-08-12 | Stop reason: HOSPADM

## 2025-08-07 RX ADMIN — LEVOTHYROXINE SODIUM 200 MCG: 0.1 TABLET ORAL at 05:50

## 2025-08-07 RX ADMIN — POTASSIUM CHLORIDE 10 MEQ: 10 INJECTION, SOLUTION INTRAVENOUS at 10:30

## 2025-08-07 RX ADMIN — SODIUM CHLORIDE: 0.9 INJECTION, SOLUTION INTRAVENOUS at 01:02

## 2025-08-07 RX ADMIN — POTASSIUM CHLORIDE 10 MEQ: 10 INJECTION, SOLUTION INTRAVENOUS at 12:10

## 2025-08-07 RX ADMIN — POTASSIUM CHLORIDE 10 MEQ: 10 INJECTION, SOLUTION INTRAVENOUS at 09:37

## 2025-08-07 RX ADMIN — POTASSIUM CHLORIDE 10 MEQ: 10 INJECTION, SOLUTION INTRAVENOUS at 08:44

## 2025-08-07 RX ADMIN — METOPROLOL SUCCINATE 25 MG: 25 TABLET, EXTENDED RELEASE ORAL at 08:49

## 2025-08-07 ASSESSMENT — PAIN SCALES - GENERAL: PAINLEVEL_OUTOF10: 0

## 2025-08-08 PROBLEM — N17.9 AKI (ACUTE KIDNEY INJURY): Status: ACTIVE | Noted: 2025-08-08

## 2025-08-08 LAB
ANION GAP SERPL CALCULATED.3IONS-SCNC: 12 MMOL/L (ref 7–16)
BASOPHILS # BLD: 0 K/UL (ref 0–0.2)
BASOPHILS NFR BLD: 0 % (ref 0–2)
BUN SERPL-MCNC: 14 MG/DL (ref 8–23)
CALCIUM SERPL-MCNC: 7.6 MG/DL (ref 8.8–10.2)
CHLORIDE SERPL-SCNC: 93 MMOL/L (ref 98–107)
CO2 SERPL-SCNC: 19 MMOL/L (ref 22–29)
CREAT SERPL-MCNC: 1.2 MG/DL (ref 0.7–1.2)
EOSINOPHIL # BLD: 0.07 K/UL (ref 0.05–0.5)
EOSINOPHILS RELATIVE PERCENT: 1 % (ref 0–6)
ERYTHROCYTE [DISTWIDTH] IN BLOOD BY AUTOMATED COUNT: 13.5 % (ref 11.5–15)
GFR, ESTIMATED: 64 ML/MIN/1.73M2
GLUCOSE SERPL-MCNC: 108 MG/DL (ref 74–99)
HCT VFR BLD AUTO: 24.8 % (ref 37–54)
HGB BLD-MCNC: 9 G/DL (ref 12.5–16.5)
INR PPP: 1.3
LYMPHOCYTES NFR BLD: 0.28 K/UL (ref 1.5–4)
LYMPHOCYTES RELATIVE PERCENT: 4 % (ref 20–42)
MAGNESIUM SERPL-MCNC: 1.7 MG/DL (ref 1.6–2.4)
MCH RBC QN AUTO: 33.6 PG (ref 26–35)
MCHC RBC AUTO-ENTMCNC: 36.3 G/DL (ref 32–34.5)
MCV RBC AUTO: 92.5 FL (ref 80–99.9)
MONOCYTES NFR BLD: 0.36 K/UL (ref 0.1–0.95)
MONOCYTES NFR BLD: 4 % (ref 2–12)
NEUTROPHILS NFR BLD: 91 % (ref 43–80)
NEUTS SEG NFR BLD: 7.39 K/UL (ref 1.8–7.3)
PLATELET # BLD AUTO: 213 K/UL (ref 130–450)
PMV BLD AUTO: 9.4 FL (ref 7–12)
POTASSIUM SERPL-SCNC: 3.4 MMOL/L (ref 3.5–5.1)
PROTHROMBIN TIME: 14.1 SEC (ref 9.3–12.4)
RBC # BLD AUTO: 2.68 M/UL (ref 3.8–5.8)
RBC # BLD: ABNORMAL 10*6/UL
SODIUM SERPL-SCNC: 123 MMOL/L (ref 136–145)
WBC OTHER # BLD: 8.1 K/UL (ref 4.5–11.5)

## 2025-08-08 PROCEDURE — 97110 THERAPEUTIC EXERCISES: CPT

## 2025-08-08 PROCEDURE — 99232 SBSQ HOSP IP/OBS MODERATE 35: CPT | Performed by: STUDENT IN AN ORGANIZED HEALTH CARE EDUCATION/TRAINING PROGRAM

## 2025-08-08 PROCEDURE — 80048 BASIC METABOLIC PNL TOTAL CA: CPT

## 2025-08-08 PROCEDURE — 6360000002 HC RX W HCPCS: Performed by: INTERNAL MEDICINE

## 2025-08-08 PROCEDURE — 2500000003 HC RX 250 WO HCPCS: Performed by: NURSE PRACTITIONER

## 2025-08-08 PROCEDURE — 6370000000 HC RX 637 (ALT 250 FOR IP): Performed by: NURSE PRACTITIONER

## 2025-08-08 PROCEDURE — 6370000000 HC RX 637 (ALT 250 FOR IP): Performed by: STUDENT IN AN ORGANIZED HEALTH CARE EDUCATION/TRAINING PROGRAM

## 2025-08-08 PROCEDURE — 83735 ASSAY OF MAGNESIUM: CPT

## 2025-08-08 PROCEDURE — 6370000000 HC RX 637 (ALT 250 FOR IP)

## 2025-08-08 PROCEDURE — 99223 1ST HOSP IP/OBS HIGH 75: CPT | Performed by: SURGERY

## 2025-08-08 PROCEDURE — 2060000000 HC ICU INTERMEDIATE R&B

## 2025-08-08 PROCEDURE — 97535 SELF CARE MNGMENT TRAINING: CPT

## 2025-08-08 PROCEDURE — 6360000002 HC RX W HCPCS: Performed by: STUDENT IN AN ORGANIZED HEALTH CARE EDUCATION/TRAINING PROGRAM

## 2025-08-08 PROCEDURE — 85025 COMPLETE CBC W/AUTO DIFF WBC: CPT

## 2025-08-08 PROCEDURE — 85610 PROTHROMBIN TIME: CPT

## 2025-08-08 PROCEDURE — 36415 COLL VENOUS BLD VENIPUNCTURE: CPT

## 2025-08-08 RX ORDER — WARFARIN SODIUM 5 MG/1
5 TABLET ORAL
Status: COMPLETED | OUTPATIENT
Start: 2025-08-08 | End: 2025-08-08

## 2025-08-08 RX ORDER — ENOXAPARIN SODIUM 100 MG/ML
1 INJECTION SUBCUTANEOUS 2 TIMES DAILY
Status: DISCONTINUED | OUTPATIENT
Start: 2025-08-08 | End: 2025-08-12

## 2025-08-08 RX ORDER — SODIUM CHLORIDE AND POTASSIUM CHLORIDE 300; 900 MG/100ML; MG/100ML
INJECTION, SOLUTION INTRAVENOUS CONTINUOUS
Status: DISCONTINUED | OUTPATIENT
Start: 2025-08-08 | End: 2025-08-09

## 2025-08-08 RX ADMIN — LEVOTHYROXINE SODIUM 200 MCG: 0.1 TABLET ORAL at 05:58

## 2025-08-08 RX ADMIN — MUPIROCIN: 20 OINTMENT TOPICAL at 10:49

## 2025-08-08 RX ADMIN — ENOXAPARIN SODIUM 90 MG: 100 INJECTION SUBCUTANEOUS at 20:08

## 2025-08-08 RX ADMIN — SODIUM CHLORIDE, PRESERVATIVE FREE 10 ML: 5 INJECTION INTRAVENOUS at 10:41

## 2025-08-08 RX ADMIN — FOLIC ACID 1 MG: 1 TABLET ORAL at 10:41

## 2025-08-08 RX ADMIN — ASPIRIN 81 MG: 81 TABLET, COATED ORAL at 10:41

## 2025-08-08 RX ADMIN — METOPROLOL SUCCINATE 25 MG: 25 TABLET, EXTENDED RELEASE ORAL at 10:41

## 2025-08-08 RX ADMIN — Medication 100 MG: at 10:41

## 2025-08-08 RX ADMIN — DULOXETINE 20 MG: 20 CAPSULE, DELAYED RELEASE ORAL at 10:41

## 2025-08-08 RX ADMIN — SODIUM CHLORIDE AND POTASSIUM CHLORIDE: 9; 2.98 INJECTION, SOLUTION INTRAVENOUS at 10:52

## 2025-08-08 RX ADMIN — ATORVASTATIN CALCIUM 40 MG: 40 TABLET, FILM COATED ORAL at 10:41

## 2025-08-08 RX ADMIN — ENOXAPARIN SODIUM 90 MG: 100 INJECTION SUBCUTANEOUS at 13:57

## 2025-08-08 RX ADMIN — WARFARIN SODIUM 5 MG: 5 TABLET ORAL at 18:00

## 2025-08-08 ASSESSMENT — PAIN SCALES - GENERAL: PAINLEVEL_OUTOF10: 0

## 2025-08-09 LAB
25(OH)D3 SERPL-MCNC: 13.9 NG/ML (ref 30–100)
ANION GAP SERPL CALCULATED.3IONS-SCNC: 11 MMOL/L (ref 7–16)
ANION GAP SERPL CALCULATED.3IONS-SCNC: 9 MMOL/L (ref 7–16)
ANION GAP SERPL CALCULATED.3IONS-SCNC: 9 MMOL/L (ref 7–16)
BASOPHILS # BLD: 0.02 K/UL (ref 0–0.2)
BASOPHILS NFR BLD: 0 % (ref 0–2)
BUN SERPL-MCNC: 12 MG/DL (ref 8–23)
BUN SERPL-MCNC: 13 MG/DL (ref 8–23)
BUN SERPL-MCNC: 13 MG/DL (ref 8–23)
CA-I BLD-SCNC: 1.11 MMOL/L (ref 1.15–1.33)
CALCIUM SERPL-MCNC: 7.5 MG/DL (ref 8.8–10.2)
CHLORIDE SERPL-SCNC: 100 MMOL/L (ref 98–107)
CHLORIDE SERPL-SCNC: 96 MMOL/L (ref 98–107)
CHLORIDE SERPL-SCNC: 99 MMOL/L (ref 98–107)
CO2 SERPL-SCNC: 16 MMOL/L (ref 22–29)
CO2 SERPL-SCNC: 18 MMOL/L (ref 22–29)
CO2 SERPL-SCNC: 19 MMOL/L (ref 22–29)
CREAT SERPL-MCNC: 1.2 MG/DL (ref 0.7–1.2)
CREAT SERPL-MCNC: 1.2 MG/DL (ref 0.7–1.2)
CREAT SERPL-MCNC: 1.3 MG/DL (ref 0.7–1.2)
EOSINOPHIL # BLD: 0.08 K/UL (ref 0.05–0.5)
EOSINOPHILS RELATIVE PERCENT: 1 % (ref 0–6)
ERYTHROCYTE [DISTWIDTH] IN BLOOD BY AUTOMATED COUNT: 13.4 % (ref 11.5–15)
GFR, ESTIMATED: 55 ML/MIN/1.73M2
GFR, ESTIMATED: 60 ML/MIN/1.73M2
GFR, ESTIMATED: 63 ML/MIN/1.73M2
GLUCOSE SERPL-MCNC: 105 MG/DL (ref 74–99)
GLUCOSE SERPL-MCNC: 107 MG/DL (ref 74–99)
GLUCOSE SERPL-MCNC: 120 MG/DL (ref 74–99)
HCT VFR BLD AUTO: 24.6 % (ref 37–54)
HGB BLD-MCNC: 8.6 G/DL (ref 12.5–16.5)
IMM GRANULOCYTES # BLD AUTO: 0.07 K/UL (ref 0–0.58)
IMM GRANULOCYTES NFR BLD: 1 % (ref 0–5)
INR PPP: 1.4
LYMPHOCYTES NFR BLD: 0.46 K/UL (ref 1.5–4)
LYMPHOCYTES RELATIVE PERCENT: 6 % (ref 20–42)
MAGNESIUM SERPL-MCNC: 1.6 MG/DL (ref 1.6–2.4)
MCH RBC QN AUTO: 33.2 PG (ref 26–35)
MCHC RBC AUTO-ENTMCNC: 35 G/DL (ref 32–34.5)
MCV RBC AUTO: 95 FL (ref 80–99.9)
MICROORGANISM SPEC CULT: ABNORMAL
MICROORGANISM SPEC CULT: ABNORMAL
MICROORGANISM/AGENT SPEC: ABNORMAL
MONOCYTES NFR BLD: 0.42 K/UL (ref 0.1–0.95)
MONOCYTES NFR BLD: 5 % (ref 2–12)
NEUTROPHILS NFR BLD: 87 % (ref 43–80)
NEUTS SEG NFR BLD: 7.1 K/UL (ref 1.8–7.3)
PH VENOUS: 7.56 (ref 7.35–7.45)
PLATELET # BLD AUTO: 212 K/UL (ref 130–450)
PMV BLD AUTO: 9.4 FL (ref 7–12)
POTASSIUM SERPL-SCNC: 3.3 MMOL/L (ref 3.5–5.1)
POTASSIUM SERPL-SCNC: 3.4 MMOL/L (ref 3.5–5.1)
POTASSIUM SERPL-SCNC: 5.3 MMOL/L (ref 3.5–5.1)
PROTHROMBIN TIME: 15.6 SEC (ref 9.3–12.4)
RBC # BLD AUTO: 2.59 M/UL (ref 3.8–5.8)
RBC # BLD: NORMAL 10*6/UL
SERVICE CMNT-IMP: ABNORMAL
SODIUM SERPL-SCNC: 124 MMOL/L (ref 136–145)
SODIUM SERPL-SCNC: 127 MMOL/L (ref 136–145)
SODIUM SERPL-SCNC: 127 MMOL/L (ref 136–145)
SPECIMEN DESCRIPTION: ABNORMAL
WBC OTHER # BLD: 8.2 K/UL (ref 4.5–11.5)

## 2025-08-09 PROCEDURE — 2060000000 HC ICU INTERMEDIATE R&B

## 2025-08-09 PROCEDURE — 6360000002 HC RX W HCPCS: Performed by: INTERNAL MEDICINE

## 2025-08-09 PROCEDURE — 6370000000 HC RX 637 (ALT 250 FOR IP): Performed by: NURSE PRACTITIONER

## 2025-08-09 PROCEDURE — 83735 ASSAY OF MAGNESIUM: CPT

## 2025-08-09 PROCEDURE — 82306 VITAMIN D 25 HYDROXY: CPT

## 2025-08-09 PROCEDURE — 85025 COMPLETE CBC W/AUTO DIFF WBC: CPT

## 2025-08-09 PROCEDURE — 6360000002 HC RX W HCPCS: Performed by: STUDENT IN AN ORGANIZED HEALTH CARE EDUCATION/TRAINING PROGRAM

## 2025-08-09 PROCEDURE — 2580000003 HC RX 258

## 2025-08-09 PROCEDURE — 36415 COLL VENOUS BLD VENIPUNCTURE: CPT

## 2025-08-09 PROCEDURE — 99232 SBSQ HOSP IP/OBS MODERATE 35: CPT | Performed by: STUDENT IN AN ORGANIZED HEALTH CARE EDUCATION/TRAINING PROGRAM

## 2025-08-09 PROCEDURE — 85610 PROTHROMBIN TIME: CPT

## 2025-08-09 PROCEDURE — 80048 BASIC METABOLIC PNL TOTAL CA: CPT

## 2025-08-09 PROCEDURE — 6370000000 HC RX 637 (ALT 250 FOR IP)

## 2025-08-09 PROCEDURE — 6370000000 HC RX 637 (ALT 250 FOR IP): Performed by: INTERNAL MEDICINE

## 2025-08-09 PROCEDURE — 82800 BLOOD PH: CPT

## 2025-08-09 PROCEDURE — 82330 ASSAY OF CALCIUM: CPT

## 2025-08-09 PROCEDURE — 2500000003 HC RX 250 WO HCPCS: Performed by: NURSE PRACTITIONER

## 2025-08-09 RX ORDER — 3% SODIUM CHLORIDE 3 G/100ML
25 INJECTION, SOLUTION INTRAVENOUS CONTINUOUS
Status: DISPENSED | OUTPATIENT
Start: 2025-08-09 | End: 2025-08-09

## 2025-08-09 RX ORDER — 3% SODIUM CHLORIDE 3 G/100ML
25 INJECTION, SOLUTION INTRAVENOUS CONTINUOUS
Status: DISPENSED | OUTPATIENT
Start: 2025-08-09 | End: 2025-08-10

## 2025-08-09 RX ORDER — WARFARIN SODIUM 5 MG/1
5 TABLET ORAL
Status: COMPLETED | OUTPATIENT
Start: 2025-08-09 | End: 2025-08-09

## 2025-08-09 RX ORDER — POTASSIUM CHLORIDE 1500 MG/1
20 TABLET, EXTENDED RELEASE ORAL ONCE
Status: COMPLETED | OUTPATIENT
Start: 2025-08-09 | End: 2025-08-09

## 2025-08-09 RX ADMIN — ENOXAPARIN SODIUM 90 MG: 100 INJECTION SUBCUTANEOUS at 22:35

## 2025-08-09 RX ADMIN — SODIUM CHLORIDE, PRESERVATIVE FREE 10 ML: 5 INJECTION INTRAVENOUS at 07:57

## 2025-08-09 RX ADMIN — MUPIROCIN: 20 OINTMENT TOPICAL at 07:58

## 2025-08-09 RX ADMIN — ASPIRIN 81 MG: 81 TABLET, COATED ORAL at 07:57

## 2025-08-09 RX ADMIN — SODIUM CHLORIDE AND POTASSIUM CHLORIDE: 9; 2.98 INJECTION, SOLUTION INTRAVENOUS at 07:37

## 2025-08-09 RX ADMIN — FOLIC ACID 1 MG: 1 TABLET ORAL at 07:57

## 2025-08-09 RX ADMIN — SODIUM CHLORIDE 25 ML/HR: 3 INJECTION, SOLUTION INTRAVENOUS at 16:43

## 2025-08-09 RX ADMIN — SODIUM CHLORIDE 25 ML/HR: 3 INJECTION, SOLUTION INTRAVENOUS at 10:55

## 2025-08-09 RX ADMIN — ATORVASTATIN CALCIUM 40 MG: 40 TABLET, FILM COATED ORAL at 07:57

## 2025-08-09 RX ADMIN — Medication 100 MG: at 07:57

## 2025-08-09 RX ADMIN — LEVOTHYROXINE SODIUM 200 MCG: 0.1 TABLET ORAL at 06:04

## 2025-08-09 RX ADMIN — ENOXAPARIN SODIUM 90 MG: 100 INJECTION SUBCUTANEOUS at 07:57

## 2025-08-09 RX ADMIN — METOPROLOL SUCCINATE 25 MG: 25 TABLET, EXTENDED RELEASE ORAL at 07:57

## 2025-08-09 RX ADMIN — WARFARIN SODIUM 5 MG: 5 TABLET ORAL at 16:31

## 2025-08-09 RX ADMIN — DULOXETINE 20 MG: 20 CAPSULE, DELAYED RELEASE ORAL at 07:57

## 2025-08-09 RX ADMIN — POTASSIUM CHLORIDE 20 MEQ: 1500 TABLET, EXTENDED RELEASE ORAL at 22:35

## 2025-08-09 RX ADMIN — SODIUM CHLORIDE, PRESERVATIVE FREE 10 ML: 5 INJECTION INTRAVENOUS at 22:36

## 2025-08-09 ASSESSMENT — PAIN SCALES - GENERAL: PAINLEVEL_OUTOF10: 0

## 2025-08-10 LAB
ANION GAP SERPL CALCULATED.3IONS-SCNC: 10 MMOL/L (ref 7–16)
ANION GAP SERPL CALCULATED.3IONS-SCNC: 10 MMOL/L (ref 7–16)
ANION GAP SERPL CALCULATED.3IONS-SCNC: 11 MMOL/L (ref 7–16)
ATYPICAL LYMPHOCYTE ABSOLUTE COUNT: 0.06 K/UL (ref 0–0.46)
ATYPICAL LYMPHOCYTES: 1 % (ref 0–4)
BASOPHILS # BLD: 0 K/UL (ref 0–0.2)
BASOPHILS NFR BLD: 0 % (ref 0–2)
BUN SERPL-MCNC: 12 MG/DL (ref 8–23)
CALCIUM SERPL-MCNC: 7.5 MG/DL (ref 8.8–10.2)
CALCIUM SERPL-MCNC: 7.7 MG/DL (ref 8.8–10.2)
CALCIUM SERPL-MCNC: 7.8 MG/DL (ref 8.8–10.2)
CHLORIDE SERPL-SCNC: 100 MMOL/L (ref 98–107)
CO2 SERPL-SCNC: 17 MMOL/L (ref 22–29)
CO2 SERPL-SCNC: 18 MMOL/L (ref 22–29)
CO2 SERPL-SCNC: 18 MMOL/L (ref 22–29)
CREAT SERPL-MCNC: 1.2 MG/DL (ref 0.7–1.2)
EOSINOPHIL # BLD: 0.17 K/UL (ref 0.05–0.5)
EOSINOPHILS RELATIVE PERCENT: 3 % (ref 0–6)
ERYTHROCYTE [DISTWIDTH] IN BLOOD BY AUTOMATED COUNT: 13.4 % (ref 11.5–15)
FERRITIN SERPL-MCNC: 1867 NG/ML
FOLATE SERPL-MCNC: 24.4 NG/ML (ref 4.6–34.8)
GFR, ESTIMATED: 60 ML/MIN/1.73M2
GFR, ESTIMATED: 62 ML/MIN/1.73M2
GFR, ESTIMATED: 64 ML/MIN/1.73M2
GLUCOSE SERPL-MCNC: 102 MG/DL (ref 74–99)
GLUCOSE SERPL-MCNC: 93 MG/DL (ref 74–99)
GLUCOSE SERPL-MCNC: 98 MG/DL (ref 74–99)
HCT VFR BLD AUTO: 23.8 % (ref 37–54)
HGB BLD-MCNC: 8.3 G/DL (ref 12.5–16.5)
INR PPP: 1.7
IRON SATN MFR SERPL: 26 % (ref 20–55)
IRON SERPL-MCNC: 45 UG/DL (ref 61–157)
LYMPHOCYTES NFR BLD: 0.29 K/UL (ref 1.5–4)
LYMPHOCYTES RELATIVE PERCENT: 4 % (ref 20–42)
MAGNESIUM SERPL-MCNC: 1.5 MG/DL (ref 1.6–2.4)
MCH RBC QN AUTO: 33.7 PG (ref 26–35)
MCHC RBC AUTO-ENTMCNC: 34.9 G/DL (ref 32–34.5)
MCV RBC AUTO: 96.7 FL (ref 80–99.9)
METAMYELOCYTES ABSOLUTE COUNT: 0.06 K/UL (ref 0–0.12)
METAMYELOCYTES: 1 % (ref 0–1)
MONOCYTES NFR BLD: 0.29 K/UL (ref 0.1–0.95)
MONOCYTES NFR BLD: 4 % (ref 2–12)
NEUTROPHILS NFR BLD: 87 % (ref 43–80)
NEUTS SEG NFR BLD: 5.83 K/UL (ref 1.8–7.3)
OSMOLALITY UR: 654 MOSM/KG (ref 300–900)
PLATELET # BLD AUTO: 238 K/UL (ref 130–450)
PMV BLD AUTO: 9.5 FL (ref 7–12)
POTASSIUM SERPL-SCNC: 3.4 MMOL/L (ref 3.5–5.1)
POTASSIUM SERPL-SCNC: 3.6 MMOL/L (ref 3.5–5.1)
POTASSIUM SERPL-SCNC: 3.7 MMOL/L (ref 3.5–5.1)
POTASSIUM, UR: 50.6 MMOL/L
PROTHROMBIN TIME: 18 SEC (ref 9.3–12.4)
RBC # BLD AUTO: 2.46 M/UL (ref 3.8–5.8)
RBC # BLD: ABNORMAL 10*6/UL
SODIUM SERPL-SCNC: 126 MMOL/L (ref 136–145)
SODIUM SERPL-SCNC: 128 MMOL/L (ref 136–145)
SODIUM SERPL-SCNC: 129 MMOL/L (ref 136–145)
SODIUM UR-SCNC: 37 MMOL/L
TIBC SERPL-MCNC: 171 UG/DL (ref 250–450)
VIT B12 SERPL-MCNC: 703 PG/ML (ref 232–1245)
WBC OTHER # BLD: 6.7 K/UL (ref 4.5–11.5)

## 2025-08-10 PROCEDURE — 82746 ASSAY OF FOLIC ACID SERUM: CPT

## 2025-08-10 PROCEDURE — 85025 COMPLETE CBC W/AUTO DIFF WBC: CPT

## 2025-08-10 PROCEDURE — 84133 ASSAY OF URINE POTASSIUM: CPT

## 2025-08-10 PROCEDURE — 2500000003 HC RX 250 WO HCPCS: Performed by: NURSE PRACTITIONER

## 2025-08-10 PROCEDURE — 84300 ASSAY OF URINE SODIUM: CPT

## 2025-08-10 PROCEDURE — 6360000002 HC RX W HCPCS: Performed by: STUDENT IN AN ORGANIZED HEALTH CARE EDUCATION/TRAINING PROGRAM

## 2025-08-10 PROCEDURE — 2580000003 HC RX 258: Performed by: INTERNAL MEDICINE

## 2025-08-10 PROCEDURE — 6370000000 HC RX 637 (ALT 250 FOR IP): Performed by: STUDENT IN AN ORGANIZED HEALTH CARE EDUCATION/TRAINING PROGRAM

## 2025-08-10 PROCEDURE — 83550 IRON BINDING TEST: CPT

## 2025-08-10 PROCEDURE — 6370000000 HC RX 637 (ALT 250 FOR IP): Performed by: INTERNAL MEDICINE

## 2025-08-10 PROCEDURE — 85610 PROTHROMBIN TIME: CPT

## 2025-08-10 PROCEDURE — 6370000000 HC RX 637 (ALT 250 FOR IP)

## 2025-08-10 PROCEDURE — 83935 ASSAY OF URINE OSMOLALITY: CPT

## 2025-08-10 PROCEDURE — 99232 SBSQ HOSP IP/OBS MODERATE 35: CPT | Performed by: STUDENT IN AN ORGANIZED HEALTH CARE EDUCATION/TRAINING PROGRAM

## 2025-08-10 PROCEDURE — 82728 ASSAY OF FERRITIN: CPT

## 2025-08-10 PROCEDURE — 83735 ASSAY OF MAGNESIUM: CPT

## 2025-08-10 PROCEDURE — 83540 ASSAY OF IRON: CPT

## 2025-08-10 PROCEDURE — 6370000000 HC RX 637 (ALT 250 FOR IP): Performed by: NURSE PRACTITIONER

## 2025-08-10 PROCEDURE — 82607 VITAMIN B-12: CPT

## 2025-08-10 PROCEDURE — 2060000000 HC ICU INTERMEDIATE R&B

## 2025-08-10 PROCEDURE — 80048 BASIC METABOLIC PNL TOTAL CA: CPT

## 2025-08-10 RX ORDER — WARFARIN SODIUM 5 MG/1
5 TABLET ORAL
Status: COMPLETED | OUTPATIENT
Start: 2025-08-10 | End: 2025-08-10

## 2025-08-10 RX ORDER — MAGNESIUM SULFATE IN WATER 40 MG/ML
2000 INJECTION, SOLUTION INTRAVENOUS ONCE
Status: COMPLETED | OUTPATIENT
Start: 2025-08-10 | End: 2025-08-10

## 2025-08-10 RX ORDER — POTASSIUM CHLORIDE 1500 MG/1
20 TABLET, EXTENDED RELEASE ORAL ONCE
Status: COMPLETED | OUTPATIENT
Start: 2025-08-10 | End: 2025-08-10

## 2025-08-10 RX ORDER — 3% SODIUM CHLORIDE 3 G/100ML
25 INJECTION, SOLUTION INTRAVENOUS CONTINUOUS
Status: DISPENSED | OUTPATIENT
Start: 2025-08-10 | End: 2025-08-10

## 2025-08-10 RX ORDER — SODIUM CHLORIDE 1 G/1
1 TABLET ORAL
Status: DISCONTINUED | OUTPATIENT
Start: 2025-08-10 | End: 2025-08-12 | Stop reason: HOSPADM

## 2025-08-10 RX ORDER — VITAMIN B COMPLEX
1000 TABLET ORAL DAILY
Status: DISCONTINUED | OUTPATIENT
Start: 2025-08-10 | End: 2025-08-12 | Stop reason: HOSPADM

## 2025-08-10 RX ADMIN — FOLIC ACID 1 MG: 1 TABLET ORAL at 07:42

## 2025-08-10 RX ADMIN — Medication 100 MG: at 07:42

## 2025-08-10 RX ADMIN — ATORVASTATIN CALCIUM 40 MG: 40 TABLET, FILM COATED ORAL at 07:42

## 2025-08-10 RX ADMIN — SODIUM CHLORIDE, PRESERVATIVE FREE 10 ML: 5 INJECTION INTRAVENOUS at 07:43

## 2025-08-10 RX ADMIN — MUPIROCIN: 20 OINTMENT TOPICAL at 20:28

## 2025-08-10 RX ADMIN — Medication 1000 UNITS: at 09:44

## 2025-08-10 RX ADMIN — SODIUM CHLORIDE 25 ML/HR: 3 INJECTION, SOLUTION INTRAVENOUS at 17:10

## 2025-08-10 RX ADMIN — MAGNESIUM SULFATE HEPTAHYDRATE 2000 MG: 2 INJECTION, SOLUTION INTRAVENOUS at 14:25

## 2025-08-10 RX ADMIN — DULOXETINE 20 MG: 20 CAPSULE, DELAYED RELEASE ORAL at 07:42

## 2025-08-10 RX ADMIN — POTASSIUM CHLORIDE 20 MEQ: 1500 TABLET, EXTENDED RELEASE ORAL at 09:44

## 2025-08-10 RX ADMIN — ASPIRIN 81 MG: 81 TABLET, COATED ORAL at 07:42

## 2025-08-10 RX ADMIN — SODIUM CHLORIDE, PRESERVATIVE FREE 10 ML: 5 INJECTION INTRAVENOUS at 20:28

## 2025-08-10 RX ADMIN — SODIUM CHLORIDE 25 ML/HR: 3 INJECTION, SOLUTION INTRAVENOUS at 10:51

## 2025-08-10 RX ADMIN — SODIUM CHLORIDE 25 ML/HR: 3 INJECTION, SOLUTION INTRAVENOUS at 00:19

## 2025-08-10 RX ADMIN — Medication 1 G: at 16:28

## 2025-08-10 RX ADMIN — ENOXAPARIN SODIUM 90 MG: 100 INJECTION SUBCUTANEOUS at 20:28

## 2025-08-10 RX ADMIN — ENOXAPARIN SODIUM 90 MG: 100 INJECTION SUBCUTANEOUS at 07:42

## 2025-08-10 RX ADMIN — WARFARIN SODIUM 5 MG: 5 TABLET ORAL at 16:28

## 2025-08-10 RX ADMIN — MUPIROCIN: 20 OINTMENT TOPICAL at 07:43

## 2025-08-10 RX ADMIN — LEVOTHYROXINE SODIUM 200 MCG: 0.1 TABLET ORAL at 05:36

## 2025-08-10 RX ADMIN — METOPROLOL SUCCINATE 25 MG: 25 TABLET, EXTENDED RELEASE ORAL at 07:42

## 2025-08-11 LAB
25(OH)D3 SERPL-MCNC: 13.4 NG/ML (ref 30–100)
ANION GAP SERPL CALCULATED.3IONS-SCNC: 10 MMOL/L (ref 7–16)
ANION GAP SERPL CALCULATED.3IONS-SCNC: 12 MMOL/L (ref 7–16)
ANION GAP SERPL CALCULATED.3IONS-SCNC: 13 MMOL/L (ref 7–16)
BASOPHILS # BLD: 0.02 K/UL (ref 0–0.2)
BASOPHILS NFR BLD: 0 % (ref 0–2)
BUN SERPL-MCNC: 12 MG/DL (ref 8–23)
CA-I BLD-SCNC: 1.14 MMOL/L (ref 1.15–1.33)
CALCIUM SERPL-MCNC: 7.7 MG/DL (ref 8.8–10.2)
CALCIUM SERPL-MCNC: 7.8 MG/DL (ref 8.8–10.2)
CALCIUM SERPL-MCNC: 8.1 MG/DL (ref 8.8–10.2)
CHLORIDE SERPL-SCNC: 100 MMOL/L (ref 98–107)
CHLORIDE SERPL-SCNC: 98 MMOL/L (ref 98–107)
CHLORIDE SERPL-SCNC: 98 MMOL/L (ref 98–107)
CO2 SERPL-SCNC: 16 MMOL/L (ref 22–29)
CO2 SERPL-SCNC: 18 MMOL/L (ref 22–29)
CO2 SERPL-SCNC: 19 MMOL/L (ref 22–29)
CREAT SERPL-MCNC: 1.1 MG/DL (ref 0.7–1.2)
EOSINOPHIL # BLD: 0.09 K/UL (ref 0.05–0.5)
EOSINOPHILS RELATIVE PERCENT: 1 % (ref 0–6)
ERYTHROCYTE [DISTWIDTH] IN BLOOD BY AUTOMATED COUNT: 13.8 % (ref 11.5–15)
GFR, ESTIMATED: 66 ML/MIN/1.73M2
GFR, ESTIMATED: 67 ML/MIN/1.73M2
GFR, ESTIMATED: 69 ML/MIN/1.73M2
GLUCOSE SERPL-MCNC: 106 MG/DL (ref 74–99)
GLUCOSE SERPL-MCNC: 82 MG/DL (ref 74–99)
GLUCOSE SERPL-MCNC: 95 MG/DL (ref 74–99)
HCT VFR BLD AUTO: 25.8 % (ref 37–54)
HGB BLD-MCNC: 8.8 G/DL (ref 12.5–16.5)
IMM GRANULOCYTES # BLD AUTO: 0.09 K/UL (ref 0–0.58)
IMM GRANULOCYTES NFR BLD: 1 % (ref 0–5)
INR PPP: 1.8
LYMPHOCYTES NFR BLD: 0.61 K/UL (ref 1.5–4)
LYMPHOCYTES RELATIVE PERCENT: 9 % (ref 20–42)
MAGNESIUM SERPL-MCNC: 1.9 MG/DL (ref 1.6–2.4)
MCH RBC QN AUTO: 33 PG (ref 26–35)
MCHC RBC AUTO-ENTMCNC: 34.1 G/DL (ref 32–34.5)
MCV RBC AUTO: 96.6 FL (ref 80–99.9)
MICROORGANISM SPEC CULT: NORMAL
MICROORGANISM SPEC CULT: NORMAL
MONOCYTES NFR BLD: 0.39 K/UL (ref 0.1–0.95)
MONOCYTES NFR BLD: 6 % (ref 2–12)
NEUTROPHILS NFR BLD: 83 % (ref 43–80)
NEUTS SEG NFR BLD: 5.9 K/UL (ref 1.8–7.3)
PLATELET # BLD AUTO: 279 K/UL (ref 130–450)
PMV BLD AUTO: 9.1 FL (ref 7–12)
POTASSIUM SERPL-SCNC: 3.4 MMOL/L (ref 3.5–5.1)
POTASSIUM SERPL-SCNC: 3.8 MMOL/L (ref 3.5–5.1)
POTASSIUM SERPL-SCNC: 3.8 MMOL/L (ref 3.5–5.1)
PROTHROMBIN TIME: 19.3 SEC (ref 9.3–12.4)
RBC # BLD AUTO: 2.67 M/UL (ref 3.8–5.8)
SERVICE CMNT-IMP: NORMAL
SERVICE CMNT-IMP: NORMAL
SODIUM SERPL-SCNC: 126 MMOL/L (ref 136–145)
SODIUM SERPL-SCNC: 128 MMOL/L (ref 136–145)
SODIUM SERPL-SCNC: 128 MMOL/L (ref 136–145)
SPECIMEN DESCRIPTION: NORMAL
SPECIMEN DESCRIPTION: NORMAL
WBC OTHER # BLD: 7.1 K/UL (ref 4.5–11.5)

## 2025-08-11 PROCEDURE — 6370000000 HC RX 637 (ALT 250 FOR IP)

## 2025-08-11 PROCEDURE — 6370000000 HC RX 637 (ALT 250 FOR IP): Performed by: NURSE PRACTITIONER

## 2025-08-11 PROCEDURE — 6360000002 HC RX W HCPCS: Performed by: INTERNAL MEDICINE

## 2025-08-11 PROCEDURE — 6370000000 HC RX 637 (ALT 250 FOR IP): Performed by: STUDENT IN AN ORGANIZED HEALTH CARE EDUCATION/TRAINING PROGRAM

## 2025-08-11 PROCEDURE — 82306 VITAMIN D 25 HYDROXY: CPT

## 2025-08-11 PROCEDURE — 80048 BASIC METABOLIC PNL TOTAL CA: CPT

## 2025-08-11 PROCEDURE — 82330 ASSAY OF CALCIUM: CPT

## 2025-08-11 PROCEDURE — 85025 COMPLETE CBC W/AUTO DIFF WBC: CPT

## 2025-08-11 PROCEDURE — 6360000002 HC RX W HCPCS: Performed by: STUDENT IN AN ORGANIZED HEALTH CARE EDUCATION/TRAINING PROGRAM

## 2025-08-11 PROCEDURE — 83735 ASSAY OF MAGNESIUM: CPT

## 2025-08-11 PROCEDURE — 99233 SBSQ HOSP IP/OBS HIGH 50: CPT | Performed by: STUDENT IN AN ORGANIZED HEALTH CARE EDUCATION/TRAINING PROGRAM

## 2025-08-11 PROCEDURE — 2580000003 HC RX 258: Performed by: INTERNAL MEDICINE

## 2025-08-11 PROCEDURE — 85610 PROTHROMBIN TIME: CPT

## 2025-08-11 PROCEDURE — 2500000003 HC RX 250 WO HCPCS: Performed by: NURSE PRACTITIONER

## 2025-08-11 PROCEDURE — 6370000000 HC RX 637 (ALT 250 FOR IP): Performed by: INTERNAL MEDICINE

## 2025-08-11 PROCEDURE — 2060000000 HC ICU INTERMEDIATE R&B

## 2025-08-11 RX ORDER — MAGNESIUM SULFATE IN WATER 40 MG/ML
2000 INJECTION, SOLUTION INTRAVENOUS ONCE
Status: COMPLETED | OUTPATIENT
Start: 2025-08-11 | End: 2025-08-11

## 2025-08-11 RX ORDER — 3% SODIUM CHLORIDE 3 G/100ML
25 INJECTION, SOLUTION INTRAVENOUS CONTINUOUS
Status: DISPENSED | OUTPATIENT
Start: 2025-08-11 | End: 2025-08-11

## 2025-08-11 RX ORDER — SODIUM CHLORIDE 1 G/1
1 TABLET ORAL
Qty: 90 TABLET | Refills: 0 | Status: SHIPPED | OUTPATIENT
Start: 2025-08-12

## 2025-08-11 RX ORDER — WARFARIN SODIUM 5 MG/1
5 TABLET ORAL
Status: COMPLETED | OUTPATIENT
Start: 2025-08-11 | End: 2025-08-11

## 2025-08-11 RX ORDER — SODIUM CHLORIDE 1 G/1
2 TABLET ORAL ONCE
Status: COMPLETED | OUTPATIENT
Start: 2025-08-11 | End: 2025-08-11

## 2025-08-11 RX ORDER — VITAMIN B COMPLEX
1000 TABLET ORAL DAILY
Qty: 30 TABLET | Refills: 0 | Status: SHIPPED | OUTPATIENT
Start: 2025-08-12

## 2025-08-11 RX ORDER — POTASSIUM CHLORIDE 7.45 MG/ML
10 INJECTION INTRAVENOUS
Status: COMPLETED | OUTPATIENT
Start: 2025-08-11 | End: 2025-08-11

## 2025-08-11 RX ORDER — MUPIROCIN 2 %
OINTMENT (GRAM) TOPICAL
Qty: 1 EACH | Refills: 0
Start: 2025-08-11 | End: 2025-08-18

## 2025-08-11 RX ADMIN — MUPIROCIN: 20 OINTMENT TOPICAL at 20:57

## 2025-08-11 RX ADMIN — ASPIRIN 81 MG: 81 TABLET, COATED ORAL at 08:05

## 2025-08-11 RX ADMIN — SODIUM CHLORIDE 25 ML/HR: 3 INJECTION, SOLUTION INTRAVENOUS at 08:12

## 2025-08-11 RX ADMIN — Medication 1 G: at 16:47

## 2025-08-11 RX ADMIN — ENOXAPARIN SODIUM 90 MG: 100 INJECTION SUBCUTANEOUS at 20:57

## 2025-08-11 RX ADMIN — ENOXAPARIN SODIUM 90 MG: 100 INJECTION SUBCUTANEOUS at 08:04

## 2025-08-11 RX ADMIN — POTASSIUM CHLORIDE 10 MEQ: 7.46 INJECTION, SOLUTION INTRAVENOUS at 09:50

## 2025-08-11 RX ADMIN — SODIUM CHLORIDE, PRESERVATIVE FREE 10 ML: 5 INJECTION INTRAVENOUS at 08:05

## 2025-08-11 RX ADMIN — MUPIROCIN: 20 OINTMENT TOPICAL at 08:05

## 2025-08-11 RX ADMIN — DULOXETINE 20 MG: 20 CAPSULE, DELAYED RELEASE ORAL at 08:05

## 2025-08-11 RX ADMIN — Medication 100 MG: at 08:05

## 2025-08-11 RX ADMIN — MAGNESIUM SULFATE HEPTAHYDRATE 2000 MG: 40 INJECTION, SOLUTION INTRAVENOUS at 11:50

## 2025-08-11 RX ADMIN — LEVOTHYROXINE SODIUM 200 MCG: 0.1 TABLET ORAL at 05:04

## 2025-08-11 RX ADMIN — WARFARIN SODIUM 5 MG: 5 TABLET ORAL at 16:47

## 2025-08-11 RX ADMIN — POTASSIUM CHLORIDE 10 MEQ: 7.46 INJECTION, SOLUTION INTRAVENOUS at 10:36

## 2025-08-11 RX ADMIN — METOPROLOL SUCCINATE 25 MG: 25 TABLET, EXTENDED RELEASE ORAL at 08:05

## 2025-08-11 RX ADMIN — SODIUM CHLORIDE, PRESERVATIVE FREE 10 ML: 5 INJECTION INTRAVENOUS at 21:22

## 2025-08-11 RX ADMIN — Medication 2 G: at 23:24

## 2025-08-11 RX ADMIN — ATORVASTATIN CALCIUM 40 MG: 40 TABLET, FILM COATED ORAL at 08:08

## 2025-08-11 RX ADMIN — POTASSIUM CHLORIDE 10 MEQ: 7.46 INJECTION, SOLUTION INTRAVENOUS at 11:42

## 2025-08-11 RX ADMIN — Medication 1000 UNITS: at 08:05

## 2025-08-11 RX ADMIN — FOLIC ACID 1 MG: 1 TABLET ORAL at 08:05

## 2025-08-11 ASSESSMENT — PAIN SCALES - GENERAL
PAINLEVEL_OUTOF10: 0
PAINLEVEL_OUTOF10: 0

## 2025-08-12 VITALS
RESPIRATION RATE: 18 BRPM | OXYGEN SATURATION: 99 % | WEIGHT: 201.2 LBS | BODY MASS INDEX: 30.49 KG/M2 | SYSTOLIC BLOOD PRESSURE: 131 MMHG | TEMPERATURE: 98 F | HEART RATE: 80 BPM | DIASTOLIC BLOOD PRESSURE: 69 MMHG | HEIGHT: 68 IN

## 2025-08-12 LAB
ANION GAP SERPL CALCULATED.3IONS-SCNC: 10 MMOL/L (ref 7–16)
BASOPHILS # BLD: 0.01 K/UL (ref 0–0.2)
BASOPHILS NFR BLD: 0 % (ref 0–2)
BUN SERPL-MCNC: 11 MG/DL (ref 8–23)
CALCIUM SERPL-MCNC: 7.7 MG/DL (ref 8.8–10.2)
CHLORIDE SERPL-SCNC: 100 MMOL/L (ref 98–107)
CO2 SERPL-SCNC: 18 MMOL/L (ref 22–29)
CREAT SERPL-MCNC: 1.1 MG/DL (ref 0.7–1.2)
EOSINOPHIL # BLD: 0.1 K/UL (ref 0.05–0.5)
EOSINOPHILS RELATIVE PERCENT: 2 % (ref 0–6)
ERYTHROCYTE [DISTWIDTH] IN BLOOD BY AUTOMATED COUNT: 14.2 % (ref 11.5–15)
GFR, ESTIMATED: 66 ML/MIN/1.73M2
GLUCOSE SERPL-MCNC: 99 MG/DL (ref 74–99)
HCT VFR BLD AUTO: 22.2 % (ref 37–54)
HGB BLD-MCNC: 7.9 G/DL (ref 12.5–16.5)
IMM GRANULOCYTES # BLD AUTO: 0.1 K/UL (ref 0–0.58)
IMM GRANULOCYTES NFR BLD: 2 % (ref 0–5)
INR PPP: 2.7
LYMPHOCYTES NFR BLD: 0.6 K/UL (ref 1.5–4)
LYMPHOCYTES RELATIVE PERCENT: 10 % (ref 20–42)
MCH RBC QN AUTO: 33.2 PG (ref 26–35)
MCHC RBC AUTO-ENTMCNC: 35.6 G/DL (ref 32–34.5)
MCV RBC AUTO: 93.3 FL (ref 80–99.9)
MONOCYTES NFR BLD: 0.52 K/UL (ref 0.1–0.95)
MONOCYTES NFR BLD: 9 % (ref 2–12)
NEUTROPHILS NFR BLD: 77 % (ref 43–80)
NEUTS SEG NFR BLD: 4.5 K/UL (ref 1.8–7.3)
PLATELET # BLD AUTO: 277 K/UL (ref 130–450)
PMV BLD AUTO: 9 FL (ref 7–12)
POTASSIUM SERPL-SCNC: 3.5 MMOL/L (ref 3.5–5.1)
PROTHROMBIN TIME: 29.8 SEC (ref 9.3–12.4)
RBC # BLD AUTO: 2.38 M/UL (ref 3.8–5.8)
SODIUM SERPL-SCNC: 128 MMOL/L (ref 136–145)
WBC OTHER # BLD: 5.8 K/UL (ref 4.5–11.5)

## 2025-08-12 PROCEDURE — 6370000000 HC RX 637 (ALT 250 FOR IP): Performed by: NURSE PRACTITIONER

## 2025-08-12 PROCEDURE — 99239 HOSP IP/OBS DSCHRG MGMT >30: CPT | Performed by: STUDENT IN AN ORGANIZED HEALTH CARE EDUCATION/TRAINING PROGRAM

## 2025-08-12 PROCEDURE — 85610 PROTHROMBIN TIME: CPT

## 2025-08-12 PROCEDURE — 85025 COMPLETE CBC W/AUTO DIFF WBC: CPT

## 2025-08-12 PROCEDURE — 6370000000 HC RX 637 (ALT 250 FOR IP)

## 2025-08-12 PROCEDURE — 6360000002 HC RX W HCPCS: Performed by: STUDENT IN AN ORGANIZED HEALTH CARE EDUCATION/TRAINING PROGRAM

## 2025-08-12 PROCEDURE — 2500000003 HC RX 250 WO HCPCS: Performed by: NURSE PRACTITIONER

## 2025-08-12 PROCEDURE — 6370000000 HC RX 637 (ALT 250 FOR IP): Performed by: INTERNAL MEDICINE

## 2025-08-12 PROCEDURE — 80048 BASIC METABOLIC PNL TOTAL CA: CPT

## 2025-08-12 RX ORDER — TORSEMIDE 20 MG/1
20 TABLET ORAL DAILY
Qty: 30 TABLET | Refills: 0
Start: 2025-08-12

## 2025-08-12 RX ORDER — WARFARIN SODIUM 2.5 MG/1
2.5 TABLET ORAL
Status: DISCONTINUED | OUTPATIENT
Start: 2025-08-12 | End: 2025-08-12 | Stop reason: HOSPADM

## 2025-08-12 RX ADMIN — SODIUM CHLORIDE, PRESERVATIVE FREE 10 ML: 5 INJECTION INTRAVENOUS at 08:11

## 2025-08-12 RX ADMIN — Medication 1 G: at 08:09

## 2025-08-12 RX ADMIN — LEVOTHYROXINE SODIUM 200 MCG: 0.1 TABLET ORAL at 05:40

## 2025-08-12 RX ADMIN — DULOXETINE 20 MG: 20 CAPSULE, DELAYED RELEASE ORAL at 08:09

## 2025-08-12 RX ADMIN — ENOXAPARIN SODIUM 90 MG: 100 INJECTION SUBCUTANEOUS at 08:11

## 2025-08-12 RX ADMIN — Medication 100 MG: at 08:09

## 2025-08-12 RX ADMIN — Medication 1000 UNITS: at 08:08

## 2025-08-12 RX ADMIN — Medication 1 G: at 12:10

## 2025-08-12 RX ADMIN — ASPIRIN 81 MG: 81 TABLET, COATED ORAL at 08:09

## 2025-08-12 RX ADMIN — ATORVASTATIN CALCIUM 40 MG: 40 TABLET, FILM COATED ORAL at 08:09

## 2025-08-12 RX ADMIN — MUPIROCIN: 20 OINTMENT TOPICAL at 08:12

## 2025-08-12 RX ADMIN — METOPROLOL SUCCINATE 25 MG: 25 TABLET, EXTENDED RELEASE ORAL at 08:09

## 2025-08-12 RX ADMIN — FOLIC ACID 1 MG: 1 TABLET ORAL at 08:09

## 2025-08-12 ASSESSMENT — PAIN SCALES - GENERAL: PAINLEVEL_OUTOF10: 0

## 2025-08-13 ENCOUNTER — TELEPHONE (OUTPATIENT)
Dept: FAMILY MEDICINE CLINIC | Age: 85
End: 2025-08-13

## 2025-08-14 ENCOUNTER — TELEPHONE (OUTPATIENT)
Dept: FAMILY MEDICINE CLINIC | Age: 85
End: 2025-08-14

## (undated) DEVICE — SYRINGE MED 50ML LUERLOCK TIP

## (undated) DEVICE — ELECTRODE PT RET AD L9FT HI MOIST COND ADH HYDRGEL CORDED

## (undated) DEVICE — BANDAGE COMPR W6INXL12FT SMOOTH FOR LIMB EXSANG ESMARCH

## (undated) DEVICE — BOOT POS LEG DEMAYO

## (undated) DEVICE — DRAPE,TOP,102X53,STERILE: Brand: MEDLINE

## (undated) DEVICE — DRAPE,REIN 53X77,STERILE: Brand: MEDLINE

## (undated) DEVICE — SWABSTICK SURG PREP BENZOIN TINCTURE SINGLE ST

## (undated) DEVICE — DRESSING FOAM W4XL12IN AG SIL ADH ANTIMIC POSTOP OPTIFOAM

## (undated) DEVICE — TOWEL,OR,DSP,ST,BLUE,STD,6/PK,12PK/CS: Brand: MEDLINE

## (undated) DEVICE — PADDING CAST W6INXL4YD COT LO LINTING WYTEX

## (undated) DEVICE — Z DISCONTINUED PER MEDLINE USE 2741943 DRESSING AQUACEL 10 IN ALG W9XL25CM SIL CVR WTRPRF VIR BACT BARR ANTIMIC

## (undated) DEVICE — TRAY STRYKER MAKO TOTAL KNEE ARRAY

## (undated) DEVICE — 3M™ COBAN™ NL STERILE NON-LATEX SELF-ADHERENT WRAP, 2084S, 4 IN X 5 YD (10 CM X 4,5 M), 18 ROLLS/CASE: Brand: 3M™ COBAN™

## (undated) DEVICE — Z INACTIVE USE 2660664 SOLUTION IRRIG 3000ML 0.9% SOD CHL USP UROMATIC PLAS CONT

## (undated) DEVICE — SPONGE LAP W18XL18IN WHT COT 4 PLY FLD STRUNG RADPQ DISP ST

## (undated) DEVICE — TUBE IRRIG HNDPC HI FLO TP INTRPULS W/SUCTION TUBE

## (undated) DEVICE — GOWN,SIRUS,POLYRNF,BRTHSLV,XL,30/CS: Brand: MEDLINE

## (undated) DEVICE — PIN BNE FIX TEMP L110MM DIA4MM MAKO

## (undated) DEVICE — KIT INT FIX FEM TIB CKPT MAKOPLASTY

## (undated) DEVICE — STERILE PVP: Brand: MEDLINE INDUSTRIES, INC.

## (undated) DEVICE — SOLUTION IV IRRIG POUR BRL 0.9% SODIUM CHL 2F7124

## (undated) DEVICE — NEEDLE HYPO 18GA L1.5IN PNK POLYPR HUB S STL REG BVL STR

## (undated) DEVICE — DRESSING COMP W4XL4IN N ADH PD W2.5XL2.5IN GZ BORDERED ADH

## (undated) DEVICE — TRAY STRYKER MAKO TOTAL KNEE POWER

## (undated) DEVICE — SUTURE VCRL + SZ 3-0 L27IN ABSRB UD L24MM PS-1 3/8 CIR PRIM VCP936H

## (undated) DEVICE — SUTURE VCRL SZ 3-0 L18IN ABSRB UD PS-2 L19MM 1/2 CIR J497G

## (undated) DEVICE — TAPE ADH W3INXL10YD WHT COT WVN BK POWERFUL RUB BASE HIGHLY

## (undated) DEVICE — KIT PLT RATIO DISPNS KT 2IN CANN TIP SPRY TIP DISP MAGELLAN

## (undated) DEVICE — 4-PORT MANIFOLD: Brand: NEPTUNE 2

## (undated) DEVICE — PACK PROCEDURE SURG GEN CUST

## (undated) DEVICE — BOWL AND CEMENT CARTRIDGE WITH BREAKAWAY FEMORAL NOZZLE: Brand: ACM

## (undated) DEVICE — KIT DRP FOR RIO ROBOTIC ARM ASST SYS

## (undated) DEVICE — ZIMMER® STERILE DISPOSABLE TOURNIQUET CUFF WITH PLC, DUAL PORT, SINGLE BLADDER, 34 IN. (86 CM)

## (undated) DEVICE — DOUBLE BASIN SET: Brand: MEDLINE INDUSTRIES, INC.

## (undated) DEVICE — BLADE SURG SAW STD S STL OSC W/ SERR EDGE DISP

## (undated) DEVICE — CORD RETRCT SIL

## (undated) DEVICE — SUTURE ETHLN SZ 3-0 L18IN NONABSORBABLE BLK L24MM PS-1 3/8 1663G

## (undated) DEVICE — SOLUTION IV IRRIG WATER 1000ML POUR BRL 2F7114

## (undated) DEVICE — KIT TRK KNEE PROC VIZADISC

## (undated) DEVICE — COVER HNDL LT DISP

## (undated) DEVICE — SUTURE ETHBND EXCEL SZ 1 L30IN NONABSORBABLE GRN OS-8 L40MM X548H

## (undated) DEVICE — TOTAL KNEE PK

## (undated) DEVICE — PIN BNE FIX TEMP L140MM DIA4MM MAKO

## (undated) DEVICE — APPLICATOR PREP 26ML 0.7% IOD POVACRYLEX 74% ISO ALC ST

## (undated) DEVICE — BNDG,ELSTC,MATRIX,STRL,6"X5YD,LF,HOOK&LP: Brand: MEDLINE

## (undated) DEVICE — TUBING, SUCTION, 9/32" X 10', STRAIGHT: Brand: MEDLINE

## (undated) DEVICE — TRAY STRYKER MAKO LEG POSITIONER INSTR